# Patient Record
Sex: FEMALE | Race: BLACK OR AFRICAN AMERICAN | NOT HISPANIC OR LATINO | Employment: OTHER | URBAN - METROPOLITAN AREA
[De-identification: names, ages, dates, MRNs, and addresses within clinical notes are randomized per-mention and may not be internally consistent; named-entity substitution may affect disease eponyms.]

---

## 2021-01-03 ENCOUNTER — APPOINTMENT (EMERGENCY)
Dept: RADIOLOGY | Facility: HOSPITAL | Age: 71
End: 2021-01-03
Payer: COMMERCIAL

## 2021-01-03 ENCOUNTER — APPOINTMENT (INPATIENT)
Dept: NON INVASIVE DIAGNOSTICS | Facility: HOSPITAL | Age: 71
DRG: 270 | End: 2021-01-03
Payer: COMMERCIAL

## 2021-01-03 ENCOUNTER — APPOINTMENT (INPATIENT)
Dept: RADIOLOGY | Facility: HOSPITAL | Age: 71
DRG: 270 | End: 2021-01-03
Payer: COMMERCIAL

## 2021-01-03 ENCOUNTER — HOSPITAL ENCOUNTER (INPATIENT)
Facility: HOSPITAL | Age: 71
LOS: 24 days | Discharge: HOME | DRG: 270 | End: 2021-01-27
Attending: INTERNAL MEDICINE | Admitting: INTERNAL MEDICINE
Payer: COMMERCIAL

## 2021-01-03 ENCOUNTER — HOSPITAL ENCOUNTER (EMERGENCY)
Facility: HOSPITAL | Age: 71
End: 2021-01-03
Payer: COMMERCIAL

## 2021-01-03 VITALS
HEIGHT: 63 IN | TEMPERATURE: 97 F | DIASTOLIC BLOOD PRESSURE: 76 MMHG | HEART RATE: 100 BPM | RESPIRATION RATE: 18 BRPM | OXYGEN SATURATION: 96 % | WEIGHT: 198 LBS | BODY MASS INDEX: 35.08 KG/M2 | SYSTOLIC BLOOD PRESSURE: 148 MMHG

## 2021-01-03 DIAGNOSIS — M79.601 PAIN AND SWELLING OF RIGHT UPPER EXTREMITY: ICD-10-CM

## 2021-01-03 DIAGNOSIS — I51.7 CARDIOMEGALY: ICD-10-CM

## 2021-01-03 DIAGNOSIS — I10 ESSENTIAL HYPERTENSION: ICD-10-CM

## 2021-01-03 DIAGNOSIS — M79.89 PAIN AND SWELLING OF RIGHT UPPER EXTREMITY: ICD-10-CM

## 2021-01-03 DIAGNOSIS — I31.3 PERICARDIAL EFFUSION WITHOUT CARDIAC TAMPONADE: ICD-10-CM

## 2021-01-03 DIAGNOSIS — R06.02 SHORTNESS OF BREATH: ICD-10-CM

## 2021-01-03 DIAGNOSIS — I48.92 ATRIAL FLUTTER, PAROXYSMAL (HCC): Primary | ICD-10-CM

## 2021-01-03 DIAGNOSIS — I48.92 ATRIAL FLUTTER (HCC): ICD-10-CM

## 2021-01-03 DIAGNOSIS — J91.0 MALIGNANT PLEURAL EFFUSION: ICD-10-CM

## 2021-01-03 DIAGNOSIS — I31.3 PERICARDIAL EFFUSION: ICD-10-CM

## 2021-01-03 DIAGNOSIS — C49.A0 GASTROINTESTINAL STROMAL TUMOR (GIST) (HCC): Primary | ICD-10-CM

## 2021-01-03 DIAGNOSIS — J98.59 MEDIASTINAL MASS: ICD-10-CM

## 2021-01-03 DIAGNOSIS — R16.0 HYPODENSE MASS OF LIVER: ICD-10-CM

## 2021-01-03 LAB
ABO GROUP BLD: NORMAL
ABO GROUP BLD: NORMAL
ALBUMIN SERPL BCP-MCNC: 3.1 G/DL (ref 3.5–5)
ALBUMIN SERPL BCP-MCNC: 3.6 G/DL (ref 3.4–4.8)
ALP SERPL-CCNC: 106.9 U/L (ref 35–140)
ALP SERPL-CCNC: 119 U/L (ref 46–116)
ALT SERPL W P-5'-P-CCNC: 21 U/L (ref 5–54)
ALT SERPL W P-5'-P-CCNC: 26 U/L (ref 12–78)
ANION GAP SERPL CALCULATED.3IONS-SCNC: 7 MMOL/L (ref 4–13)
ANION GAP SERPL CALCULATED.3IONS-SCNC: 9 MMOL/L (ref 4–13)
ANISOCYTOSIS BLD QL SMEAR: PRESENT
APTT PPP: 28 SECONDS (ref 23–31)
APTT PPP: 36 SECONDS (ref 23–37)
AST SERPL W P-5'-P-CCNC: 17 U/L (ref 5–45)
AST SERPL W P-5'-P-CCNC: 19 U/L (ref 15–41)
ATRIAL RATE: 138 BPM
ATRIAL RATE: 139 BPM
ATRIAL RATE: 267 BPM
BASOPHILS # BLD MANUAL: 0.14 THOUSAND/UL (ref 0–0.1)
BASOPHILS NFR MAR MANUAL: 1 % (ref 0–1)
BILIRUB DIRECT SERPL-MCNC: 0.55 MG/DL (ref 0–0.2)
BILIRUB SERPL-MCNC: 0.93 MG/DL (ref 0.3–1.2)
BILIRUB SERPL-MCNC: 1.07 MG/DL (ref 0.2–1)
BLD GP AB SCN SERPL QL: NEGATIVE
BNP SERPL-MCNC: 113 PG/ML (ref 1–100)
BUN SERPL-MCNC: 19 MG/DL (ref 5–25)
BUN SERPL-MCNC: 22 MG/DL (ref 6–20)
BURR CELLS BLD QL SMEAR: PRESENT
CALCIUM ALBUM COR SERPL-MCNC: 9.4 MG/DL (ref 8.3–10.1)
CALCIUM SERPL-MCNC: 8.6 MG/DL (ref 8.4–10.2)
CALCIUM SERPL-MCNC: 8.7 MG/DL (ref 8.3–10.1)
CHLORIDE SERPL-SCNC: 106 MMOL/L (ref 96–108)
CHLORIDE SERPL-SCNC: 112 MMOL/L (ref 100–108)
CO2 SERPL-SCNC: 23 MMOL/L (ref 21–32)
CO2 SERPL-SCNC: 24 MMOL/L (ref 22–33)
CREAT SERPL-MCNC: 0.94 MG/DL (ref 0.6–1.3)
CREAT SERPL-MCNC: 1.07 MG/DL (ref 0.4–1.1)
D DIMER PPP FEU-MCNC: 8.37 MG/L FEU (ref 0.19–0.49)
EOSINOPHIL # BLD MANUAL: 0.7 THOUSAND/UL (ref 0–0.4)
EOSINOPHIL NFR BLD MANUAL: 5 % (ref 0–6)
ERYTHROCYTE [DISTWIDTH] IN BLOOD BY AUTOMATED COUNT: 18.9 % (ref 11.6–15.1)
ERYTHROCYTE [DISTWIDTH] IN BLOOD BY AUTOMATED COUNT: 19.7 % (ref 11.6–15.1)
EST. AVERAGE GLUCOSE BLD GHB EST-MCNC: 151 MG/DL
FLUAV RNA RESP QL NAA+PROBE: NEGATIVE
FLUBV RNA RESP QL NAA+PROBE: NEGATIVE
GFR SERPL CREATININE-BSD FRML MDRD: 60 ML/MIN/1.73SQ M
GFR SERPL CREATININE-BSD FRML MDRD: 70 ML/MIN/1.73SQ M
GIANT PLATELETS BLD QL SMEAR: PRESENT
GLUCOSE SERPL-MCNC: 119 MG/DL (ref 65–140)
GLUCOSE SERPL-MCNC: 136 MG/DL (ref 65–140)
HBA1C MFR BLD: 6.9 %
HCT VFR BLD AUTO: 34.1 % (ref 34.8–46.1)
HCT VFR BLD AUTO: 36.5 % (ref 34.8–46.1)
HELMET CELLS BLD QL SMEAR: PRESENT
HGB BLD-MCNC: 10.8 G/DL (ref 11.5–15.4)
HGB BLD-MCNC: 10.9 G/DL (ref 11.5–15.4)
HYPERCHROMIA BLD QL SMEAR: PRESENT
INR PPP: 1.42 (ref 0.9–1.1)
INR PPP: 1.64 (ref 0.84–1.19)
LYMPHOCYTES # BLD AUTO: 1.26 THOUSAND/UL (ref 0.6–4.47)
LYMPHOCYTES # BLD AUTO: 9 % (ref 14–44)
MAGNESIUM SERPL-MCNC: 2.3 MG/DL (ref 1.6–2.6)
MCH RBC QN AUTO: 20.6 PG (ref 26.8–34.3)
MCH RBC QN AUTO: 20.8 PG (ref 26.8–34.3)
MCHC RBC AUTO-ENTMCNC: 29.6 G/DL (ref 31.4–37.4)
MCHC RBC AUTO-ENTMCNC: 32 G/DL (ref 31.4–37.4)
MCV RBC AUTO: 65 FL (ref 82–98)
MCV RBC AUTO: 70 FL (ref 82–98)
MICROCYTES BLD QL AUTO: PRESENT
MONOCYTES # BLD AUTO: 1.26 THOUSAND/UL (ref 0–1.22)
MONOCYTES NFR BLD: 9 % (ref 4–12)
NEUTROPHILS # BLD MANUAL: 10.66 THOUSAND/UL (ref 1.85–7.62)
NEUTS SEG NFR BLD AUTO: 76 % (ref 43–75)
OVALOCYTES BLD QL SMEAR: PRESENT
P AXIS: 249 DEGREES
P AXIS: 250 DEGREES
P AXIS: 260 DEGREES
PLATELET # BLD AUTO: 384 THOUSANDS/UL (ref 149–390)
PLATELET # BLD AUTO: 391 THOUSANDS/UL (ref 149–390)
PLATELET BLD QL SMEAR: ADEQUATE
PMV BLD AUTO: 11.4 FL (ref 8.9–12.7)
PMV BLD AUTO: 11.4 FL (ref 8.9–12.7)
POIKILOCYTOSIS BLD QL SMEAR: PRESENT
POTASSIUM SERPL-SCNC: 4.2 MMOL/L (ref 3.5–5.3)
POTASSIUM SERPL-SCNC: 4.4 MMOL/L (ref 3.5–5)
PR INTERVAL: 143 MS
PR INTERVAL: 156 MS
PROT SERPL-MCNC: 6.7 G/DL (ref 6.4–8.3)
PROT SERPL-MCNC: 7 G/DL (ref 6.4–8.2)
PROTHROMBIN TIME: 15.8 SECONDS (ref 9.5–12.1)
PROTHROMBIN TIME: 19.3 SECONDS (ref 11.6–14.5)
QRS AXIS: -44 DEGREES
QRS AXIS: 19 DEGREES
QRS AXIS: 51 DEGREES
QRSD INTERVAL: 138 MS
QRSD INTERVAL: 66 MS
QRSD INTERVAL: 73 MS
QT INTERVAL: 288 MS
QT INTERVAL: 368 MS
QT INTERVAL: 382 MS
QTC INTERVAL: 415 MS
QTC INTERVAL: 438 MS
QTC INTERVAL: 558 MS
RBC # BLD AUTO: 5.24 MILLION/UL (ref 3.81–5.12)
RBC # BLD AUTO: 5.25 MILLION/UL (ref 3.81–5.12)
RBC MORPH BLD: PRESENT
RH BLD: POSITIVE
RH BLD: POSITIVE
RSV RNA RESP QL NAA+PROBE: NEGATIVE
SARS-COV-2 RNA RESP QL NAA+PROBE: NEGATIVE
SCHISTOCYTES BLD QL SMEAR: PRESENT
SMUDGE CELLS BLD QL SMEAR: PRESENT
SODIUM SERPL-SCNC: 139 MMOL/L (ref 133–145)
SODIUM SERPL-SCNC: 142 MMOL/L (ref 136–145)
SPECIMEN EXPIRATION DATE: NORMAL
T WAVE AXIS: 102 DEGREES
T WAVE AXIS: 257 DEGREES
T WAVE AXIS: 48 DEGREES
TARGETS BLD QL SMEAR: PRESENT
TOTAL CELLS COUNTED SPEC: 100
TROPONIN I SERPL-MCNC: <0.02 NG/ML
TROPONIN I SERPL-MCNC: <0.02 NG/ML
TROPONIN I SERPL-MCNC: <0.03 NG/ML (ref 0–0.07)
TSH SERPL DL<=0.05 MIU/L-ACNC: 1.21 UIU/ML (ref 0.36–3.74)
VENTRICULAR RATE: 138 BPM
VENTRICULAR RATE: 139 BPM
VENTRICULAR RATE: 71 BPM
WBC # BLD AUTO: 14.02 THOUSAND/UL (ref 4.31–10.16)
WBC # BLD AUTO: 15.59 THOUSAND/UL (ref 4.31–10.16)

## 2021-01-03 PROCEDURE — 80053 COMPREHEN METABOLIC PANEL: CPT

## 2021-01-03 PROCEDURE — 71275 CT ANGIOGRAPHY CHEST: CPT

## 2021-01-03 PROCEDURE — 93010 ELECTROCARDIOGRAM REPORT: CPT | Performed by: INTERNAL MEDICINE

## 2021-01-03 PROCEDURE — 96375 TX/PRO/DX INJ NEW DRUG ADDON: CPT

## 2021-01-03 PROCEDURE — 85027 COMPLETE CBC AUTOMATED: CPT | Performed by: SURGERY

## 2021-01-03 PROCEDURE — 84484 ASSAY OF TROPONIN QUANT: CPT | Performed by: INTERNAL MEDICINE

## 2021-01-03 PROCEDURE — 85027 COMPLETE CBC AUTOMATED: CPT

## 2021-01-03 PROCEDURE — 0241U HB NFCT DS VIR RESP RNA 4 TRGT: CPT

## 2021-01-03 PROCEDURE — 99285 EMERGENCY DEPT VISIT HI MDM: CPT

## 2021-01-03 PROCEDURE — 86850 RBC ANTIBODY SCREEN: CPT | Performed by: SURGERY

## 2021-01-03 PROCEDURE — 36415 COLL VENOUS BLD VENIPUNCTURE: CPT

## 2021-01-03 PROCEDURE — 82248 BILIRUBIN DIRECT: CPT | Performed by: INTERNAL MEDICINE

## 2021-01-03 PROCEDURE — 93306 TTE W/DOPPLER COMPLETE: CPT | Performed by: INTERNAL MEDICINE

## 2021-01-03 PROCEDURE — 84443 ASSAY THYROID STIM HORMONE: CPT | Performed by: INTERNAL MEDICINE

## 2021-01-03 PROCEDURE — 85007 BL SMEAR W/DIFF WBC COUNT: CPT

## 2021-01-03 PROCEDURE — 83735 ASSAY OF MAGNESIUM: CPT | Performed by: INTERNAL MEDICINE

## 2021-01-03 PROCEDURE — 85730 THROMBOPLASTIN TIME PARTIAL: CPT | Performed by: SURGERY

## 2021-01-03 PROCEDURE — 93005 ELECTROCARDIOGRAM TRACING: CPT

## 2021-01-03 PROCEDURE — G1004 CDSM NDSC: HCPCS

## 2021-01-03 PROCEDURE — 71045 X-RAY EXAM CHEST 1 VIEW: CPT

## 2021-01-03 PROCEDURE — 99252 IP/OBS CONSLTJ NEW/EST SF 35: CPT | Performed by: INTERNAL MEDICINE

## 2021-01-03 PROCEDURE — 84484 ASSAY OF TROPONIN QUANT: CPT

## 2021-01-03 PROCEDURE — 85379 FIBRIN DEGRADATION QUANT: CPT

## 2021-01-03 PROCEDURE — 86900 BLOOD TYPING SEROLOGIC ABO: CPT | Performed by: SURGERY

## 2021-01-03 PROCEDURE — 96376 TX/PRO/DX INJ SAME DRUG ADON: CPT

## 2021-01-03 PROCEDURE — 86920 COMPATIBILITY TEST SPIN: CPT

## 2021-01-03 PROCEDURE — 83880 ASSAY OF NATRIURETIC PEPTIDE: CPT

## 2021-01-03 PROCEDURE — 96365 THER/PROPH/DIAG IV INF INIT: CPT

## 2021-01-03 PROCEDURE — 85610 PROTHROMBIN TIME: CPT

## 2021-01-03 PROCEDURE — 83036 HEMOGLOBIN GLYCOSYLATED A1C: CPT | Performed by: INTERNAL MEDICINE

## 2021-01-03 PROCEDURE — 80053 COMPREHEN METABOLIC PANEL: CPT | Performed by: INTERNAL MEDICINE

## 2021-01-03 PROCEDURE — 96366 THER/PROPH/DIAG IV INF ADDON: CPT

## 2021-01-03 PROCEDURE — 85730 THROMBOPLASTIN TIME PARTIAL: CPT

## 2021-01-03 PROCEDURE — 85610 PROTHROMBIN TIME: CPT | Performed by: SURGERY

## 2021-01-03 PROCEDURE — NC001 PR NO CHARGE: Performed by: THORACIC SURGERY (CARDIOTHORACIC VASCULAR SURGERY)

## 2021-01-03 PROCEDURE — 93306 TTE W/DOPPLER COMPLETE: CPT

## 2021-01-03 PROCEDURE — 86901 BLOOD TYPING SEROLOGIC RH(D): CPT | Performed by: SURGERY

## 2021-01-03 PROCEDURE — 99223 1ST HOSP IP/OBS HIGH 75: CPT | Performed by: INTERNAL MEDICINE

## 2021-01-03 RX ORDER — DOCUSATE SODIUM 100 MG/1
100 CAPSULE, LIQUID FILLED ORAL 2 TIMES DAILY
Status: DISCONTINUED | OUTPATIENT
Start: 2021-01-03 | End: 2021-01-27 | Stop reason: HOSPADM

## 2021-01-03 RX ORDER — MAGNESIUM HYDROXIDE/ALUMINUM HYDROXICE/SIMETHICONE 120; 1200; 1200 MG/30ML; MG/30ML; MG/30ML
30 SUSPENSION ORAL EVERY 6 HOURS PRN
Status: DISCONTINUED | OUTPATIENT
Start: 2021-01-03 | End: 2021-01-27 | Stop reason: HOSPADM

## 2021-01-03 RX ORDER — HEPARIN SODIUM 10000 [USP'U]/100ML
3-30 INJECTION, SOLUTION INTRAVENOUS
Status: DISCONTINUED | OUTPATIENT
Start: 2021-01-03 | End: 2021-01-04

## 2021-01-03 RX ORDER — DILTIAZEM HYDROCHLORIDE 5 MG/ML
15 INJECTION INTRAVENOUS ONCE
Status: COMPLETED | OUTPATIENT
Start: 2021-01-03 | End: 2021-01-03

## 2021-01-03 RX ORDER — HEPARIN SODIUM 1000 [USP'U]/ML
6800 INJECTION, SOLUTION INTRAVENOUS; SUBCUTANEOUS ONCE
Status: COMPLETED | OUTPATIENT
Start: 2021-01-03 | End: 2021-01-03

## 2021-01-03 RX ORDER — SENNOSIDES 8.6 MG
1 TABLET ORAL DAILY
Status: DISCONTINUED | OUTPATIENT
Start: 2021-01-03 | End: 2021-01-27 | Stop reason: HOSPADM

## 2021-01-03 RX ORDER — CEFAZOLIN SODIUM 2 G/50ML
2000 SOLUTION INTRAVENOUS
Status: DISPENSED | OUTPATIENT
Start: 2021-01-04 | End: 2021-01-05

## 2021-01-03 RX ORDER — FUROSEMIDE 10 MG/ML
40 INJECTION INTRAMUSCULAR; INTRAVENOUS ONCE
Status: COMPLETED | OUTPATIENT
Start: 2021-01-03 | End: 2021-01-03

## 2021-01-03 RX ORDER — METOPROLOL TARTRATE 5 MG/5ML
5 INJECTION INTRAVENOUS ONCE
Status: COMPLETED | OUTPATIENT
Start: 2021-01-03 | End: 2021-01-03

## 2021-01-03 RX ORDER — LISINOPRIL 30 MG/1
30 TABLET ORAL DAILY
COMMUNITY
End: 2021-01-27 | Stop reason: HOSPADM

## 2021-01-03 RX ORDER — ONDANSETRON 2 MG/ML
4 INJECTION INTRAMUSCULAR; INTRAVENOUS EVERY 6 HOURS PRN
Status: DISCONTINUED | OUTPATIENT
Start: 2021-01-03 | End: 2021-01-27 | Stop reason: HOSPADM

## 2021-01-03 RX ADMIN — DOCUSATE SODIUM 100 MG: 100 CAPSULE, LIQUID FILLED ORAL at 09:35

## 2021-01-03 RX ADMIN — HEPARIN SODIUM 18 UNITS/KG/HR: 10000 INJECTION, SOLUTION INTRAVENOUS at 21:57

## 2021-01-03 RX ADMIN — DILTIAZEM HYDROCHLORIDE 15 MG/HR: 5 INJECTION INTRAVENOUS at 09:33

## 2021-01-03 RX ADMIN — FUROSEMIDE 40 MG: 10 INJECTION, SOLUTION INTRAMUSCULAR; INTRAVENOUS at 12:59

## 2021-01-03 RX ADMIN — METOPROLOL TARTRATE 25 MG: 25 TABLET, FILM COATED ORAL at 20:17

## 2021-01-03 RX ADMIN — STANDARDIZED SENNA CONCENTRATE 8.6 MG: 8.6 TABLET ORAL at 09:35

## 2021-01-03 RX ADMIN — DILTIAZEM HYDROCHLORIDE 5 MG/HR: 5 INJECTION INTRAVENOUS at 03:41

## 2021-01-03 RX ADMIN — DILTIAZEM HYDROCHLORIDE 15 MG: 5 INJECTION, SOLUTION INTRAVENOUS at 03:33

## 2021-01-03 RX ADMIN — METOPROLOL TARTRATE 25 MG: 25 TABLET, FILM COATED ORAL at 09:35

## 2021-01-03 RX ADMIN — HEPARIN SODIUM 6800 UNITS: 1000 INJECTION INTRAVENOUS; SUBCUTANEOUS at 20:17

## 2021-01-03 RX ADMIN — METOROPROLOL TARTRATE 5 MG: 5 INJECTION, SOLUTION INTRAVENOUS at 01:08

## 2021-01-03 RX ADMIN — IOHEXOL 85 ML: 350 INJECTION, SOLUTION INTRAVENOUS at 16:43

## 2021-01-03 RX ADMIN — ENOXAPARIN SODIUM 90 MG: 100 INJECTION SUBCUTANEOUS at 11:26

## 2021-01-03 NOTE — ED NOTES
Patient in [id number]  Repeat EKG completed and tigertexted to Beulah Jin and made aware of change in patient status  Awaiting Cardiology to see patient       Ashli Radnall RN  01/03/21 1547

## 2021-01-03 NOTE — EMTALA/ACUTE CARE TRANSFER
[facility] EMERGENCY DEPARTMENT  [address] [facility] [address]  Dept: [phone]      EMTALA TRANSFER CONSENT    NAME Geovanni DORANTES 1950                              MRN [id number]    I have been informed of my rights regarding examination, treatment, and transfer   by Dr Dorian Zhang MD    Benefits: Specialized equipment and/or services available at the receiving facility (Include comment)________________________    Risks: Potential for delay in receiving treatment, Potential deterioration of medical condition, Loss of IV, Increased discomfort during transfer, Possible worsening of condition or death during transfer      Consent for Transfer:  I acknowledge that my medical condition has been evaluated and explained to me by the emergency department physician or other qualified medical person and/or my attending physician, who has recommended that I be transferred to the service of  Accepting Physician: Janiya Watson at [address] Name, [address] : [address]  The above potential benefits of such transfer, the potential risks associated with such transfer, and the probable risks of not being transferred have been explained to me, and I fully understand them  The doctor has explained that, in my case, the benefits of transfer outweigh the risks  I agree to be transferred  I authorize the performance of emergency medical procedures and treatments upon me in both transit and upon arrival at the receiving facility  Additionally, I authorize the release of any and all medical records to the receiving facility and request they be transported with me, if possible  I understand that the safest mode of transportation during a medical emergency is an ambulance and that the Hospital advocates the use of this mode of transport   Risks of traveling to the receiving facility by car, including absence of medical control, life sustaining equipment, such as oxygen, and medical personnel has been explained to me and I fully understand them  (TERRY CORRECT BOX BELOW)  [  ]  I consent to the stated transfer and to be transported by ambulance/helicopter  [  ]  I consent to the stated transfer, but refuse transportation by ambulance and accept full responsibility for my transportation by car  I understand the risks of non-ambulance transfers and I exonerate the Hospital and its staff from any deterioration in my condition that results from this refusal     X___________________________________________    DATE  21  TIME________  Signature of patient or legally responsible individual signing on patient behalf           RELATIONSHIP TO PATIENT_________________________          Provider Certification    NAME Soha Hightower                                         1950                              MRN [id number]    A medical screening exam was performed on the above named patient  Based on the examination:    Condition Necessitating Transfer There were no encounter diagnoses      Patient Condition: The patient has been stabilized such that within reasonable medical probability, no material deterioration of the patient condition or the condition of the unborn child(cherry) is likely to result from the transfer    Reason for Transfer: Level of Care needed not available at this facility    Transfer Requirements: Maximino Martinez   · Space available and qualified personnel available for treatment as acknowledged by    · Agreed to accept transfer and to provide appropriate medical treatment as acknowledged by       [place]  · Appropriate medical records of the examination and treatment of the patient are provided at the time of transfer   [address] _______  · Transfer will be performed by qualified personnel from    and appropriate transfer equipment as required, including the use of necessary and appropriate life support measures  Provider Certification: I have examined the patient and explained the following risks and benefits of being transferred/refusing transfer to the patient/family:         Based on these reasonable risks and benefits to the patient and/or the unborn child(cherry), and based upon the information available at the time of the patients examination, I certify that the medical benefits reasonably to be expected from the provision of appropriate medical treatments at another medical facility outweigh the increasing risks, if any, to the individuals medical condition, and in the case of labor to the unborn child, from effecting the transfer      X____________________________________________ DATE 01/03/21        TIME_______      ORIGINAL - SEND TO MEDICAL RECORDS   COPY - SEND WITH PATIENT DURING TRANSFER

## 2021-01-03 NOTE — PLAN OF CARE
Problem: Potential for Falls  Goal: Patient will remain free of falls  Description: INTERVENTIONS:  - Assess patient frequently for physical needs  -  Identify cognitive and physical deficits and behaviors that affect risk of falls  -  [place] fall precautions as indicated by assessment   - Educate patient/family on patient safety including physical limitations  - Instruct patient to call for assistance with activity based on assessment  - Modify environment to reduce risk of injury  - Consider OT/PT consult to assist with strengthening/mobility  Outcome: Progressing     Problem: SAFETY ADULT  Goal: Patient will remain free of falls  Description: INTERVENTIONS:  - Assess patient frequently for physical needs  -  Identify cognitive and physical deficits and behaviors that affect risk of falls    -  [place] fall precautions as indicated by assessment   - Educate patient/family on patient safety including physical limitations  - Instruct patient to call for assistance with activity based on assessment  - Modify environment to reduce risk of injury  - Consider OT/PT consult to assist with strengthening/mobility  Outcome: Progressing     Problem: CARDIOVASCULAR - ADULT  Goal: Maintains optimal cardiac output and hemodynamic stability  Description: INTERVENTIONS:  - Monitor I/O, vital signs and rhythm  - Monitor for S/S and trends of decreased cardiac output  - Administer and titrate ordered vasoactive medications to optimize hemodynamic stability  - Assess quality of pulses, skin color and temperature  - Assess for signs of decreased coronary artery perfusion  - Instruct patient to report change in severity of symptoms  Outcome: Progressing

## 2021-01-03 NOTE — ASSESSMENT & PLAN NOTE
Patient has sob, edema of lower extremities  Does not look like heart failure, will continue to monitor, BNP is 118  Lasix for now 20 mg ivss once

## 2021-01-03 NOTE — ED NOTES
Called pharmacy for Cardizem drip  Will be sent to ED when made       Abhishek Mares, GARRET  01/03/21 4932

## 2021-01-03 NOTE — CONSULTS
Consultation - Cardiology   Myrna Soares 65 y o  female MRN: [id number]  Unit/Bed#: ED 23 Encounter: [id number]      Assessment and Plan:  Principal Problem:    Atrial flutter ([address] )  Active Problems:    Essential hypertension    Shortness of breath    # Atrial flutter  # cardiomegaly on CXR  # CHF  - c/w cardizem gtt for now  - obtain a TTE  - diuresis with lasix 40 mg QD, monitor urine output and lytes  - Eventually patient will require TATYANA/cardioversion vs ablation if she does not self convert  - Lovenox for AC  Depending upon her insurance she can be switched to oral [facility] tomorrow    # HTN      History of Present Illness   Physician Requesting Consult: Gali Wall MD  Reason for Consult / Principal Problem: Atrial flutter  HPI: Myrna Soares is a 65y o  year old female with past medical history of hypertension presenting with intermittent cough and shortness of breath  Patient had intermittent cough for the past few days which was mostly dry although she did have clear phlegm during one episode  She has been having shortness of breath intermittently while playing with her grandchild but it is not consistent with exercise  She has also been noticing lower extremity swelling which has worsened recently  She can lie down flat and denies any chest pain, PND or palpitations  She denies any prior cardiac history, substance use history or smoking        EK:1 flutter       Inpatient consult to Cardiology  Consult performed by: Marshall Guerrero MD  Consult ordered by: Gali Wall MD        Review of Systems:  Review of Systems  10 point ROS negative except as mentioned in the HPI    Historical Information   Past Medical History:   Diagnosis Date    Hypertension      Past Surgical History:   Procedure Laterality Date    MASTECTOMY      left      Social History     Substance and Sexual Activity   Alcohol Use Yes    Frequency: Monthly or less    Drinks per session: 1 or 2    Binge frequency: Never     Social History     Substance and Sexual Activity   Drug Use Never     Social History     Tobacco Use   Smoking Status Never Smoker   Smokeless Tobacco Never Used     Family History: non-contributory    Meds/Allergies   all current active meds have been reviewed  No Known Allergies    Objective   Vitals: Blood pressure 90/52, pulse 68, temperature (!) 97 4 °F (36 3 °C), temperature source Oral, resp  rate 18, height 5' 4" (1 626 m), weight 89 8 kg (198 lb), SpO2 96 %  , Body mass index is 33 99 kg/m² ,   Orthostatic Blood Pressures      Most Recent Value   Blood Pressure  90/52 [Dr Mendoza made aware] filed at 01/03/2021 1043   Patient Position - Orthostatic VS  Lying filed at 01/03/2021 1043          No intake or output data in the 24 hours ending 01/03/21 1055    Invasive Devices     Peripheral Intravenous Line            Peripheral IV 01/03/21 Right Antecubital less than 1 day                Physical Exam:  Physical Exam  General: alert, oriented X 3 , comfortable  Neck: No JVD  Cardiac: normal S1, S2, S3 gallop present  Respiratory: normal breath sounds, no wheezes or crackles  Abdomen: soft and non-tender  Extremities: warm, no cyanosis, 1+ lower extremity edema      Lab Results:     Lab Results   Component Value Date    TROPONINI <0 02 01/03/2021    TROPONINI <0 03 01/03/2021       Lab Results   Component Value Date    CALCIUM 8 7 01/03/2021    K 4 2 01/03/2021    CO2 23 01/03/2021     (H) 01/03/2021    BUN 19 01/03/2021    CREATININE 0 94 01/03/2021       Lab Results   Component Value Date    WBC 14 02 (H) 01/03/2021    HGB 10 9 (L) 01/03/2021    HCT 34 1 (L) 01/03/2021    MCV 65 (L) 01/03/2021     01/03/2021       No results found for: CHOL  No results found for: HDL  No results found for: LDLCALC  No results found for: TRIG    Lab Results   Component Value Date    ALT 26 01/03/2021    AST 17 01/03/2021       Results from last 7 days   Lab Units 01/03/21  0108   INR  1 42* Imaging: I have personally reviewed pertinent reports

## 2021-01-03 NOTE — ASSESSMENT & PLAN NOTE
Patient was coughing too much and her  brought her to ED  She was having sob, EKG showed a flutter, QTc prolongation  She her son passed away on 12/21/2020  She says did not feel anything  She did have swelling in her legs increased over last week   She has swelling in the past, treated with lasix with improvement in June, 2020    -admit to med/surg  -telemetry monitoring  -EKG serial  Troponins, so far negative  Started on cardizem drip, will continue for now  Cardiology consulted  ECHO ordered

## 2021-01-03 NOTE — ED PROVIDER NOTES
History  Chief Complaint   Patient presents with    Shortness of Breath     pt presents to ed via walk in with a cough x1 week also with some SOB and the past two days increase in leg swelling      47-year-old female history of hypertension presents secondary to a cough it has gone on for the past week noted some increasing shortness of breath and some swelling in her legs as well  Patient denies any particular chest pain she is awake alert no significant distress on evaluation  Patient denies any fever chills denies any sore throat patient denies any productive cough  Patient is awake alert in mild distress on my evaluation  Patient claims she has been compliant with her medications  Patient denies any nausea or vomiting and denies any significant cardiac history  Prior to Admission Medications   Prescriptions Last Dose Informant Patient Reported? Taking?   lisinopril (ZESTRIL) 30 mg tablet 1/3/2021 at Unknown time Self Yes Yes   Sig: Take 30 mg by mouth daily   metoprolol tartrate (LOPRESSOR) 25 mg tablet 1/3/2021 at Unknown time Self Yes Yes   Sig: Take 25 mg by mouth every 12 (twelve) hours      Facility-Administered Medications: None       Past Medical History:   Diagnosis Date    Hypertension        Past Surgical History:   Procedure Laterality Date    MASTECTOMY      left        History reviewed  No pertinent family history  I have reviewed and agree with the history as documented  E-Cigarette/Vaping    E-Cigarette Use Never User      E-Cigarette/Vaping Substances     Social History     Tobacco Use    Smoking status: Never Smoker    Smokeless tobacco: Never Used   Substance Use Topics    Alcohol use: Yes     Frequency: Monthly or less     Drinks per session: 1 or 2     Binge frequency: Never    Drug use: Never       Review of Systems   Constitutional: Negative for chills and fever  HENT: Negative for congestion  Eyes: Negative for visual disturbance     Respiratory: Positive for cough and shortness of breath  Cardiovascular: Positive for leg swelling  Negative for chest pain  Gastrointestinal: Negative for abdominal pain  Endocrine: Negative for cold intolerance  Genitourinary: Negative for frequency  Musculoskeletal: Negative for gait problem  Skin: Negative for rash  Neurological: Positive for weakness  Negative for dizziness  Psychiatric/Behavioral: Negative for behavioral problems and confusion  Physical Exam  Physical Exam  Vitals signs and nursing note reviewed  Constitutional:       General: She is in acute distress (mild respiratory distress)  Appearance: She is well-developed  HENT:      Head: Normocephalic and atraumatic  Eyes:      Conjunctiva/sclera: Conjunctivae normal       Pupils: Pupils are equal, round, and reactive to light  Neck:      Musculoskeletal: Normal range of motion and neck supple  Cardiovascular:      Rate and Rhythm: Regular rhythm  Tachycardia present  Heart sounds: Murmur: systolic murmer heard best over apex  Pulmonary:      Effort: Pulmonary effort is normal       Breath sounds: Examination of the right-lower field reveals rales  Examination of the left-lower field reveals rales  Rales present  Abdominal:      General: Bowel sounds are normal       Palpations: Abdomen is soft  Musculoskeletal: Normal range of motion  Skin:     General: Skin is warm and dry  Capillary Refill: Capillary refill takes less than 2 seconds  Neurological:      Mental Status: She is alert and oriented to person, place, and time     Psychiatric:         Behavior: Behavior normal          Vital Signs  ED Triage Vitals [01/03/21 0034]   Temperature Pulse Respirations Blood Pressure SpO2   (!) 97 °F (36 1 °C) (!) 137 21 162/77 94 %      Temp Source Heart Rate Source Patient Position - Orthostatic VS BP Location FiO2 (%)   Tympanic Monitor Sitting Right arm --      Pain Score       No Pain           Vitals:    01/03/21 0034 01/03/21 0121 01/03/21 0330 01/03/21 0335   BP: 162/77 136/80 132/96 112/75   Pulse: (!) 137 (!) 135 (!) 136 69   Patient Position - Orthostatic VS: Sitting Sitting Lying Lying         Visual Acuity      ED Medications  Medications   diltiazem (CARDIZEM) 125 mg in sodium chloride 0 9 % 125 mL infusion (5 mg/hr Intravenous New Bag 1/3/21 0341)   metoprolol (LOPRESSOR) injection 5 mg (5 mg Intravenous Given 1/3/21 0108)   diltiazem (CARDIZEM) injection 15 mg (15 mg Intravenous Given 1/3/21 0333)       Diagnostic Studies  Results Reviewed     Procedure Component Value Units Date/Time    COVID19, Influenza A/B, RSV PCR, [facility] [[id number]]  (Normal) Collected: 01/03/21 0108    Lab Status: Final result Specimen: Nares from Nasopharyngeal Swab Updated: 01/03/21 0222     SARS-CoV-2 Negative     INFLUENZA A PCR Negative     INFLUENZA B PCR Negative     RSV PCR Negative    Narrative: This test has been authorized by FDA under an EUA (Emergency Use Assay) for use by authorized laboratories  Clinical caution and judgement should be used with the interpretation of these results with consideration of the clinical impression and other laboratory testing  Testing reported as "Positive" or "Negative" has been proven to be accurate according to standard laboratory validation requirements  All testing is performed with control materials showing appropriate reactivity at standard intervals      CBC and differential [[id number]]  (Abnormal) Collected: 01/03/21 0108    Lab Status: Final result Specimen: Blood from Arm, Right Updated: 01/03/21 0158     WBC 14 02 Thousand/uL      RBC 5 24 Million/uL      Hemoglobin 10 9 g/dL      Hematocrit 34 1 %      MCV 65 fL      MCH 20 8 pg      MCHC 32 0 g/dL      RDW 18 9 %      MPV 11 4 fL      Platelets 167 Thousands/uL     Manual Differential(PHLEBS Do Not Order) [[id number]]  (Abnormal) Collected: 01/03/21 0108    Lab Status: Final result Specimen: Blood from Arm, Right Updated: 01/03/21 0158     Segmented % 76 %      Lymphocytes % 9 %      Monocytes % 9 %      Eosinophils, % 5 %      Basophils % 1 %      Absolute Neutrophils 10 66 Thousand/uL      Lymphocytes Absolute 1 26 Thousand/uL      Monocytes Absolute 1 26 Thousand/uL      Eosinophils Absolute 0 70 Thousand/uL      Basophils Absolute 0 14 Thousand/uL      Total Counted 100     Smudge Cells Present     RBC Morphology Present     Anisocytosis Present     Jade Cells Present     Helmet Cells Present     Hypochromia Present     Microcytes Present     Ovalocytes Present     Poikilocytes Present     Schistocytes Present     Target Cells Present     Platelet Estimate Adequate     Giant PLTs Present    APTT [[id number]]  (Normal) Collected: 01/03/21 0108    Lab Status: Final result Specimen: Blood from Arm, Right Updated: 01/03/21 0150     PTT 28 seconds     Protime-INR [[id number]]  (Abnormal) Collected: 01/03/21 0108    Lab Status: Final result Specimen: Blood from Arm, Right Updated: 01/03/21 0150     Protime 15 8 seconds      INR 1 42    Narrative:      INR Reference Ranges:  No Anticoagulant, Normal:           0 9-1 1  Standard Dose, Oral Anticoagulant:  2 0-3 0  High Dose, Oral Anticoagulant:      2 5-3 5    D-dimer, quantitative [[id number]]  (Abnormal) Collected: 01/03/21 0108    Lab Status: Final result Specimen: Blood from Arm, Right Updated: 01/03/21 0150     D-Dimer, Quant  8 37 mg/L FEU     B-Type Natriuretic Peptide [facility] ONLY) [[id number]]  (Abnormal) Collected: 01/03/21 0108    Lab Status: Final result Specimen: Blood from Arm, Right Updated: 01/03/21 0144      0 pg/mL     Troponin I [[id number]]  (Normal) Collected: 01/03/21 0108    Lab Status: Final result Specimen: Blood from Arm, Right Updated: 01/03/21 0142     Troponin I <0 03 ng/mL     Comprehensive metabolic panel [[id number]]  (Abnormal) Collected: 01/03/21 0108    Lab Status: Final result Specimen: Blood from Arm, Right Updated: 01/03/21 0138     Sodium 139 mmol/L      Potassium 4 4 mmol/L      Chloride 106 mmol/L      CO2 24 mmol/L      ANION GAP 9 mmol/L      BUN 22 mg/dL      Creatinine 1 07 mg/dL      Glucose 136 mg/dL      Calcium 8 6 mg/dL      AST 19 U/L      ALT 21 U/L      Alkaline Phosphatase 106 9 U/L      Total Protein 6 7 g/dL      Albumin 3 6 g/dL      Total Bilirubin 0 93 mg/dL      eGFR 60 ml/min/1 73sq m     Narrative:      [place] guidelines for Chronic Kidney Disease (CKD):     Stage 1 with normal or high GFR (GFR > 90 mL/min/1 73 square meters)    Stage 2 Mild CKD (GFR = 60-89 mL/min/1 73 square meters)    Stage 3A Moderate CKD (GFR = 45-59 mL/min/1 73 square meters)    Stage 3B Moderate CKD (GFR = 30-44 mL/min/1 73 square meters)    Stage 4 Severe CKD (GFR = 15-29 mL/min/1 73 square meters)    Stage 5 End Stage CKD (GFR <15 mL/min/1 73 square meters)  Note: GFR calculation is accurate only with a steady state creatinine                 XR chest 1 view portable    (Results Pending)              Procedures  Procedures         ED Course                             SBIRT 22yo+      Most Recent Value   SBIRT (24 yo +)   In order to provide better care to our patients, we are screening all of our patients for alcohol and drug use  Would it be okay to ask you these screening questions? Yes Filed at: 01/03/2021 0112   Initial Alcohol Screen: US AUDIT-C    1  How often do you have a drink containing alcohol?  0 Filed at: 01/03/2021 0112   2  How many drinks containing alcohol do you have on a typical day you are drinking? 0 Filed at: 01/03/2021 0112   3a  Male UNDER 65: How often do you have five or more drinks on one occasion? 0 Filed at: 01/03/2021 0112   3b  FEMALE Any Age, or MALE 65+: How often do you have 4 or more drinks on one occassion? 0 Filed at: 01/03/2021 0112   Audit-C Score  0 Filed at: 01/03/2021 4018   CARLITO: How many times in the past year have you       Used an illegal drug or used a prescription medication for non-medical reasons? Never Filed at: 01/03/2021 0112                    [facility]  Number of Diagnoses or Management Options  Diagnosis management comments: Patient was monitored in the emergency department Lopressor 5 mg IV was given there is no significant change in patient's heart rate  Patient had no significant issues with her blood pressure heart rate still remained at 1:38 a m  Blessing Ceballos Patient is given Lopressor IV without any improvement  Case discussed with cardiologist Dr Houston Shankar recommends patient be transferred for cardiac evaluation conversion of the atrial flutter after evaluation of the T etiology  Patient [facility] be [facility] the [facility] med surge telemetry the admitting physician will be Anderson Kumari  Disposition  Final diagnoses:   None     ED Disposition     ED Disposition Condition Date/Time Comment    Transfer to Another Facility-In [facility] Erm[place] 3, 2021  3:45 AM Erlin Claros should be transferred out to [facility]          MD Documentation      Most Recent Value   Patient Condition  The patient has been stabilized such that within reasonable medical probability, no material deterioration of the patient condition or the condition of the unborn child(cherry) is likely to result from the transfer   Reason for Transfer  Level of Care needed not available at this facility   Benefits of Transfer  Specialized equipment and/or services available at the receiving facility (Include comment)________________________   Risks of Transfer  Potential for delay in receiving treatment, Potential deterioration of medical condition, Loss of IV, Increased discomfort during transfer, Possible worsening of condition or death during transfer   Accepting Physician  Deepak Holt NameSavi   Sending MD ashley COMBS Documentation      Most [address] [facility] NameSavi      Follow-up Information    None         Patient's Medications   Discharge Prescriptions    No medications on file     No discharge procedures on file      PDMP Review     None          ED Provider  Electronically Signed by           Rabia Bacon MD  01/03/21 9752

## 2021-01-03 NOTE — ED NOTES
Cardizem 15mg bolus given at this time heart rate went from 135bpm to [address]  Ok Duque , RN  01/03/21 8434

## 2021-01-03 NOTE — H&P
H&P- Carolina Guardado 1950, 65 y o  female MRN: [id number]    Unit/Bed#: ED 23 Encounter: [id number]    Primary Care Provider: Augustina Rogers MD   Date and time admitted to hospital: 1/3/2021  8:26 AM        * Atrial flutter [facility])  Assessment & Plan  Patient was coughing too much and her  brought her to ED  She was having sob, EKG showed a flutter, QTc prolongation  She her son passed away on 12/21/2020  She says did not feel anything  She did have swelling in her legs increased over last week  She has swelling in the past, treated with lasix with improvement in June, 2020    -admit to med/surg  -telemetry monitoring  -EKG serial  Troponins, so far negative  Started on cardizem drip, will continue for now  Cardiology consulted  ECHO ordered      Shortness of breath  Assessment & Plan  Patient has sob, edema of lower extremities  Does not look like heart failure, will continue to monitor, BNP is 118  Lasix for now 20 mg ivss once    Essential hypertension  Assessment & Plan  Patient is being treated with lisinopril and metoprolol will continue for now  bp is under control  VTE Prophylaxis: Enoxaparin (Lovenox)  / sequential compression device   Code Status: full code   POLST: POLST is not applicable to this patient  Discussion with family: updated    Anticipated Length of Stay:  Patient will be admitted on an Inpatient basis with an anticipated length of stay of  More than  2 midnights  Justification for Hospital Stay: due to a flutter     Total Time for Visit, including Counseling / Coordination of Care: 45 minutes  Greater than 50% of this total time spent on direct patient counseling and coordination of care  Chief Complaint:   Cough     History of Present Illness:    Carolina Guardado is a 65 y o  female who presents with history of htn, lower ext edema presented with cough   She says her  noted it and brought her to ED, where they found her to be in a flutter, lower edema and transferred here  She was having no symptoms  She say she has been stressed since 2020 as her son passed away  She is making  arrangements  Review of Systems:    Review of Systems   Constitutional: Positive for activity change  Negative for appetite change, chills, diaphoresis, fatigue, fever and unexpected weight change  HENT: Negative for congestion, dental problem, drooling and ear discharge  Eyes: Negative for photophobia, pain, discharge, redness, itching and visual disturbance  Respiratory: Positive for cough and shortness of breath  Negative for apnea, choking, chest tightness, wheezing and stridor  Cardiovascular: Negative  Negative for chest pain, palpitations and leg swelling  Gastrointestinal: Negative  Negative for abdominal distention and abdominal pain  Endocrine: Negative  Negative for cold intolerance, heat intolerance, polydipsia, polyphagia and polyuria  Genitourinary: Negative  Musculoskeletal: Negative  Past Medical and Surgical History:     Past Medical History:   Diagnosis Date    Hypertension        Past Surgical History:   Procedure Laterality Date    MASTECTOMY      left        Meds/Allergies:    Prior to Admission medications    Medication Sig Start Date End Date Taking? Authorizing Provider   lisinopril (ZESTRIL) 30 mg tablet Take 30 mg by mouth daily    Historical Provider, MD   metoprolol tartrate (LOPRESSOR) 25 mg tablet Take 25 mg by mouth every 12 (twelve) hours    Historical Provider, MD     I have reviewed home medications with patient personally      Allergies: No Known Allergies    Social History:     Marital Status: /Civil Union   Occupation:    Patient Pre-hospital Living Situation: lives at home   Patient Pre-hospital Level of Mobility: able to ambulate  Patient Pre-hospital Diet Restrictions: cardiac   Substance Use History:   Social History     Substance and Sexual Activity   Alcohol Use Yes    Frequency: Monthly or less    Drinks per session: 1 or 2    Binge frequency: Never     Social History     Tobacco Use   Smoking Status Never Smoker   Smokeless Tobacco Never Used     Social History     Substance and Sexual Activity   Drug Use Never       Family History:    History reviewed  No pertinent family history  Physical Exam:     Vitals:   Blood Pressure: 168/92 (01/03/21 0826)  Pulse: (!) 139 (01/03/21 0826)  Respirations: 20 (01/03/21 0826)  Height: 5' 4" (162 6 cm) (01/03/21 0826)  Weight - Scale: 89 8 kg (198 lb) (01/03/21 0826)  SpO2: 96 % (01/03/21 0826)    Physical Exam  Vitals signs and nursing note reviewed  Constitutional:       Appearance: Normal appearance  HENT:      Head: Normocephalic and atraumatic  Eyes:      General:         Right eye: No discharge  Left eye: No discharge  Extraocular Movements: Extraocular movements intact  Pupils: Pupils are equal, round, and reactive to light  Neck:      Musculoskeletal: Normal range of motion and neck supple  No neck rigidity  Cardiovascular:      Rate and Rhythm: Tachycardia present  Heart sounds: Murmur present  No friction rub  No gallop  Abdominal:      General: Abdomen is flat  There is distension  Palpations: Abdomen is soft  Musculoskeletal:         General: No swelling or tenderness  Right lower leg: Edema present  Left lower leg: Edema present  Lymphadenopathy:      Cervical: No cervical adenopathy  Skin:     General: Skin is warm and dry  Neurological:      Mental Status: She is alert and oriented to person, place, and time  Psychiatric:         Mood and Affect: Mood normal          Thought Content: Thought content normal               Additional Data:     Lab Results: I have personally reviewed pertinent reports        Results from last 7 days   Lab Units 01/03/21  0108   WBC Thousand/uL 14 02*   HEMOGLOBIN g/dL 10 9*   HEMATOCRIT % 34 1*   PLATELETS Thousands/uL 384   LYMPHO PCT % 9* MONO PCT % 9   EOS PCT % 5     Results from last 7 days   Lab Units 01/03/21  0108   SODIUM mmol/L 139   POTASSIUM mmol/L 4 4   CHLORIDE mmol/L 106   CO2 mmol/L 24   BUN mg/dL 22*   CREATININE mg/dL 1 07   ANION GAP mmol/L 9   CALCIUM mg/dL 8 6   ALBUMIN g/dL 3 6   TOTAL BILIRUBIN mg/dL 0 93   ALK PHOS U/L 106 9   ALT U/L 21   AST U/L 19   GLUCOSE RANDOM mg/dL 136     Results from last 7 days   Lab Units 01/03/21  0108   INR  1 42*                   Imaging: I have personally reviewed pertinent reports  No orders to display       EKG, Pathology, and Other Studies Reviewed on Admission:   · EKG: A flutter     [facility] Yes     ** Please Note: This note has been constructed using a voice recognition system   **

## 2021-01-03 NOTE — CONSULTS
Consultation - Thoracic Surgery   Myrna Soares 65 y o  female MRN: [id number]  Unit/Bed#: [facility] 426-01 Encounter: [id number]      Assessment/Plan      Assessment:  Pt is a 64 y/o F w h/o Afib, Br cancer s/p mastectomy who presents w cough and CT imaging demonstrating large pericardial effusion and 6 6cm mediastinal mass  1/3 Echo: EF 65%  Large pericardial effusion  No evidence of tamponade physiology  VSS  Afebrile  spo2 97% on 2L NC  Plan:  Convert patient to IV heparin   Keep NPO after midnight  Hold heparin gtt midday tomorrow  Planning of pericardial window for tomorrow  Will also discuss biopsy of mediastinal mass in future  History of Present Illness   Physician Requesting Consult: Gali Wall MD  Reason for Consult / Principal Problem: pericardial effusion needing pericardial window  History, ROS and PFSH unobtainable from any source due to none  HPI: Myrna Soares is a 65y o  year old female past medical history of AFib, breast cancer status post left mastectomy in 1999, who presents with shortness of breath, cough for the past week, nothing seems to make this cough better worse  Also noted lower extremity pedal edema  CT imaging of the chest demonstrating pericardial effusion and mediastinal mass adjacent to the heart  Echocardiogram with normal ejection fraction but large pericardial effusions without evidence of tamponade physiology  Denied any history of heart attack or stroke  Surgical history significant for left mastectomy and prior tubal ligation  Denied any recent sick contacts  Denied any fevers, chills  Patient did endorse some shortness of breath and chest discomfort progressively worsening over the last couple of days            Inpatient consult to Thoracic Surgery     Date/Time 1/3/2021 6:54 PM     Performed by  Stephani Aschoff, MD     Authorized by Gali Wall MD              Review of Systems   Constitutional: Negative for chills and fever    HENT: Negative  Eyes: Negative  Respiratory: Positive for chest tightness and shortness of breath  Cardiovascular: Positive for leg swelling  Gastrointestinal: Negative  Endocrine: Negative  Genitourinary: Negative  Musculoskeletal: Negative  Skin: Negative  Allergic/Immunologic: Negative  Neurological: Negative  Hematological: Negative  Psychiatric/Behavioral: Negative  Historical Information   Past Medical History:   Diagnosis Date    Arthritis     Hypertension      Past Surgical History:   Procedure Laterality Date    MASTECTOMY      left      Social History   Social History     Substance and Sexual Activity   Alcohol Use Yes    Frequency: Monthly or less    Drinks per session: 1 or 2    Binge frequency: Never     Social History     Substance and Sexual Activity   Drug Use Never     E-Cigarette/Vaping    E-Cigarette Use Never User      E-Cigarette/Vaping Substances     Social History     Tobacco Use   Smoking Status Never Smoker   Smokeless Tobacco Never Used     Family History: non-contributory}    Meds/Allergies   all current active meds have been reviewed  No Known Allergies    Objective   Vitals: Blood pressure 138/77, pulse (!) 128, temperature 98 1 °F (36 7 °C), resp  rate 18, height 5' 4" (1 626 m), weight 89 6 kg (197 lb 8 5 oz), SpO2 97 %  ,Body mass index is 33 91 kg/m²  Intake/Output Summary (Last 24 hours) at 1/3/2021 1842  Last data filed at 1/3/2021 1740  Gross per 24 hour   Intake 81 58 ml   Output 400 ml   Net -318 42 ml     Invasive Devices     Peripheral Intravenous Line            Peripheral IV 01/03/21 Right Antecubital less than 1 day                Physical Exam  Vitals signs reviewed  HENT:      Head: Normocephalic and atraumatic  Nose: Nose normal       Mouth/Throat:      Pharynx: No oropharyngeal exudate  Eyes:      General: No scleral icterus       Conjunctiva/sclera: Conjunctivae normal       Pupils: Pupils are equal, round, and reactive to light  Neck:      Musculoskeletal: Normal range of motion  Cardiovascular:      Rate and Rhythm: Normal rate  Pulses: Normal pulses  Pulmonary:      Effort: Pulmonary effort is normal    Abdominal:      General: There is no distension  Palpations: Abdomen is soft  Tenderness: There is no abdominal tenderness  Genitourinary:     Comments: deferred  Musculoskeletal: Normal range of motion  Skin:     General: Skin is warm  Capillary Refill: Capillary refill takes 2 to 3 seconds  Neurological:      General: No focal deficit present  Mental Status: She is alert and oriented to person, place, and time  Psychiatric:         Mood and Affect: Mood normal          Lab Results:   I have personally reviewed pertinent reports  , Coags:   Lab Results   Component Value Date    PTT 28 01/03/2021    INR 1 42 (H) 01/03/2021   , Creatinine:   Lab Results   Component Value Date    CREATININE 0 94 01/03/2021   , Lipid Panel: No results found for: CHOL, CBC with diff:   Lab Results   Component Value Date    WBC 14 02 (H) 01/03/2021    HGB 10 9 (L) 01/03/2021    HCT 34 1 (L) 01/03/2021    MCV 65 (L) 01/03/2021     01/03/2021    MCH 20 8 (L) 01/03/2021    MCHC 32 0 01/03/2021    RDW 18 9 (H) 01/03/2021    MPV 11 4 01/03/2021   , BMP/CMP:   Lab Results   Component Value Date    SODIUM 142 01/03/2021    K 4 2 01/03/2021     (H) 01/03/2021    CO2 23 01/03/2021    BUN 19 01/03/2021    CREATININE 0 94 01/03/2021    CALCIUM 8 7 01/03/2021    AST 17 01/03/2021    ALT 26 01/03/2021    ALKPHOS 119 (H) 01/03/2021    EGFR 70 01/03/2021   , Coags:   Lab Results   Component Value Date    PTT 28 01/03/2021    INR 1 42 (H) 01/03/2021     Imaging Studies: I have personally reviewed pertinent reports  EKG, Pathology, and Other Studies: I have personally reviewed pertinent reports      VTE Prophylaxis: Sequential compression device (Venodyne)      Code Status: Level 1 - Full Code  Advance Directive and Living Will:      Power of :    POLST:      Counseling / Coordination of Care  Counseling/Coordination of Care: Total floor / unit time spent today 30 minutes  Greater than 50% of total time was spent with the patient and / or family counseling and / or coordination of care   A description of the counseling / coordination of care: 30

## 2021-01-03 NOTE — QUICK NOTE
Called by radiology patient has pericardial effusion and pleural effusions with mediastinal mass  Discussed with cardiology  Patient is hemodynamically stable  She has ECHO done  CT surgery consult placed for pericardial effusion  IR mediastinal mass biopsy   Called  and explained about the radiology findings and pericardial fluid, mediastinal masses and pleural effusion

## 2021-01-03 NOTE — ED NOTES
Cardizem drip continued at this time at 15mg/hr   Bag running empty due to repriming of tubing, SLIM admit Naomi tigertexted and made aware of patient arrival        Uyen Li RN  01/03/21 6565

## 2021-01-04 ENCOUNTER — ANESTHESIA EVENT (INPATIENT)
Dept: PERIOP | Facility: HOSPITAL | Age: 71
DRG: 270 | End: 2021-01-04
Payer: COMMERCIAL

## 2021-01-04 ENCOUNTER — ANESTHESIA (INPATIENT)
Dept: PERIOP | Facility: HOSPITAL | Age: 71
DRG: 270 | End: 2021-01-04
Payer: COMMERCIAL

## 2021-01-04 VITALS — HEART RATE: 113 BPM

## 2021-01-04 PROBLEM — I31.3 PERICARDIAL EFFUSION WITHOUT CARDIAC TAMPONADE: Status: ACTIVE | Noted: 2021-01-04

## 2021-01-04 PROBLEM — J98.59 MEDIASTINAL MASS: Status: ACTIVE | Noted: 2021-01-04

## 2021-01-04 LAB
ALBUMIN SERPL BCP-MCNC: 3.2 G/DL (ref 3.5–5)
ALP SERPL-CCNC: 116 U/L (ref 46–116)
ALT SERPL W P-5'-P-CCNC: 28 U/L (ref 12–78)
ANION GAP SERPL CALCULATED.3IONS-SCNC: 8 MMOL/L (ref 4–13)
APTT PPP: 134 SECONDS (ref 23–37)
AST SERPL W P-5'-P-CCNC: 21 U/L (ref 5–45)
BASOPHILS # BLD AUTO: 0.08 THOUSANDS/ΜL (ref 0–0.1)
BASOPHILS NFR BLD AUTO: 1 % (ref 0–1)
BILIRUB SERPL-MCNC: 1.03 MG/DL (ref 0.2–1)
BUN SERPL-MCNC: 21 MG/DL (ref 5–25)
CALCIUM ALBUM COR SERPL-MCNC: 9.6 MG/DL (ref 8.3–10.1)
CALCIUM SERPL-MCNC: 9 MG/DL (ref 8.3–10.1)
CHLORIDE SERPL-SCNC: 107 MMOL/L (ref 100–108)
CO2 SERPL-SCNC: 24 MMOL/L (ref 21–32)
CREAT SERPL-MCNC: 1.14 MG/DL (ref 0.6–1.3)
EOSINOPHIL # BLD AUTO: 0.29 THOUSAND/ΜL (ref 0–0.61)
EOSINOPHIL NFR BLD AUTO: 2 % (ref 0–6)
ERYTHROCYTE [DISTWIDTH] IN BLOOD BY AUTOMATED COUNT: 19.8 % (ref 11.6–15.1)
GFR SERPL CREATININE-BSD FRML MDRD: 56 ML/MIN/1.73SQ M
GLUCOSE SERPL-MCNC: 129 MG/DL (ref 65–140)
GLUCOSE SERPL-MCNC: 132 MG/DL (ref 65–140)
GLUCOSE SERPL-MCNC: 133 MG/DL (ref 65–140)
GLUCOSE SERPL-MCNC: 97 MG/DL (ref 65–140)
HCT VFR BLD AUTO: 36.3 % (ref 34.8–46.1)
HGB BLD-MCNC: 10.6 G/DL (ref 11.5–15.4)
IMM GRANULOCYTES # BLD AUTO: 0.26 THOUSAND/UL (ref 0–0.2)
IMM GRANULOCYTES NFR BLD AUTO: 2 % (ref 0–2)
LYMPHOCYTES # BLD AUTO: 1.21 THOUSANDS/ΜL (ref 0.6–4.47)
LYMPHOCYTES NFR BLD AUTO: 8 % (ref 14–44)
MCH RBC QN AUTO: 20.2 PG (ref 26.8–34.3)
MCHC RBC AUTO-ENTMCNC: 29.2 G/DL (ref 31.4–37.4)
MCV RBC AUTO: 69 FL (ref 82–98)
MONOCYTES # BLD AUTO: 1.82 THOUSAND/ΜL (ref 0.17–1.22)
MONOCYTES NFR BLD AUTO: 12 % (ref 4–12)
NEUTROPHILS # BLD AUTO: 11.16 THOUSANDS/ΜL (ref 1.85–7.62)
NEUTS SEG NFR BLD AUTO: 75 % (ref 43–75)
NRBC BLD AUTO-RTO: 0 /100 WBCS
PHOSPHATE SERPL-MCNC: 3.8 MG/DL (ref 2.3–4.1)
PLATELET # BLD AUTO: 368 THOUSANDS/UL (ref 149–390)
PMV BLD AUTO: 11.6 FL (ref 8.9–12.7)
POTASSIUM SERPL-SCNC: 4.5 MMOL/L (ref 3.5–5.3)
PROT SERPL-MCNC: 7 G/DL (ref 6.4–8.2)
RBC # BLD AUTO: 5.24 MILLION/UL (ref 3.81–5.12)
SODIUM SERPL-SCNC: 139 MMOL/L (ref 136–145)
WBC # BLD AUTO: 14.82 THOUSAND/UL (ref 4.31–10.16)

## 2021-01-04 PROCEDURE — 88342 IMHCHEM/IMCYTCHM 1ST ANTB: CPT | Performed by: PATHOLOGY

## 2021-01-04 PROCEDURE — 82948 REAGENT STRIP/BLOOD GLUCOSE: CPT

## 2021-01-04 PROCEDURE — 87206 SMEAR FLUORESCENT/ACID STAI: CPT | Performed by: THORACIC SURGERY (CARDIOTHORACIC VASCULAR SURGERY)

## 2021-01-04 PROCEDURE — 85025 COMPLETE CBC W/AUTO DIFF WBC: CPT | Performed by: INTERNAL MEDICINE

## 2021-01-04 PROCEDURE — 87116 MYCOBACTERIA CULTURE: CPT | Performed by: THORACIC SURGERY (CARDIOTHORACIC VASCULAR SURGERY)

## 2021-01-04 PROCEDURE — 87075 CULTR BACTERIA EXCEPT BLOOD: CPT | Performed by: THORACIC SURGERY (CARDIOTHORACIC VASCULAR SURGERY)

## 2021-01-04 PROCEDURE — 88112 CYTOPATH CELL ENHANCE TECH: CPT | Performed by: PATHOLOGY

## 2021-01-04 PROCEDURE — 0W9D00Z DRAINAGE OF PERICARDIAL CAVITY WITH DRAINAGE DEVICE, OPEN APPROACH: ICD-10-PCS | Performed by: THORACIC SURGERY (CARDIOTHORACIC VASCULAR SURGERY)

## 2021-01-04 PROCEDURE — 88341 IMHCHEM/IMCYTCHM EA ADD ANTB: CPT | Performed by: PATHOLOGY

## 2021-01-04 PROCEDURE — 88305 TISSUE EXAM BY PATHOLOGIST: CPT | Performed by: PATHOLOGY

## 2021-01-04 PROCEDURE — 87205 SMEAR GRAM STAIN: CPT | Performed by: THORACIC SURGERY (CARDIOTHORACIC VASCULAR SURGERY)

## 2021-01-04 PROCEDURE — 84100 ASSAY OF PHOSPHORUS: CPT | Performed by: INTERNAL MEDICINE

## 2021-01-04 PROCEDURE — 80053 COMPREHEN METABOLIC PANEL: CPT | Performed by: INTERNAL MEDICINE

## 2021-01-04 PROCEDURE — 87252 VIRUS INOCULATION TISSUE: CPT | Performed by: THORACIC SURGERY (CARDIOTHORACIC VASCULAR SURGERY)

## 2021-01-04 PROCEDURE — 33025 INCISION OF HEART SAC: CPT | Performed by: THORACIC SURGERY (CARDIOTHORACIC VASCULAR SURGERY)

## 2021-01-04 PROCEDURE — 99233 SBSQ HOSP IP/OBS HIGH 50: CPT | Performed by: INTERNAL MEDICINE

## 2021-01-04 PROCEDURE — 99223 1ST HOSP IP/OBS HIGH 75: CPT | Performed by: THORACIC SURGERY (CARDIOTHORACIC VASCULAR SURGERY)

## 2021-01-04 PROCEDURE — 87070 CULTURE OTHR SPECIMN AEROBIC: CPT | Performed by: THORACIC SURGERY (CARDIOTHORACIC VASCULAR SURGERY)

## 2021-01-04 PROCEDURE — 99232 SBSQ HOSP IP/OBS MODERATE 35: CPT | Performed by: INTERNAL MEDICINE

## 2021-01-04 PROCEDURE — 85730 THROMBOPLASTIN TIME PARTIAL: CPT | Performed by: INTERNAL MEDICINE

## 2021-01-04 RX ORDER — ONDANSETRON 2 MG/ML
INJECTION INTRAMUSCULAR; INTRAVENOUS AS NEEDED
Status: DISCONTINUED | OUTPATIENT
Start: 2021-01-04 | End: 2021-01-04

## 2021-01-04 RX ORDER — PROMETHAZINE HYDROCHLORIDE 25 MG/ML
25 INJECTION, SOLUTION INTRAMUSCULAR; INTRAVENOUS ONCE AS NEEDED
Status: DISCONTINUED | OUTPATIENT
Start: 2021-01-04 | End: 2021-01-04 | Stop reason: HOSPADM

## 2021-01-04 RX ORDER — ALBUTEROL SULFATE 2.5 MG/3ML
2.5 SOLUTION RESPIRATORY (INHALATION) ONCE AS NEEDED
Status: DISCONTINUED | OUTPATIENT
Start: 2021-01-04 | End: 2021-01-04 | Stop reason: HOSPADM

## 2021-01-04 RX ORDER — HEPARIN SODIUM 10000 [USP'U]/100ML
3-30 INJECTION, SOLUTION INTRAVENOUS
Status: DISCONTINUED | OUTPATIENT
Start: 2021-01-05 | End: 2021-01-11

## 2021-01-04 RX ORDER — SODIUM CHLORIDE, SODIUM LACTATE, POTASSIUM CHLORIDE, CALCIUM CHLORIDE 600; 310; 30; 20 MG/100ML; MG/100ML; MG/100ML; MG/100ML
INJECTION, SOLUTION INTRAVENOUS CONTINUOUS PRN
Status: DISCONTINUED | OUTPATIENT
Start: 2021-01-04 | End: 2021-01-04

## 2021-01-04 RX ORDER — ONDANSETRON 2 MG/ML
4 INJECTION INTRAMUSCULAR; INTRAVENOUS ONCE AS NEEDED
Status: DISCONTINUED | OUTPATIENT
Start: 2021-01-04 | End: 2021-01-04 | Stop reason: HOSPADM

## 2021-01-04 RX ORDER — PROPOFOL 10 MG/ML
INJECTION, EMULSION INTRAVENOUS AS NEEDED
Status: DISCONTINUED | OUTPATIENT
Start: 2021-01-04 | End: 2021-01-04

## 2021-01-04 RX ORDER — KETAMINE HCL IN NACL, ISO-OSM 100MG/10ML
SYRINGE (ML) INJECTION AS NEEDED
Status: DISCONTINUED | OUTPATIENT
Start: 2021-01-04 | End: 2021-01-04

## 2021-01-04 RX ORDER — EPHEDRINE SULFATE 50 MG/ML
INJECTION INTRAVENOUS AS NEEDED
Status: DISCONTINUED | OUTPATIENT
Start: 2021-01-04 | End: 2021-01-04

## 2021-01-04 RX ORDER — DEXAMETHASONE SODIUM PHOSPHATE 10 MG/ML
INJECTION, SOLUTION INTRAMUSCULAR; INTRAVENOUS AS NEEDED
Status: DISCONTINUED | OUTPATIENT
Start: 2021-01-04 | End: 2021-01-04

## 2021-01-04 RX ORDER — FENTANYL CITRATE 50 UG/ML
INJECTION, SOLUTION INTRAMUSCULAR; INTRAVENOUS AS NEEDED
Status: DISCONTINUED | OUTPATIENT
Start: 2021-01-04 | End: 2021-01-04

## 2021-01-04 RX ORDER — MIDAZOLAM HYDROCHLORIDE 2 MG/2ML
INJECTION, SOLUTION INTRAMUSCULAR; INTRAVENOUS AS NEEDED
Status: DISCONTINUED | OUTPATIENT
Start: 2021-01-04 | End: 2021-01-04

## 2021-01-04 RX ORDER — ROCURONIUM BROMIDE 10 MG/ML
INJECTION, SOLUTION INTRAVENOUS AS NEEDED
Status: DISCONTINUED | OUTPATIENT
Start: 2021-01-04 | End: 2021-01-04

## 2021-01-04 RX ORDER — FENTANYL CITRATE/PF 50 MCG/ML
25 SYRINGE (ML) INJECTION
Status: DISCONTINUED | OUTPATIENT
Start: 2021-01-04 | End: 2021-01-04 | Stop reason: HOSPADM

## 2021-01-04 RX ORDER — MIDAZOLAM HYDROCHLORIDE 2 MG/2ML
INJECTION, SOLUTION INTRAMUSCULAR; INTRAVENOUS
Status: DISPENSED
Start: 2021-01-04 | End: 2021-01-05

## 2021-01-04 RX ORDER — CEFAZOLIN SODIUM 1 G/3ML
INJECTION, POWDER, FOR SOLUTION INTRAMUSCULAR; INTRAVENOUS AS NEEDED
Status: DISCONTINUED | OUTPATIENT
Start: 2021-01-04 | End: 2021-01-04

## 2021-01-04 RX ORDER — LIDOCAINE HYDROCHLORIDE AND EPINEPHRINE BITARTRATE 20; .01 MG/ML; MG/ML
INJECTION, SOLUTION SUBCUTANEOUS AS NEEDED
Status: DISCONTINUED | OUTPATIENT
Start: 2021-01-04 | End: 2021-01-04 | Stop reason: HOSPADM

## 2021-01-04 RX ADMIN — DEXAMETHASONE SODIUM PHOSPHATE 5 MG: 10 INJECTION, SOLUTION INTRAMUSCULAR; INTRAVENOUS at 17:42

## 2021-01-04 RX ADMIN — MIDAZOLAM 2 MG: 1 INJECTION INTRAMUSCULAR; INTRAVENOUS at 17:01

## 2021-01-04 RX ADMIN — FENTANYL CITRATE 25 MCG: 50 INJECTION INTRAMUSCULAR; INTRAVENOUS at 17:43

## 2021-01-04 RX ADMIN — SODIUM CHLORIDE, SODIUM LACTATE, POTASSIUM CHLORIDE, AND CALCIUM CHLORIDE: .6; .31; .03; .02 INJECTION, SOLUTION INTRAVENOUS at 16:49

## 2021-01-04 RX ADMIN — METOPROLOL TARTRATE 25 MG: 25 TABLET, FILM COATED ORAL at 08:25

## 2021-01-04 RX ADMIN — LISINOPRIL 30 MG: 10 TABLET ORAL at 08:25

## 2021-01-04 RX ADMIN — PHENYLEPHRINE HYDROCHLORIDE 200 MCG: 10 INJECTION INTRAVENOUS at 17:21

## 2021-01-04 RX ADMIN — EPHEDRINE SULFATE 5 MG: 50 INJECTION, SOLUTION INTRAVENOUS at 17:48

## 2021-01-04 RX ADMIN — PHENYLEPHRINE HYDROCHLORIDE 200 MCG: 10 INJECTION INTRAVENOUS at 17:41

## 2021-01-04 RX ADMIN — METOPROLOL TARTRATE 25 MG: 25 TABLET, FILM COATED ORAL at 20:18

## 2021-01-04 RX ADMIN — FENTANYL CITRATE 25 MCG: 50 INJECTION INTRAMUSCULAR; INTRAVENOUS at 17:51

## 2021-01-04 RX ADMIN — ONDANSETRON 4 MG: 2 INJECTION INTRAMUSCULAR; INTRAVENOUS at 17:42

## 2021-01-04 RX ADMIN — FENTANYL CITRATE 50 MCG: 50 INJECTION INTRAMUSCULAR; INTRAVENOUS at 17:31

## 2021-01-04 RX ADMIN — PROPOFOL 50 MG: 10 INJECTION, EMULSION INTRAVENOUS at 17:21

## 2021-01-04 RX ADMIN — PHENYLEPHRINE HYDROCHLORIDE 100 MCG: 10 INJECTION INTRAVENOUS at 17:38

## 2021-01-04 RX ADMIN — DILTIAZEM HYDROCHLORIDE 12.5 MG/HR: 5 INJECTION INTRAVENOUS at 01:00

## 2021-01-04 RX ADMIN — SUGAMMADEX 150 MG: 100 INJECTION, SOLUTION INTRAVENOUS at 17:55

## 2021-01-04 RX ADMIN — CEFAZOLIN 2000 MG: 1 INJECTION, POWDER, FOR SOLUTION INTRAVENOUS at 16:55

## 2021-01-04 RX ADMIN — Medication 40 MG: at 17:21

## 2021-01-04 RX ADMIN — PHENYLEPHRINE HYDROCHLORIDE 200 MCG: 10 INJECTION INTRAVENOUS at 17:30

## 2021-01-04 RX ADMIN — ROCURONIUM BROMIDE 10 MG: 50 INJECTION, SOLUTION INTRAVENOUS at 17:28

## 2021-01-04 RX ADMIN — DILTIAZEM HYDROCHLORIDE 15 MG/HR: 5 INJECTION INTRAVENOUS at 08:31

## 2021-01-04 NOTE — OP NOTE
OPERATIVE REPORT  PATIENT NAME: Karen Eric    :  1950  MRN: [id number]  Pt Location: BE OR ROOM 08    SURGERY DATE: 2021    Surgeon(s) and Role:     * Jennifer Hoang MD - Primary     * Brenda Matias MD - Assisting    Preop Diagnosis:  Pericardial effusion [I31 3]    Post-Op Diagnosis Codes:     * Pericardial effusion [I31 3]    Procedure(s) (LRB):  WINDOW PERICARDIAL, subxiphoid  (N/A)    Specimen(s):  ID Type Source Tests Collected by Time Destination   1 :  Body Fluid Pericardial Fluid NON-GYNECOLOGIC CYTOLOGY Jennifer Hoang MD 2021 1733    A :  Body Fluid Pericardial Fluid ANAEROBIC CULTURE AND GRAM STAIN, VIRUS CULTURE, BODY FLUID CULTURE AND GRAM STAIN, AFB CULTURE WITH STAIN Jennifer Hoang MD 2021 1720        Estimated Blood Loss:   Minimal    Drains:  Closed/Suction Drain Right;Medial Chest Bulb 24 Fr  (Active)   Number of days: 0       Anesthesia Type:   General    Operative Indications:  Pericardial effusion [I323]  77-year-old female with a large pericardial effusion atrial fibrillation shortness of breath but no clear evidence of tamponade echo    Operative Findings:  Drained 720 mL of slightly bloody pericardial fluid  Patient's AFib improved immediately afterwards  Complications:   None    Procedure and Technique:  After obtaining informed consent from the patient, they were transported to the operating room and placed supine on the OR table  Anesthesia at this point placed an arterial line and all monitoring lines  Patient was prepped with ChloraPrep and draped in standard sterile fashion  A formal time-out was performed at this time including patient, date of birth, intended procedure, antibiotic usage as appropriate, beta-blocker usage as appropriate and plans for specimen handling  Once fully ready to get started with surgery, general anesthesia was induced and a single lumen endotracheal tube was placed without incident    The remained hemodynamically stable during induction and with intubation  Once intubated we used 5 cc of 2% lidocaine for local anesthesia  A 3 cm subxiphoid incision was made  We dissected down into the midline opening up the anterior rectus sheath  We dissected superiorly underneath the sternum until we reached the pericardium  The pericardium was grasped with 2 Allice clamps and was sharply entered the pericardium with scissors  Immediately upon entry into the pericardium we obtained slightly bloody serous fluid  This was suctioned out fully  A specimen was sent for cytology as well as a 2nd specimen was sent for Gram stain and culture  In total 720 mL of pericardial fluid was removed  The patient stabilized following removal of this fluid  A 24 Hungarian channel drain was placed into the pericardial space and passed through a left lateral incision  This was sutured down with a drain stitch  Once we were satisfied with hemostasis, we began to close  The fascia was closed with a running 0 Vicryl suture  Soft tissue was closed with a running 3 0 Vicryl suture  Skin was closed with a running 4 Monocryl  Surgical glue was placed over the incision  A drain sponge was placed at drain site  The drain was placed to bulb suction  All needle, instrument and sponge counts were correct at conclusion of the procedure  Wand assessment of drain sponges was also performed and showed no retained sponges  Patient awoke and was extubated without issue  They were transferred to the PACU in stable condition       I was present for the entire procedure    Patient Disposition:  PACU     SIGNATURE: Angela Antunez MD  DATE: January 4, 2021  TIME: 5:54 PM

## 2021-01-04 NOTE — PROGRESS NOTES
Progress Note - Shyla Gray 1950, 65 y o  female MRN: [id number]    Unit/Bed#: [facility] 426-01 Encounter: [id number]    Primary Care Provider: Marisol Barrientos MD   Date and time admitted to hospital: 1/3/2021  8:26 AM        * Atrial flutter [facility])  Assessment & Plan  Patient was coughing too much and her  brought her to ED  She was having sob, EKG showed atrial flutter, QTc prolongation  Her son passed away on 12/21/2020  She did have swelling in her legs increased over last week  She has swelling in the past, treated with lasix with improvement in June, 2020  Patient was evaluated by Cardiology  Cardiac echo with large pericardial effusion without tamponade, EF 65%  Currently on metoprolol, heparin drip and Cardizem drip  Continue to monitor  Cardiology is following      Pericardial effusion without cardiac tamponade  Assessment & Plan  Evaluated by thoracic surgery with plan for elective pericardiocentesis versus pericardial window to drain pericardial effusion  Currently patient NPO    Mediastinal mass  Assessment & Plan  Patient with remote history of left breast cancer status post mastectomy over 20 years ago  No chemotherapy or radiation  Anterior mediastinal mass seen on CT scan with diffuse mediastinal adenopathy, Innumerable vague hypodensities throughout the liver are suspicious for metastases  Unclear etiology  Thoracic surgery is following with plan for IR biopsy after pericardial effusion draining and reimaging    Shortness of breath  Assessment & Plan  Shortness of Increasing bilateral lower extremity edema, Likely secondary to above  Continue to monitor    Essential hypertension  Assessment & Plan  Continue metoprolol  Hold lisinopril for now  Continue to monitor         VTE Pharmacologic Prophylaxis:   Pharmacologic: Heparin Drip  Mechanical VTE Prophylaxis in Place: Yes    Patient Centered Rounds: I have performed bedside rounds with nursing staff today      Discussions with Specialists or Other Care Team Provider:     Education and Discussions with Family / Patient:  Discussed with patient's     Time Spent for Care: 45 minutes  More than 50% of total time spent on counseling and coordination of care as described above  Current Length of Stay: 1 day(s)    Current Patient Status: Inpatient   Certification Statement: The patient will continue to require additional inpatient hospital stay due to Above    Discharge Plan / Estimated Discharge Date: TBD    Code Status: Level 1 - Full Code      Subjective:   Patient seen and examined  Comfortable sitting in bed  Dyspnea on exertion  No Chest pain    Objective:     Vitals:   Temp (24hrs), Av 8 °F (36 6 °C), Min:97 4 °F (36 3 °C), Max:98 1 °F (36 7 °C)    Temp:  [97 4 °F (36 3 °C)-98 1 °F (36 7 °C)] 98 1 °F (36 7 °C)  HR:  [] 68  Resp:  [18-20] 18  BP: ()/(49-81) 106/58  SpO2:  [90 %-99 %] 94 %  Body mass index is 33 79 kg/m²  Input and Output Summary (last 24 hours):        Intake/Output Summary (Last 24 hours) at 2021 1220  Last data filed at 2021 0954  Gross per 24 hour   Intake 375 7 ml   Output 1450 ml   Net -1074 3 ml       Physical Exam:     Physical Exam  Patient is awake alert in no acute distress  Lung with decreased breath sounds bilateral  Heart tachycardia  Abdomen soft nontender  Bilateral lower extremity edema    Additional Data:     Labs:    Results from last 7 days   Lab Units 21  0557   WBC Thousand/uL 14 82*   HEMOGLOBIN g/dL 10 6*   HEMATOCRIT % 36 3   PLATELETS Thousands/uL 368   NEUTROS PCT % 75   LYMPHS PCT % 8*   MONOS PCT % 12   EOS PCT % 2     Results from last 7 days   Lab Units 21  0533   POTASSIUM mmol/L 4 5   CHLORIDE mmol/L 107   CO2 mmol/L 24   BUN mg/dL 21   CREATININE mg/dL 1 14   CALCIUM mg/dL 9 0   ALK PHOS U/L 116   ALT U/L 28   AST U/L 21     Results from last 7 days   Lab Units 21  1958   INR  1 64*       * I Have Reviewed All Lab Data Listed Above   * Additional Pertinent Lab Tests Reviewed: Janell 64 Admission Reviewed    Imaging:    Imaging Reports Reviewed Today Include:   Imaging Personally Reviewed by Myself Includes:     Recent Cultures (last 7 days):           Last 24 Hours Medication List:   Current Facility-Administered Medications   Medication Dose Route Frequency Provider Last Rate    aluminum-magnesium hydroxide-simethicone  30 mL Oral Q6H PRN Aurora Sampson MD      cefazolin  2,000 mg Intravenous On Call To Rufina Camarena MD      diltiazem  1-15 mg/hr Intravenous Titrated Aurora Sampson MD 5 mg/hr (01/04/21 1657)    docusate sodium  100 mg Oral BID Aurora Sampson MD      heparin (porcine)  3-30 Units/kg/hr (Order-Specific) Intravenous Titrated Prema Levy MD Stopped (01/04/21 7007)    metoprolol tartrate  25 mg Oral Q12H [address] Aurora Sampson MD      ondansetron  4 mg Intravenous Q6H PRN Aurora Sampson MD      senna  1 tablet Oral Daily Aurora Sampson MD          Today, Patient Was Seen By: Alexandria Akbar DO    ** Please Note: This note has been constructed using a voice recognition system   **

## 2021-01-04 NOTE — UTILIZATION REVIEW
Initial Clinical Review    Admission: Date/Time/Statement:   Admission Orders (From admission, onward)     Ordered        01/03/21 0847  Inpatient Admission  Once                   Orders Placed This Encounter   Procedures    Inpatient Admission     Standing Status:   Standing     Number of Occurrences:   1     Order Specific Question:   Admitting Physician     Answer:   Stan Morgan [[id number]]     Order Specific Question:   Level of Care     Answer:   Level 2 Stepdown / HOT [14]     Order Specific Question:   Estimated length of stay     Answer:   More than 2 Midnights     Order Specific Question:   Certification     Answer:   I certify that inpatient services are medically necessary for this patient for a duration of greater than two midnights  See H&P and MD Progress Notes for additional information about the patient's course of treatment  ED Arrival Information     Expected Arrival Acuity Means of Arrival Escorted By Service Admission Type    1/3/2021  1/3/2021 08:26 Urgent Ambulance [facility] Hospitalist Urgent    Arrival Complaint    Atrial Flutter        Chief Complaint   Patient presents with    Shortness of Breath     Patient had SOB and lower extremity swelling, found to have atrial flutter at [facility]  Transfer here private for [facility]  Assessment/Plan: 65year old female, presented to the ED @ [address],  Transferred to [facility], higher level of care, via EMS  Admitted as Inpatient due to Atrial Flutter  PTA,  noted coughing too much and brought her to the ED, found to be in A  Flutter, with lower leg edema, and transferred to [facility]   + for SOB  Monitor on telemetry  Serial ECGs  Initial trop WNL, trend trops  IV cardizem gtt  Consult Cardio  ECHO: pending  Monitor BNP  IV lasix  VTE Prophylaxis: Enoxaparin (Lovenox)  / sequential compression device     01/03/2021  Consult Cardio:  Atrial Flutter  Cardiomegaly on chest X   CHF  Continue IV cardizem gtt  Obtain TATYANA  Diuresis with lasix, Monitor UOP and Elytes  May require TATYANA/CV va ablation if she does not self convert  Lovenox for [facility]       01/03/2021  Consult Thoracic:  CT imaging demonstrating large pericardial effusion and 6 6cm mediastinal mass  1/3 Echo: EF 65%  Large pericardial effusion  No evidence of tamponade physiology  P O  97% with O2 @ 2L via NC  Convert to IV heparin  NPO after MN  Planning for Pericardial window tomorrow       Triage Vitals   Temperature Pulse Respirations Blood Pressure SpO2   01/03/21 0933 01/03/21 0826 01/03/21 0826 01/03/21 0826 01/03/21 0826   (!) 97 4 °F (36 3 °C) (!) 139 20 168/92 96 %      Temp Source Heart Rate Source Patient Position - Orthostatic VS BP Location FiO2 (%)   01/03/21 0933 01/03/21 0826 01/03/21 1043 01/03/21 0826 --   Oral Monitor Lying Right arm       Pain Score       01/03/21 0826       No Pain          Wt Readings from Last 1 Encounters:   01/04/21 89 3 kg (196 lb 13 9 oz)     Additional Vital Signs:   Date/Time  Temp  Pulse  Resp  BP  MAP (mmHg)  SpO2  Calculated FIO2 (%) - Nasal Cannula  Nasal Cannula O2 Flow Rate (L/min)  O2 Device  Patient Position - Orthostatic VS   01/04/21 0953    67    95/49Abnormal   70          Sitting   01/04/21 0929    68        94 %           01/04/21 0900    77                   01/04/21 0800            95 %      None (Room air)     01/04/21 07:32:20  97 9 °F (36 6 °C)  137Abnormal     120/77  96  90 %      None (Room air)  Lying   01/04/21 0417    134Abnormal         92 %           01/04/21 0304  97 4 °F (36 3 °C)Abnormal   103  18  141/58  85  93 %      None (Room air)  Lying   01/03/21 2358  97 4 °F (36 3 °C)Abnormal   98  18  112/66  84  93 %      None (Room air)  Sitting   01/03/21 2157    79        96 %           01/03/21 2100    90        94 %           01/03/21 2001    138Abnormal         93 %           01/03/21 Frank Yepez  None (Room air)     01/03/21 19:59:08  97 9 °F (36 6 °C)  139Abnormal   20  127/73    94 %           01/03/21 17:09:36  98 1 °F (36 7 °C)  128Abnormal         97 %           01/03/21 1709  98 1 °F (36 7 °C)  117Abnormal     138/77  101  97 %  28  2 L/min  Nasal cannula  Sitting   01/03/21 1500    98  18  132/72  90  94 %           01/03/21 1430    98  20  133/62  89  94 %  28  2 L/min       01/03/21 1345    102  20  117/67  86  97 %           01/03/21 1230    132Abnormal   20  129/81  100  99 %  28  2 L/min  Nasal cannula  Sitting   01/03/21 1100    99  18  102/56  72  95 %           01/03/21 1043    68  18  90/52     96 %  28  2 L/min  Nasal cannula  Lying   BP: Dr Mendoza made aware at 01/03/21 1043   01/03/21 1000    138Abnormal   20  113/57  80  96 %  28  2 L/min         Date and Time Eye Opening Best Verbal Response Best Motor Response Daiana Coma Scale Score   01/04/21 0800 4 5 6 15   01/04/21 0400 4 5 6 15   01/03/21 2000 4 5 6 15   01/03/21 1709 4 5 6 15   01/03/21 0834 4 5 6 15   01/03/21 0045 4 5 6 15     01/03/2021 @ 1704  CTa chest:  Large pericardial effusion measuring approximately 3 3 cm area of bulging of the right pericardium or a potential adjacent right mediastinal mass measuring approximately 6 6 cm #2/100  The pericardial effusion may be on a malignant basis  Prominent diffuse mediastinal adenopathy  Interlobular septal thickening and small pleural effusions in keeping with pulmonary edema  Posterior right upper lobe consolidation #3/55 in a wedge-shaped appearance with a apex extending towards the right hilum may represent pneumonia or a postobstructive pneumonitis from a central mass  A discrete mass is not identified but could be   obscured  Innumerable vague hypodensities throughout the liver are suspicious for metastases       01/03/2021 @ 1622  Chest X:  Marked enlargement of the cardiac silhouette which could be due to cardiomegaly or a pericardial effusion  Right middle lobe pneumonia  2021 @ 0048  EC, A  Flutter, QTc prolonged 558     2021 @ 0923  EC, A  Flutter, QTc 438, Non-specific intra-ventricular conduction block    2021 @ 1038  EC, Atrial flutter with variable A-V block    Pertinent Labs/Diagnostic Test Results:   Results from last 7 days   Lab Units 21  010   SARS-COV-2  Negative     Results from last 7 days   Lab Units 21  0557 21  1958 21  0108   WBC Thousand/uL 14 82* 15 59* 14 02*   HEMOGLOBIN g/dL 10 6* 10 8* 10 9*   HEMATOCRIT % 36 3 36 5 34 1*   PLATELETS Thousands/uL 368 391* 384   NEUTROS ABS Thousands/µL 11 16*  --   --      Results from last 7 days   Lab Units 21  0533 21  0911 21  0108   SODIUM mmol/L 139 142 139   POTASSIUM mmol/L 4 5 4 2 4 4   CHLORIDE mmol/L 107 112* 106   CO2 mmol/L 24 23 24   ANION GAP mmol/L 8 7 9   BUN mg/dL 21 19 22*   CREATININE mg/dL 1 14 0 94 1 07   EGFR ml/min/1 73sq m 56 70 60   CALCIUM mg/dL 9 0 8 7 8 6   MAGNESIUM mg/dL  --  2 3  --    PHOSPHORUS mg/dL 3 8  --   --      Results from last 7 days   Lab Units 21  0533 21  0911 21  0108   AST U/L 21 17 19   ALT U/L 28 26 21   ALK PHOS U/L 116 119* 106 9   TOTAL PROTEIN g/dL 7 0 7 0 6 7   ALBUMIN g/dL 3 2* 3 1* 3 6   TOTAL BILIRUBIN mg/dL 1 03* 1 07* 0 93   BILIRUBIN DIRECT mg/dL  --  0 55*  --      Results from last 7 days   Lab Units 21  0602   POC GLUCOSE mg/dl 129     Results from last 7 days   Lab Units 21  0533 21  0911 21  0108   GLUCOSE RANDOM mg/dL 97 119 136     Results from last 7 days   Lab Units 21  0911   HEMOGLOBIN A1C % 6 9*   EAG mg/dl 151     Results from last 7 days   Lab Units 21  1306 21  0911 21  0108   TROPONIN I ng/mL <0 02 <0 02 <0 03     Results from last 7 days   Lab Units 21  0108   D-DIMER QUANTITATIVE mg/L FEU 8 37*     Results from last 7 days   Lab Units 01/04/21  0415 01/03/21 1958 01/03/21  0108   PROTIME seconds  --  19 3* 15 8*   INR   --  1 64* 1 42*   PTT seconds 134* 36 28     Results from last 7 days   Lab Units 01/03/21  0911   TSH 3RD GENERATON uIU/mL 1 210     Results from last 7 days   Lab Units 01/03/21  0108   BNP pg/mL 113 0*     Results from last 7 days   Lab Units 01/03/21  0108   INFLUENZA A PCR  Negative   INFLUENZA B PCR  Negative   RSV PCR  Negative     Results from last 7 days   Lab Units 01/03/21  0108   TOTAL COUNTED  100     ED Treatment:   Medication Administration from 01/03/2021 0824 to 01/03/2021 1703       Date/Time Order Dose Route Action     01/03/2021 1340 diltiazem (CARDIZEM) 125 mg in sodium chloride 0 9 % 125 mL infusion 7 5 mg/hr Intravenous Rate/Dose Change     01/03/2021 0933 diltiazem (CARDIZEM) 125 mg in sodium chloride 0 9 % 125 mL infusion 15 mg/hr Intravenous New Bag     01/03/2021 0935 metoprolol tartrate (LOPRESSOR) tablet 25 mg 25 mg Oral Given     01/03/2021 0935 docusate sodium (COLACE) capsule 100 mg 100 mg Oral Given     01/03/2021 0935 senna (SENOKOT) tablet 8 6 mg 8 6 mg Oral Given     01/03/2021 1126 enoxaparin (LOVENOX) subcutaneous injection 90 mg 90 mg Subcutaneous Given     01/03/2021 1259 furosemide (LASIX) injection 40 mg 40 mg Intravenous Given     01/03/2021 1643 iohexol (OMNIPAQUE) 350 MG/ML injection (MULTI-DOSE) 85 mL 85 mL Intravenous Given        Past Medical History:   Diagnosis Date    Arthritis     Hypertension      Present on Admission:   Atrial flutter (HCC)   Shortness of breath   Essential hypertension      Admitting Diagnosis: Cardiomegaly [I51 7]  Shortness of breath [R06 02]  Essential hypertension [I10]  SOB (shortness of breath) [R06 02]  Age/Sex: 65 y o  female  Admission Orders:  Scheduled Medications:  cefazolin, 2,000 mg, Intravenous, On Call To OR  docusate sodium, 100 mg, Oral, BID  lisinopril, 30 mg, Oral, Daily  metoprolol tartrate, 25 mg, Oral, Q12H [facility]  senna, 1 tablet, Oral, Daily      Continuous IV Infusions:  diltiazem, 1-15 mg/hr, Intravenous, Titrated  heparin (porcine), 3-30 Units/kg/hr (Order-Specific), Intravenous, Titrated      PRN Meds:  aluminum-magnesium hydroxide-simethicone, 30 mL, Oral, Q6H PRN  ondansetron, 4 mg, Intravenous, Q6H PRN      NPO  Daily Weight, I&O  Telemetry  Natanael SCDs  IP CONSULT TO CARDIOLOGY  IP CONSULT TO THORACIC SURGERY    Network Utilization Review Department  ATTENTION: Please call with any questions or concerns to [phone] and carefully listen to the prompts so that you are directed to the right person  All voicemails are confidential   Geetha Rosas all requests for admission clinical reviews, approved or denied determinations and any other requests to dedicated fax number below belonging to the campus where the patient is receiving treatment   List of dedicated fax numbers for the Facilities:  [address] DENIALS (Administrative/Medical Necessity) [phone]   [address] (Maternity/NICU/Pediatrics) [phone]   [address] [place] [address] Dr Huang Ramos 2178 (Isabella Mauro "Marah" 103) [address] [address] Ayo Maxi Arshad [address]31 [address] [place]) [facility] [phone]

## 2021-01-04 NOTE — ASSESSMENT & PLAN NOTE
Patient with remote history of left breast cancer status post mastectomy over 20 years ago  No chemotherapy or radiation  Anterior mediastinal mass seen on CT scan with diffuse mediastinal adenopathy, Innumerable vague hypodensities throughout the liver are suspicious for metastases    Unclear etiology  Thoracic surgery is following with plan for IR biopsy after pericardial effusion draining and reimaging

## 2021-01-04 NOTE — PROGRESS NOTES
Progress Note -    Dolly Lloyd 65 y o  female MRN: [id number]  Unit/Bed#: [facility] 426-01 Encounter: [id number]    Assessment  73-yr old female with large pericardial effusion (background hx of breast ca; s/p mastectomy & new onset afib)  echocardio done yesterday was negative for tamponade physiology  Occasionally tachycardic to the 130s; on cardizem drip and heparin gtt  Other vital signs have been stable in room air  PLAN:  - hold heparin drip  - keep NPO for surgery  - close vital signs monitoring  Subjective:   - c/o: denies any shortness of breath  Does report spells/bouts of anxiety  Objective:     Vitals: Temp:  [97 4 °F (36 3 °C)-98 1 °F (36 7 °C)] 97 4 °F (36 3 °C)  HR:  [] 134  Resp:  [18-20] 18  BP: ()/(52-92) 141/58  Body mass index is 33 79 kg/m²  I/O       01/02 0701 - 01/03 0700 01/03 0701 - 01/04 0700 01/04 0701 - 01/05 0700    I V  (mL/kg)  300 3 (3 4)     Total Intake(mL/kg)  300 3 (3 4)     Urine (mL/kg/hr)  1300     Total Output  1300     Net  -999 7                  Physical Exam:  GEN: NAD  HEENT: atraumatic, normocephalic  CV: RRR  Lung: Normal effort  Ab: Soft, NT/ND  Extrem: No CCE  Neuro: A+Ox3    Lab, Imaging and other studies: I have personally reviewed pertinent reports      VTE Pharmacologic Prophylaxis: Heparin  VTE Mechanical Prophylaxis: sequential compression device

## 2021-01-04 NOTE — ANESTHESIA POSTPROCEDURE EVALUATION
Post-Op Assessment Note    CV Status:  Stable  Pain Score: 0    Pain management: adequate     Mental Status:  Alert and awake   Hydration Status:  Euvolemic   PONV Controlled:  Controlled   Airway Patency:  Patent      Post Op Vitals Reviewed: Yes      Staff: CRNA   Comments: Pt awake, alert, able to maintain own airway, VSS, report to recovery RN        No complications documented      BP (P) 134/95 (01/04/21 1805)    Temp (!) (P) 97 2 °F (36 2 °C) (01/04/21 1805)    Pulse (!) (P) 114 (01/04/21 1805)   Resp (P) 18 (01/04/21 1805)    SpO2 (P) 96 % (01/04/21 1805)

## 2021-01-04 NOTE — ASSESSMENT & PLAN NOTE
Shortness of Increasing bilateral lower extremity edema, Likely secondary to above  Continue to monitor

## 2021-01-04 NOTE — ASSESSMENT & PLAN NOTE
Evaluated by thoracic surgery with plan for elective pericardiocentesis versus pericardial window to drain pericardial effusion  Currently patient NPO

## 2021-01-04 NOTE — ASSESSMENT & PLAN NOTE
Patient was coughing too much and her  brought her to ED  She was having sob, EKG showed atrial flutter, QTc prolongation  Her son passed away on 12/21/2020  She did have swelling in her legs increased over last week  She has swelling in the past, treated with lasix with improvement in June, 2020     Patient was evaluated by Cardiology  Cardiac echo with large pericardial effusion without tamponade, EF 65%  Currently on metoprolol, heparin drip and Cardizem drip  Continue to monitor  Cardiology is following

## 2021-01-04 NOTE — PROGRESS NOTES
Cardiology PROGRESS NOTE     Name: Abhay Carnes   Age & Sex: 65 y o  female   MRN: [id number]  Unit/Bed#: [facility] 426-01   Encounter: [id number]      PATIENT INFORMATION     Name: Abhay Carnes   Age & Sex: 65 y o  female   MRN: [id number]  Hospital Stay Days: 1    ASSESSMENT/PLAN     Principal Problem:    Atrial flutter ([address] )  Active Problems:    Essential hypertension    Shortness of breath      Assessment:    Atrial flutter  Large pericardial effusion- CT surgery consulted  Right mediastinal mass 6 6 cm  Mediastinal lymphadenopathy  Small pleural effusions  History of breast cancer  Diabetes A1c 6 9    Plan:    1  Atrial flutter  Likely in setting of history of hypertension, new pericardial effusion and new mediastinal mass  Started on Cardizem and heparin drip initially  CHADS2 Vasc score of 4  EKG:  Atrial flutter, rate 138,   Echo:  EF 65%, large pericardial effusion, no signs of tamponade       Plan: Will continue heparin drip for anticoagulation  On Cardizem drip and Lopressor 25 mg b i d  Rate has been around 110-120  Plan to up titrate Lopressor for rate control as tolerated and slowly wean off Cardizem drip  Continue tele  2  Large pericardial effusion without tamponade:  CT surgery consulted for possible drainage  3  Hypertension:  Continue Lopressor and up titrate for AFib rate control  Continue lisinopril 30 mg daily  4  R mediastinal mass: plan for IR biopsy  SUBJECTIVE     Patient seen and examined  No acute events overnight  Patient denies any chest pain or shortness of breath  Currently asymptomatic  No signs of volume overload      OBJECTIVE     Vitals:    01/04/21 0417 01/04/21 0521 01/04/21 0732 01/04/21 0800   BP:   120/77    BP Location:   Right arm    Pulse: (!) 134  (!) 137    Resp:       Temp:   97 9 °F (36 6 °C)    TempSrc:       SpO2: 92%  90% 95%   Weight:  89 3 kg (196 lb 13 9 oz)     Height:          Temperature:   Temp (24hrs), Av 7 °F (36 5 °C), Min:97 4 °F (36 3 °C), Max:98 1 °F (36 7 °C)    Temperature: 97 9 °F (36 6 °C)  Intake & Output:  I/O        07 -  0700  07 -  0700  07 -  0700    I V  (mL/kg)  300 3 (3 4)     Total Intake(mL/kg)  300 3 (3 4)     Urine (mL/kg/hr)  1300     Total Output  1300     Net  -999 7                Weights:   IBW: 54 7 kg    Body mass index is 33 79 kg/m²  Weight (last 2 days)     Date/Time   Weight    21 0521   89 3 (196 87)    21 1709   89 6 (197 53)    21 0826   89 8 (198)            Physical Exam  Vitals signs reviewed  Constitutional:       General: She is not in acute distress  Appearance: She is well-developed  She is not diaphoretic  HENT:      Head: Normocephalic and atraumatic  Nose: Nose normal       Mouth/Throat:      Pharynx: No oropharyngeal exudate  Eyes:      General: No scleral icterus  Conjunctiva/sclera: Conjunctivae normal    Neck:      Musculoskeletal: Neck supple  Thyroid: No thyromegaly  Vascular: No JVD  Trachea: No tracheal deviation  Cardiovascular:      Rate and Rhythm: Regular rhythm  Tachycardia present  Heart sounds: Normal heart sounds  No murmur  No friction rub  No gallop  Pulmonary:      Effort: Pulmonary effort is normal  No respiratory distress  Breath sounds: Normal breath sounds  No stridor  No wheezing or rales  Chest:      Chest wall: No tenderness  Abdominal:      General: Bowel sounds are normal  There is no distension  Palpations: Abdomen is soft  There is no mass  Tenderness: There is no abdominal tenderness  There is no guarding or rebound  Musculoskeletal: Normal range of motion  General: No tenderness  Skin:     General: Skin is warm  Findings: No erythema or rash  Neurological:      Mental Status: She is alert and oriented to person, place, and time  Sensory: No sensory deficit     Psychiatric:         Behavior: Behavior normal          Thought Content: Thought content normal          Judgment: Judgment normal        LABORATORY DATA     Labs: I have personally reviewed pertinent reports  Results from last 7 days   Lab Units 01/04/21  0557 01/03/21  1958 01/03/21  0108   WBC Thousand/uL 14 82* 15 59* 14 02*   HEMOGLOBIN g/dL 10 6* 10 8* 10 9*   HEMATOCRIT % 36 3 36 5 34 1*   PLATELETS Thousands/uL 368 391* 384   NEUTROS PCT % 75  --   --    MONOS PCT % 12  --   --    MONO PCT %  --   --  9      Results from last 7 days   Lab Units 01/04/21  0533 01/03/21  0911 01/03/21  0108   POTASSIUM mmol/L 4 5 4 2 4 4   CHLORIDE mmol/L 107 112* 106   CO2 mmol/L 24 23 24   BUN mg/dL 21 19 22*   CREATININE mg/dL 1 14 0 94 1 07   CALCIUM mg/dL 9 0 8 7 8 6   ALK PHOS U/L 116 119* 106 9   ALT U/L 28 26 21   AST U/L 21 17 19     Results from last 7 days   Lab Units 01/03/21  0911   MAGNESIUM mg/dL 2 3     Results from last 7 days   Lab Units 01/04/21  0533   PHOSPHORUS mg/dL 3 8      Results from last 7 days   Lab Units 01/04/21  0415 01/03/21  1958 01/03/21  0108   INR   --  1 64* 1 42*   PTT seconds 134* 36 28         Results from last 7 days   Lab Units 01/03/21  1306 01/03/21  0911 01/03/21  0108   TROPONIN I ng/mL <0 02 <0 02 <0 03       IMAGING & DIAGNOSTIC TESTING     Radiology Results: I have personally reviewed pertinent reports  Xr Chest 1 View Portable    Result Date: 1/3/2021  Impression: Marked enlargement of the cardiac silhouette which could be due to cardiomegaly or a pericardial effusion  Right middle lobe pneumonia  Workstation performed: [id number]     Cta Chest Pe Study    Result Date: 1/3/2021  Impression: Large pericardial effusion measuring approximately 3 3 cm area of bulging of the right pericardium or a potential adjacent right mediastinal mass measuring approximately 6 6 cm #2/100  The pericardial effusion may be on a malignant basis   Prominent diffuse mediastinal adenopathy Interlobular septal thickening and small pleural effusions in keeping with pulmonary edema  Posterior right upper lobe consolidation #3/55 in a wedge-shaped appearance with a apex extending towards the right hilum may represent pneumonia or a postobstructive pneumonitis from a central mass  A discrete mass is not identified but could be obscured  Innumerable vague hypodensities throughout the liver are suspicious for metastases  I personally discussed this study with KERON Shannon on 1/3/2021 at 5:18 PM  Workstation performed: [id number]     Other Diagnostic Testing: I have personally reviewed pertinent reports  ACTIVE MEDICATIONS     Current Facility-Administered Medications   Medication Dose Route Frequency    aluminum-magnesium hydroxide-simethicone (MYLANTA) oral suspension 30 mL  30 mL Oral Q6H PRN    ceFAZolin (ANCEF) IVPB (premix in dextrose) 2,000 mg 50 mL  2,000 mg Intravenous On Call To OR    diltiazem (CARDIZEM) 125 mg in sodium chloride 0 9 % 125 mL infusion  1-15 mg/hr Intravenous Titrated    docusate sodium (COLACE) capsule 100 mg  100 mg Oral BID    heparin (porcine) 25,000 units in 0 45% NaCl 250 mL infusion (premix)  3-30 Units/kg/hr (Order-Specific) Intravenous Titrated    lisinopril (ZESTRIL) tablet 30 mg  30 mg Oral Daily    metoprolol tartrate (LOPRESSOR) tablet 25 mg  25 mg Oral Q12H [address]    ondansetron (ZOFRAN) injection 4 mg  4 mg Intravenous Q6H PRN    senna (SENOKOT) tablet 8 6 mg  1 tablet Oral Daily       VTE Pharmacologic Prophylaxis: Heparin  VTE Mechanical Prophylaxis: sequential compression device    Portions of the record may have been created with voice recognition software  Occasional wrong word or "sound a like" substitutions may have occurred due to the inherent limitations of voice recognition software    Read the chart carefully and recognize, using context, where substitutions have occurred   ==  Morral Grand, [address] [facility]  Internal Medicine Residency PGY-2

## 2021-01-04 NOTE — ANESTHESIA PREPROCEDURE EVALUATION
Procedure:  WINDOW PERICARDIAL, subxiphoid  (N/A Chest)    Relevant Problems   CARDIO   (+) Atrial flutter (HCC)   (+) Essential hypertension      PULMONARY   (+) Shortness of breath        Physical Exam    Airway    Mallampati score: I  TM Distance: >3 FB  Neck ROM: full     Dental   No notable dental hx     Cardiovascular  Rhythm: regular, Rate: normal, Cardiovascular exam normal    Pulmonary  Pulmonary exam normal Breath sounds clear to auscultation,     Other Findings        Anesthesia Plan  ASA Score- 4     Anesthesia Type- general with ASA Monitors  Additional Monitors: arterial line  Airway Plan: ETT  Comment: Risks/benefits and alternatives discussed with patient including likely possibility of PONV and sore throat, as well as the rare possibilities of aspiration, dental/oropharyngeal/ocular injuries, or grave/life threatening anesthetic and surgical emergencies          Plan Factors-Exercise tolerance (METS): >4 METS  Chart reviewed  EKG reviewed  Existing labs reviewed  Patient summary reviewed  Patient is not a current smoker  Patient instructed to abstain from smoking on day of procedure  Patient did not smoke on day of surgery  Obstructive sleep apnea risk education given perioperatively  Induction- intravenous  Postoperative Plan- Plan for postoperative opioid use  Planned trial extubation    Informed Consent- Anesthetic plan and risks discussed with patient  I personally reviewed this patient with the CRNA  Discussed and agreed on the Anesthesia Plan with the CRNA  Sintia Pérez

## 2021-01-05 ENCOUNTER — APPOINTMENT (INPATIENT)
Dept: RADIOLOGY | Facility: HOSPITAL | Age: 71
DRG: 270 | End: 2021-01-05
Attending: THORACIC SURGERY (CARDIOTHORACIC VASCULAR SURGERY)
Payer: COMMERCIAL

## 2021-01-05 LAB
ANION GAP SERPL CALCULATED.3IONS-SCNC: 4 MMOL/L (ref 4–13)
APTT PPP: 121 SECONDS (ref 23–37)
APTT PPP: 80 SECONDS (ref 23–37)
BUN SERPL-MCNC: 28 MG/DL (ref 5–25)
CALCIUM SERPL-MCNC: 8.3 MG/DL (ref 8.3–10.1)
CHLORIDE SERPL-SCNC: 114 MMOL/L (ref 100–108)
CO2 SERPL-SCNC: 25 MMOL/L (ref 21–32)
CREAT SERPL-MCNC: 1.49 MG/DL (ref 0.6–1.3)
ERYTHROCYTE [DISTWIDTH] IN BLOOD BY AUTOMATED COUNT: 19.5 % (ref 11.6–15.1)
GFR SERPL CREATININE-BSD FRML MDRD: 40 ML/MIN/1.73SQ M
GLUCOSE SERPL-MCNC: 162 MG/DL (ref 65–140)
GLUCOSE SERPL-MCNC: 178 MG/DL (ref 65–140)
GLUCOSE SERPL-MCNC: 235 MG/DL (ref 65–140)
HCT VFR BLD AUTO: 32.2 % (ref 34.8–46.1)
HGB BLD-MCNC: 9.8 G/DL (ref 11.5–15.4)
MCH RBC QN AUTO: 20.6 PG (ref 26.8–34.3)
MCHC RBC AUTO-ENTMCNC: 30.4 G/DL (ref 31.4–37.4)
MCV RBC AUTO: 68 FL (ref 82–98)
NRBC BLD AUTO-RTO: 0 /100 WBCS
PLATELET # BLD AUTO: 377 THOUSANDS/UL (ref 149–390)
PMV BLD AUTO: 11.5 FL (ref 8.9–12.7)
POTASSIUM SERPL-SCNC: 4.3 MMOL/L (ref 3.5–5.3)
RBC # BLD AUTO: 4.76 MILLION/UL (ref 3.81–5.12)
SODIUM SERPL-SCNC: 143 MMOL/L (ref 136–145)
WBC # BLD AUTO: 13.77 THOUSAND/UL (ref 4.31–10.16)

## 2021-01-05 PROCEDURE — 02HV33Z INSERTION OF INFUSION DEVICE INTO SUPERIOR VENA CAVA, PERCUTANEOUS APPROACH: ICD-10-PCS | Performed by: FAMILY MEDICINE

## 2021-01-05 PROCEDURE — 80048 BASIC METABOLIC PNL TOTAL CA: CPT | Performed by: SURGERY

## 2021-01-05 PROCEDURE — 99232 SBSQ HOSP IP/OBS MODERATE 35: CPT | Performed by: INTERNAL MEDICINE

## 2021-01-05 PROCEDURE — 82948 REAGENT STRIP/BLOOD GLUCOSE: CPT

## 2021-01-05 PROCEDURE — 99233 SBSQ HOSP IP/OBS HIGH 50: CPT | Performed by: FAMILY MEDICINE

## 2021-01-05 PROCEDURE — 85730 THROMBOPLASTIN TIME PARTIAL: CPT | Performed by: FAMILY MEDICINE

## 2021-01-05 PROCEDURE — 85027 COMPLETE CBC AUTOMATED: CPT | Performed by: SURGERY

## 2021-01-05 PROCEDURE — 71045 X-RAY EXAM CHEST 1 VIEW: CPT

## 2021-01-05 PROCEDURE — 99024 POSTOP FOLLOW-UP VISIT: CPT | Performed by: THORACIC SURGERY (CARDIOTHORACIC VASCULAR SURGERY)

## 2021-01-05 PROCEDURE — 36569 INSJ PICC 5 YR+ W/O IMAGING: CPT

## 2021-01-05 PROCEDURE — C1751 CATH, INF, PER/CENT/MIDLINE: HCPCS

## 2021-01-05 RX ORDER — METOPROLOL TARTRATE 50 MG/1
50 TABLET, FILM COATED ORAL EVERY 12 HOURS SCHEDULED
Status: DISCONTINUED | OUTPATIENT
Start: 2021-01-05 | End: 2021-01-11

## 2021-01-05 RX ADMIN — DILTIAZEM HYDROCHLORIDE 15 MG/HR: 5 INJECTION INTRAVENOUS at 21:48

## 2021-01-05 RX ADMIN — DOCUSATE SODIUM 100 MG: 100 CAPSULE, LIQUID FILLED ORAL at 17:14

## 2021-01-05 RX ADMIN — HEPARIN SODIUM 15 UNITS/KG/HR: 10000 INJECTION, SOLUTION INTRAVENOUS at 21:44

## 2021-01-05 RX ADMIN — METOPROLOL TARTRATE 25 MG: 25 TABLET, FILM COATED ORAL at 08:55

## 2021-01-05 RX ADMIN — HEPARIN SODIUM 18 UNITS/KG/HR: 10000 INJECTION, SOLUTION INTRAVENOUS at 01:12

## 2021-01-05 RX ADMIN — METOPROLOL TARTRATE 50 MG: 50 TABLET, FILM COATED ORAL at 21:43

## 2021-01-05 RX ADMIN — DILTIAZEM HYDROCHLORIDE 10 MG/HR: 5 INJECTION INTRAVENOUS at 09:47

## 2021-01-05 NOTE — ASSESSMENT & PLAN NOTE
Patient with remote history of left breast cancer status post mastectomy over 20 years ago  No chemotherapy or radiation  Anterior mediastinal mass seen on CT scan with diffuse mediastinal adenopathy, Innumerable vague hypodensities throughout the liver are suspicious for metastases    Unclear etiology  Thoracic surgery is following   s/p IR biopsy on 1/4/21

## 2021-01-05 NOTE — PROGRESS NOTES
Cardiology PROGRESS NOTE     Name: Meghana Jones   Age & Sex: 65 y o  female   MRN: [id number]  Unit/Bed#: [facility] 426-01   Encounter: [id number]      PATIENT INFORMATION     Name: Meghana Jones   Age & Sex: 65 y o  female   MRN: [id number]  Hospital Stay Days: 2    ASSESSMENT/PLAN     Principal Problem:    Atrial flutter ([address] )  Active Problems:    Essential hypertension    Shortness of breath    Pericardial effusion without cardiac tamponade    Mediastinal mass      Assessment:    Atrial flutter  Large pericardial effusion- s/p pericardial window  Right mediastinal mass 6 6 cm- s/p biopsy   Mediastinal lymphadenopathy  Small pleural effusions  History of breast cancer  Diabetes A1c 6 9    Plan:    1  Atrial flutter  Likely in setting of history of hypertension, new pericardial effusion and new mediastinal mass  Started on Cardizem and heparin drip initially  CHADS2 Vasc score of 4  EKG:  Atrial flutter, rate 138,   Echo:  EF 65%, large pericardial effusion, no signs of tamponade       Plan: Will continue heparin drip for anticoagulation for now with plan to switch to oral AC  Lopressor 25 mg b i d  Will increase dose for goal rate <120  Dc Cardizem drip  Continue tele  2  Large pericardial effusion without tamponade: s/p drainage of 720 mL slight bloody fluid  3  Hypertension:  Continue Lopressor and up titrate for AFib rate control  Continue lisinopril 30 mg daily  4  R mediastinal mass: s/p IR biopsy on 1/4/21  SUBJECTIVE     Patient seen and examined  No acute events overnight  Patient denies any chest pain or shortness of breath  Currently asymptomatic  No signs of volume overload      OBJECTIVE     Vitals:    01/05/21 0415 01/05/21 0445 01/05/21 0600 01/05/21 0613   BP: 106/65 109/66     BP Location:       Pulse: 82 85     Resp:       Temp:    (!) 97 4 °F (36 3 °C)   TempSrc:    Oral   SpO2:       Weight:   88 8 kg (195 lb 12 3 oz)    Height: Temperature:   Temp (24hrs), Av 6 °F (36 4 °C), Min:97 °F (36 1 °C), Max:98 1 °F (36 7 °C)    Temperature: (!) 97 4 °F (36 3 °C)  Intake & Output:  I/O        07 -  0700  07 -  0700 / 07 -  0700    I V  (mL/kg)  300 3 (3 4)     Total Intake(mL/kg)  300 3 (3 4)     Urine (mL/kg/hr)  1300     Total Output  1300     Net  -999 7                Weights:   IBW: 54 7 kg    Body mass index is 33 6 kg/m²  Weight (last 2 days)     Date/Time   Weight    21 0600   88 8 (195 77)    21 0521   89 3 (196 87)    21 1709   89 6 (197 53)    21 0826   89 8 (198)            Physical Exam  Vitals signs reviewed  Constitutional:       General: She is not in acute distress  Appearance: She is well-developed  She is not diaphoretic  HENT:      Head: Normocephalic and atraumatic  Nose: Nose normal       Mouth/Throat:      Pharynx: No oropharyngeal exudate  Eyes:      General: No scleral icterus  Conjunctiva/sclera: Conjunctivae normal    Neck:      Musculoskeletal: Neck supple  Thyroid: No thyromegaly  Vascular: No JVD  Trachea: No tracheal deviation  Cardiovascular:      Rate and Rhythm: Regular rhythm  Tachycardia present  Heart sounds: Normal heart sounds  No murmur  No friction rub  No gallop  Pulmonary:      Effort: Pulmonary effort is normal  No respiratory distress  Breath sounds: Normal breath sounds  No stridor  No wheezing or rales  Chest:      Chest wall: No tenderness  Abdominal:      General: Bowel sounds are normal  There is no distension  Palpations: Abdomen is soft  There is no mass  Tenderness: There is no abdominal tenderness  There is no guarding or rebound  Musculoskeletal: Normal range of motion  General: No tenderness  Skin:     General: Skin is warm  Findings: No erythema or rash  Neurological:      Mental Status: She is alert and oriented to person, place, and time  Sensory: No sensory deficit  Psychiatric:         Behavior: Behavior normal          Thought Content: Thought content normal          Judgment: Judgment normal        LABORATORY DATA     Labs: I have personally reviewed pertinent reports  Results from last 7 days   Lab Units 01/05/21  0448 01/04/21  0557 01/03/21 1958 01/03/21  0108   WBC Thousand/uL 13 77* 14 82* 15 59* 14 02*   HEMOGLOBIN g/dL 9 8* 10 6* 10 8* 10 9*   HEMATOCRIT % 32 2* 36 3 36 5 34 1*   PLATELETS Thousands/uL 377 368 391* 384   NEUTROS PCT %  --  75  --   --    MONOS PCT %  --  12  --   --    MONO PCT %  --   --   --  9      Results from last 7 days   Lab Units 01/05/21  0448 01/04/21  0533 01/03/21  0911 01/03/21  0108   POTASSIUM mmol/L 4 3 4 5 4 2 4 4   CHLORIDE mmol/L 114* 107 112* 106   CO2 mmol/L 25 24 23 24   BUN mg/dL 28* 21 19 22*   CREATININE mg/dL 1 49* 1 14 0 94 1 07   CALCIUM mg/dL 8 3 9 0 8 7 8 6   ALK PHOS U/L  --  116 119* 106 9   ALT U/L  --  28 26 21   AST U/L  --  21 17 19     Results from last 7 days   Lab Units 01/03/21  0911   MAGNESIUM mg/dL 2 3     Results from last 7 days   Lab Units 01/04/21  0533   PHOSPHORUS mg/dL 3 8      Results from last 7 days   Lab Units 01/04/21  0415 01/03/21  1958 01/03/21  0108   INR   --  1 64* 1 42*   PTT seconds 134* 36 28         Results from last 7 days   Lab Units 01/03/21  1306 01/03/21  0911 01/03/21  0108   TROPONIN I ng/mL <0 02 <0 02 <0 03       IMAGING & DIAGNOSTIC TESTING     Radiology Results: I have personally reviewed pertinent reports  Xr Chest 1 View Portable    Result Date: 1/3/2021  Impression: Marked enlargement of the cardiac silhouette which could be due to cardiomegaly or a pericardial effusion  Right middle lobe pneumonia   Workstation performed: [id number]     Cta Chest Pe Study    Result Date: 1/3/2021  Impression: Large pericardial effusion measuring approximately 3 3 cm area of bulging of the right pericardium or a potential adjacent right mediastinal mass measuring approximately 6 6 cm #2/100  The pericardial effusion may be on a malignant basis  Prominent diffuse mediastinal adenopathy Interlobular septal thickening and small pleural effusions in keeping with pulmonary edema  Posterior right upper lobe consolidation #3/55 in a wedge-shaped appearance with a apex extending towards the right hilum may represent pneumonia or a postobstructive pneumonitis from a central mass  A discrete mass is not identified but could be obscured  Innumerable vague hypodensities throughout the liver are suspicious for metastases  I personally discussed this study with KERON Pitts on 1/3/2021 at 5:18 PM  Workstation performed: [id number]     Other Diagnostic Testing: I have personally reviewed pertinent reports  ACTIVE MEDICATIONS     Current Facility-Administered Medications   Medication Dose Route Frequency    aluminum-magnesium hydroxide-simethicone (MYLANTA) oral suspension 30 mL  30 mL Oral Q6H PRN    diltiazem (CARDIZEM) 125 mg in sodium chloride 0 9 % 125 mL infusion  1-15 mg/hr Intravenous Titrated    docusate sodium (COLACE) capsule 100 mg  100 mg Oral BID    heparin (porcine) 25,000 units in 0 45% NaCl 250 mL infusion (premix)  3-30 Units/kg/hr (Order-Specific) Intravenous Titrated    metoprolol tartrate (LOPRESSOR) tablet 25 mg  25 mg Oral Q12H [facility]    ondansetron (ZOFRAN) injection 4 mg  4 mg Intravenous Q6H PRN    senna (SENOKOT) tablet 8 6 mg  1 tablet Oral Daily       VTE Pharmacologic Prophylaxis: Heparin  VTE Mechanical Prophylaxis: sequential compression device    Portions of the record may have been created with voice recognition software  Occasional wrong word or "sound a like" substitutions may have occurred due to the inherent limitations of voice recognition software    Read the chart carefully and recognize, using context, where substitutions have occurred   ==  Be Claire, [address] [facility]  Internal Medicine Residency PGY-2

## 2021-01-05 NOTE — ASSESSMENT & PLAN NOTE
Evaluated by thoracic surgery  Underwent pericardial drain placement 01/04/2021, will stay at least for 72 hours

## 2021-01-05 NOTE — RESTORATIVE TECHNICIAN NOTE
Restorative Specialist Mobility Note       Activity: (would like to wait a little longer until she ambulates)

## 2021-01-05 NOTE — PROGRESS NOTES
Progress Note -    Julia Dominguez 65 y o  female MRN: [id number]  Unit/Bed#: [facility] 426-01 Encounter: [id number]    Assessment:  73-yr old female with large pericardial effusion (background hx of breast ca; s/p mastectomy & new onset afib)  Now POD # 1; s/p subxiphoid pericardial window  Two episodes of hypotension noted between 2 am and 3 am: systolics of 87 and 78   cardizem drip held  Heart rate mostly in the 70s post-operatively; 120 at the bedside this morning  On hep gtt  Drain output: 125 cc; serosanguinous  White count: 13; Hb: 9 8  Gram stain; no orgs  Pericardial fluid cytology pending  PLAN:  - keep pericardial drain to bulb suction  - resume cardizem drip  - continue hep gtt and transition to oral agent per primary team   - other care per primary team     Subjective:   - c/o; no new complaints  Denies any shortness of breath or palpitations  Objective:     Vitals: Temp:  [97 °F (36 1 °C)-98 1 °F (36 7 °C)] 97 4 °F (36 3 °C)  HR:  [] 85  Resp:  [15-33] 15  BP: ()/(47-95) 109/66  Arterial Line BP: (114-180)/(52-76) 114/52  Body mass index is 33 6 kg/m²  I/O       01/03 0701 - 01/04 0700 01/04 0701 - 01/05 0700 01/05 0701 - 01/06 0700    I V  (mL/kg) 300 3 (3 4) 841 8 (9 5)     NG/GT  0     Total Intake(mL/kg) 300 3 (3 4) 841 8 (9 5)     Urine (mL/kg/hr) 1300 400 (0 2)     Drains  125     Other  670     Total Output 1300 1195     Net -999 7 -353 2                  Physical Exam:  GEN: NAD  HEENT: atraumatic, normocephalic  Chest: incision clean/dry/intact; drain (CT) in situ; light serosanguinous effluent  CV: RRR  Lung: Normal effort  Ab: Soft, NT/ND  Extrem: No CCE  Neuro: A+Ox3    Lab, Imaging and other studies: I have personally reviewed pertinent reports      VTE Pharmacologic Prophylaxis: Heparin  VTE Mechanical Prophylaxis: sequential compression device

## 2021-01-05 NOTE — ASSESSMENT & PLAN NOTE
Patient was coughing too much and her  brought her to ED  She was having sob, EKG showed atrial flutter, QTc prolongation  Her son passed away on 12/21/2020  She did have swelling in her legs increased over last week  She has swelling in the past, treated with lasix with improvement in June, 2020     Patient was evaluated by Cardiology  Cardiac echo with large pericardial effusion without tamponade, EF 65%  Currently on metoprolol, heparin drip and Cardizem drip  Very difficult IV access, will need PICC line

## 2021-01-05 NOTE — PROGRESS NOTES
Progress Note - Jennifer Mon 1950, 65 y o  female MRN: [id number]    Unit/Bed#: [facility] 426-01 Encounter: [id number]    Primary Care Provider: Fito Sage MD   Date and time admitted to hospital: 1/3/2021  8:26 AM        * Atrial flutter [facility])  Assessment & Plan  Patient was coughing too much and her  brought her to ED  She was having sob, EKG showed atrial flutter, QTc prolongation  Her son passed away on 12/21/2020  She did have swelling in her legs increased over last week  She has swelling in the past, treated with lasix with improvement in June, 2020  Patient was evaluated by Cardiology  Cardiac echo with large pericardial effusion without tamponade, EF 65%  Currently on metoprolol, heparin drip and Cardizem drip  Very difficult IV access, will need PICC line      Pericardial effusion without cardiac tamponade  Assessment & Plan  Evaluated by thoracic surgery  Underwent pericardial drain placement 01/04/2021, will stay at least for 72 hours    Mediastinal mass  Assessment & Plan  Patient with remote history of left breast cancer status post mastectomy over 20 years ago  No chemotherapy or radiation  Anterior mediastinal mass seen on CT scan with diffuse mediastinal adenopathy, Innumerable vague hypodensities throughout the liver are suspicious for metastases  Unclear etiology  Thoracic surgery is following   s/p IR biopsy on 1/4/21    Shortness of breath  Assessment & Plan  Shortness of Increasing bilateral lower extremity edema, Likely secondary to above  Continue to monitor    Essential hypertension  Assessment & Plan  Continue metoprolol  Hold lisinopril for now  Continue to monitor       VTE Pharmacologic Prophylaxis:   Pharmacologic: Heparin Drip  Mechanical VTE Prophylaxis in Place: Yes    Patient Centered Rounds: I have performed bedside rounds with nursing staff today       Education and Discussions with Family / Patient: discussed with  cristal over the phone Current Length of Stay: 2 day(s)    Current Patient Status: Inpatient   Certification Statement: The patient will continue to require additional inpatient hospital stay due to Pericardial effusion, short of breath    Discharge Plan:  Pending     Code Status: Level 1 - Full Code      Subjective:   Examined at bedside  Not in distress  No Short of breath or chest pain    Objective:     Vitals:   Temp (24hrs), Av 4 °F (36 3 °C), Min:97 °F (36 1 °C), Max:97 7 °F (36 5 °C)    Temp:  [97 °F (36 1 °C)-97 7 °F (36 5 °C)] 97 6 °F (36 4 °C)  HR:  [] 121  Resp:  [14-33] 14  BP: ()/(47-95) 108/62  SpO2:  [93 %-98 %] 95 %  Body mass index is 33 6 kg/m²  Input and Output Summary (last 24 hours): Intake/Output Summary (Last 24 hours) at 2021 1233  Last data filed at 2021 1140  Gross per 24 hour   Intake 766 41 ml   Output 1105 ml   Net -338  59 ml       Physical Exam:     Physical Exam  Constitutional:       Appearance: She is well-developed  HENT:      Head: Normocephalic and atraumatic  Neck:      Musculoskeletal: Normal range of motion  Pulmonary:      Effort: Pulmonary effort is normal  No respiratory distress  Breath sounds: Normal breath sounds  Skin:     General: Skin is warm and dry              Labs:    Results from last 7 days   Lab Units 21  0448 21  0557   WBC Thousand/uL 13 77* 14 82*   HEMOGLOBIN g/dL 9 8* 10 6*   HEMATOCRIT % 32 2* 36 3   PLATELETS Thousands/uL 377 368   NEUTROS PCT %  --  75   LYMPHS PCT %  --  8*   MONOS PCT %  --  12   EOS PCT %  --  2     Results from last 7 days   Lab Units 21  0448 21  0533   SODIUM mmol/L 143 139   POTASSIUM mmol/L 4 3 4 5   CHLORIDE mmol/L 114* 107   CO2 mmol/L 25 24   BUN mg/dL 28* 21   CREATININE mg/dL 1 49* 1 14   ANION GAP mmol/L 4 8   CALCIUM mg/dL 8 3 9 0   ALBUMIN g/dL  --  3 2*   TOTAL BILIRUBIN mg/dL  --  1 03*   ALK PHOS U/L  --  116   ALT U/L  --  28   AST U/L  --  21   GLUCOSE RANDOM mg/dL 178* 97     Results from last 7 days   Lab Units 01/03/21  1958   INR  1 64*     Results from last 7 days   Lab Units 01/05/21  1031 01/05/21  0534 01/04/21  2047 01/04/21  1136 01/04/21  0602   POC GLUCOSE mg/dl 235* 162* 133 132 129     Results from last 7 days   Lab Units 01/03/21  0911   HEMOGLOBIN A1C % 6 9*                Recent Cultures (last 7 days):     Results from last 7 days   Lab Units 01/04/21  1720   GRAM STAIN RESULT  1+ Polys  2+ Mononuclear Cells  No organisms seen   BODY FLUID CULTURE, STERILE  No growth       Last 24 Hours Medication List:   Current Facility-Administered Medications   Medication Dose Route Frequency Provider Last Rate    aluminum-magnesium hydroxide-simethicone  30 mL Oral Q6H PRN Carlos Shea MD      diltiazem  1-15 mg/hr Intravenous Titrated Carlos Shea MD Stopped (01/05/21 1208)    docusate sodium  100 mg Oral BID Carlos Shea MD      heparin (porcine)  3-30 Units/kg/hr (Order-Specific) Intravenous Titrated Carlos Shea MD 15 Units/kg/hr (01/05/21 1209)    metoprolol tartrate  25 mg Oral Q12H [facility] Carlos Shea MD      ondansetron  4 mg Intravenous Q6H PRN Carlos Shea MD      senna  1 tablet Oral Daily Carlos Shea MD          Today, Patient Was Seen By: Kirstin Bass MD    ** Please Note: Dictation voice to text software may have been used in the creation of this document   **

## 2021-01-05 NOTE — PROCEDURES
Insert PICC line    Date/Time: 1/5/2021 1:58 PM  Performed by: Joslyn Torres RN  Authorized by: Topher Bishop MD     Patient location:  Bedside  Other Assisting Provider: Yes (comment)    Consent:     Consent obtained:  Written (physician obtained consent)    Consent given by:  Patient    Procedural risks discussed: MD instructed  Universal protocol:     Procedure explained and questions answered to patient or proxy's satisfaction: yes      Relevant documents present and verified: yes      Test results available and properly labeled: yes      Radiology Images displayed and confirmed  If images not available, report reviewed: yes      Required blood products, implants, devices, and special equipment available: yes      Site/side marked: yes      Immediately prior to procedure, a time out was called: yes      Patient identity confirmed:  Verbally with patient and arm band  Pre-procedure details:     Hand hygiene: Hand hygiene performed prior to insertion      Sterile barrier technique: All elements of maximal sterile technique followed      Skin preparation:  ChloraPrep    Skin preparation agent: Skin preparation agent completely dried prior to procedure    Indications:     PICC line indications: medications requiring central line    Anesthesia (see MAR for exact dosages):      Anesthesia method:  Local infiltration    Local anesthetic:  Lidocaine 1% w/o epi (2 ml)  Procedure details:     Location:  Brachial    Vessel type: vein      Laterality:  Right    Site selection rationale:  Left arm limb alert    Approach: percutaneous technique used      Patient position:  Flat    Procedural supplies:  Double lumen    Catheter size:  5 Fr    Landmarks identified: yes      Ultrasound guidance: yes      Sterile ultrasound techniques: Sterile gel and sterile probe covers were used      Number of attempts:  2    Successful placement: yes      Vessel of catheter tip end:  Sherlock 3CG confirmed (Ok to use, reported to RN) Total catheter length (cm):  42    Catheter out on skin (cm):  1    Max flow rate:  999 ml / hr    Arm circumference:  40  Post-procedure details:     Post-procedure:  Dressing applied and securement device placed    Assessment:  Blood return through all ports and free fluid flow    Post-procedure complications: none      Patient tolerance of procedure: Tolerated well, no immediate complications    Observer: Yes      Observer name:   Guerita Chirinos, Infusion tech

## 2021-01-06 LAB
ABO GROUP BLD BPU: NORMAL
ABO GROUP BLD BPU: NORMAL
ANION GAP SERPL CALCULATED.3IONS-SCNC: 7 MMOL/L (ref 4–13)
APTT PPP: 109 SECONDS (ref 23–37)
APTT PPP: 61 SECONDS (ref 23–37)
APTT PPP: 65 SECONDS (ref 23–37)
BPU ID: NORMAL
BPU ID: NORMAL
BUN SERPL-MCNC: 29 MG/DL (ref 5–25)
CALCIUM SERPL-MCNC: 8.6 MG/DL (ref 8.3–10.1)
CHLORIDE SERPL-SCNC: 111 MMOL/L (ref 100–108)
CO2 SERPL-SCNC: 23 MMOL/L (ref 21–32)
CREAT SERPL-MCNC: 1.2 MG/DL (ref 0.6–1.3)
CROSSMATCH: NORMAL
CROSSMATCH: NORMAL
ERYTHROCYTE [DISTWIDTH] IN BLOOD BY AUTOMATED COUNT: 19.6 % (ref 11.6–15.1)
GFR SERPL CREATININE-BSD FRML MDRD: 52 ML/MIN/1.73SQ M
GLUCOSE SERPL-MCNC: 140 MG/DL (ref 65–140)
HCT VFR BLD AUTO: 34.3 % (ref 34.8–46.1)
HGB BLD-MCNC: 10.2 G/DL (ref 11.5–15.4)
MCH RBC QN AUTO: 20.2 PG (ref 26.8–34.3)
MCHC RBC AUTO-ENTMCNC: 29.7 G/DL (ref 31.4–37.4)
MCV RBC AUTO: 68 FL (ref 82–98)
PLATELET # BLD AUTO: 346 THOUSANDS/UL (ref 149–390)
PMV BLD AUTO: 11.1 FL (ref 8.9–12.7)
POTASSIUM SERPL-SCNC: 4.1 MMOL/L (ref 3.5–5.3)
RBC # BLD AUTO: 5.04 MILLION/UL (ref 3.81–5.12)
SODIUM SERPL-SCNC: 141 MMOL/L (ref 136–145)
UNIT DISPENSE STATUS: NORMAL
UNIT DISPENSE STATUS: NORMAL
UNIT PRODUCT CODE: NORMAL
UNIT PRODUCT CODE: NORMAL
UNIT RH: NORMAL
UNIT RH: NORMAL
WBC # BLD AUTO: 21.22 THOUSAND/UL (ref 4.31–10.16)

## 2021-01-06 PROCEDURE — 99232 SBSQ HOSP IP/OBS MODERATE 35: CPT | Performed by: FAMILY MEDICINE

## 2021-01-06 PROCEDURE — 99232 SBSQ HOSP IP/OBS MODERATE 35: CPT | Performed by: INTERNAL MEDICINE

## 2021-01-06 PROCEDURE — 80048 BASIC METABOLIC PNL TOTAL CA: CPT | Performed by: FAMILY MEDICINE

## 2021-01-06 PROCEDURE — 85730 THROMBOPLASTIN TIME PARTIAL: CPT | Performed by: FAMILY MEDICINE

## 2021-01-06 PROCEDURE — 85027 COMPLETE CBC AUTOMATED: CPT | Performed by: FAMILY MEDICINE

## 2021-01-06 RX ADMIN — STANDARDIZED SENNA CONCENTRATE 8.6 MG: 8.6 TABLET ORAL at 08:49

## 2021-01-06 RX ADMIN — METOPROLOL TARTRATE 50 MG: 50 TABLET, FILM COATED ORAL at 08:49

## 2021-01-06 RX ADMIN — DOCUSATE SODIUM 100 MG: 100 CAPSULE, LIQUID FILLED ORAL at 08:49

## 2021-01-06 RX ADMIN — METOPROLOL TARTRATE 50 MG: 50 TABLET, FILM COATED ORAL at 21:01

## 2021-01-06 RX ADMIN — HEPARIN SODIUM 13 UNITS/KG/HR: 10000 INJECTION, SOLUTION INTRAVENOUS at 21:07

## 2021-01-06 NOTE — RESTORATIVE TECHNICIAN NOTE
Restorative Specialist Mobility Note       Activity: Ambulate in turpin, Chair     Assistive Device: None

## 2021-01-06 NOTE — PROGRESS NOTES
Progress Note - Zi Georges 1950, 65 y o  female MRN: [id number]    Unit/Bed#: [facility] 426-01 Encounter: [id number]    Primary Care Provider: Dez Smith MD   Date and time admitted to hospital: 1/3/2021  8:26 AM        * Atrial flutter [facility])  Assessment & Plan  Patient was coughing too much and her  brought her to ED  She was having sob, EKG showed atrial flutter, QTc prolongation  Her son passed away on 12/21/2020  She did have swelling in her legs increased over last week  She has swelling in the past, treated with lasix with improvement in June, 2020  Patient was evaluated by Cardiology  Cardiac echo with large pericardial effusion without tamponade, EF 65%  Currently off of Cardizem drip, on p o  Metoprolol  Heart rate is well controlled  Patient continues to be on heparin drip  Will switch over to oral anticoagulation when cleared by Cardiology  Has a PICC line due to difficult IV access      Pericardial effusion without cardiac tamponade  Assessment & Plan  Evaluated by thoracic surgery  Underwent pericardial drain placement 01/04/2021, will stay at least for 72 hours    Mediastinal mass  Assessment & Plan  Patient with remote history of left breast cancer status post mastectomy over 20 years ago  No chemotherapy or radiation  Anterior mediastinal mass seen on CT scan with diffuse mediastinal adenopathy, Innumerable vague hypodensities throughout the liver are suspicious for metastases    Unclear etiology  Thoracic surgery is following   s/p IR biopsy on 1/4/21    Shortness of breath  Assessment & Plan  Shortness of Increasing bilateral lower extremity edema, Likely secondary to above  Continue to monitor    Essential hypertension  Assessment & Plan  Continue metoprolol  Hold lisinopril for now  Continue to monitor        VTE Pharmacologic Prophylaxis:   Pharmacologic: Heparin Drip  Mechanical VTE Prophylaxis in Place: Yes    Patient Centered Rounds: I have performed bedside rounds with nursing staff today  Education and Discussions with Family / Patient:  Patient at bedside    Current Length of Stay: 3 day(s)    Current Patient Status: Inpatient   Certification Statement: The patient will continue to require additional inpatient hospital stay due to Heparin drip, pericardial drain    Discharge Plan:  Home when medically clear    Code Status: Level 1 - Full Code      Subjective:   Examined at bedside  Patient is sitting up on the chair  Denies any complaint or concern  States feels well  Objective:     Vitals:   Temp (24hrs), Av °F (36 7 °C), Min:97 5 °F (36 4 °C), Max:98 3 °F (36 8 °C)    Temp:  [97 5 °F (36 4 °C)-98 3 °F (36 8 °C)] 97 5 °F (36 4 °C)  HR:  [] 62  Resp:  [12-16] 12  BP: (101-170)/(54-78) 101/54  SpO2:  [95 %-98 %] 97 %  Body mass index is 34 1 kg/m²  Input and Output Summary (last 24 hours): Intake/Output Summary (Last 24 hours) at 2021 1415  Last data filed at 2021 1134  Gross per 24 hour   Intake 771 63 ml   Output 1660 ml   Net -888  37 ml       Physical Exam:     Physical Exam  Constitutional:       Appearance: She is well-developed  HENT:      Head: Normocephalic and atraumatic  Neck:      Musculoskeletal: Normal range of motion  Pulmonary:      Effort: Pulmonary effort is normal  No respiratory distress  Breath sounds: Normal breath sounds  Skin:     General: Skin is warm and dry  Additional Data:     Labs:    Results from last 7 days   Lab Units 21  0458  21  0557   WBC Thousand/uL 21 22*   < > 14 82*   HEMOGLOBIN g/dL 10 2*   < > 10 6*   HEMATOCRIT % 34 3*   < > 36 3   PLATELETS Thousands/uL 346   < > 368   NEUTROS PCT %  --   --  75   LYMPHS PCT %  --   --  8*   MONOS PCT %  --   --  12   EOS PCT %  --   --  2    < > = values in this interval not displayed       Results from last 7 days   Lab Units 21  0458  21  0533   SODIUM mmol/L 141   < > 139   POTASSIUM mmol/L 4 1   < > 4 5 CHLORIDE mmol/L 111*   < > 107   CO2 mmol/L 23   < > 24   BUN mg/dL 29*   < > 21   CREATININE mg/dL 1 20   < > 1 14   ANION GAP mmol/L 7   < > 8   CALCIUM mg/dL 8 6   < > 9 0   ALBUMIN g/dL  --   --  3 2*   TOTAL BILIRUBIN mg/dL  --   --  1 03*   ALK PHOS U/L  --   --  116   ALT U/L  --   --  28   AST U/L  --   --  21   GLUCOSE RANDOM mg/dL 140   < > 97    < > = values in this interval not displayed  Results from last 7 days   Lab Units 01/03/21  1958   INR  1 64*     Results from last 7 days   Lab Units 01/05/21  1031 01/05/21  0534 01/04/21  2047 01/04/21  1136 01/04/21  0602   POC GLUCOSE mg/dl 235* 162* 133 132 129     Results from last 7 days   Lab Units 01/03/21  0911   HEMOGLOBIN A1C % 6 9*               * I Have Reviewed All Lab Data Listed Above  * Additional Pertinent Lab Tests Reviewed: Janell 64 Admission Reviewed    Imaging:    Recent Cultures (last 7 days):     Results from last 7 days   Lab Units 01/04/21  1720   GRAM STAIN RESULT  1+ Polys  2+ Mononuclear Cells  No organisms seen   BODY FLUID CULTURE, STERILE  No growth       Last 24 Hours Medication List:   Current Facility-Administered Medications   Medication Dose Route Frequency Provider Last Rate    aluminum-magnesium hydroxide-simethicone  30 mL Oral Q6H PRN Mercedes Islas MD      diltiazem  1-15 mg/hr Intravenous Titrated Mercedes Islas MD Stopped (01/06/21 1037)    docusate sodium  100 mg Oral BID Mercedes Islas MD      heparin (porcine)  3-30 Units/kg/hr (Order-Specific) Intravenous Titrated Mercedes Islas MD 13 Units/kg/hr (01/06/21 0219)    metoprolol tartrate  50 mg Oral Q12H [address] DO      ondansetron  4 mg Intravenous Q6H PRN Mercedes Islas MD      senna  1 tablet Oral Daily Mercedes Islas MD          Today, Patient Was Seen By: Fina Drummond MD    ** Please Note: Dictation voice to text software may have been used in the creation of this document   **

## 2021-01-06 NOTE — PROGRESS NOTES
Cardiology PROGRESS NOTE     Name: Lazaro Argueta   Age & Sex: 65 y o  female   MRN: [id number]  Unit/Bed#: [facility] 426-01   Encounter: [id number]      PATIENT INFORMATION     Name: Lazaro Argueta   Age & Sex: 65 y o  female   MRN: [id number]  Hospital Stay Days: 3    ASSESSMENT/PLAN     Principal Problem:    Atrial flutter ([address] )  Active Problems:    Essential hypertension    Shortness of breath    Pericardial effusion without cardiac tamponade    Mediastinal mass      Assessment:    Atrial flutter  Large pericardial effusion- s/p pericardial window  Right mediastinal mass 6 6 cm- s/p biopsy   Leukocytosis  Mediastinal lymphadenopathy  Small pleural effusions  History of breast cancer  Diabetes A1c 6 9    Plan:    1  Atrial flutter  Likely in setting of history of hypertension, new pericardial effusion and new mediastinal mass  Started on Cardizem and heparin drip initially  CHADS2 Vasc score of 4  EKG:  Atrial flutter, rate 138,   Echo:  EF 65%, large pericardial effusion, no signs of tamponade       Plan: Will continue heparin drip for anticoagulation for now with plan to switch to oral AC  Lopressor 50 mg b i d  Wean Cardizem drip as tolerated  Continue tele  Rate seems to be going up with activity  Continue to watch for now  2  Large pericardial effusion without tamponade: s/p drainage of 720 mL slight bloody fluid  Plan to get limited echo this week  Fluid studies: cultures pending, no organism growth, no acid fast bacilli seen  3  Hypertension:  Continue Lopressor and up titrate for AFib rate control  Holding lisinopril 30 mg daily given recent hypotension  4  R mediastinal mass: s/p IR biopsy on 1/4/21  Bx pending  5  Leukocytosis      SUBJECTIVE     Patient seen and examined  No acute events overnight  Patient denies any chest pain or shortness of breath  Currently asymptomatic  No signs of volume overload   Pt admits to feeling well from breathing stand point  OBJECTIVE     Vitals:    21 2300 21 0200 21 0300 21 0600   BP: 105/66  106/60    BP Location: Right arm  Right arm    Pulse: 62 80 76    Resp: 16  16    Temp: 98 1 °F (36 7 °C)  98 °F (36 7 °C)    TempSrc: Oral  Oral    SpO2: 96%  95%    Weight:    90 1 kg (198 lb 10 2 oz)   Height:          Temperature:   Temp (24hrs), Av 1 °F (36 7 °C), Min:98 °F (36 7 °C), Max:98 3 °F (36 8 °C)    Temperature: 98 °F (36 7 °C)  Intake & Output:  I/O       701 -  07 -  07 -  07    I V  (mL/kg)  300 3 (3 4)     Total Intake(mL/kg)  300 3 (3 4)     Urine (mL/kg/hr)  1300     Total Output  1300     Net  -999 7                Weights:   IBW: 54 7 kg    Body mass index is 34 1 kg/m²  Weight (last 2 days)     Date/Time   Weight    21 06   90 1 (198 63)    21 06   88 8 (195 77)    21 05   89 3 (196 87)            Physical Exam  Vitals signs reviewed  Constitutional:       General: She is not in acute distress  Appearance: She is well-developed  She is not diaphoretic  HENT:      Head: Normocephalic and atraumatic  Nose: Nose normal       Mouth/Throat:      Pharynx: No oropharyngeal exudate  Eyes:      General: No scleral icterus  Conjunctiva/sclera: Conjunctivae normal    Neck:      Musculoskeletal: Neck supple  Thyroid: No thyromegaly  Vascular: No JVD  Trachea: No tracheal deviation  Cardiovascular:      Rate and Rhythm: Normal rate  Rhythm irregular  Heart sounds: Normal heart sounds  No murmur  No friction rub  No gallop  Pulmonary:      Effort: Pulmonary effort is normal  No respiratory distress  Breath sounds: Normal breath sounds  No stridor  No wheezing or rales  Chest:      Chest wall: No tenderness  Abdominal:      General: Bowel sounds are normal  There is no distension  Palpations: Abdomen is soft  There is no mass  Tenderness:  There is no abdominal tenderness  There is no guarding or rebound  Musculoskeletal: Normal range of motion  General: No tenderness  Skin:     General: Skin is warm  Findings: No erythema or rash  Neurological:      Mental Status: She is alert and oriented to person, place, and time  Sensory: No sensory deficit  Psychiatric:         Behavior: Behavior normal          Thought Content: Thought content normal          Judgment: Judgment normal        LABORATORY DATA     Labs: I have personally reviewed pertinent reports  Results from last 7 days   Lab Units 01/06/21  0458 01/05/21  0448 01/04/21  0557  01/03/21  0108   WBC Thousand/uL 21 22* 13 77* 14 82*   < > 14 02*   HEMOGLOBIN g/dL 10 2* 9 8* 10 6*   < > 10 9*   HEMATOCRIT % 34 3* 32 2* 36 3   < > 34 1*   PLATELETS Thousands/uL 346 377 368   < > 384   NEUTROS PCT %  --   --  75  --   --    MONOS PCT %  --   --  12  --   --    MONO PCT %  --   --   --   --  9    < > = values in this interval not displayed  Results from last 7 days   Lab Units 01/06/21 0458 01/05/21 0448 01/04/21  0533 01/03/21  0911 01/03/21  0108   POTASSIUM mmol/L 4 1 4 3 4 5 4 2 4 4   CHLORIDE mmol/L 111* 114* 107 112* 106   CO2 mmol/L 23 25 24 23 24   BUN mg/dL 29* 28* 21 19 22*   CREATININE mg/dL 1 20 1 49* 1 14 0 94 1 07   CALCIUM mg/dL 8 6 8 3 9 0 8 7 8 6   ALK PHOS U/L  --   --  116 119* 106 9   ALT U/L  --   --  28 26 21   AST U/L  --   --  21 17 19     Results from last 7 days   Lab Units 01/03/21  0911   MAGNESIUM mg/dL 2 3     Results from last 7 days   Lab Units 01/04/21  0533   PHOSPHORUS mg/dL 3 8      Results from last 7 days   Lab Units 01/06/21  0106 01/05/21  1714 01/05/21  0902  01/03/21  1958 01/03/21  0108   INR   --   --   --   --  1 64* 1 42*   PTT seconds 109* 80* 121*   < > 36 28    < > = values in this interval not displayed           Results from last 7 days   Lab Units 01/03/21  1306 01/03/21  0911 01/03/21  0108   TROPONIN I ng/mL <0 02 <0 02 <0 03 IMAGING & DIAGNOSTIC TESTING     Radiology Results: I have personally reviewed pertinent reports  Xr Chest 1 View Portable    Result Date: 1/3/2021  Impression: Marked enlargement of the cardiac silhouette which could be due to cardiomegaly or a pericardial effusion  Right middle lobe pneumonia  Workstation performed: [id number]     Cta Chest Pe Study    Result Date: 1/3/2021  Impression: Large pericardial effusion measuring approximately 3 3 cm area of bulging of the right pericardium or a potential adjacent right mediastinal mass measuring approximately 6 6 cm #2/100  The pericardial effusion may be on a malignant basis  Prominent diffuse mediastinal adenopathy Interlobular septal thickening and small pleural effusions in keeping with pulmonary edema  Posterior right upper lobe consolidation #3/55 in a wedge-shaped appearance with a apex extending towards the right hilum may represent pneumonia or a postobstructive pneumonitis from a central mass  A discrete mass is not identified but could be obscured  Innumerable vague hypodensities throughout the liver are suspicious for metastases  I personally discussed this study with KERON Mendez on 1/3/2021 at 5:18 PM  Workstation performed: [id number]     Other Diagnostic Testing: I have personally reviewed pertinent reports      ACTIVE MEDICATIONS     Current Facility-Administered Medications   Medication Dose Route Frequency    aluminum-magnesium hydroxide-simethicone (MYLANTA) oral suspension 30 mL  30 mL Oral Q6H PRN    diltiazem (CARDIZEM) 125 mg in sodium chloride 0 9 % 125 mL infusion  1-15 mg/hr Intravenous Titrated    docusate sodium (COLACE) capsule 100 mg  100 mg Oral BID    heparin (porcine) 25,000 units in 0 45% NaCl 250 mL infusion (premix)  3-30 Units/kg/hr (Order-Specific) Intravenous Titrated    metoprolol tartrate (LOPRESSOR) tablet 50 mg  50 mg Oral Q12H [facility] & assisted    ondansetron (ZOFRAN) injection 4 mg  4 mg Intravenous Q6H PRN    senna (SENOKOT) tablet 8 6 mg  1 tablet Oral Daily       VTE Pharmacologic Prophylaxis: Heparin  VTE Mechanical Prophylaxis: sequential compression device    Portions of the record may have been created with voice recognition software  Occasional wrong word or "sound a like" substitutions may have occurred due to the inherent limitations of voice recognition software    Read the chart carefully and recognize, using context, where substitutions have occurred   ==  Sofiya Galdamez, [address] [facility]  Internal Medicine Residency PGY-2

## 2021-01-06 NOTE — CASE MANAGEMENT
Pt is transferred to [facility] from [facility] ED where pt was initially brought on 1/3/21  CM met w/ pt and obtained all the following info  HOME: Pt resides in a 1st floor apt w/ no steps inside apt, no steps to enter apt, no steps to enter bldg from outside, and 2 steps up to path from parking lot  LIVES W/:   : Pt's  [place] (c: [phone])  INDEPENDENCE: Pt is independent at baseline w/ ambulating and performing her ADLS  DME: None reported  HHC: No hx of services reported  I/P REHAB: No hx of placements reported  MENTAL HEALTH ISSUES: No hx reported  D&A ISSUES: No hx reported  PCP: Dr Claudia Sotelo through [facility]  PHARMACY: [facility] pharmacy located on corner of [address] in [facility]  INCOME SOURCE: Pension and Social Security benefits  INSURANCE: [employer] Medicare-replacement plan  MEDICAL POA: No one is officially pre-designated  Pt's  is her POA by legal default, only if needed  TRANSPORTATION AT D/C: Pt's family will provide transport  CM reviewed d/c planning process including the following: identifying help at home, patient preference for d/c planning needs, Discharge Lounge, [facility] Meds to Bed program, availability of treatment team to discuss questions or concerns patient and/or family may have regarding understanding medications and recognizing signs and symptoms once discharged  CM also encouraged patient to follow up with all recommended appointments after discharge  Patient advised of importance for patient and family to participate in managing patients medical well being  Patient/caregiver received discharge checklist  Content reviewed  Patient/caregiver encouraged to participate in discharge plan of care prior to discharge home

## 2021-01-07 ENCOUNTER — APPOINTMENT (INPATIENT)
Dept: NON INVASIVE DIAGNOSTICS | Facility: HOSPITAL | Age: 71
DRG: 270 | End: 2021-01-07
Payer: COMMERCIAL

## 2021-01-07 LAB
ANION GAP SERPL CALCULATED.3IONS-SCNC: 4 MMOL/L (ref 4–13)
APTT PPP: 40 SECONDS (ref 23–37)
APTT PPP: 87 SECONDS (ref 23–37)
ATRIAL RATE: 256 BPM
BACTERIA SPEC ANAEROBE CULT: NO GROWTH
BACTERIA SPEC BFLD CULT: NO GROWTH
BUN SERPL-MCNC: 25 MG/DL (ref 5–25)
CALCIUM SERPL-MCNC: 8.1 MG/DL (ref 8.3–10.1)
CHLORIDE SERPL-SCNC: 111 MMOL/L (ref 100–108)
CO2 SERPL-SCNC: 27 MMOL/L (ref 21–32)
CREAT SERPL-MCNC: 1.01 MG/DL (ref 0.6–1.3)
ERYTHROCYTE [DISTWIDTH] IN BLOOD BY AUTOMATED COUNT: 19.8 % (ref 11.6–15.1)
GFR SERPL CREATININE-BSD FRML MDRD: 64 ML/MIN/1.73SQ M
GLUCOSE SERPL-MCNC: 101 MG/DL (ref 65–140)
GRAM STN SPEC: NORMAL
HCT VFR BLD AUTO: 33.4 % (ref 34.8–46.1)
HGB BLD-MCNC: 10 G/DL (ref 11.5–15.4)
MCH RBC QN AUTO: 20.7 PG (ref 26.8–34.3)
MCHC RBC AUTO-ENTMCNC: 29.9 G/DL (ref 31.4–37.4)
MCV RBC AUTO: 69 FL (ref 82–98)
P AXIS: 255 DEGREES
PLATELET # BLD AUTO: 320 THOUSANDS/UL (ref 149–390)
PMV BLD AUTO: 11.1 FL (ref 8.9–12.7)
POTASSIUM SERPL-SCNC: 4.1 MMOL/L (ref 3.5–5.3)
QRS AXIS: 17 DEGREES
QRSD INTERVAL: 84 MS
QT INTERVAL: 438 MS
QTC INTERVAL: 462 MS
RBC # BLD AUTO: 4.82 MILLION/UL (ref 3.81–5.12)
SODIUM SERPL-SCNC: 142 MMOL/L (ref 136–145)
T WAVE AXIS: -37 DEGREES
VENTRICULAR RATE: 67 BPM
WBC # BLD AUTO: 15.03 THOUSAND/UL (ref 4.31–10.16)

## 2021-01-07 PROCEDURE — 99232 SBSQ HOSP IP/OBS MODERATE 35: CPT | Performed by: INTERNAL MEDICINE

## 2021-01-07 PROCEDURE — 85027 COMPLETE CBC AUTOMATED: CPT | Performed by: FAMILY MEDICINE

## 2021-01-07 PROCEDURE — 93308 TTE F-UP OR LMTD: CPT | Performed by: INTERNAL MEDICINE

## 2021-01-07 PROCEDURE — 93308 TTE F-UP OR LMTD: CPT

## 2021-01-07 PROCEDURE — 85730 THROMBOPLASTIN TIME PARTIAL: CPT | Performed by: FAMILY MEDICINE

## 2021-01-07 PROCEDURE — 80048 BASIC METABOLIC PNL TOTAL CA: CPT | Performed by: FAMILY MEDICINE

## 2021-01-07 PROCEDURE — 93325 DOPPLER ECHO COLOR FLOW MAPG: CPT | Performed by: INTERNAL MEDICINE

## 2021-01-07 PROCEDURE — 99233 SBSQ HOSP IP/OBS HIGH 50: CPT | Performed by: FAMILY MEDICINE

## 2021-01-07 PROCEDURE — 93321 DOPPLER ECHO F-UP/LMTD STD: CPT | Performed by: INTERNAL MEDICINE

## 2021-01-07 PROCEDURE — 93010 ELECTROCARDIOGRAM REPORT: CPT | Performed by: INTERNAL MEDICINE

## 2021-01-07 PROCEDURE — 93005 ELECTROCARDIOGRAM TRACING: CPT

## 2021-01-07 RX ORDER — OXYCODONE HYDROCHLORIDE 5 MG/1
2.5 TABLET ORAL EVERY 4 HOURS PRN
Status: DISCONTINUED | OUTPATIENT
Start: 2021-01-07 | End: 2021-01-08

## 2021-01-07 RX ORDER — ACETAMINOPHEN 325 MG/1
650 TABLET ORAL EVERY 6 HOURS PRN
Status: DISCONTINUED | OUTPATIENT
Start: 2021-01-07 | End: 2021-01-27 | Stop reason: HOSPADM

## 2021-01-07 RX ADMIN — METOPROLOL TARTRATE 50 MG: 50 TABLET, FILM COATED ORAL at 20:13

## 2021-01-07 RX ADMIN — HEPARIN SODIUM 17 UNITS/KG/HR: 10000 INJECTION, SOLUTION INTRAVENOUS at 20:14

## 2021-01-07 RX ADMIN — STANDARDIZED SENNA CONCENTRATE 8.6 MG: 8.6 TABLET ORAL at 08:35

## 2021-01-07 RX ADMIN — DOCUSATE SODIUM 100 MG: 100 CAPSULE, LIQUID FILLED ORAL at 08:35

## 2021-01-07 RX ADMIN — METOPROLOL TARTRATE 50 MG: 50 TABLET, FILM COATED ORAL at 08:35

## 2021-01-07 RX ADMIN — ACETAMINOPHEN 650 MG: 325 TABLET, FILM COATED ORAL at 20:24

## 2021-01-07 NOTE — PROGRESS NOTES
Cardiology PROGRESS NOTE     Name: Brooke Nichole   Age & Sex: 65 y o  female   MRN: [id number]  Unit/Bed#: [facility] 426-01   Encounter: [id number]      PATIENT INFORMATION     Name: Brooke Nichole   Age & Sex: 65 y o  female   MRN: [id number]  Hospital Stay Days: 4    ASSESSMENT/PLAN     Principal Problem:    Atrial flutter ([address] )  Active Problems:    Essential hypertension    Shortness of breath    Pericardial effusion without cardiac tamponade    Mediastinal mass      Assessment:    Atrial flutter  Large pericardial effusion- s/p pericardial window  Right mediastinal mass 6 6 cm- s/p biopsy   Leukocytosis  Mediastinal lymphadenopathy  Small pleural effusions  History of breast cancer  Diabetes A1c 6 9    Plan:    1  Atrial flutter  Likely in setting of history of hypertension, new pericardial effusion and new mediastinal mass  Started on Cardizem and heparin drip initially  CHADS2 Vasc score of 4  EKG:  Atrial flutter, rate 138,   Echo:  EF 65%, large pericardial effusion, no signs of tamponade       Plan: Will continue heparin drip for anticoagulation for now with plan to switch to oral AC  May consider it once follow up echo is stable from pericardial effusion stand point  Lopressor 50 mg b i d  Wean Cardizem drip as tolerated  Continue tele  Rate seems to be going up with activity  Continue to watch for now  2  Large pericardial effusion without tamponade: s/p drainage of 720 mL slight bloody fluid  Plan to get limited echo this week  Fluid studies: cultures pending, no organism growth, no acid fast bacilli seen  Plan:  Follow up on limited echo  3  Hypertension:  Continue Lopressor and up titrate for AFib rate control  Holding lisinopril 30 mg daily given recent hypotension  4  R mediastinal mass: s/p IR biopsy on 1/4/21  Bx pending  5  Leukocytosis- likely reactive, improving  SUBJECTIVE     Patient seen and examined  No acute events overnight  Patient denies any chest pain or shortness of breath  Currently asymptomatic  No signs of volume overload  Pt admits to feeling well from breathing stand point  OBJECTIVE     Vitals:    21 2101 21 2300 21 0300 21 0600   BP: 125/70 106/69 103/51    BP Location:  Right arm Right arm    Pulse: 88 69 64    Resp:  16 16    Temp:  97 9 °F (36 6 °C) 97 9 °F (36 6 °C)    TempSrc:  Oral Oral    SpO2:  97% 97%    Weight:    90 7 kg (199 lb 15 3 oz)   Height:          Temperature:   Temp (24hrs), Av 8 °F (36 6 °C), Min:97 5 °F (36 4 °C), Max:97 9 °F (36 6 °C)    Temperature: 97 9 °F (36 6 °C)  Intake & Output:  I/O        07 - / 0700  07 -  07 07 -  0700    I V  (mL/kg)  300 3 (3 4)     Total Intake(mL/kg)  300 3 (3 4)     Urine (mL/kg/hr)  1300     Total Output  1300     Net  -999 7                Weights:   IBW: 54 7 kg    Body mass index is 34 32 kg/m²  Weight (last 2 days)     Date/Time   Weight    21 0600   90 7 (199 96)    21 0600   90 1 (198 63)    21 0600   88 8 (195 77)            Physical Exam  Vitals signs reviewed  Constitutional:       General: She is not in acute distress  Appearance: She is well-developed  She is not diaphoretic  HENT:      Head: Normocephalic and atraumatic  Nose: Nose normal       Mouth/Throat:      Pharynx: No oropharyngeal exudate  Eyes:      General: No scleral icterus  Conjunctiva/sclera: Conjunctivae normal    Neck:      Musculoskeletal: Neck supple  Thyroid: No thyromegaly  Vascular: No JVD  Trachea: No tracheal deviation  Cardiovascular:      Rate and Rhythm: Normal rate  Rhythm irregular  Heart sounds: Normal heart sounds  No murmur  No friction rub  No gallop  Pulmonary:      Effort: Pulmonary effort is normal  No respiratory distress  Breath sounds: Normal breath sounds  No stridor  No wheezing or rales  Chest:      Chest wall: No tenderness  Abdominal:      General: Bowel sounds are normal  There is no distension  Palpations: Abdomen is soft  There is no mass  Tenderness: There is no abdominal tenderness  There is no guarding or rebound  Musculoskeletal: Normal range of motion  General: No tenderness  Skin:     General: Skin is warm  Findings: No erythema or rash  Neurological:      Mental Status: She is alert and oriented to person, place, and time  Sensory: No sensory deficit  Psychiatric:         Behavior: Behavior normal          Thought Content: Thought content normal          Judgment: Judgment normal        LABORATORY DATA     Labs: I have personally reviewed pertinent reports  Results from last 7 days   Lab Units 01/07/21 0447 01/06/21 0458 01/05/21 0448 01/04/21  0557  01/03/21  0108   WBC Thousand/uL 15 03* 21 22* 13 77* 14 82*   < > 14 02*   HEMOGLOBIN g/dL 10 0* 10 2* 9 8* 10 6*   < > 10 9*   HEMATOCRIT % 33 4* 34 3* 32 2* 36 3   < > 34 1*   PLATELETS Thousands/uL 320 346 377 368   < > 384   NEUTROS PCT %  --   --   --  75  --   --    MONOS PCT %  --   --   --  12  --   --    MONO PCT %  --   --   --   --   --  9    < > = values in this interval not displayed        Results from last 7 days   Lab Units 01/07/21 0447 01/06/21 0458 01/05/21 0448 01/04/21  0533 01/03/21  0911 01/03/21  0108   POTASSIUM mmol/L 4 1 4 1 4 3 4 5 4 2 4 4   CHLORIDE mmol/L 111* 111* 114* 107 112* 106   CO2 mmol/L 27 23 25 24 23 24   BUN mg/dL 25 29* 28* 21 19 22*   CREATININE mg/dL 1 01 1 20 1 49* 1 14 0 94 1 07   CALCIUM mg/dL 8 1* 8 6 8 3 9 0 8 7 8 6   ALK PHOS U/L  --   --   --  116 119* 106 9   ALT U/L  --   --   --  28 26 21   AST U/L  --   --   --  21 17 19     Results from last 7 days   Lab Units 01/03/21  0911   MAGNESIUM mg/dL 2 3     Results from last 7 days   Lab Units 01/04/21  0533   PHOSPHORUS mg/dL 3 8      Results from last 7 days   Lab Units 01/07/21 0447 01/06/21  1625 01/06/21  0859  01/03/21  1958 01/03/21  0108   INR   --   --   --   --  1 64* 1 42*   PTT seconds 40* 61* 65*   < > 36 28    < > = values in this interval not displayed  Results from last 7 days   Lab Units 01/03/21  1306 01/03/21  0911 01/03/21  0108   TROPONIN I ng/mL <0 02 <0 02 <0 03       IMAGING & DIAGNOSTIC TESTING     Radiology Results: I have personally reviewed pertinent reports  Xr Chest 1 View Portable    Result Date: 1/3/2021  Impression: Marked enlargement of the cardiac silhouette which could be due to cardiomegaly or a pericardial effusion  Right middle lobe pneumonia  Workstation performed: [id number]     Cta Chest Pe Study    Result Date: 1/3/2021  Impression: Large pericardial effusion measuring approximately 3 3 cm area of bulging of the right pericardium or a potential adjacent right mediastinal mass measuring approximately 6 6 cm #2/100  The pericardial effusion may be on a malignant basis  Prominent diffuse mediastinal adenopathy Interlobular septal thickening and small pleural effusions in keeping with pulmonary edema  Posterior right upper lobe consolidation #3/55 in a wedge-shaped appearance with a apex extending towards the right hilum may represent pneumonia or a postobstructive pneumonitis from a central mass  A discrete mass is not identified but could be obscured  Innumerable vague hypodensities throughout the liver are suspicious for metastases  I personally discussed this study with KERON Hernandez on 1/3/2021 at 5:18 PM  Workstation performed: [id number]     Other Diagnostic Testing: I have personally reviewed pertinent reports      ACTIVE MEDICATIONS     Current Facility-Administered Medications   Medication Dose Route Frequency    aluminum-magnesium hydroxide-simethicone (MYLANTA) oral suspension 30 mL  30 mL Oral Q6H PRN    diltiazem (CARDIZEM) 125 mg in sodium chloride 0 9 % 125 mL infusion  1-15 mg/hr Intravenous Titrated    docusate sodium (COLACE) capsule 100 mg  100 mg Oral BID    heparin (porcine) 25,000 units in 0 45% NaCl 250 mL infusion (premix)  3-30 Units/kg/hr (Order-Specific) Intravenous Titrated    metoprolol tartrate (LOPRESSOR) tablet 50 mg  50 mg Oral Q12H STEPHANIE    ondansetron (ZOFRAN) injection 4 mg  4 mg Intravenous Q6H PRN    senna (SENOKOT) tablet 8 6 mg  1 tablet Oral Daily       VTE Pharmacologic Prophylaxis: Heparin  VTE Mechanical Prophylaxis: sequential compression device    Portions of the record may have been created with voice recognition software  Occasional wrong word or "sound a like" substitutions may have occurred due to the inherent limitations of voice recognition software    Read the chart carefully and recognize, using context, where substitutions have occurred   ==  [place], [address] [facility]  Internal Medicine Residency PGY-2

## 2021-01-07 NOTE — QUICK NOTE
Thoracic surgery attending note    Patient seen examined this morning  No pain at her incision site  Breathing improved  Heart rate improved on antiarrhythmics  Pericardial drain still in place to bulb suction  140 mL out yesterday    Pericardial fluid cytology still pending    Assessment - 73-year-old female with a large pericardial effusion status post 01/04/2021 subxiphoid pericardial window doing better    Plan  - keep pericardial drain in place until output less than 50 mL in 24 hours  - follow-up pericardial fluid cytology  - patient also has a 6 cm anterior mediastinal mass around the SVC  If the cytology is nondiagnostic then may need to workup for a biopsy of that mass  - thoracic surgery will continue to follow    Please call with questions    Camilla Simmons MD

## 2021-01-07 NOTE — ASSESSMENT & PLAN NOTE
Patient with remote history of left breast cancer status post mastectomy over 20 years ago  No chemotherapy or radiation  Anterior mediastinal mass seen on CT scan with diffuse mediastinal adenopathy, Innumerable vague hypodensities throughout the liver are suspicious for metastases    Unclear etiology  Thoracic surgery is following   s/p IR biopsy on 1/4/21, pending pathology

## 2021-01-07 NOTE — ASSESSMENT & PLAN NOTE
Patient was coughing too much and her  brought her to ED  She was having sob, EKG showed atrial flutter, QTc prolongation  Her son passed away on 12/21/2020  She did have swelling in her legs increased over last week  She has swelling in the past, treated with lasix with improvement in June, 2020  Patient was evaluated by Cardiology  Cardiac echo with large pericardial effusion without tamponade, EF 65%  Currently off of Cardizem drip, on p o  Metoprolol  Heart rate is well controlled  Patient continues to be on heparin drip    Will switch over to oral anticoagulation when cleared by Cardiology  Has a PICC line due to difficult IV access

## 2021-01-07 NOTE — ASSESSMENT & PLAN NOTE
Evaluated by thoracic surgery  Underwent pericardial drain placement 01/04/2021  Still draining, will need to keep drain as per CT surgery     Pending repeat echo

## 2021-01-07 NOTE — PROGRESS NOTES
Progress Note - Rosemarie Ya 1950, 65 y o  female MRN: [id number]    Unit/Bed#: [facility] 426-01 Encounter: [id number]    Primary Care Provider: Farzana Garrido MD   Date and time admitted to hospital: 1/3/2021  8:26 AM        * Atrial flutter [facility])  Assessment & Plan  Patient was coughing too much and her  brought her to ED  She was having sob, EKG showed atrial flutter, QTc prolongation  Her son passed away on 12/21/2020  She did have swelling in her legs increased over last week  She has swelling in the past, treated with lasix with improvement in June, 2020  Patient was evaluated by Cardiology  Cardiac echo with large pericardial effusion without tamponade, EF 65%  Currently off of Cardizem drip, on p o  Metoprolol  Heart rate is well controlled  Patient continues to be on heparin drip  Will switch over to oral anticoagulation when cleared by Cardiology  Has a PICC line due to difficult IV access      Pericardial effusion without cardiac tamponade  Assessment & Plan  Evaluated by thoracic surgery  Underwent pericardial drain placement 01/04/2021  Still draining, will need to keep drain as per CT surgery  Pending repeat echo    Mediastinal mass  Assessment & Plan  Patient with remote history of left breast cancer status post mastectomy over 20 years ago  No chemotherapy or radiation  Anterior mediastinal mass seen on CT scan with diffuse mediastinal adenopathy, Innumerable vague hypodensities throughout the liver are suspicious for metastases    Unclear etiology  Thoracic surgery is following   s/p IR biopsy on 1/4/21, pending pathology    Shortness of breath  Assessment & Plan  Shortness of Increasing bilateral lower extremity edema, Likely secondary to above  Continue to monitor    Essential hypertension  Assessment & Plan  Continue metoprolol  Hold lisinopril for now  Continue to monitor           VTE Pharmacologic Prophylaxis:   Pharmacologic: Heparin Drip  Mechanical VTE Prophylaxis in Place: Yes    Patient Centered Rounds: I have performed bedside rounds with nursing staff today  Education and Discussions with Family / Patient: called Bill Deras on cell, no answer       Current Length of Stay: 4 day(s)    Current Patient Status: Inpatient   Certification Statement: The patient will continue to require additional inpatient hospital stay due to pericardial drain in place    Discharge Plan: pending progress    Code Status: Level 1 - Full Code      Subjective:   Patient denies any dyspnea, chest pain or any other complains  Objective:     Vitals:   Temp (24hrs), Av 8 °F (36 6 °C), Min:97 7 °F (36 5 °C), Max:97 9 °F (36 6 °C)    Temp:  [97 7 °F (36 5 °C)-97 9 °F (36 6 °C)] 97 7 °F (36 5 °C)  HR:  [62-88] 76  Resp:  [15-18] 18  BP: (103-125)/(51-71) 113/56  SpO2:  [97 %-98 %] 98 %  Body mass index is 34 32 kg/m²  Input and Output Summary (last 24 hours): Intake/Output Summary (Last 24 hours) at 2021 1152  Last data filed at 2021 0839  Gross per 24 hour   Intake 1536 96 ml   Output 2350 ml   Net -813 04 ml       Physical Exam:     Physical Exam  Constitutional:       Appearance: She is well-developed  HENT:      Head: Normocephalic and atraumatic  Neck:      Musculoskeletal: Normal range of motion  Pulmonary:      Effort: Pulmonary effort is normal  No respiratory distress  Breath sounds: Normal breath sounds  Skin:     General: Skin is warm and dry  Additional Data:     Labs:    Results from last 7 days   Lab Units 21  04421  0557   WBC Thousand/uL 15 03*   < > 14 82*   HEMOGLOBIN g/dL 10 0*   < > 10 6*   HEMATOCRIT % 33 4*   < > 36 3   PLATELETS Thousands/uL 320   < > 368   NEUTROS PCT %  --   --  75   LYMPHS PCT %  --   --  8*   MONOS PCT %  --   --  12   EOS PCT %  --   --  2    < > = values in this interval not displayed       Results from last 7 days   Lab Units 21  0533   SODIUM mmol/L 142   < > 139   POTASSIUM mmol/L 4 1   < > 4 5   CHLORIDE mmol/L 111*   < > 107   CO2 mmol/L 27   < > 24   BUN mg/dL 25   < > 21   CREATININE mg/dL 1 01   < > 1 14   ANION GAP mmol/L 4   < > 8   CALCIUM mg/dL 8 1*   < > 9 0   ALBUMIN g/dL  --   --  3 2*   TOTAL BILIRUBIN mg/dL  --   --  1 03*   ALK PHOS U/L  --   --  116   ALT U/L  --   --  28   AST U/L  --   --  21   GLUCOSE RANDOM mg/dL 101   < > 97    < > = values in this interval not displayed  Results from last 7 days   Lab Units 01/03/21  1958   INR  1 64*     Results from last 7 days   Lab Units 01/05/21  1031 01/05/21  0534 01/04/21  2047 01/04/21  1136 01/04/21  0602   POC GLUCOSE mg/dl 235* 162* 133 132 129     Results from last 7 days   Lab Units 01/03/21  0911   HEMOGLOBIN A1C % 6 9*               * I Have Reviewed All Lab Data Listed Above  * Additional Pertinent Lab Tests Reviewed: [address] Admission Reviewed    Imaging:     Recent Cultures (last 7 days):     Results from last 7 days   Lab Units 01/04/21  1720   GRAM STAIN RESULT  1+ Polys  2+ Mononuclear Cells  No organisms seen   BODY FLUID CULTURE, STERILE  No growth       Last 24 Hours Medication List:   Current Facility-Administered Medications   Medication Dose Route Frequency Provider Last Rate    aluminum-magnesium hydroxide-simethicone  30 mL Oral Q6H PRN Stefania Cox MD      diltiazem  1-15 mg/hr Intravenous Titrated Stefania Cox MD Stopped (01/06/21 1037)    docusate sodium  100 mg Oral BID Stefania Cox MD      heparin (porcine)  3-30 Units/kg/hr (Order-Specific) Intravenous Titrated Stefania Cox MD 17 Units/kg/hr (01/07/21 0703)    metoprolol tartrate  50 mg Oral Q12H [address] Walker Slight, DO      ondansetron  4 mg Intravenous Q6H PRN Stefania Cox MD      senna  1 tablet Oral Daily Stefania Cox MD          Today, Patient Was Seen By: Katerine Tucker MD    ** Please Note: Dictation voice to text software may have been used in the creation of this document  **

## 2021-01-08 ENCOUNTER — APPOINTMENT (INPATIENT)
Dept: RADIOLOGY | Facility: HOSPITAL | Age: 71
DRG: 270 | End: 2021-01-08
Payer: COMMERCIAL

## 2021-01-08 PROBLEM — M79.601 PAIN AND SWELLING OF RIGHT UPPER EXTREMITY: Status: ACTIVE | Noted: 2021-01-08

## 2021-01-08 PROBLEM — R06.02 SHORTNESS OF BREATH: Status: RESOLVED | Noted: 2021-01-03 | Resolved: 2021-01-08

## 2021-01-08 PROBLEM — M79.89 PAIN AND SWELLING OF RIGHT UPPER EXTREMITY: Status: ACTIVE | Noted: 2021-01-08

## 2021-01-08 LAB
APTT PPP: 79 SECONDS (ref 23–37)
APTT PPP: 86 SECONDS (ref 23–37)
APTT PPP: 95 SECONDS (ref 23–37)
ERYTHROCYTE [DISTWIDTH] IN BLOOD BY AUTOMATED COUNT: 19.8 % (ref 11.6–15.1)
HCT VFR BLD AUTO: 33.9 % (ref 34.8–46.1)
HGB BLD-MCNC: 9.9 G/DL (ref 11.5–15.4)
INR PPP: 1.64 (ref 0.84–1.19)
MCH RBC QN AUTO: 20.4 PG (ref 26.8–34.3)
MCHC RBC AUTO-ENTMCNC: 29.2 G/DL (ref 31.4–37.4)
MCV RBC AUTO: 70 FL (ref 82–98)
PLATELET # BLD AUTO: 334 THOUSANDS/UL (ref 149–390)
PMV BLD AUTO: 10.9 FL (ref 8.9–12.7)
PROTHROMBIN TIME: 19.4 SECONDS (ref 11.6–14.5)
RBC # BLD AUTO: 4.85 MILLION/UL (ref 3.81–5.12)
WBC # BLD AUTO: 15.87 THOUSAND/UL (ref 4.31–10.16)

## 2021-01-08 PROCEDURE — 99222 1ST HOSP IP/OBS MODERATE 55: CPT | Performed by: SURGERY

## 2021-01-08 PROCEDURE — 85730 THROMBOPLASTIN TIME PARTIAL: CPT | Performed by: PHYSICIAN ASSISTANT

## 2021-01-08 PROCEDURE — 85027 COMPLETE CBC AUTOMATED: CPT | Performed by: PHYSICIAN ASSISTANT

## 2021-01-08 PROCEDURE — 76882 US LMTD JT/FCL EVL NVASC XTR: CPT

## 2021-01-08 PROCEDURE — 99233 SBSQ HOSP IP/OBS HIGH 50: CPT | Performed by: FAMILY MEDICINE

## 2021-01-08 PROCEDURE — 85610 PROTHROMBIN TIME: CPT | Performed by: PHYSICIAN ASSISTANT

## 2021-01-08 PROCEDURE — 85730 THROMBOPLASTIN TIME PARTIAL: CPT | Performed by: FAMILY MEDICINE

## 2021-01-08 PROCEDURE — 99232 SBSQ HOSP IP/OBS MODERATE 35: CPT | Performed by: INTERNAL MEDICINE

## 2021-01-08 RX ORDER — OXYCODONE HYDROCHLORIDE 5 MG/1
2.5 TABLET ORAL EVERY 4 HOURS PRN
Status: DISCONTINUED | OUTPATIENT
Start: 2021-01-08 | End: 2021-01-27 | Stop reason: HOSPADM

## 2021-01-08 RX ORDER — HYDROMORPHONE HCL/PF 1 MG/ML
0.2 SYRINGE (ML) INJECTION EVERY 4 HOURS PRN
Status: DISCONTINUED | OUTPATIENT
Start: 2021-01-08 | End: 2021-01-27 | Stop reason: HOSPADM

## 2021-01-08 RX ORDER — METOPROLOL TARTRATE 5 MG/5ML
2.5 INJECTION INTRAVENOUS ONCE
Status: COMPLETED | OUTPATIENT
Start: 2021-01-08 | End: 2021-01-08

## 2021-01-08 RX ORDER — OXYCODONE HYDROCHLORIDE 5 MG/1
5 TABLET ORAL EVERY 4 HOURS PRN
Status: DISCONTINUED | OUTPATIENT
Start: 2021-01-08 | End: 2021-01-27 | Stop reason: HOSPADM

## 2021-01-08 RX ORDER — HYDROMORPHONE HCL/PF 1 MG/ML
0.2 SYRINGE (ML) INJECTION ONCE
Status: DISCONTINUED | OUTPATIENT
Start: 2021-01-08 | End: 2021-01-27 | Stop reason: HOSPADM

## 2021-01-08 RX ORDER — DILTIAZEM HYDROCHLORIDE 5 MG/ML
15 INJECTION INTRAVENOUS ONCE
Status: COMPLETED | OUTPATIENT
Start: 2021-01-08 | End: 2021-01-08

## 2021-01-08 RX ADMIN — HYDROMORPHONE HYDROCHLORIDE 0.2 MG: 1 INJECTION, SOLUTION INTRAMUSCULAR; INTRAVENOUS; SUBCUTANEOUS at 05:15

## 2021-01-08 RX ADMIN — OXYCODONE HYDROCHLORIDE 5 MG: 5 TABLET ORAL at 09:30

## 2021-01-08 RX ADMIN — ACETAMINOPHEN 650 MG: 325 TABLET, FILM COATED ORAL at 04:41

## 2021-01-08 RX ADMIN — OXYCODONE HYDROCHLORIDE 2.5 MG: 5 TABLET ORAL at 04:41

## 2021-01-08 RX ADMIN — DILTIAZEM HYDROCHLORIDE 15 MG: 5 INJECTION INTRAVENOUS at 06:35

## 2021-01-08 RX ADMIN — ACETAMINOPHEN 650 MG: 325 TABLET, FILM COATED ORAL at 09:30

## 2021-01-08 RX ADMIN — METOROPROLOL TARTRATE 2.5 MG: 5 INJECTION, SOLUTION INTRAVENOUS at 05:25

## 2021-01-08 RX ADMIN — STANDARDIZED SENNA CONCENTRATE 8.6 MG: 8.6 TABLET ORAL at 09:20

## 2021-01-08 RX ADMIN — METOPROLOL TARTRATE 50 MG: 50 TABLET, FILM COATED ORAL at 22:00

## 2021-01-08 RX ADMIN — DOCUSATE SODIUM 100 MG: 100 CAPSULE, LIQUID FILLED ORAL at 19:34

## 2021-01-08 RX ADMIN — METOPROLOL TARTRATE 50 MG: 50 TABLET, FILM COATED ORAL at 09:21

## 2021-01-08 RX ADMIN — DOCUSATE SODIUM 100 MG: 100 CAPSULE, LIQUID FILLED ORAL at 09:20

## 2021-01-08 NOTE — NURSING NOTE
Aflutter on monitor with rate of 130-150's  Rizwan HOLBROOK with slim at bedside, 2 5mg IV lopressor given  Will continue to monitor

## 2021-01-08 NOTE — PROGRESS NOTES
Progress Note - Arturo Bustamante 1950, 65 y o  female MRN: [id number]    Unit/Bed#: [facility] 426-01 Encounter: [id number]    Primary Care Provider: Antoni Acosta MD   Date and time admitted to hospital: 1/3/2021  8:26 AM        * Atrial flutter [facility])  Assessment & Plan  Patient was coughing too much and her  brought her to ED  She was having sob, EKG showed atrial flutter, QTc prolongation  Her son passed away on 12/21/2020  She did have swelling in her legs increased over last week  She has swelling in the past, treated with lasix with improvement in June, 2020  Patient was evaluated by Cardiology  Cardiac echo with large pericardial effusion without tamponade, EF 65%  Currently off of Cardizem drip, on p o  Metoprolol  Heart rate is well controlled  Has a PICC line due to difficult IV access  Due to possible right upper extremity hematoma, currently heparin drip is on hold  Pericardial effusion without cardiac tamponade  Assessment & Plan  Evaluated by thoracic surgery  Underwent pericardial drain placement 01/04/2021  Still draining, will need to keep drain as per CT surgery  Repeat echo shows no residual pericardial effusion    Pain and swelling of right upper extremity  Assessment & Plan  Right upper extremity swelling noted overnight  Likely hematoma  Unable to obtain CT scan due to not having IV access  Will obtain STAT ultrasound  Will hold heparin drip until US result  Mediastinal mass  Assessment & Plan  Patient with remote history of left breast cancer status post mastectomy over 20 years ago  No chemotherapy or radiation  Anterior mediastinal mass seen on CT scan with diffuse mediastinal adenopathy, Innumerable vague hypodensities throughout the liver are suspicious for metastases    Unclear etiology  Thoracic surgery is following   s/p IR biopsy on 1/4/21, pending pathology    Essential hypertension  Assessment & Plan  Continue metoprolol  Hold lisinopril for now  Continue to monitor    Shortness of breathresolved as of 2021  Assessment & Plan  resolved Likely secondary to above  Continue to monitor        VTE Pharmacologic Prophylaxis:   Pharmacologic: Pharmacologic VTE Prophylaxis contraindicated due to on hold for possible right arm hematoma  Mechanical VTE Prophylaxis in Place: Yes    Patient Centered Rounds: I have performed bedside rounds with nursing staff today  Discussions with Specialists or Other Care Team Provider: cardio   Current Length of Stay: 5 day(s)    Current Patient Status: Inpatient   Certification Statement: The patient will continue to require additional inpatient hospital stay due to pending cardiac clearance, pending US    Discharge Plan: home when medically clear    Code Status: Level 1 - Full Code      Subjective:   Patient complaints of worsening pain and swelling of the right medial upper arm that started last night  Patient noted sudden swelling with worsening pain  Overnight provider ordered CT scan however unable to complete due to not having IV access  Objective:     Vitals:   Temp (24hrs), Av 7 °F (36 5 °C), Min:97 3 °F (36 3 °C), Max:98 2 °F (36 8 °C)    Temp:  [97 3 °F (36 3 °C)-98 2 °F (36 8 °C)] 97 3 °F (36 3 °C)  HR:  [] 103  Resp:  [17-19] 17  BP: (117-149)/() 142/74  SpO2:  [94 %-98 %] 98 %  Body mass index is 33 91 kg/m²  Input and Output Summary (last 24 hours): Intake/Output Summary (Last 24 hours) at 2021 1004  Last data filed at 2021 0747  Gross per 24 hour   Intake 771 78 ml   Output 1620 ml   Net -848 22 ml       Physical Exam:     Physical Exam  Constitutional:       Appearance: She is well-developed  HENT:      Head: Normocephalic and atraumatic  Neck:      Musculoskeletal: Normal range of motion  Pulmonary:      Effort: Pulmonary effort is normal  No respiratory distress  Breath sounds: Normal breath sounds     Musculoskeletal:      Comments: Right upper arm on the medial side swelling, and tenderness noted  No color changes   Skin:     General: Skin is warm and dry  Additional Data:     Labs:    Results from last 7 days   Lab Units 01/08/21  0531  01/04/21  0557   WBC Thousand/uL 15 87*   < > 14 82*   HEMOGLOBIN g/dL 9 9*   < > 10 6*   HEMATOCRIT % 33 9*   < > 36 3   PLATELETS Thousands/uL 334   < > 368   NEUTROS PCT %  --   --  75   LYMPHS PCT %  --   --  8*   MONOS PCT %  --   --  12   EOS PCT %  --   --  2    < > = values in this interval not displayed  Results from last 7 days   Lab Units 01/07/21  0447  01/04/21  0533   SODIUM mmol/L 142   < > 139   POTASSIUM mmol/L 4 1   < > 4 5   CHLORIDE mmol/L 111*   < > 107   CO2 mmol/L 27   < > 24   BUN mg/dL 25   < > 21   CREATININE mg/dL 1 01   < > 1 14   ANION GAP mmol/L 4   < > 8   CALCIUM mg/dL 8 1*   < > 9 0   ALBUMIN g/dL  --   --  3 2*   TOTAL BILIRUBIN mg/dL  --   --  1 03*   ALK PHOS U/L  --   --  116   ALT U/L  --   --  28   AST U/L  --   --  21   GLUCOSE RANDOM mg/dL 101   < > 97    < > = values in this interval not displayed       Results from last 7 days   Lab Units 01/08/21  0530   INR  1 64*     Results from last 7 days   Lab Units 01/05/21  1031 01/05/21  0534 01/04/21  2047 01/04/21  1136 01/04/21  0602   POC GLUCOSE mg/dl 235* 162* 133 132 129     Results from last 7 days   Lab Units 01/03/21  0911   HEMOGLOBIN A1C % 6 9*                Recent Cultures (last 7 days):     Results from last 7 days   Lab Units 01/04/21  1720   GRAM STAIN RESULT  1+ Polys  2+ Mononuclear Cells  No organisms seen   BODY FLUID CULTURE, STERILE  No growth       Last 24 Hours Medication List:   Current Facility-Administered Medications   Medication Dose Route Frequency Provider Last Rate    acetaminophen  650 mg Oral Q6H PRN Jim Elmore PA-C      aluminum-magnesium hydroxide-simethicone  30 mL Oral Q6H PRN Narayan Rajan MD      diltiazem  1-15 mg/hr Intravenous Titrated Narayan Rajan MD Stopped (01/06/21 1037)    docusate sodium  100 mg Oral BID Rosi Alarcon MD      heparin (porcine)  3-30 Units/kg/hr (Order-Specific) Intravenous Titrated Rosi Alarcon MD Stopped (01/08/21 4705)    HYDROmorphone  0 2 mg Intravenous Q4H PRN Cleotha Boom, PA-C      HYDROmorphone  0 2 mg Intravenous Once Cleotha Boom, PA-C      metoprolol tartrate  50 mg Oral Q12H [address] Lois Mcnair DO      ondansetron  4 mg Intravenous Q6H PRN Rosi Alarcon MD      oxyCODONE  2 5 mg Oral Q4H PRN Cleotha Boom, PA-C      oxyCODONE  5 mg Oral Q4H PRN Cleotha Boom, PA-C      senna  1 tablet Oral Daily Rosi Alarcon MD          Today, Patient Was Seen By: Ramón Diamond MD    ** Please Note: Dictation voice to text software may have been used in the creation of this document   **

## 2021-01-08 NOTE — CONSULTS
Consultation - General Surgery   Ana Luisa Zendejas 65 y o  female MRN: [id number]  Unit/Bed#: [facility] 426-01 Encounter: [id number]    Assessment/Plan     Assessment:  65 F w/ remote Hx of breast cancer p/w new onset atrial flutter and shortness of breath this admission, secondary to pericardial effusion, now s/p pericardial window on 1/4/21 recovering well who developed spontaneous pain and swelling of the RUE overnight on 1/7/21 concerning for hematoma  Patient was on newly commenced anticoagulation with heparin gtt this admission, currently held  Afebrile, Tachycardic with episodic atrial fibrillation and flutter, normotensive, maintaining O2 saturation on room air    Hb 9 9 this AM, previously 10 0 from 10 2 from 9 8 on days prior this admission    US of RUE showing echogenic fluid density in biceps distribution of upper arm measuring  8 2 x 5 6 x 7 5 cm  8 2 x 5 6 x 7 5 cm     Patient has an indwelling dual lumen PICC line placed on 1/5/2021 in proximity to the current hematoma, no malfunction of this line has been noted  RUE neurovascularly intact, palpable radial and ulnar pulses, no motor or sensory deficits  Mild pain with arm flexion noted, no formal weakness  Plan:  -No acute surgical intervention  -monitor serum hemoglobin  -agree with current holding of heparin, may resume if Hb stable and no symptoms of expansion or neurovascular compromise develop  -pericardial drain care, additional work-up of effusion and mediastinal mass as per thoracic surgery  -additional care as per primary team       History of Present Illness   History, ROS and PFSH unobtainable from any source due to none  HPI:  Ana Luisa Zendejas is a 65 y o  female who presents with new onset swelling and pain of the RUE near her biceps along the mid humeral aspect of the extremity  This began overnight without and notable inciting or precipitating event  She categorically denies trauma to the area   She was admitted to the hospital and has since been treated for a pericardial effusion and cardiac arrhythmias and did have a PICC line inserted in the arm near this site on 1/5/21 without any immediate complication  She has been receiving heparin anticoagulation this admission for new onset AFIB which is currently held  Ultrasound obtained today shows fluid collection near the muscle body concerning for possible hematoma  She denies increased pain, swelling, numbness or weakness of the extremity, and notes that apart from he initial pain and some superficial bruising of the arm, nothing has appeared or felt irregular to her  Consults    Review of Systems   Constitutional: Negative for activity change and appetite change  Cardiovascular: Positive for palpitations  Musculoskeletal: Positive for joint swelling and myalgias  Skin: Negative for color change and wound  Neurological: Negative for weakness and numbness  All other systems reviewed and are negative        Historical Information   Past Medical History:   Diagnosis Date    Arthritis     Hypertension      Past Surgical History:   Procedure Laterality Date    MASTECTOMY      left     PERICARDIAL WINDOW N/A 1/4/2021    Procedure: WINDOW PERICARDIAL, subxiphoid ;  Surgeon: Mitzi Valenzuela MD;  Location: [facility];  Service: Thoracic     Social History   Social History     Substance and Sexual Activity   Alcohol Use Yes    Frequency: Monthly or less    Drinks per session: 1 or 2    Binge frequency: Never     Social History     Substance and Sexual Activity   Drug Use Never     E-Cigarette/Vaping    E-Cigarette Use Never User      E-Cigarette/Vaping Substances     Social History     Tobacco Use   Smoking Status Never Smoker   Smokeless Tobacco Never Used     Family History: non-contributory    Meds/Allergies   all current active meds have been reviewed and current meds:   Current Facility-Administered Medications   Medication Dose Route Frequency    acetaminophen (TYLENOL) tablet 650 mg  650 mg Oral Q6H PRN    aluminum-magnesium hydroxide-simethicone (MYLANTA) oral suspension 30 mL  30 mL Oral Q6H PRN    diltiazem (CARDIZEM) 125 mg in sodium chloride 0 9 % 125 mL infusion  1-15 mg/hr Intravenous Titrated    docusate sodium (COLACE) capsule 100 mg  100 mg Oral BID    heparin (porcine) 25,000 units in 0 45% NaCl 250 mL infusion (premix)  3-30 Units/kg/hr (Order-Specific) Intravenous Titrated    HYDROmorphone (DILAUDID) injection 0 2 mg  0 2 mg Intravenous Q4H PRN    HYDROmorphone (DILAUDID) injection 0 2 mg  0 2 mg Intravenous Once    metoprolol tartrate (LOPRESSOR) tablet 50 mg  50 mg Oral Q12H [address]    ondansetron (ZOFRAN) injection 4 mg  4 mg Intravenous Q6H PRN    oxyCODONE (ROXICODONE) IR tablet 2 5 mg  2 5 mg Oral Q4H PRN    oxyCODONE (ROXICODONE) IR tablet 5 mg  5 mg Oral Q4H PRN    senna (SENOKOT) tablet 8 6 mg  1 tablet Oral Daily     No Known Allergies    Objective   First Vitals:   Blood Pressure: 168/92 (01/03/21 0826)  Pulse: (!) 139 (01/03/21 0826)  Temperature: (!) 97 4 °F (36 3 °C) (01/03/21 0933)  Temp Source: Oral (01/03/21 0933)  Respirations: 20 (01/03/21 0826)  Height: 5' 4" (162 6 cm) (01/03/21 0826)  Weight - Scale: 89 8 kg (198 lb) (01/03/21 0826)  SpO2: 96 % (01/03/21 0826)    Current Vitals:   Blood Pressure: 135/61 (01/08/21 1534)  Pulse: (!) 124 (01/08/21 1534)  Temperature: 97 8 °F (36 6 °C) (01/08/21 1534)  Temp Source: Oral (01/08/21 1534)  Respirations: 16 (01/08/21 1534)  Height: 5' 4" (162 6 cm) (01/03/21 1709)  Weight - Scale: 89 6 kg (197 lb 8 5 oz) (01/08/21 0600)  SpO2: 97 % (01/08/21 1534)      Intake/Output Summary (Last 24 hours) at 1/8/2021 1603  Last data filed at 1/8/2021 1300  Gross per 24 hour   Intake 951 78 ml   Output 1270 ml   Net -318 22 ml       Invasive Devices     Peripherally Inserted Central Catheter Line            PICC Line 01/05/21 Right Brachial 3 days          Drain            Closed/Suction Drain Right;Medial Chest Bulb 24 Fr  3 days                Physical Exam  Constitutional:       General: She is not in acute distress  Appearance: She is obese  HENT:      Head: Normocephalic and atraumatic  Mouth/Throat:      Mouth: Mucous membranes are moist    Eyes:      Pupils: Pupils are equal, round, and reactive to light  Neck:      Musculoskeletal: Neck supple  No muscular tenderness  Cardiovascular:      Rate and Rhythm: Normal rate  Rhythm irregular  Pulses:           Radial pulses are 2+ on the right side and 2+ on the left side  Comments: Pericardial drain present, bulb compressed, trace serosanguineous effleunt in drain     2+ ulnar pulses bilaterally   Pulmonary:      Effort: Pulmonary effort is normal  No respiratory distress  Abdominal:      General: There is no distension  Palpations: Abdomen is soft  Tenderness: There is no abdominal tenderness  Musculoskeletal:         General: Swelling present  No deformity  Right elbow: She exhibits decreased range of motion and swelling  She exhibits no deformity  Tenderness found  Arms:    Skin:     General: Skin is warm and dry  Capillary Refill: Capillary refill takes 2 to 3 seconds  Coloration: Skin is not cyanotic, mottled or pale  Findings: Ecchymosis present  Neurological:      General: No focal deficit present  Mental Status: She is alert and oriented to person, place, and time  Mental status is at baseline  Motor: Motor function is intact  No weakness or atrophy  Coordination: Coordination is intact  Comments: RUE with intact hand motor and sensory dunction, fine motor finger coordination intact       Psychiatric:         Mood and Affect: Mood normal          Behavior: Behavior normal          Lab Results:   I have personally reviewed pertinent lab results    , CBC:   Lab Results   Component Value Date    WBC 15 87 (H) 01/08/2021    HGB 9 9 (L) 01/08/2021    HCT 33 9 (L) 01/08/2021    MCV 70 (L) 01/08/2021     01/08/2021    MCH 20 4 (L) 01/08/2021    MCHC 29 2 (L) 01/08/2021    RDW 19 8 (H) 01/08/2021    MPV 10 9 01/08/2021   , CMP: No results found for: SODIUM, K, CL, CO2, ANIONGAP, BUN, CREATININE, GLUCOSE, CALCIUM, AST, ALT, ALKPHOS, PROT, BILITOT, EGFR, Coagulation:   Lab Results   Component Value Date    INR 1 64 (H) 01/08/2021     Imaging: I have personally reviewed pertinent reports  Xr Chest Portable    Result Date: 1/5/2021  Impression: Pericardial drain placement with slight decrease in size of the cardiac silhouette  Small effusions and bibasilar atelectasis  Workstation performed: [id number]     Xr Chest 1 View Portable    Result Date: 1/3/2021  Impression: Marked enlargement of the cardiac silhouette which could be due to cardiomegaly or a pericardial effusion  Right middle lobe pneumonia  Workstation performed: [id number]     Cta Chest Pe Study    Result Date: 1/3/2021  Impression: Large pericardial effusion measuring approximately 3 3 cm area of bulging of the right pericardium or a potential adjacent right mediastinal mass measuring approximately 6 6 cm #2/100  The pericardial effusion may be on a malignant basis  Prominent diffuse mediastinal adenopathy Interlobular septal thickening and small pleural effusions in keeping with pulmonary edema  Posterior right upper lobe consolidation #3/55 in a wedge-shaped appearance with a apex extending towards the right hilum may represent pneumonia or a postobstructive pneumonitis from a central mass  A discrete mass is not identified but could be obscured  Innumerable vague hypodensities throughout the liver are suspicious for metastases    I personally discussed this study with Nicki Coleman on 1/3/2021 at 5:18 PM  Workstation performed: [id number]     Us Extremity Soft Tissue    Result Date: 1/8/2021  Impression: Complex fluid collection in the medial right upper arm with hematocrit level in keeping with hematoma  Given the location, the possibility of associated underlying biceps tendon rupture is not excluded  This can be evaluated with MSK shoulder ultrasound if clinically indicated  Recommend follow-up ultrasound in 1 month to confirm resolution  The study was marked in [facility] for immediate notification  Workstation performed: [id number]     EKG, Pathology, and Other Studies: I have personally reviewed pertinent reports     and I have personally reviewed pertinent films in PACS

## 2021-01-08 NOTE — QUICK NOTE
Texted by RN sergio DUONG pain and swelling  Pt reports pain started at 7pm last night and has been getting progressively worse, now 10/10 unrelieved by oxy 2 5 mg  No numbness/tingling  Significant swelling/+ttp and small area of ecchymosis/palpable mass noted to medial R upper arm  Motor/sensory/distal pulses intact  On heparin gtt since 1/5, last PTT therapeutic  A/P: concern for hematoma  Check stat coags/CBC, CT RUE w contrast, hold heparin gtt pending above  Q4 neurovascular checks, pain control with oxy/dilaudid

## 2021-01-08 NOTE — ASSESSMENT & PLAN NOTE
Patient was coughing too much and her  brought her to ED  She was having sob, EKG showed atrial flutter, QTc prolongation  Her son passed away on 12/21/2020  She did have swelling in her legs increased over last week  She has swelling in the past, treated with lasix with improvement in June, 2020  Patient was evaluated by Cardiology  Cardiac echo with large pericardial effusion without tamponade, EF 65%  Currently off of Cardizem drip, on p o  Metoprolol  Heart rate is well controlled  Has a PICC line due to difficult IV access  Due to possible right upper extremity hematoma, currently heparin drip is on hold

## 2021-01-08 NOTE — UTILIZATION REVIEW
Continued Stay Review    Date: 01/08/2021                          Current Patient Class: Inpatient  Current Level of Care: Med/Surg    HPI:66 y o  female initially admitted on 01/03/2021  Atrial Flutter  Assessment/Plan: due to right upper extremity hematoma - heparin gtt on hold  Check CBC, coags, CT of RUE  Neurovascular checks q4h  Analgesia PRN  Monitor on telemetry  Continue BB  Underwent pericardial drain placement 01/04/2021, Still draining, will need to keep drain as per CT surgery     Pertinent Labs/Diagnostic Results:   Results from last 7 days   Lab Units 01/03/21  0108   SARS-COV-2  Negative     Results from last 7 days   Lab Units 01/08/21  0531 01/07/21  0447 01/06/21  0458 01/05/21  0448 01/04/21  0557   WBC Thousand/uL 15 87* 15 03* 21 22* 13 77* 14 82*   HEMOGLOBIN g/dL 9 9* 10 0* 10 2* 9 8* 10 6*   HEMATOCRIT % 33 9* 33 4* 34 3* 32 2* 36 3   PLATELETS Thousands/uL 334 320 346 377 368   NEUTROS ABS Thousands/µL  --   --   --   --  11 16*     Results from last 7 days   Lab Units 01/07/21  0447 01/06/21  0458 01/05/21  0448 01/04/21  0533 01/03/21  0911   SODIUM mmol/L 142 141 143 139 142   POTASSIUM mmol/L 4 1 4 1 4 3 4 5 4 2   CHLORIDE mmol/L 111* 111* 114* 107 112*   CO2 mmol/L 27 23 25 24 23   ANION GAP mmol/L 4 7 4 8 7   BUN mg/dL 25 29* 28* 21 19   CREATININE mg/dL 1 01 1 20 1 49* 1 14 0 94   EGFR ml/min/1 73sq m 64 52 40 56 70   CALCIUM mg/dL 8 1* 8 6 8 3 9 0 8 7   MAGNESIUM mg/dL  --   --   --   --  2 3   PHOSPHORUS mg/dL  --   --   --  3 8  --      Results from last 7 days   Lab Units 01/04/21  0533 01/03/21  0911 01/03/21  0108   AST U/L 21 17 19   ALT U/L 28 26 21   ALK PHOS U/L 116 119* 106 9   TOTAL PROTEIN g/dL 7 0 7 0 6 7   ALBUMIN g/dL 3 2* 3 1* 3 6   TOTAL BILIRUBIN mg/dL 1 03* 1 07* 0 93   BILIRUBIN DIRECT mg/dL  --  0 55*  --      Results from last 7 days   Lab Units 01/05/21  1031 01/05/21  0534 01/04/21  2047 01/04/21  1136 01/04/21  0602   POC GLUCOSE mg/dl 235* 162* 133 132 129     Results from last 7 days   Lab Units 01/07/21  0447 01/06/21  0458 01/05/21  0448 01/04/21  0533 01/03/21  0911 01/03/21  0108   GLUCOSE RANDOM mg/dL 101 140 178* 97 119 136     Results from last 7 days   Lab Units 01/03/21  0911   HEMOGLOBIN A1C % 6 9*   EAG mg/dl 151     Results from last 7 days   Lab Units 01/03/21  1306 01/03/21  0911 01/03/21  0108   TROPONIN I ng/mL <0 02 <0 02 <0 03     Results from last 7 days   Lab Units 01/03/21  0108   D-DIMER QUANTITATIVE mg/L FEU 8 37*     Results from last 7 days   Lab Units 01/08/21  0530 01/08/21  0452 01/07/21  2359  01/03/21  1958 01/03/21  0108   PROTIME seconds 19 4*  --   --   --  19 3* 15 8*   INR  1 64*  --   --   --  1 64* 1 42*   PTT seconds 79* 95* 86*   < > 36 28    < > = values in this interval not displayed       Results from last 7 days   Lab Units 01/03/21  0911   TSH 3RD GENERATON uIU/mL 1 210     Results from last 7 days   Lab Units 01/03/21  0108   BNP pg/mL 113 0*     Results from last 7 days   Lab Units 01/03/21  0108   INFLUENZA A PCR  Negative   INFLUENZA B PCR  Negative   RSV PCR  Negative     Results from last 7 days   Lab Units 01/04/21  1720   GRAM STAIN RESULT  1+ Polys  2+ Mononuclear Cells  No organisms seen   BODY FLUID CULTURE, STERILE  No growth     Results from last 7 days   Lab Units 01/03/21  0108   TOTAL COUNTED  100     Vital Signs: /74   Pulse 103   Temp (!) 97 3 °F (36 3 °C) (Oral)   Resp 17   Ht 5' 4" (1 626 m)   Wt 89 6 kg (197 lb 8 5 oz)   SpO2 98%   BMI 33 91 kg/m²       Medications:   Scheduled Medications:  docusate sodium, 100 mg, Oral, BID  HYDROmorphone, 0 2 mg, Intravenous, Once  metoprolol tartrate, 50 mg, Oral, Q12H STEPHANIE  senna, 1 tablet, Oral, Daily      Continuous IV Infusions:  diltiazem, 1-15 mg/hr, Intravenous, Titrated  heparin (porcine), 3-30 Units/kg/hr (Order-Specific), Intravenous, Titrated      PRN Meds:  acetaminophen, 650 mg, Oral, Q6H PRN  aluminum-magnesium hydroxide-simethicone, 30 mL, Oral, Q6H PRN  HYDROmorphone, 0 2 mg, Intravenous, Q4H PRN  ondansetron, 4 mg, Intravenous, Q6H PRN  oxyCODONE, 2 5 mg, Oral, Q4H PRN  oxyCODONE, 5 mg, Oral, Q4H PRN        Discharge Plan: D    Network Utilization Review Department  ATTENTION: Please call with any questions or concerns to [phone] and carefully listen to the prompts so that you are directed to the right person  All voicemails are confidential   Chinedu Dove all requests for admission clinical reviews, approved or denied determinations and any other requests to dedicated fax number below belonging to the campus where the patient is receiving treatment   List of dedicated fax numbers for the Facilities:  [address] DENIALS (Administrative/Medical Necessity) [phone]   [address] (Maternity/NICU/Pediatrics) [phone]   [address] [place] [address] [address] [place] [address] (  Bimal Colbert [facility]) [address] [address] [place] [address] Ayo Arshad [address] 951 [phone]

## 2021-01-08 NOTE — PLAN OF CARE
Problem: Potential for Falls  Goal: Patient will remain free of falls  Description: INTERVENTIONS:  - Assess patient frequently for physical needs  -  Identify cognitive and physical deficits and behaviors that affect risk of falls    -  Vale fall precautions as indicated by assessment   - Educate patient/family on patient safety including physical limitations  - Instruct patient to call for assistance with activity based on assessment  - Modify environment to reduce risk of injury  - Consider OT/PT consult to assist with strengthening/mobility  Outcome: Progressing

## 2021-01-08 NOTE — ASSESSMENT & PLAN NOTE
Evaluated by thoracic surgery  Underwent pericardial drain placement 01/04/2021  Still draining, will need to keep drain as per CT surgery     Repeat echo shows no residual pericardial effusion

## 2021-01-08 NOTE — PROGRESS NOTES
During rounding patient sitting in her chair moaning and crying with ice pack on right arm with 10/10 pain  Good pulse, limited ROM and swelling noted  Oyxcodone and tylenol given with no relief  Yuly HOLBROOK with slim notified, assess patient at bedside, order to hold heparin gtt until CT scan is resulted

## 2021-01-08 NOTE — PROGRESS NOTES
Cardiology PROGRESS NOTE     Name: Arturo Bustamante   Age & Sex: 65 y o  female   MRN: [id number]  Unit/Bed#: [facility] 426-01   Encounter: [id number]      PATIENT INFORMATION     Name: Arturo Bustamante   Age & Sex: 65 y o  female   MRN: [id number]  Hospital Stay Days: 5    ASSESSMENT/PLAN     Principal Problem:    Atrial flutter ([address] )  Active Problems:    Essential hypertension    Shortness of breath    Pericardial effusion without cardiac tamponade    Mediastinal mass      Assessment:    Atrial flutter  Large pericardial effusion- s/p pericardial window  Right arm pain and swelling- concern for hematoma  Right mediastinal mass 6 6 cm- s/p biopsy   Leukocytosis  Mediastinal lymphadenopathy  Small pleural effusions  History of breast cancer  Diabetes A1c 6 9        Plan:    1  Atrial flutter  Likely in setting of history of hypertension, new pericardial effusion and new mediastinal mass  Started on Cardizem and heparin drip initially  CHADS2 Vasc score of 4  EKG on admission:  Atrial flutter, rate 138,   Echo:  EF 65%, large pericardial effusion, no signs of tamponade  Repeat echo:  EF 70%, moderate pericardial thickening, no residual pericardial effusion  Plan:  Lopressor 50 mg b i d  Continue tele  Rate seems to be going up with activity  Continue to watch for now  1/8/21: pt found to have R arm swelling  Concern for possible hematoma  Communicated with [facility]  Heparin on hold until work up for right arm swelling is done  Pt notes to have Aflutter with RVR likely in setting of pain and stress  Patient received IV Lopressor 2 5 mg once and IV Cardizem 15 mg once for rate control  Will continue to monitor for now  2  Large pericardial effusion without tamponade: s/p drainage of 720 mL slight bloody fluid  Plan to get limited echo this week  Fluid studies: cultures pending, no organism growth, no acid fast bacilli seen    Follow-up echoes showed EF 70%, mild-to-moderate MR, moderate pericardial thickening, no pericardial effusion  Plan:  Follow-up echo showed no evidence of pericardial effusion  Pericardial drain is still draining fluid  Will consider switching to oral anticoagulation for AFib once pericardial drain is removed  3  Hypertension:  Continue Lopressor and up titrate for AFib rate control  Holding lisinopril 30 mg daily given recent hypotension  4  R mediastinal mass: s/p IR biopsy on 21  Bx pending  5  Leukocytosis- likely reactive, improving  6  Right arm pain and swelling:  Patient being followed up for new onset right arm pain and swelling  Concern for possible hematoma  SUBJECTIVE     Patient seen and examined  No acute events overnight  Patient denies any chest pain or shortness of breath  Currently asymptomatic  No signs of volume overload  Pt admits to feeling well from breathing stand point  Patient admits to having some right arm pain and swelling  OBJECTIVE     Vitals:    21 0600 21 0636 21 0638 21 0747   BP:  (!) 145/103  142/73   BP Location:    Right arm   Pulse:  (!) 148 70 (!) 121   Resp:    17   Temp:    (!) 97 3 °F (36 3 °C)   TempSrc:    Oral   SpO2:    98%   Weight: 89 6 kg (197 lb 8 5 oz)      Height:          Temperature:   Temp (24hrs), Av 7 °F (36 5 °C), Min:97 3 °F (36 3 °C), Max:98 2 °F (36 8 °C)    Temperature: (!) 97 3 °F (36 3 °C)  Intake & Output:  I/O        07 -  07 -  07 -  0700    I V  (mL/kg)  300 3 (3 4)     Total Intake(mL/kg)  300 3 (3 4)     Urine (mL/kg/hr)  1300     Total Output  1300     Net  -999 7                Weights:   IBW: 54 7 kg    Body mass index is 33 91 kg/m²  Weight (last 2 days)     Date/Time   Weight    21 0600   89 6 (197 53)    21 06   90 7 (199 96)    21 06   90 1 (198 63)            Physical Exam  Vitals signs reviewed     Constitutional:       General: She is not in acute distress  Appearance: She is well-developed  She is not diaphoretic  HENT:      Head: Normocephalic and atraumatic  Nose: Nose normal       Mouth/Throat:      Pharynx: No oropharyngeal exudate  Eyes:      General: No scleral icterus  Conjunctiva/sclera: Conjunctivae normal    Neck:      Musculoskeletal: Neck supple  Thyroid: No thyromegaly  Vascular: No JVD  Trachea: No tracheal deviation  Cardiovascular:      Rate and Rhythm: Normal rate  Rhythm irregular  Heart sounds: Normal heart sounds  No murmur  No friction rub  No gallop  Pulmonary:      Effort: Pulmonary effort is normal  No respiratory distress  Breath sounds: Normal breath sounds  No stridor  No wheezing or rales  Chest:      Chest wall: No tenderness  Abdominal:      General: Bowel sounds are normal  There is no distension  Palpations: Abdomen is soft  There is no mass  Tenderness: There is no abdominal tenderness  There is no guarding or rebound  Musculoskeletal: Normal range of motion  General: No tenderness  Comments: Right arm swelling and pain  Skin:     General: Skin is warm  Findings: No erythema or rash  Neurological:      Mental Status: She is alert and oriented to person, place, and time  Sensory: No sensory deficit  Psychiatric:         Behavior: Behavior normal          Thought Content: Thought content normal          Judgment: Judgment normal        LABORATORY DATA     Labs: I have personally reviewed pertinent reports    Results from last 7 days   Lab Units 01/08/21  0531 01/07/21  0447 01/06/21  0458  01/04/21  0557  01/03/21  0108   WBC Thousand/uL 15 87* 15 03* 21 22*   < > 14 82*   < > 14 02*   HEMOGLOBIN g/dL 9 9* 10 0* 10 2*   < > 10 6*   < > 10 9*   HEMATOCRIT % 33 9* 33 4* 34 3*   < > 36 3   < > 34 1*   PLATELETS Thousands/uL 334 320 346   < > 368   < > 384   NEUTROS PCT %  --   --   --   --  75  --   --    MONOS PCT %  --   --   --   --  12 --   --    MONO PCT %  --   --   --   --   --   --  9    < > = values in this interval not displayed  Results from last 7 days   Lab Units 01/07/21  0447 01/06/21  0458 01/05/21  0448 01/04/21  0533 01/03/21  0911 01/03/21  0108   POTASSIUM mmol/L 4 1 4 1 4 3 4 5 4 2 4 4   CHLORIDE mmol/L 111* 111* 114* 107 112* 106   CO2 mmol/L 27 23 25 24 23 24   BUN mg/dL 25 29* 28* 21 19 22*   CREATININE mg/dL 1 01 1 20 1 49* 1 14 0 94 1 07   CALCIUM mg/dL 8 1* 8 6 8 3 9 0 8 7 8 6   ALK PHOS U/L  --   --   --  116 119* 106 9   ALT U/L  --   --   --  28 26 21   AST U/L  --   --   --  21 17 19     Results from last 7 days   Lab Units 01/03/21  0911   MAGNESIUM mg/dL 2 3     Results from last 7 days   Lab Units 01/04/21  0533   PHOSPHORUS mg/dL 3 8      Results from last 7 days   Lab Units 01/08/21  0530 01/08/21  0452 01/07/21  2359  01/03/21  1958 01/03/21  0108   INR  1 64*  --   --   --  1 64* 1 42*   PTT seconds 79* 95* 86*   < > 36 28    < > = values in this interval not displayed  Results from last 7 days   Lab Units 01/03/21  1306 01/03/21  0911 01/03/21  0108   TROPONIN I ng/mL <0 02 <0 02 <0 03       IMAGING & DIAGNOSTIC TESTING     Radiology Results: I have personally reviewed pertinent reports  Xr Chest 1 View Portable    Result Date: 1/3/2021  Impression: Marked enlargement of the cardiac silhouette which could be due to cardiomegaly or a pericardial effusion  Right middle lobe pneumonia  Workstation performed: [id number]     Cta Chest Pe Study    Result Date: 1/3/2021  Impression: Large pericardial effusion measuring approximately 3 3 cm area of bulging of the right pericardium or a potential adjacent right mediastinal mass measuring approximately 6 6 cm #2/100  The pericardial effusion may be on a malignant basis  Prominent diffuse mediastinal adenopathy Interlobular septal thickening and small pleural effusions in keeping with pulmonary edema   Posterior right upper lobe consolidation #3/55 in a wedge-shaped appearance with a apex extending towards the right hilum may represent pneumonia or a postobstructive pneumonitis from a central mass  A discrete mass is not identified but could be obscured  Innumerable vague hypodensities throughout the liver are suspicious for metastases  I personally discussed this study with KERON Babcock on 1/3/2021 at 5:18 PM  Workstation performed: [id number]     Other Diagnostic Testing: I have personally reviewed pertinent reports  ACTIVE MEDICATIONS     Current Facility-Administered Medications   Medication Dose Route Frequency    acetaminophen (TYLENOL) tablet 650 mg  650 mg Oral Q6H PRN    aluminum-magnesium hydroxide-simethicone (MYLANTA) oral suspension 30 mL  30 mL Oral Q6H PRN    diltiazem (CARDIZEM) 125 mg in sodium chloride 0 9 % 125 mL infusion  1-15 mg/hr Intravenous Titrated    docusate sodium (COLACE) capsule 100 mg  100 mg Oral BID    heparin (porcine) 25,000 units in 0 45% NaCl 250 mL infusion (premix)  3-30 Units/kg/hr (Order-Specific) Intravenous Titrated    HYDROmorphone (DILAUDID) injection 0 2 mg  0 2 mg Intravenous Q4H PRN    HYDROmorphone (DILAUDID) injection 0 2 mg  0 2 mg Intravenous Once    metoprolol tartrate (LOPRESSOR) tablet 50 mg  50 mg Oral Q12H [address]    ondansetron (ZOFRAN) injection 4 mg  4 mg Intravenous Q6H PRN    oxyCODONE (ROXICODONE) IR tablet 2 5 mg  2 5 mg Oral Q4H PRN    oxyCODONE (ROXICODONE) IR tablet 5 mg  5 mg Oral Q4H PRN    senna (SENOKOT) tablet 8 6 mg  1 tablet Oral Daily       VTE Pharmacologic Prophylaxis: Heparin  VTE Mechanical Prophylaxis: sequential compression device    Portions of the record may have been created with voice recognition software  Occasional wrong word or "sound a like" substitutions may have occurred due to the inherent limitations of voice recognition software    Read the chart carefully and recognize, using context, where substitutions have occurred   ==  Julia Gee,   [facility]  Internal Medicine Residency PGY-2

## 2021-01-08 NOTE — ASSESSMENT & PLAN NOTE
Right upper extremity swelling noted overnight  Likely hematoma  Unable to obtain CT scan due to not having IV access  Will obtain STAT ultrasound  Will hold heparin drip until US result

## 2021-01-08 NOTE — H&P (VIEW-ONLY)
Consultation - General Surgery   Mulugeta Braun 65 y o  female MRN: [id number]  Unit/Bed#: [facility] 426-01 Encounter: [id number]    Assessment/Plan     Assessment:  65 F w/ remote Hx of breast cancer p/w new onset atrial flutter and shortness of breath this admission, secondary to pericardial effusion, now s/p pericardial window on 1/4/21 recovering well who developed spontaneous pain and swelling of the RUE overnight on 1/7/21 concerning for hematoma  Patient was on newly commenced anticoagulation with heparin gtt this admission, currently held  Afebrile, Tachycardic with episodic atrial fibrillation and flutter, normotensive, maintaining O2 saturation on room air    Hb 9 9 this AM, previously 10 0 from 10 2 from 9 8 on days prior this admission    US of RUE showing echogenic fluid density in biceps distribution of upper arm measuring  8 2 x 5 6 x 7 5 cm  8 2 x 5 6 x 7 5 cm     Patient has an indwelling dual lumen PICC line placed on 1/5/2021 in proximity to the current hematoma, no malfunction of this line has been noted  RUE neurovascularly intact, palpable radial and ulnar pulses, no motor or sensory deficits  Mild pain with arm flexion noted, no formal weakness  Plan:  -No acute surgical intervention  -monitor serum hemoglobin  -agree with current holding of heparin, may resume if Hb stable and no symptoms of expansion or neurovascular compromise develop  -pericardial drain care, additional work-up of effusion and mediastinal mass as per thoracic surgery  -additional care as per primary team       History of Present Illness   History, ROS and PFSH unobtainable from any source due to none  HPI:  Mulugeta Braun is a 65 y o  female who presents with new onset swelling and pain of the RUE near her biceps along the mid humeral aspect of the extremity  This began overnight without and notable inciting or precipitating event  She categorically denies trauma to the area   She was admitted to the hospital and has since been treated for a pericardial effusion and cardiac arrhythmias and did have a PICC line inserted in the arm near this site on 1/5/21 without any immediate complication  She has been receiving heparin anticoagulation this admission for new onset AFIB which is currently held  Ultrasound obtained today shows fluid collection near the muscle body concerning for possible hematoma  She denies increased pain, swelling, numbness or weakness of the extremity, and notes that apart from he initial pain and some superficial bruising of the arm, nothing has appeared or felt irregular to her  Consults    Review of Systems   Constitutional: Negative for activity change and appetite change  Cardiovascular: Positive for palpitations  Musculoskeletal: Positive for joint swelling and myalgias  Skin: Negative for color change and wound  Neurological: Negative for weakness and numbness  All other systems reviewed and are negative        Historical Information   Past Medical History:   Diagnosis Date    Arthritis     Hypertension      Past Surgical History:   Procedure Laterality Date    MASTECTOMY      left     PERICARDIAL WINDOW N/A 1/4/2021    Procedure: WINDOW PERICARDIAL, subxiphoid ;  Surgeon: Sabina Stanley MD;  Location: [facility];  Service: Thoracic     Social History   Social History     Substance and Sexual Activity   Alcohol Use Yes    Frequency: Monthly or less    Drinks per session: 1 or 2    Binge frequency: Never     Social History     Substance and Sexual Activity   Drug Use Never     E-Cigarette/Vaping    E-Cigarette Use Never User      E-Cigarette/Vaping Substances     Social History     Tobacco Use   Smoking Status Never Smoker   Smokeless Tobacco Never Used     Family History: non-contributory    Meds/Allergies   all current active meds have been reviewed and current meds:   Current Facility-Administered Medications   Medication Dose Route Frequency    acetaminophen (TYLENOL) tablet 650 mg  650 mg Oral Q6H PRN    aluminum-magnesium hydroxide-simethicone (MYLANTA) oral suspension 30 mL  30 mL Oral Q6H PRN    diltiazem (CARDIZEM) 125 mg in sodium chloride 0 9 % 125 mL infusion  1-15 mg/hr Intravenous Titrated    docusate sodium (COLACE) capsule 100 mg  100 mg Oral BID    heparin (porcine) 25,000 units in 0 45% NaCl 250 mL infusion (premix)  3-30 Units/kg/hr (Order-Specific) Intravenous Titrated    HYDROmorphone (DILAUDID) injection 0 2 mg  0 2 mg Intravenous Q4H PRN    HYDROmorphone (DILAUDID) injection 0 2 mg  0 2 mg Intravenous Once    metoprolol tartrate (LOPRESSOR) tablet 50 mg  50 mg Oral Q12H [facility]    ondansetron (ZOFRAN) injection 4 mg  4 mg Intravenous Q6H PRN    oxyCODONE (ROXICODONE) IR tablet 2 5 mg  2 5 mg Oral Q4H PRN    oxyCODONE (ROXICODONE) IR tablet 5 mg  5 mg Oral Q4H PRN    senna (SENOKOT) tablet 8 6 mg  1 tablet Oral Daily     No Known Allergies    Objective   First Vitals:   Blood Pressure: 168/92 (01/03/21 0826)  Pulse: (!) 139 (01/03/21 0826)  Temperature: (!) 97 4 °F (36 3 °C) (01/03/21 0933)  Temp Source: Oral (01/03/21 0933)  Respirations: 20 (01/03/21 0826)  Height: 5' 4" (162 6 cm) (01/03/21 0826)  Weight - Scale: 89 8 kg (198 lb) (01/03/21 0826)  SpO2: 96 % (01/03/21 0826)    Current Vitals:   Blood Pressure: 135/61 (01/08/21 1534)  Pulse: (!) 124 (01/08/21 1534)  Temperature: 97 8 °F (36 6 °C) (01/08/21 1534)  Temp Source: Oral (01/08/21 1534)  Respirations: 16 (01/08/21 1534)  Height: 5' 4" (162 6 cm) (01/03/21 1709)  Weight - Scale: 89 6 kg (197 lb 8 5 oz) (01/08/21 0600)  SpO2: 97 % (01/08/21 1534)      Intake/Output Summary (Last 24 hours) at 1/8/2021 1603  Last data filed at 1/8/2021 1300  Gross per 24 hour   Intake 951 78 ml   Output 1270 ml   Net -318 22 ml       Invasive Devices     Peripherally Inserted Central Catheter Line            PICC Line 01/05/21 Right Brachial 3 days          Drain            Closed/Suction Drain Right;Medial Chest Bulb 24 Fr  3 days                Physical Exam  Constitutional:       General: She is not in acute distress  Appearance: She is obese  HENT:      Head: Normocephalic and atraumatic  Mouth/Throat:      Mouth: Mucous membranes are moist    Eyes:      Pupils: Pupils are equal, round, and reactive to light  Neck:      Musculoskeletal: Neck supple  No muscular tenderness  Cardiovascular:      Rate and Rhythm: Normal rate  Rhythm irregular  Pulses:           Radial pulses are 2+ on the right side and 2+ on the left side  Comments: Pericardial drain present, bulb compressed, trace serosanguineous effleunt in drain     2+ ulnar pulses bilaterally   Pulmonary:      Effort: Pulmonary effort is normal  No respiratory distress  Abdominal:      General: There is no distension  Palpations: Abdomen is soft  Tenderness: There is no abdominal tenderness  Musculoskeletal:         General: Swelling present  No deformity  Right elbow: She exhibits decreased range of motion and swelling  She exhibits no deformity  Tenderness found  Arms:    Skin:     General: Skin is warm and dry  Capillary Refill: Capillary refill takes 2 to 3 seconds  Coloration: Skin is not cyanotic, mottled or pale  Findings: Ecchymosis present  Neurological:      General: No focal deficit present  Mental Status: She is alert and oriented to person, place, and time  Mental status is at baseline  Motor: Motor function is intact  No weakness or atrophy  Coordination: Coordination is intact  Comments: RUE with intact hand motor and sensory dunction, fine motor finger coordination intact       Psychiatric:         Mood and Affect: Mood normal          Behavior: Behavior normal          Lab Results:   I have personally reviewed pertinent lab results    , CBC:   Lab Results   Component Value Date    WBC 15 87 (H) 01/08/2021    HGB 9 9 (L) 01/08/2021    HCT 33 9 (L) 01/08/2021    MCV 70 (L) 01/08/2021     01/08/2021    MCH 20 4 (L) 01/08/2021    MCHC 29 2 (L) 01/08/2021    RDW 19 8 (H) 01/08/2021    MPV 10 9 01/08/2021   , CMP: No results found for: SODIUM, K, CL, CO2, ANIONGAP, BUN, CREATININE, GLUCOSE, CALCIUM, AST, ALT, ALKPHOS, PROT, BILITOT, EGFR, Coagulation:   Lab Results   Component Value Date    INR 1 64 (H) 01/08/2021     Imaging: I have personally reviewed pertinent reports  Xr Chest Portable    Result Date: 1/5/2021  Impression: Pericardial drain placement with slight decrease in size of the cardiac silhouette  Small effusions and bibasilar atelectasis  Workstation performed: [id number]     Xr Chest 1 View Portable    Result Date: 1/3/2021  Impression: Marked enlargement of the cardiac silhouette which could be due to cardiomegaly or a pericardial effusion  Right middle lobe pneumonia  Workstation performed: [id number]     Cta Chest Pe Study    Result Date: 1/3/2021  Impression: Large pericardial effusion measuring approximately 3 3 cm area of bulging of the right pericardium or a potential adjacent right mediastinal mass measuring approximately 6 6 cm #2/100  The pericardial effusion may be on a malignant basis  Prominent diffuse mediastinal adenopathy Interlobular septal thickening and small pleural effusions in keeping with pulmonary edema  Posterior right upper lobe consolidation #3/55 in a wedge-shaped appearance with a apex extending towards the right hilum may represent pneumonia or a postobstructive pneumonitis from a central mass  A discrete mass is not identified but could be obscured  Innumerable vague hypodensities throughout the liver are suspicious for metastases    I personally discussed this study with Kindra Price on 1/3/2021 at 5:18 PM  Workstation performed: [id number]     Us Extremity Soft Tissue    Result Date: 1/8/2021  Impression: Complex fluid collection in the medial right upper arm with hematocrit level in keeping with hematoma  Given the location, the possibility of associated underlying biceps tendon rupture is not excluded  This can be evaluated with MSK shoulder ultrasound if clinically indicated  Recommend follow-up ultrasound in 1 month to confirm resolution  The study was marked in [facility] for immediate notification  Workstation performed: [id number]     EKG, Pathology, and Other Studies: I have personally reviewed pertinent reports     and I have personally reviewed pertinent films in PACS

## 2021-01-09 LAB
ANION GAP SERPL CALCULATED.3IONS-SCNC: 5 MMOL/L (ref 4–13)
BUN SERPL-MCNC: 16 MG/DL (ref 5–25)
CALCIUM SERPL-MCNC: 8.6 MG/DL (ref 8.3–10.1)
CHLORIDE SERPL-SCNC: 109 MMOL/L (ref 100–108)
CO2 SERPL-SCNC: 27 MMOL/L (ref 21–32)
CREAT SERPL-MCNC: 0.79 MG/DL (ref 0.6–1.3)
ERYTHROCYTE [DISTWIDTH] IN BLOOD BY AUTOMATED COUNT: 20 % (ref 11.6–15.1)
GFR SERPL CREATININE-BSD FRML MDRD: 86 ML/MIN/1.73SQ M
GLUCOSE SERPL-MCNC: 100 MG/DL (ref 65–140)
HCT VFR BLD AUTO: 33.3 % (ref 34.8–46.1)
HGB BLD-MCNC: 9.8 G/DL (ref 11.5–15.4)
MCH RBC QN AUTO: 20.3 PG (ref 26.8–34.3)
MCHC RBC AUTO-ENTMCNC: 29.4 G/DL (ref 31.4–37.4)
MCV RBC AUTO: 69 FL (ref 82–98)
PLATELET # BLD AUTO: 244 THOUSANDS/UL (ref 149–390)
POTASSIUM SERPL-SCNC: 4.5 MMOL/L (ref 3.5–5.3)
RBC # BLD AUTO: 4.82 MILLION/UL (ref 3.81–5.12)
SODIUM SERPL-SCNC: 141 MMOL/L (ref 136–145)
WBC # BLD AUTO: 14.8 THOUSAND/UL (ref 4.31–10.16)

## 2021-01-09 PROCEDURE — 85027 COMPLETE CBC AUTOMATED: CPT | Performed by: FAMILY MEDICINE

## 2021-01-09 PROCEDURE — 99232 SBSQ HOSP IP/OBS MODERATE 35: CPT | Performed by: INTERNAL MEDICINE

## 2021-01-09 PROCEDURE — 99233 SBSQ HOSP IP/OBS HIGH 50: CPT | Performed by: FAMILY MEDICINE

## 2021-01-09 PROCEDURE — 80048 BASIC METABOLIC PNL TOTAL CA: CPT | Performed by: FAMILY MEDICINE

## 2021-01-09 RX ADMIN — METOPROLOL TARTRATE 50 MG: 50 TABLET, FILM COATED ORAL at 08:00

## 2021-01-09 RX ADMIN — DOCUSATE SODIUM 100 MG: 100 CAPSULE, LIQUID FILLED ORAL at 08:02

## 2021-01-09 RX ADMIN — HYDROMORPHONE HYDROCHLORIDE 0.2 MG: 1 INJECTION, SOLUTION INTRAMUSCULAR; INTRAVENOUS; SUBCUTANEOUS at 06:45

## 2021-01-09 RX ADMIN — STANDARDIZED SENNA CONCENTRATE 8.6 MG: 8.6 TABLET ORAL at 08:02

## 2021-01-09 RX ADMIN — METOPROLOL TARTRATE 50 MG: 50 TABLET, FILM COATED ORAL at 21:46

## 2021-01-09 NOTE — ASSESSMENT & PLAN NOTE
Evaluated by thoracic surgery  Underwent pericardial drain placement 01/04/2021  Still draining, will need to keep drain as per CT surgery  Repeat echo shows no residual pericardial effusion  However continues to have large volume drainage therefore will need to maintain the drain

## 2021-01-09 NOTE — PROGRESS NOTES
Progress Note - Selvin Correa 1950, 65 y o  female MRN: [id number]    Unit/Bed#: [facility] 426-01 Encounter: [id number]    Primary Care Provider: Mary Parrish MD   Date and time admitted to hospital: 1/3/2021  8:26 AM        * Atrial flutter [facility])  Assessment & Plan  Patient was coughing too much and her  brought her to ED  She was having sob, EKG showed atrial flutter, QTc prolongation  Her son passed away on 12/21/2020  She did have swelling in her legs increased over last week  She has swelling in the past, treated with lasix with improvement in June, 2020  Patient was evaluated by Cardiology  Cardiac echo with large pericardial effusion without tamponade, EF 65%  Currently off of Cardizem drip, on p o  Metoprolol  Heart rate is well controlled  Has a PICC line due to difficult IV access  Due to possible right upper extremity hematoma or proximal biceps tendon tear, currently heparin drip is on hold  Pericardial effusion without cardiac tamponade  Assessment & Plan  Evaluated by thoracic surgery  Underwent pericardial drain placement 01/04/2021  Still draining, will need to keep drain as per CT surgery  Repeat echo shows no residual pericardial effusion  However continues to have large volume drainage therefore will need to maintain the drain  Pain and swelling of right upper extremity  Assessment & Plan  Right upper extremity swelling noted overnight  Likely hematoma  Unable to obtain CT scan due to not having IV access  US obtained that shows possible hematoma vs  Proximal biceps tendon repair  General surgery consult appreciated- recommends conservative managment and holding AC  Patient does not have motor strength loss  neurovascular intact  Pain is significantly improved since last night  Patient is able to extend her shoulder, slightly limited due to pain  At this point, patient will follow up outpatient   If persistent in 3 months, may obtain MRI    Mediastinal mass  Assessment & Plan  Patient with remote history of left breast cancer status post mastectomy over 20 years ago  No chemotherapy or radiation  Anterior mediastinal mass seen on CT scan with diffuse mediastinal adenopathy, Innumerable vague hypodensities throughout the liver are suspicious for metastases  Unclear etiology  Thoracic surgery is following   s/p IR biopsy on 21, pending pathology    Essential hypertension  Assessment & Plan  Continue metoprolol  Hold lisinopril for now  Continue to monitor        VTE Pharmacologic Prophylaxis:   Pharmacologic: Pharmacologic VTE Prophylaxis contraindicated due to right upper arm hematoma  Mechanical VTE Prophylaxis in Place: Yes    Patient Centered Rounds: I have performed bedside rounds with nursing staff today  Current Length of Stay: 6 day(s)    Current Patient Status: Inpatient   Certification Statement: The patient will continue to require additional inpatient hospital stay due to continues to have pericardial drain, right upper arm swelling    Discharge Plan: pending progress    Code Status: Level 1 - Full Code      Subjective:   Patient examined at bedside  States right upper arm pain is significantly improved compared to night before  She is able to lift her arm, slightly limited with pain however no loss of strength, no worsening swelling  Objective:     Vitals:   Temp (24hrs), Av 5 °F (36 9 °C), Min:97 6 °F (36 4 °C), Max:99 7 °F (37 6 °C)    Temp:  [97 6 °F (36 4 °C)-99 7 °F (37 6 °C)] 99 3 °F (37 4 °C)  HR:  [] 107  Resp:  [14-16] 14  BP: (107-135)/(56-77) 113/56  SpO2:  [94 %-98 %] 96 %  Body mass index is 34 1 kg/m²  Input and Output Summary (last 24 hours): Intake/Output Summary (Last 24 hours) at 2021 1506  Last data filed at 2021 0800  Gross per 24 hour   Intake 120 ml   Output 840 ml   Net -720 ml       Physical Exam:     Physical Exam  Constitutional:       Appearance: She is well-developed  HENT:      Head: Normocephalic and atraumatic  Neck:      Musculoskeletal: Normal range of motion  Pulmonary:      Effort: Pulmonary effort is normal  No respiratory distress  Breath sounds: Normal breath sounds  Musculoskeletal:      Comments: Right medial upper arm swelling noted  Patient is able to extend her shoulder, slightly limited due to some pain  Skin:     General: Skin is warm and dry  Additional Data:     Labs:    Results from last 7 days   Lab Units 01/09/21  0504  01/04/21  0557   WBC Thousand/uL 14 80*   < > 14 82*   HEMOGLOBIN g/dL 9 8*   < > 10 6*   HEMATOCRIT % 33 3*   < > 36 3   PLATELETS Thousands/uL 244   < > 368   NEUTROS PCT %  --   --  75   LYMPHS PCT %  --   --  8*   MONOS PCT %  --   --  12   EOS PCT %  --   --  2    < > = values in this interval not displayed  Results from last 7 days   Lab Units 01/09/21  0504  01/04/21  0533   SODIUM mmol/L 141   < > 139   POTASSIUM mmol/L 4 5   < > 4 5   CHLORIDE mmol/L 109*   < > 107   CO2 mmol/L 27   < > 24   BUN mg/dL 16   < > 21   CREATININE mg/dL 0 79   < > 1 14   ANION GAP mmol/L 5   < > 8   CALCIUM mg/dL 8 6   < > 9 0   ALBUMIN g/dL  --   --  3 2*   TOTAL BILIRUBIN mg/dL  --   --  1 03*   ALK PHOS U/L  --   --  116   ALT U/L  --   --  28   AST U/L  --   --  21   GLUCOSE RANDOM mg/dL 100   < > 97    < > = values in this interval not displayed       Results from last 7 days   Lab Units 01/08/21  0530   INR  1 64*     Results from last 7 days   Lab Units 01/05/21  1031 01/05/21  0534 01/04/21  2047 01/04/21  1136 01/04/21  0602   POC GLUCOSE mg/dl 235* 162* 133 132 129     Results from last 7 days   Lab Units 01/03/21  0911   HEMOGLOBIN A1C % 6 9*            Recent Cultures (last 7 days):     Results from last 7 days   Lab Units 01/04/21  1720   GRAM STAIN RESULT  1+ Polys  2+ Mononuclear Cells  No organisms seen   BODY FLUID CULTURE, STERILE  No growth       Last 24 Hours Medication List:   Current Facility-Administered Medications   Medication Dose Route Frequency Provider Last Rate    acetaminophen  650 mg Oral Q6H PRN Melissa Dennison PA-C      aluminum-magnesium hydroxide-simethicone  30 mL Oral Q6H PRN Molly Salinas MD      diltiazem  1-15 mg/hr Intravenous Titrated Molly Salinas MD Stopped (01/06/21 1037)    docusate sodium  100 mg Oral BID Molly Salinas MD      heparin (porcine)  3-30 Units/kg/hr (Order-Specific) Intravenous Titrated Molly Salinas MD Stopped (01/08/21 0505)    HYDROmorphone  0 2 mg Intravenous Q4H PRN Melissa Dennison PA-C      HYDROmorphone  0 2 mg Intravenous Once Melissa Dennison PA-C      metoprolol tartrate  50 mg Oral Q12H [facility] Kelsy Nagel DO      ondansetron  4 mg Intravenous Q6H PRN Molly Salinas MD      oxyCODONE  2 5 mg Oral Q4H PRN Melissa Dennison PA-C      oxyCODONE  5 mg Oral Q4H PRN Melissa Dennison PA-C      senna  1 tablet Oral Daily Molly Salinas MD          Today, Patient Was Seen By: Davis Barros MD    ** Please Note: Dictation voice to text software may have been used in the creation of this document   **

## 2021-01-09 NOTE — PROGRESS NOTES
Cardiology Progress Note - Carolina Guardado 65 y o  female MRN: [id number]    Unit/Bed#: [facility] 426-01 Encounter: [id number]    Assessment and plan  1  Pericardial effusion suspect malignant  2  Thoracic mass near SVC  3  Atrial flutter  4  Right arm hematoma    Recommendations:  Patient's output still remains high from pericardial drain  120 cc yesterday  Echocardiogram was personally reviewed there were no loculated pockets this chest remains high output  Will discuss the case with thoracic surgery I suspect will need identification of with the masses in her chest and possible initiation treatment to help slow down outputs  There is no evidence of pericarditis or other correctable cause of pericardial fluid production that I can discern     Atrial flutter has been better rate controlled the pain from right arm hematoma has been driving upper rates  Continue with AV wyatt blocking agents resume anticoagulation when able  Subjective:    No significant events overnight  Resting comfortably right arm pain has improved  Denies any chest pain, shortness of breath, lightheadedness    ROS    Objective:   Vitals: Blood pressure 111/68, pulse (!) 135, temperature 98 9 °F (37 2 °C), resp  rate 14, height 5' 4" (1 626 m), weight 90 1 kg (198 lb 10 2 oz), SpO2 94 %  , Body mass index is 34 1 kg/m² ,   Orthostatic Blood Pressures      Most Recent Value   Blood Pressure  111/68 filed at 2021 0728   Patient Position - Orthostatic VS  Sitting filed at 2021 6355         Systolic (36NJR), XTR:845 , Min:107 , BH     Diastolic (08PBD), UPC:29, Min:60, Max:77      Intake/Output Summary (Last 24 hours) at 2021 0941  Last data filed at 2021 0800  Gross per 24 hour   Intake 600 ml   Output 1395 ml   Net -795 ml     Weight (last 2 days)     Date/Time   Weight    21 0600   90 1 (198 63)    21 0600   89 6 (197 53)    21 0600   90 7 (199 96)                Telemetry Review: No significant arrhythmias seen on telemetry review  EKG personally reviewed by Leda Baig DO  Physical Exam  Vitals signs and nursing note reviewed  Constitutional:       General: She is not in acute distress  Appearance: She is well-developed  HENT:      Head: Normocephalic and atraumatic  Eyes:      Conjunctiva/sclera: Conjunctivae normal       Pupils: Pupils are equal, round, and reactive to light  Neck:      Musculoskeletal: Neck supple  Cardiovascular:      Rate and Rhythm: Normal rate and regular rhythm  Heart sounds: Normal heart sounds  No murmur  No friction rub  Pulmonary:      Effort: Pulmonary effort is normal  No respiratory distress  Breath sounds: Normal breath sounds  No wheezing or rales  Abdominal:      General: Bowel sounds are normal  There is no distension  Palpations: Abdomen is soft  Tenderness: There is no abdominal tenderness  There is no rebound  Musculoskeletal: Normal range of motion  General: No tenderness or deformity  Skin:     General: Skin is warm and dry  Findings: No erythema  Neurological:      Mental Status: She is alert and oriented to person, place, and time  Cranial Nerves: No cranial nerve deficit             Laboratory Results:  Results from last 7 days   Lab Units 01/03/21  1306 01/03/21  0911 01/03/21  0108   TROPONIN I ng/mL <0 02 <0 02 <0 03       CBC with diff:   Results from last 7 days   Lab Units 01/09/21  0504 01/08/21  0531 01/07/21  0447 01/06/21  0458 01/05/21  0448 01/04/21  0557 01/03/21  1958 01/03/21  0108   WBC Thousand/uL 14 80* 15 87* 15 03* 21 22* 13 77* 14 82* 15 59* 14 02*   HEMOGLOBIN g/dL 9 8* 9 9* 10 0* 10 2* 9 8* 10 6* 10 8* 10 9*   HEMATOCRIT % 33 3* 33 9* 33 4* 34 3* 32 2* 36 3 36 5 34 1*   MCV fL 69* 70* 69* 68* 68* 69* 70* 65*   PLATELETS Thousands/uL 244 334 320 346 377 368 391* 384   MCH pg 20 3* 20 4* 20 7* 20 2* 20 6* 20 2* 20 6* 20 8*   MCHC g/dL 29 4* 29 2* 29 9* 29 7* 30 4* 29 2* 29 6* 32 0   RDW % 20 0* 19 8* 19 8* 19 6* 19 5* 19 8* 19 7* 18 9*   MPV fL  --  10 9 11 1 11 1 11 5 11 6 11 4 11 4   NRBC AUTO /100 WBCs  --   --   --   --  0 0  --   --          CMP:  Results from last 7 days   Lab Units 01/09/21  0504 01/07/21  0447 01/06/21  0458 01/05/21  0448 01/04/21  0533 01/03/21  0911 01/03/21  0108   POTASSIUM mmol/L 4 5 4 1 4 1 4 3 4 5 4 2 4 4   CHLORIDE mmol/L 109* 111* 111* 114* 107 112* 106   CO2 mmol/L 27 27 23 25 24 23 24   BUN mg/dL 16 25 29* 28* 21 19 22*   CREATININE mg/dL 0 79 1 01 1 20 1 49* 1 14 0 94 1 07   CALCIUM mg/dL 8 6 8 1* 8 6 8 3 9 0 8 7 8 6   AST U/L  --   --   --   --  21 17 19   ALT U/L  --   --   --   --  28 26 21   ALK PHOS U/L  --   --   --   --  116 119* 106 9   EGFR ml/min/1 73sq m 86 64 52 40 56 70 60         BMP:  Results from last 7 days   Lab Units 01/09/21  0504 01/07/21  0447 01/06/21  0458 01/05/21 0448 01/04/21  0533 01/03/21  0911 01/03/21  0108   POTASSIUM mmol/L 4 5 4 1 4 1 4 3 4 5 4 2 4 4   CHLORIDE mmol/L 109* 111* 111* 114* 107 112* 106   CO2 mmol/L 27 27 23 25 24 23 24   BUN mg/dL 16 25 29* 28* 21 19 22*   CREATININE mg/dL 0 79 1 01 1 20 1 49* 1 14 0 94 1 07   CALCIUM mg/dL 8 6 8 1* 8 6 8 3 9 0 8 7 8 6       BNP: No results for input(s): BNP in the last 72 hours  Magnesium:   Results from last 7 days   Lab Units 01/03/21  0911   MAGNESIUM mg/dL 2 3       Coags:   Results from last 7 days   Lab Units 01/08/21  0530 01/08/21  0452 01/07/21  2359 01/07/21  1329 01/07/21  0447 01/06/21  1625 01/06/21  0859  01/03/21  1958 01/03/21  0108   PTT seconds 79* 95* 86* 87* 40* 61* 65*   < > 36 28   INR  1 64*  --   --   --   --   --   --   --  1 64* 1 42*    < > = values in this interval not displayed         TSH:        Hemoglobin A1C   Results from last 7 days   Lab Units 01/03/21  0911   HEMOGLOBIN A1C % 6 9*       Lipid Profile:       Cardiac testing:   Results for orders placed during the hospital encounter of 01/03/21   Echo complete with contrast if indicated    Narrative [address]  [facility], [address] [facility]  [phone]    Transthoracic Echocardiogram  2D, M-mode, Doppler, and Color Doppler    Study date:  2021    Patient: Nolan Langley  MR number: [id number]  Account number: [de-identified]  : 1950  Age: 65 years  Gender: Female  Status: Inpatient  Location: Emergency room  Height: 64 in  Weight: 197 6 lb  BP: 117/ 67 mmHg    Indications: Atrial flutter  Diagnoses: I48 1 - Atrial flutter    Sonographer:  Clinton Damico RDCS  Primary Physician:  Vinicius Simpson MD  Referring Physician:  Luz Beltran MD  Group:  [facility]  Interpreting Physician:  Amando Ariza MD    SUMMARY    LEFT VENTRICLE:  The cavity was small  Systolic function was normal  Ejection fraction was estimated to be 65 %  There were no regional wall motion abnormalities  Wall thickness was at the upper limits of normal     RIGHT VENTRICLE:  The size was normal   Systolic function was normal     LEFT ATRIUM:  The atrium was mildly dilated  MITRAL VALVE:  There was mild to moderate regurgitation  AORTIC VALVE:  There was trace regurgitation  TRICUSPID VALVE:  There was mild regurgitation  PERICARDIUM:  A large pericardial effusion was identified circumferential to the heart  There was no evidence of hemodynamic compromise  HISTORY: PRIOR HISTORY: Hypertension  PROCEDURE: The procedure was performed in the emergency room  This was a routine study  The transthoracic approach was used  The study included complete 2D imaging, M-mode, complete spectral Doppler, and color Doppler  The heart rate was  100 bpm, at the start of the study  Echocardiographic views were limited due to poor acoustic window availability  This was a technically difficult study  LEFT VENTRICLE: The cavity was small  Systolic function was normal  Ejection fraction was estimated to be 65 %  There were no regional wall motion abnormalities  Wall thickness was at the upper limits of normal     RIGHT VENTRICLE: The size was normal  Systolic function was normal  Wall thickness was normal     LEFT ATRIUM: The atrium was mildly dilated  RIGHT ATRIUM: Size was normal     MITRAL VALVE: Valve structure was normal  There was normal leaflet separation  DOPPLER: The transmitral velocity was within the normal range  There was no evidence for stenosis  There was mild to moderate regurgitation  The regurgitant jet  was eccentric and directed posteriorly  AORTIC VALVE: The valve was trileaflet  Leaflets exhibited normal thickness and normal cuspal separation  DOPPLER: Transaortic velocity was within the normal range  There was no evidence for stenosis  There was trace regurgitation  TRICUSPID VALVE: The valve structure was normal  There was normal leaflet separation  DOPPLER: The transtricuspid velocity was within the normal range  There was no evidence for stenosis  There was mild regurgitation  PULMONIC VALVE: Leaflets exhibited normal thickness, no calcification, and normal cuspal separation  DOPPLER: The transpulmonic velocity was within the normal range  There was no regurgitation  PERICARDIUM: A large pericardial effusion was identified circumferential to the heart  There was no evidence of hemodynamic compromise  The pericardium was normal in appearance  AORTA: The root exhibited normal size  SYSTEMIC VEINS: IVC: The inferior vena cava was normal in size and course   Respirophasic changes were normal     SYSTEM MEASUREMENT TABLES    2D  %FS: 26 02 %  Ao Diam: 3 2 cm  EDV(Teich): 66 92 ml  EF(Teich): 51 72 %  ESV(Teich): 32 31 ml  IVSd: 1 01 cm  LA Area: 21 05 cm2  LA Diam: 3 39 cm  LVEDV MOD A4C: 28 28 ml  LVEF MOD A4C: 86 2 %  LVESV MOD A4C: 3 9 ml  LVIDd: 3 92 cm  LVIDs: 2 9 cm  LVLd A4C: 6 cm  LVLs A4C: 5 29 cm  LVPWd: 1 17 cm  RA Area: 12 36 cm2  RVIDd: 2 64 cm  SV MOD A4C: 24 38 ml  SV(Teich): 34 61 ml    CW  TR Vmax: 2 17 m/s  TR maxP 81 mmHg    [facility] [place] [facility] Accredited Echocardiography Laboratory    Prepared and electronically signed by    Atul Chavez MD  Signed 2021 16:28:40       No results found for this or any previous visit  No results found for this or any previous visit  No results found for this or any previous visit  Meds/Allergies   all current active meds have been reviewed  Medications Prior to Admission   Medication    lisinopril (ZESTRIL) 30 mg tablet    metoprolol tartrate (LOPRESSOR) 25 mg tablet       diltiazem, 1-15 mg/hr, Last Rate: Stopped (21 1037)  heparin (porcine), 3-30 Units/kg/hr (Order-Specific), Last Rate: Stopped (21 0505)      Assessment:  Principal Problem:    Atrial flutter (HCC)  Active Problems:    Essential hypertension    Pericardial effusion without cardiac tamponade    Mediastinal mass    Pain and swelling of right upper extremity            Counseling / Coordination of Care  Total floor / unit time spent today 25 minutes  Greater than 50% of total time was spent with the patient and / or family counseling and / or coordination of care  A description of the counseling / coordination of care: Sarah Edmonds

## 2021-01-09 NOTE — ASSESSMENT & PLAN NOTE
Patient was coughing too much and her  brought her to ED  She was having sob, EKG showed atrial flutter, QTc prolongation  Her son passed away on 12/21/2020  She did have swelling in her legs increased over last week  She has swelling in the past, treated with lasix with improvement in June, 2020  Patient was evaluated by Cardiology  Cardiac echo with large pericardial effusion without tamponade, EF 65%  Currently off of Cardizem drip, on p o  Metoprolol  Heart rate is well controlled  Has a PICC line due to difficult IV access  Due to possible right upper extremity hematoma or proximal biceps tendon tear, currently heparin drip is on hold

## 2021-01-09 NOTE — QUICK NOTE
Evaluated patient  Her RUE is mostly soft  There is a small half dollar sized area superior to her PICC insertion that is moderately taut  This has been outlined  We will monitor this daily  She is neurovascularly intact in her RUE and denies any complaints

## 2021-01-09 NOTE — ASSESSMENT & PLAN NOTE
Right upper extremity swelling noted overnight  Likely hematoma  Unable to obtain CT scan due to not having IV access  US obtained that shows possible hematoma vs  Proximal biceps tendon repair  General surgery consult appreciated- recommends conservative managment and holding AC  Patient does not have motor strength loss  neurovascular intact  Pain is significantly improved since last night  Patient is able to extend her shoulder, slightly limited due to pain  At this point, patient will follow up outpatient   If persistent in 3 months, may obtain MRI

## 2021-01-10 PROBLEM — R16.0 HYPODENSE MASS OF LIVER: Status: ACTIVE | Noted: 2021-01-10

## 2021-01-10 LAB
ANION GAP SERPL CALCULATED.3IONS-SCNC: 3 MMOL/L (ref 4–13)
BUN SERPL-MCNC: 12 MG/DL (ref 5–25)
CALCIUM SERPL-MCNC: 8.6 MG/DL (ref 8.3–10.1)
CHLORIDE SERPL-SCNC: 111 MMOL/L (ref 100–108)
CO2 SERPL-SCNC: 29 MMOL/L (ref 21–32)
CREAT SERPL-MCNC: 0.84 MG/DL (ref 0.6–1.3)
ERYTHROCYTE [DISTWIDTH] IN BLOOD BY AUTOMATED COUNT: 20.5 % (ref 11.6–15.1)
GFR SERPL CREATININE-BSD FRML MDRD: 80 ML/MIN/1.73SQ M
GLUCOSE SERPL-MCNC: 106 MG/DL (ref 65–140)
HCT VFR BLD AUTO: 28.7 % (ref 34.8–46.1)
HGB BLD-MCNC: 8.8 G/DL (ref 11.5–15.4)
MCH RBC QN AUTO: 21.2 PG (ref 26.8–34.3)
MCHC RBC AUTO-ENTMCNC: 30.7 G/DL (ref 31.4–37.4)
MCV RBC AUTO: 69 FL (ref 82–98)
PLATELET # BLD AUTO: 193 THOUSANDS/UL (ref 149–390)
POTASSIUM SERPL-SCNC: 4.4 MMOL/L (ref 3.5–5.3)
RBC # BLD AUTO: 4.15 MILLION/UL (ref 3.81–5.12)
SODIUM SERPL-SCNC: 143 MMOL/L (ref 136–145)
WBC # BLD AUTO: 10.37 THOUSAND/UL (ref 4.31–10.16)

## 2021-01-10 PROCEDURE — 80048 BASIC METABOLIC PNL TOTAL CA: CPT | Performed by: PHYSICIAN ASSISTANT

## 2021-01-10 PROCEDURE — 99233 SBSQ HOSP IP/OBS HIGH 50: CPT | Performed by: FAMILY MEDICINE

## 2021-01-10 PROCEDURE — 85027 COMPLETE CBC AUTOMATED: CPT | Performed by: PHYSICIAN ASSISTANT

## 2021-01-10 PROCEDURE — 99232 SBSQ HOSP IP/OBS MODERATE 35: CPT | Performed by: INTERNAL MEDICINE

## 2021-01-10 RX ADMIN — DOCUSATE SODIUM 100 MG: 100 CAPSULE, LIQUID FILLED ORAL at 17:33

## 2021-01-10 RX ADMIN — METOPROLOL TARTRATE 50 MG: 50 TABLET, FILM COATED ORAL at 21:09

## 2021-01-10 RX ADMIN — DOCUSATE SODIUM 100 MG: 100 CAPSULE, LIQUID FILLED ORAL at 09:24

## 2021-01-10 RX ADMIN — STANDARDIZED SENNA CONCENTRATE 8.6 MG: 8.6 TABLET ORAL at 09:24

## 2021-01-10 RX ADMIN — METOPROLOL TARTRATE 50 MG: 50 TABLET, FILM COATED ORAL at 09:24

## 2021-01-10 NOTE — ASSESSMENT & PLAN NOTE
Patient was coughing too much and her  brought her to ED  She was having sob, EKG showed atrial flutter, QTc prolongation  Her son passed away on 12/21/2020  She did have swelling in her legs increased over last week  She has swelling in the past, treated with lasix with improvement in June, 2020  Patient was evaluated by Cardiology  Cardiac echo with large pericardial effusion without tamponade, EF 65%  Currently off of Cardizem drip, on p o  Metoprolol  Heart rate is well controlled  Has a PICC line due to difficult IV access  Due to possible right upper extremity hematoma or proximal biceps tendon tear, currently heparin drip is on hold  Will need AC once pericardial drain is removed and cleared by thoracic surgery

## 2021-01-10 NOTE — PROGRESS NOTES
General Cardiology   Progress Note -  Team One   Soha Campbell 65 y o  female MRN: [id number]    Unit/Bed#: [facility] 426-01 Encounter: [id number]    Assessment/ Plan    1  Pericardial effusion status post pericardial drain by thoracic surgery 01/04/2021  Drain output 170 ml in 24 hours   Per thoracic surgery keep pericardial drain in place until output less than 50 mL in 24 hours  Echocardiogram 01/07/2021 showed no evidence for residual pericardial effusion and no pericardial constriction  Pericardial fluid cytology: Atypical cellular changes  Rare atypical epithelioid cells present  Background reactive mesothelial cells, histiocytes, lymphocytes and neutrophils  2  Thoracic mass near SVC  Status post IR biopsy 01/04/2021 pending pathology    3  Atrial flutter/fibrillation  Reviewed telemetry, patient currently normal sinus rhythm heart rate 70s to 80s  On metoprolol 50 mg p o  B i d  Anticoagulation on hold due to #4      4  Right arm hematoma  Holding anticoagulation due to hematoma   General surgery following and note she is neurovascularly intact and to continue conservative management  No complaint of pain in right upper extremity    5  Hypertension  Blood pressure stable:  111/55  Hold home medication lisinopril  Continue metoprolol    Subjective  Patient resting comfortable out of bed in chair  She is reporting constipation  No complaint of chest pain, shortness of breath or palpitations  She reports poor appetite due to constipation  No fevers or chills  Review of Systems   Constitution: Positive for decreased appetite  Negative for chills, fever and malaise/fatigue  HENT: Negative for congestion  Cardiovascular: Negative for chest pain, dyspnea on exertion, leg swelling, orthopnea and palpitations  Respiratory: Negative for cough and shortness of breath  Musculoskeletal: Negative for falls  Gastrointestinal: Positive for constipation  Negative for bloating, nausea and vomiting  Neurological: Negative for dizziness and light-headedness  Psychiatric/Behavioral: Negative for altered mental status  All other systems reviewed and are negative  Objective:   Vitals: Blood pressure 103/51, pulse 70, temperature 98 °F (36 7 °C), resp  rate 17, height 5' 4" (1 626 m), weight 87 4 kg (192 lb 10 9 oz), SpO2 97 %  ,       Body mass index is 33 07 kg/m²  ,     Systolic (19ZVQ), YZU:816 , Min:95 , QRO:773     Diastolic (74GIZ), JKC:92, Min:48, Max:66          Intake/Output Summary (Last 24 hours) at 1/10/2021 1337  Last data filed at 1/10/2021 0928  Gross per 24 hour   Intake 0 ml   Output 2810 ml   Net -2810 ml     Weight (last 2 days)     Date/Time   Weight    01/10/21 0600   87 4 (192 68)    01/09/21 0600   90 1 (198 63)    01/08/21 0600   89 6 (197 53)            Telemetry Review: Normal sinus rhythm heart rate 70s 80s  Patient had atrial fibrillation with RVR yesterday    Physical Exam  Constitutional:       General: She is not in acute distress  Appearance: She is obese  HENT:      Head: Normocephalic  Mouth/Throat:      Mouth: Mucous membranes are moist    Neck:      Musculoskeletal: Neck supple  Cardiovascular:      Rate and Rhythm: Normal rate and regular rhythm  Pulses: Normal pulses  Heart sounds: No murmur  Comments: Pericardial LORENZO drain  Pulmonary:      Effort: Pulmonary effort is normal  No respiratory distress  Breath sounds: Normal breath sounds  Comments: Room air  Abdominal:      General: Bowel sounds are normal       Palpations: Abdomen is soft  Musculoskeletal: Normal range of motion  General: No swelling  Skin:     General: Skin is warm and dry  Capillary Refill: Capillary refill takes less than 2 seconds  Neurological:      General: No focal deficit present  Mental Status: She is alert and oriented to person, place, and time     Psychiatric:         Mood and Affect: Mood normal          LABORATORY RESULTS CBC with diff:   Results from last 7 days   Lab Units 01/10/21  0455 01/09/21  0504 01/08/21  0531 01/07/21  0447 01/06/21  0458 01/05/21  0448 01/04/21  0557 01/03/21  1958   WBC Thousand/uL 10 37* 14 80* 15 87* 15 03* 21 22* 13 77* 14 82* 15 59*   HEMOGLOBIN g/dL 8 8* 9 8* 9 9* 10 0* 10 2* 9 8* 10 6* 10 8*   HEMATOCRIT % 28 7* 33 3* 33 9* 33 4* 34 3* 32 2* 36 3 36 5   MCV fL 69* 69* 70* 69* 68* 68* 69* 70*   PLATELETS Thousands/uL 193 244 334 320 346 377 368 391*   MCH pg 21 2* 20 3* 20 4* 20 7* 20 2* 20 6* 20 2* 20 6*   MCHC g/dL 30 7* 29 4* 29 2* 29 9* 29 7* 30 4* 29 2* 29 6*   RDW % 20 5* 20 0* 19 8* 19 8* 19 6* 19 5* 19 8* 19 7*   MPV fL  --   --  10 9 11 1 11 1 11 5 11 6 11 4   NRBC AUTO /100 WBCs  --   --   --   --   --  0 0  --        CMP:  Results from last 7 days   Lab Units 01/10/21  0736 01/09/21  0504 01/07/21 0447 01/06/21  0458 01/05/21  0448 01/04/21  0533   POTASSIUM mmol/L 4 4 4 5 4 1 4 1 4 3 4 5   CHLORIDE mmol/L 111* 109* 111* 111* 114* 107   CO2 mmol/L 29 27 27 23 25 24   BUN mg/dL 12 16 25 29* 28* 21   CREATININE mg/dL 0 84 0 79 1 01 1 20 1 49* 1 14   CALCIUM mg/dL 8 6 8 6 8 1* 8 6 8 3 9 0   AST U/L  --   --   --   --   --  21   ALT U/L  --   --   --   --   --  28   ALK PHOS U/L  --   --   --   --   --  116   EGFR ml/min/1 73sq m 80 86 64 52 40 56       BMP:  Results from last 7 days   Lab Units 01/10/21  0736 01/09/21  0504 01/07/21  0447 01/06/21  0458 01/05/21  0448 01/04/21  0533   POTASSIUM mmol/L 4 4 4 5 4 1 4 1 4 3 4 5   CHLORIDE mmol/L 111* 109* 111* 111* 114* 107   CO2 mmol/L 29 27 27 23 25 24   BUN mg/dL 12 16 25 29* 28* 21   CREATININE mg/dL 0 84 0 79 1 01 1 20 1 49* 1 14   CALCIUM mg/dL 8 6 8 6 8 1* 8 6 8 3 9 0       No results found for: NTBNP                            Results from last 7 days   Lab Units 01/08/21  0530 01/03/21  1958   INR  1 64* 1 64*       Lipid Profile:   No results found for: CHOL  No results found for: HDL  No results found for: LDLCALC  No results found for: TRIG    Cardiac testing:   Results for orders placed during the hospital encounter of 21   Echo complete with contrast if indicated    Narrative [address]  [facility] [place], [address] [facility]  [phone]    Transthoracic Echocardiogram  2D, M-mode, Doppler, and Color Doppler    Study date:  2021    Patient: Vitaliy Alvarado  MR number: [id number]  Account number: [de-[id number]  : 1950  Age: 65 years  Gender: Female  Status: Inpatient  Location: Emergency room  Height: 64 in  Weight: 197 6 lb  BP: 117/ 67 mmHg    Indications: Atrial flutter  Diagnoses: I48 1 - Atrial flutter    Sonographer:  Gilda Hmamond RDCS  Primary Physician:  Brent Ram MD  Referring Physician:  Nia Wilson MD  Group:  [facility]  Interpreting Physician:  Lamont Lawrence MD    SUMMARY    LEFT VENTRICLE:  The cavity was small  Systolic function was normal  Ejection fraction was estimated to be 65 %  There were no regional wall motion abnormalities  Wall thickness was at the upper limits of normal     RIGHT VENTRICLE:  The size was normal   Systolic function was normal     LEFT ATRIUM:  The atrium was mildly dilated  MITRAL VALVE:  There was mild to moderate regurgitation  AORTIC VALVE:  There was trace regurgitation  TRICUSPID VALVE:  There was mild regurgitation  PERICARDIUM:  A large pericardial effusion was identified circumferential to the heart  There was no evidence of hemodynamic compromise  HISTORY: PRIOR HISTORY: Hypertension  PROCEDURE: The procedure was performed in the emergency room  This was a routine study  The transthoracic approach was used  The study included complete 2D imaging, M-mode, complete spectral Doppler, and color Doppler  The heart rate was  100 bpm, at the start of the study  Echocardiographic views were limited due to poor acoustic window availability   This was a technically difficult study     LEFT VENTRICLE: The cavity was small  Systolic function was normal  Ejection fraction was estimated to be 65 %  There were no regional wall motion abnormalities  Wall thickness was at the upper limits of normal     RIGHT VENTRICLE: The size was normal  Systolic function was normal  Wall thickness was normal     LEFT ATRIUM: The atrium was mildly dilated  RIGHT ATRIUM: Size was normal     MITRAL VALVE: Valve structure was normal  There was normal leaflet separation  DOPPLER: The transmitral velocity was within the normal range  There was no evidence for stenosis  There was mild to moderate regurgitation  The regurgitant jet  was eccentric and directed posteriorly  AORTIC VALVE: The valve was trileaflet  Leaflets exhibited normal thickness and normal cuspal separation  DOPPLER: Transaortic velocity was within the normal range  There was no evidence for stenosis  There was trace regurgitation  TRICUSPID VALVE: The valve structure was normal  There was normal leaflet separation  DOPPLER: The transtricuspid velocity was within the normal range  There was no evidence for stenosis  There was mild regurgitation  PULMONIC VALVE: Leaflets exhibited normal thickness, no calcification, and normal cuspal separation  DOPPLER: The transpulmonic velocity was within the normal range  There was no regurgitation  PERICARDIUM: A large pericardial effusion was identified circumferential to the heart  There was no evidence of hemodynamic compromise  The pericardium was normal in appearance  AORTA: The root exhibited normal size  SYSTEMIC VEINS: IVC: The inferior vena cava was normal in size and course   Respirophasic changes were normal     SYSTEM MEASUREMENT TABLES    2D  %FS: 26 02 %  Ao Diam: 3 2 cm  EDV(Teich): 66 92 ml  EF(Teich): 51 72 %  ESV(Teich): 32 31 ml  IVSd: 1 01 cm  LA Area: 21 05 cm2  LA Diam: 3 39 cm  LVEDV MOD A4C: 28 28 ml  LVEF MOD A4C: 86 2 %  LVESV MOD A4C: 3 9 ml  LVIDd: 3 92 cm  LVIDs: 2 9 cm  LVLd A4C: 6 cm  LVLs A4C: 5 29 cm  LVPWd: 1 17 cm  RA Area: 12 36 cm2  RVIDd: 2 64 cm  SV MOD A4C: 24 38 ml  SV(Teich): 34 61 ml    CW  TR Vmax: 2 17 m/s  TR maxP 81 mmHg    [facility]    Prepared and electronically signed by    Arnav Lanza MD  Signed 2021 16:28:40       No results found for this or any previous visit  No results found for this or any previous visit  No procedure found  No results found for this or any previous visit        Meds/Allergies   all current active meds have been reviewed and current meds:   Current Facility-Administered Medications   Medication Dose Route Frequency    acetaminophen (TYLENOL) tablet 650 mg  650 mg Oral Q6H PRN    aluminum-magnesium hydroxide-simethicone (MYLANTA) oral suspension 30 mL  30 mL Oral Q6H PRN    diltiazem (CARDIZEM) 125 mg in sodium chloride 0 9 % 125 mL infusion  1-15 mg/hr Intravenous Titrated    docusate sodium (COLACE) capsule 100 mg  100 mg Oral BID    heparin (porcine) 25,000 units in 0 45% NaCl 250 mL infusion (premix)  3-30 Units/kg/hr (Order-Specific) Intravenous Titrated    HYDROmorphone (DILAUDID) injection 0 2 mg  0 2 mg Intravenous Q4H PRN    HYDROmorphone (DILAUDID) injection 0 2 mg  0 2 mg Intravenous Once    metoprolol tartrate (LOPRESSOR) tablet 50 mg  50 mg Oral Q12H [address]    ondansetron (ZOFRAN) injection 4 mg  4 mg Intravenous Q6H PRN    oxyCODONE (ROXICODONE) IR tablet 2 5 mg  2 5 mg Oral Q4H PRN    oxyCODONE (ROXICODONE) IR tablet 5 mg  5 mg Oral Q4H PRN    senna (SENOKOT) tablet 8 6 mg  1 tablet Oral Daily     Medications Prior to Admission   Medication    lisinopril (ZESTRIL) 30 mg tablet    metoprolol tartrate (LOPRESSOR) 25 mg tablet       diltiazem, 1-15 mg/hr, Last Rate: Stopped (21 1037)  heparin (porcine), 3-30 Units/kg/hr (Order-Specific), Last Rate: Stopped (21 8584)      Counseling / Coordination of Care  Total floor / unit time spent today 20 minutes  Greater than 50% of total time was spent with the patient and / or family counseling and / or coordination of care  ** Please Note: Dragon 360 Dictation voice to text software may have been used in the creation of this document   **

## 2021-01-10 NOTE — PROGRESS NOTES
Progress Note - Rosa Bowman 1950, 65 y o  female MRN: [id number]    Unit/Bed#: [facility] 426-01 Encounter: [id number]    Primary Care Provider: Jorge Ahmadi MD   Date and time admitted to hospital: 1/3/2021  8:26 AM        * Atrial flutter [facility])  Assessment & Plan  Patient was coughing too much and her  brought her to ED  She was having sob, EKG showed atrial flutter, QTc prolongation  Her son passed away on 12/21/2020  She did have swelling in her legs increased over last week  She has swelling in the past, treated with lasix with improvement in June, 2020  Patient was evaluated by Cardiology  Cardiac echo with large pericardial effusion without tamponade, EF 65%  Currently off of Cardizem drip, on p o  Metoprolol  Heart rate is well controlled  Has a PICC line due to difficult IV access  Due to possible right upper extremity hematoma or proximal biceps tendon tear, currently heparin drip is on hold  Will need AC once pericardial drain is removed and cleared by thoracic surgery  Pericardial effusion without cardiac tamponade  Assessment & Plan  Evaluated by thoracic surgery  Underwent pericardial drain placement 01/04/2021  Still draining, will need to keep drain as per CT surgery  Repeat echo shows no residual pericardial effusion  However continues to have large volume drainage therefore will need to maintain the drain  Hypodense mass of liver  Assessment & Plan  CT on admission shows: Innumerable vague hypodensities throughout the liver suspicious for metastases  Will need to discuss with thoracic to see if biopsy of the mediastinal mass is needed  Will consider IR for liver biopsy? Pain and swelling of right upper extremity  Assessment & Plan  Right upper extremity swelling noted overnight  Likely hematoma  Unable to obtain CT scan due to not having IV access  US obtained that shows possible hematoma vs  Proximal biceps tendon repair    General surgery consult appreciated- recommends conservative managment and holding AC  Patient does not have motor strength loss  neurovascular intact  Pain is significantly improved since last night  Patient is able to extend her shoulder, slightly limited due to pain  At this point, patient will follow up outpatient  If persistent in 3 months, may obtain MRI    Mediastinal mass  Assessment & Plan  Patient with remote history of left breast cancer status post mastectomy over 20 years ago  No chemotherapy or radiation  Anterior mediastinal mass seen on CT scan with diffuse mediastinal adenopathy, Innumerable vague hypodensities throughout the liver are suspicious for metastases  Unclear etiology  Thoracic surgery is following   s/p IR biopsy for pericardial pathology on 21, shows atypical reactive cells  May need biopsy of the mediastinal mass as malignancy is a concern  Thoracic surgery is deciding whether will be done with IR or thoracic surgery  Patient also has Innumerable vague hypodensities throughout the liver suspicious for metastases  Essential hypertension  Assessment & Plan  Continue metoprolol  Hold lisinopril for now  Continue to monitor         VTE Pharmacologic Prophylaxis:   Pharmacologic: Pharmacologic VTE Prophylaxis contraindicated due to right arm hematoma  Mechanical VTE Prophylaxis in Place: Yes    Patient Centered Rounds: I have performed bedside rounds with nursing staff today  Current Length of Stay: 7 day(s)    Current Patient Status: Inpatient   Certification Statement: The patient will continue to require additional inpatient hospital stay due to pericardial drainage    Discharge Plan: pending progress    Code Status: Level 1 - Full Code      Subjective:   Patient examined at bedside  The upper extremity swelling is improving, pain is improving  Able to move her arms without issues       Objective:     Vitals:   Temp (24hrs), Av 4 °F (37 4 °C), Min:98 °F (36 7 °C), Max:100 3 °F (37 9 °C)    Temp:  [98 °F (36 7 °C)-100 3 °F (37 9 °C)] 98 °F (36 7 °C)  HR:  [] 70  Resp:  [14-17] 17  BP: ()/(48-66) 103/51  SpO2:  [91 %-98 %] 97 %  Body mass index is 33 07 kg/m²  Input and Output Summary (last 24 hours): Intake/Output Summary (Last 24 hours) at 1/10/2021 1113  Last data filed at 1/10/2021 0784  Gross per 24 hour   Intake 0 ml   Output 2810 ml   Net -2810 ml       Physical Exam:     Physical Exam  Constitutional:       Appearance: She is well-developed  HENT:      Head: Normocephalic and atraumatic  Neck:      Musculoskeletal: Normal range of motion  Pulmonary:      Effort: Pulmonary effort is normal  No respiratory distress  Breath sounds: Normal breath sounds  Musculoskeletal:         General: Swelling present  Comments: Right upper medial extremity swelling  Patient has full ROM of right shoulder   Skin:     General: Skin is warm and dry  Additional Data:     Labs:    Results from last 7 days   Lab Units 01/10/21  0455  01/04/21  0557   WBC Thousand/uL 10 37*   < > 14 82*   HEMOGLOBIN g/dL 8 8*   < > 10 6*   HEMATOCRIT % 28 7*   < > 36 3   PLATELETS Thousands/uL 193   < > 368   NEUTROS PCT %  --   --  75   LYMPHS PCT %  --   --  8*   MONOS PCT %  --   --  12   EOS PCT %  --   --  2    < > = values in this interval not displayed  Results from last 7 days   Lab Units 01/10/21  0736  01/04/21  0533   SODIUM mmol/L 143   < > 139   POTASSIUM mmol/L 4 4   < > 4 5   CHLORIDE mmol/L 111*   < > 107   CO2 mmol/L 29   < > 24   BUN mg/dL 12   < > 21   CREATININE mg/dL 0 84   < > 1 14   ANION GAP mmol/L 3*   < > 8   CALCIUM mg/dL 8 6   < > 9 0   ALBUMIN g/dL  --   --  3 2*   TOTAL BILIRUBIN mg/dL  --   --  1 03*   ALK PHOS U/L  --   --  116   ALT U/L  --   --  28   AST U/L  --   --  21   GLUCOSE RANDOM mg/dL 106   < > 97    < > = values in this interval not displayed       Results from last 7 days   Lab Units 01/08/21  0530   INR  1 64*     Results from last 7 days   Lab Units 01/05/21  1031 01/05/21  0534 01/04/21  2047 01/04/21  1136 01/04/21  0602   POC GLUCOSE mg/dl 235* 162* 133 132 129                  Recent Cultures (last 7 days):     Results from last 7 days   Lab Units 01/04/21  1720   GRAM STAIN RESULT  1+ Polys  2+ Mononuclear Cells  No organisms seen   BODY FLUID CULTURE, STERILE  No growth       Last 24 Hours Medication List:   Current Facility-Administered Medications   Medication Dose Route Frequency Provider Last Rate    acetaminophen  650 mg Oral Q6H PRN Sarah Torres PA-C      aluminum-magnesium hydroxide-simethicone  30 mL Oral Q6H PRN Kristen Pacheco MD      diltiazem  1-15 mg/hr Intravenous Titrated Kristen Pacheco MD Stopped (01/06/21 1037)    docusate sodium  100 mg Oral BID Kristen Pacheco MD      heparin (porcine)  3-30 Units/kg/hr (Order-Specific) Intravenous Titrated Kristen Pacheco MD Stopped (01/08/21 0505)    HYDROmorphone  0 2 mg Intravenous Q4H PRN Sarah Torres PA-C      HYDROmorphone  0 2 mg Intravenous Once Stacie H DAVID Munoz      metoprolol tartrate  50 mg Oral Q12H [address] [facility],       ondansetron  4 mg Intravenous Q6H PRN Kristen Pacheco MD      oxyCODONE  2 5 mg Oral Q4H PRN Sarah Torres PA-C      oxyCODONE  5 mg Oral Q4H PRN Sarah Torres PA-C      senna  1 tablet Oral Daily Kristen Pacheco MD          Today, Patient Was Seen By: Morgan Vásquez MD    ** Please Note: Dictation voice to text software may have been used in the creation of this document   **

## 2021-01-10 NOTE — ASSESSMENT & PLAN NOTE
CT on admission shows: Innumerable vague hypodensities throughout the liver suspicious for metastases  Will need to discuss with thoracic to see if biopsy of the mediastinal mass is needed  Will consider IR for liver biopsy?

## 2021-01-10 NOTE — ASSESSMENT & PLAN NOTE
Patient with remote history of left breast cancer status post mastectomy over 20 years ago  No chemotherapy or radiation  Anterior mediastinal mass seen on CT scan with diffuse mediastinal adenopathy, Innumerable vague hypodensities throughout the liver are suspicious for metastases  Unclear etiology  Thoracic surgery is following   s/p IR biopsy for pericardial pathology on 1/4/21, shows atypical reactive cells  May need biopsy of the mediastinal mass as malignancy is a concern  Thoracic surgery is deciding whether will be done with IR or thoracic surgery  Patient also has Innumerable vague hypodensities throughout the liver suspicious for metastases

## 2021-01-11 ENCOUNTER — TELEPHONE (OUTPATIENT)
Dept: RADIOLOGY | Facility: HOSPITAL | Age: 71
End: 2021-01-11

## 2021-01-11 ENCOUNTER — APPOINTMENT (INPATIENT)
Dept: RADIOLOGY | Facility: HOSPITAL | Age: 71
DRG: 270 | End: 2021-01-11
Payer: COMMERCIAL

## 2021-01-11 PROCEDURE — 99233 SBSQ HOSP IP/OBS HIGH 50: CPT | Performed by: FAMILY MEDICINE

## 2021-01-11 PROCEDURE — 88305 TISSUE EXAM BY PATHOLOGIST: CPT | Performed by: PATHOLOGY

## 2021-01-11 PROCEDURE — 99232 SBSQ HOSP IP/OBS MODERATE 35: CPT | Performed by: INTERNAL MEDICINE

## 2021-01-11 PROCEDURE — 88342 IMHCHEM/IMCYTCHM 1ST ANTB: CPT | Performed by: PATHOLOGY

## 2021-01-11 PROCEDURE — G1004 CDSM NDSC: HCPCS

## 2021-01-11 PROCEDURE — 71260 CT THORAX DX C+: CPT

## 2021-01-11 PROCEDURE — 88112 CYTOPATH CELL ENHANCE TECH: CPT | Performed by: PATHOLOGY

## 2021-01-11 PROCEDURE — 88341 IMHCHEM/IMCYTCHM EA ADD ANTB: CPT | Performed by: PATHOLOGY

## 2021-01-11 RX ORDER — METOPROLOL SUCCINATE 50 MG/1
50 TABLET, EXTENDED RELEASE ORAL EVERY 12 HOURS SCHEDULED
Status: DISCONTINUED | OUTPATIENT
Start: 2021-01-11 | End: 2021-01-11

## 2021-01-11 RX ADMIN — DOCUSATE SODIUM 100 MG: 100 CAPSULE, LIQUID FILLED ORAL at 08:35

## 2021-01-11 RX ADMIN — METOPROLOL TARTRATE 50 MG: 50 TABLET, FILM COATED ORAL at 08:35

## 2021-01-11 RX ADMIN — METOPROLOL SUCCINATE 75 MG: 25 TABLET, FILM COATED, EXTENDED RELEASE ORAL at 18:04

## 2021-01-11 RX ADMIN — IOHEXOL 85 ML: 350 INJECTION, SOLUTION INTRAVENOUS at 14:34

## 2021-01-11 RX ADMIN — STANDARDIZED SENNA CONCENTRATE 8.6 MG: 8.6 TABLET ORAL at 08:35

## 2021-01-11 NOTE — ASSESSMENT & PLAN NOTE
Evaluated by thoracic surgery  Underwent pericardial drain placement 01/04/2021  Still draining, will need to keep drain as per CT surgery  Repeat echo shows no residual pericardial effusion  However continues to have large volume drainage therefore will need to maintain the drain    Thoracic surgery is now considering biopsying mediastinal mass

## 2021-01-11 NOTE — ASSESSMENT & PLAN NOTE
· Patient with remote history of left breast cancer status post mastectomy over 20 years ago  · No chemotherapy or radiation  · Anterior mediastinal mass seen on CT scan with diffuse mediastinal adenopathy, Innumerable vague hypodensities throughout the liver are suspicious for metastases  · Unclear etiology  · Thoracic surgery is following   · s/p IR biopsy for pericardial fluid pathology on 1/4/21, shows atypical reactive cells  · May need biopsy of the mediastinal mass as malignancy is a concern  Thoracic surgery is deciding whether will be done with IR or thoracic surgery     · Pending repeat pericardial fluid pathology and repeat CT chest with contrast

## 2021-01-11 NOTE — PROGRESS NOTES
Cardiology Team 2 Progress Note - Denise Marsh 65 y o  female MRN: [id number]    Unit/Bed#: [facility] 426-01 Encounter: [id number]          Subjective:   Patient seen and examined  No significant symptoms overnight  Drain site intact with persistent serosanguineous fluid in bulb  On review of system, patient endorses improvement in her shortness of breath, lower extremity swelling as well as chest discomfort  She has no fever, chills lightheadedness, dizziness, syncope, nausea, vomiting, perceivable palpitations, chest pain, diaphoresis, orthopnea, or PND  Objective:     Vitals: Blood pressure 127/77, pulse 76, temperature 98 7 °F (37 1 °C), resp  rate 17, height 5' 4" (1 626 m), weight 86 4 kg (190 lb 6 4 oz), SpO2 96 %  , Body mass index is 32 68 kg/m² ,   Orthostatic Blood Pressures      Most Recent Value   Blood Pressure  127/77 filed at 01/11/2021 0748   Patient Position - Orthostatic VS  Sitting filed at 01/09/2021 1457            Intake/Output Summary (Last 24 hours) at 1/11/2021 1011  Last data filed at 1/11/2021 0748  Gross per 24 hour   Intake 100 ml   Output 605 ml   Net -505 ml         Physical Exam:    GEN: Denise Marsh appears well, alert and oriented x 3, pleasant and cooperative   HEENT:  Normocephalic, atraumatic, anicteric, moist mucous membranes  NECK: No JVD or carotid bruits   HEART: Irregular rhythm, normal rate, normal S1 and S2, no murmurs, clicks, gallops or rubs; intact drain site with serosanguineous fluid in bulb  LUNGS: Clear to auscultation bilaterally; no wheezes, rales, or rhonchi; respiration nonlabored on 1 L NC  ABDOMEN:  Normoactive bowel sounds, soft, no tenderness, no distention  EXTREMITIES: peripheral pulses palpable; trace to 1+ pedal/ankle edema  NEURO: no gross focal findings; cranial nerves grossly intact   SKIN:  Dry, intact, warm to touch      Current Facility-Administered Medications:     acetaminophen (TYLENOL) tablet 650 mg, 650 mg, Oral, Q6H PRN, Kori Madera PA-C, 650 mg at 01/08/21 0930    aluminum-magnesium hydroxide-simethicone (MYLANTA) oral suspension 30 mL, 30 mL, Oral, Q6H PRN, Jj Chang MD    docusate sodium (COLACE) capsule 100 mg, 100 mg, Oral, BID, Jj Chang MD, 100 mg at 01/11/21 0835    HYDROmorphone (DILAUDID) injection 0 2 mg, 0 2 mg, Intravenous, Q4H PRN, Kori Madera PA-C, 0 2 mg at 01/09/21 0645    HYDROmorphone (DILAUDID) injection 0 2 mg, 0 2 mg, Intravenous, Once, Kori Madera PA-C    metoprolol tartrate (LOPRESSOR) tablet 50 mg, 50 mg, Oral, Q12H [address], Sherlean Brigidoles, DO, 50 mg at 01/11/21 0835    ondansetron (ZOFRAN) injection 4 mg, 4 mg, Intravenous, Q6H PRN, Jj Chang MD    oxyCODONE (ROXICODONE) IR tablet 2 5 mg, 2 5 mg, Oral, Q4H PRN, Kori Madera PA-C    oxyCODONE (ROXICODONE) IR tablet 5 mg, 5 mg, Oral, Q4H PRN, Kori Madera PA-C, 5 mg at 01/08/21 0930    senna (SENOKOT) tablet 8 6 mg, 1 tablet, Oral, Daily, Jj Chang MD, 8 6 mg at 01/11/21 0835    Labs & Results:    Lab Results   Component Value Date    TROPONINI <0 02 01/03/2021    TROPONINI <0 02 01/03/2021    TROPONINI <0 03 01/03/2021       Lab Results   Component Value Date    CALCIUM 8 6 01/10/2021    K 4 4 01/10/2021    CO2 29 01/10/2021     (H) 01/10/2021    BUN 12 01/10/2021    CREATININE 0 84 01/10/2021       Lab Results   Component Value Date    WBC 10 37 (H) 01/10/2021    HGB 8 8 (L) 01/10/2021    HCT 28 7 (L) 01/10/2021    MCV 69 (L) 01/10/2021     01/10/2021     Results from last 7 days   Lab Units 01/08/21  0530   INR  1 64*       No results found for: CHOL  No results found for: HDL  No results found for: LDLCALC  No results found for: TRIG    Lab Results   Component Value Date    ALT 28 01/04/2021    AST 21 01/04/2021       Telemetry:   Personally reviewed by Jo Hodges MD:  Rate control atrial fibrillation with heart rate 70-90    Previously in sinus mechanism with sudden spike in the middle of the night corresponding with coarse fib with peak heart rate of 120s    Imaging:  No new cardiac images to be reviewed      VTE Prophylaxis: Reason for no pharmacologic prophylaxis due to right arm hematoma + persistent pericardial drainage      Assessment:  Principal Problem:    Atrial flutter (HCC)  Active Problems:    Essential hypertension    Pericardial effusion without cardiac tamponade    Mediastinal mass    Pain and swelling of right upper extremity    Hypodense mass of liver    65 y o  female who presented with cough and shortness of breath  Cardiology was initially consulted for atrial flutter/fibrillation and diuresis  Course was complicated by newly diagnosed large pericardial effusion and mediastinal mass  Patient has been rate-controlled with transient resumption of sinus rhythm  No anticoagulation as yet per thoracic surgery team, pending further procedure i e  biopsy  1  Pericardial effusion  -1/3 TTE:  Large with no tamponade  Resolved s/p pericardial window with drain placement on 01/04 per echo on the 7th  -per primary team and thoracic surgery there was concern for malignant etiology stemming from thoracic mass near SVC  -195 cc serosanguineous drainage in the last 24 hours    2  Atrial fibrillation/flutter  -recurrent rapid AFib overnight with heart rate down to 70-90s currently  -[id number] 3, HAS-BLED 2  -metoprolol tartrate 50 mg b i d   -anticoagulation currently held due to persistent pericardial drainage and right arm hematoma    3  Essential hypertension  -controlled while inpatient on metoprolol, current /77  -home lisinopril currently held due to previous hypotension       Plan:  1  Currently stable hemodynamically  Persistent high output from pericardial drain with 195 cc in last 24 hours  There is plan for mediastinal mass biopsy by thoracic surgery or IR    In that sentiment, continue holding anticoagulation and resume once deemed safe from a surgical standpoint  2  Currently in rate control rhythm with metoprolol tartrate  If frequent spike, can consider switch to succinate 50 mg b i d  for more even release  3  Continue holding lisinopril unless blood pressure uptrends  Case discussed and reviewed with Dr Stanislaw Sanchez who agrees with my assessment and plan  Thank you for involving us in the care of your patient  [employer]/ [employer]Care Everywhere records reviewed:  Yes    ** Please Note: Fluency DirectDictation voice to text software may have been used in the creation of this document   **

## 2021-01-11 NOTE — PROGRESS NOTES
Progress Note - Janine Held 1950, 65 y o  female MRN: [id number]    Unit/Bed#: [facility] 426-01 Encounter: [id number]    Primary Care Provider: Paresh Kumari MD   Date and time admitted to hospital: 1/3/2021  8:26 AM        * Atrial flutter ([address] )  Assessment & Plan  · Patient was brought to ED by has been due to coughing, lower extremity edema and dyspnea  · EKG showed atrial flutter, QTc prolongation  Her son passed away on 12/21/2020  · Patient was evaluated by Cardiology  · Cardiac echo with large pericardial effusion without tamponade, EF 65%  · Initially was treated with Cardizem drip, Currently off Cardizem drip,   · on p o  Metoprolol  · Has a PICC line due to difficult IV access  · Due to possible right upper extremity hematoma or proximal biceps tendon tear, currently heparin drip is on hold  · Will need AC once pericardial drain is removed and decision on mediastinal biopsy and cleared by thoracic surgery  Pericardial effusion without cardiac tamponade  Assessment & Plan  Evaluated by thoracic surgery  Underwent pericardial drain placement 01/04/2021  Still draining, will need to keep drain as per CT surgery  Repeat echo shows no residual pericardial effusion  However continues to have large volume drainage therefore will need to maintain the drain  Thoracic surgery is now considering biopsying mediastinal mass    Hypodense mass of liver  Assessment & Plan  CT on admission shows: Innumerable vague hypodensities throughout the liver suspicious for metastases  Pending decision on biopsy of mediastinal mass    Pain and swelling of right upper extremity  Assessment & Plan  Right upper extremity swelling noted   Likely hematoma  Unable to obtain CT scan due to not having IV access  US obtained that shows possible hematoma vs  Proximal biceps tendon repair  General surgery consult appreciated- recommends conservative managment and holding AC     Patient does not have motor strength loss  neurovascular intact  Pain is significantly improved since last night  Patient is able to extend her shoulder, slightly limited due to pain  At this point, patient will follow up outpatient  If persistent in 3 months, may obtain MRI    Mediastinal mass  Assessment & Plan  · Patient with remote history of left breast cancer status post mastectomy over 20 years ago  · No chemotherapy or radiation  · Anterior mediastinal mass seen on CT scan with diffuse mediastinal adenopathy, Innumerable vague hypodensities throughout the liver are suspicious for metastases  · Unclear etiology  · Thoracic surgery is following   · s/p IR biopsy for pericardial fluid pathology on 1/4/21, shows atypical reactive cells  · May need biopsy of the mediastinal mass as malignancy is a concern  Thoracic surgery is deciding whether will be done with IR or thoracic surgery  · Pending repeat pericardial fluid pathology and repeat CT chest with contrast      Essential hypertension  Assessment & Plan  Continue metoprolol  Hold lisinopril for now  Continue to monitor        VTE Pharmacologic Prophylaxis:   Pharmacologic: Pharmacologic VTE Prophylaxis contraindicated due to holding AC due to high pericardial drain output and upper extremity hematoma  Mechanical VTE Prophylaxis in Place: Yes    Patient Centered Rounds: I have performed bedside rounds with nursing staff today  Current Length of Stay: 8 day(s)    Current Patient Status: Inpatient   Certification Statement: The patient will continue to require additional inpatient hospital stay due to pending mediastinal mass biopsy    Discharge Plan:  Pending progress    Code Status: Level 1 - Full Code      Subjective:   Patient examined at bedside  Patient states the right upper extremity pain is significantly improved  Patient is able to move it without significant difficulty  Swelling is improving as well  Denies any short of breath or chest pain    Continues to have copious pericardial drainage in the very    Objective:     Vitals:   Temp (24hrs), Av 6 °F (37 °C), Min:98 °F (36 7 °C), Max:99 1 °F (37 3 °C)    Temp:  [98 °F (36 7 °C)-99 1 °F (37 3 °C)] 98 7 °F (37 1 °C)  HR:  [67-89] 76  Resp:  [16-18] 17  BP: ()/(47-77) 127/77  SpO2:  [93 %-97 %] 96 %  Body mass index is 32 68 kg/m²  Input and Output Summary (last 24 hours): Intake/Output Summary (Last 24 hours) at 2021 0939  Last data filed at 2021 0748  Gross per 24 hour   Intake 100 ml   Output 605 ml   Net -505 ml       Physical Exam:     Physical Exam  Constitutional:       Appearance: She is well-developed  HENT:      Head: Normocephalic and atraumatic  Neck:      Musculoskeletal: Normal range of motion  Pulmonary:      Effort: Pulmonary effort is normal  No respiratory distress  Breath sounds: Normal breath sounds  Musculoskeletal:      Comments: Right upper arm swelling noted   Skin:     General: Skin is warm and dry           Labs:    Results from last 7 days   Lab Units 01/10/21  0455   WBC Thousand/uL 10 37*   HEMOGLOBIN g/dL 8 8*   HEMATOCRIT % 28 7*   PLATELETS Thousands/uL 193     Results from last 7 days   Lab Units 01/10/21  0736   SODIUM mmol/L 143   POTASSIUM mmol/L 4 4   CHLORIDE mmol/L 111*   CO2 mmol/L 29   BUN mg/dL 12   CREATININE mg/dL 0 84   ANION GAP mmol/L 3*   CALCIUM mg/dL 8 6   GLUCOSE RANDOM mg/dL 106     Results from last 7 days   Lab Units 21  0530   INR  1 64*     Results from last 7 days   Lab Units 21  1031 21  0534 21  2047 21  1136   POC GLUCOSE mg/dl 235* 162* 133 132                     Recent Cultures (last 7 days):     Results from last 7 days   Lab Units 21  1720   GRAM STAIN RESULT  1+ Polys  2+ Mononuclear Cells  No organisms seen   BODY FLUID CULTURE, STERILE  No growth       Last 24 Hours Medication List:   Current Facility-Administered Medications   Medication Dose Route Frequency Provider Last Rate  acetaminophen  650 mg Oral Q6H PRN Garnell Brazil, DAVID      aluminum-magnesium hydroxide-simethicone  30 mL Oral Q6H PRN Kristina Germain MD      docusate sodium  100 mg Oral BID Kristina Germain, MD      HYDROmorphone  0 2 mg Intravenous Q4H PRN Garnell Brazil, DAVID      HYDROmorphone  0 2 mg Intravenous Once Garnell Brazil, DAVID      metoprolol tartrate  50 mg Oral Q12H [facility] Mckenna Finney DO      ondansetron  4 mg Intravenous Q6H PRN Kristina Germain MD      oxyCODONE  2 5 mg Oral Q4H PRN Garnell Brazil, DAVID      oxyCODONE  5 mg Oral Q4H PRN Garnell Brazil, DAVID      senna  1 tablet Oral Daily Kristina Germain MD          Today, Patient Was Seen By: Schuyler Blunt MD    ** Please Note: Dictation voice to text software may have been used in the creation of this document   **

## 2021-01-11 NOTE — ASSESSMENT & PLAN NOTE
· Patient was brought to ED by has been due to coughing, lower extremity edema and dyspnea  · EKG showed atrial flutter, QTc prolongation  Her son passed away on 12/21/2020  · Patient was evaluated by Cardiology  · Cardiac echo with large pericardial effusion without tamponade, EF 65%  · Initially was treated with Cardizem drip, Currently off Cardizem drip,   · on p o  Metoprolol  · Has a PICC line due to difficult IV access  · Due to possible right upper extremity hematoma or proximal biceps tendon tear, currently heparin drip is on hold  · Will need AC once pericardial drain is removed and decision on mediastinal biopsy and cleared by thoracic surgery

## 2021-01-11 NOTE — ASSESSMENT & PLAN NOTE
Right upper extremity swelling noted   Likely hematoma  Unable to obtain CT scan due to not having IV access  US obtained that shows possible hematoma vs  Proximal biceps tendon repair  General surgery consult appreciated- recommends conservative managment and holding AC  Patient does not have motor strength loss  neurovascular intact  Pain is significantly improved since last night  Patient is able to extend her shoulder, slightly limited due to pain  At this point, patient will follow up outpatient   If persistent in 3 months, may obtain MRI

## 2021-01-11 NOTE — QUICK NOTE
Thoracic surgery attending note    Patient seen examined this morning  No major events over the weekend  Denies any shortness of breath or chest pain    Pericardial drain with 195 mL of serous output yesterday    Previous pericardial fluid cytology was negative for malignancy    Assessment - 68-year-old female with a 6 cm anterior mediastinal mass and large pericardial effusion status post 01/04/2021 subxiphoid pericardial window with continued high output from her drain      Plan  - send repeat cytology of her pericardial fluid  - check repeat CT of the chest with IV contrast to evaluate that anterior mediastinal mass  - continue to hold anticoagulation for possible biopsy of the anterior mediastinal mass  - will discuss with Chauncey Chambers MD

## 2021-01-11 NOTE — NURSING NOTE
Called nurse caring for inpatient  to follow up with extravasation of contrast that was given for CT on  1/8/21  Per RN Dimitry Myers No issues or complaints from patient regarding left AC extravasation of Omnipaque

## 2021-01-11 NOTE — ASSESSMENT & PLAN NOTE
CT on admission shows: Innumerable vague hypodensities throughout the liver suspicious for metastases    Pending decision on biopsy of mediastinal mass

## 2021-01-12 LAB
ANION GAP SERPL CALCULATED.3IONS-SCNC: 4 MMOL/L (ref 4–13)
BUN SERPL-MCNC: 9 MG/DL (ref 5–25)
CALCIUM SERPL-MCNC: 8.2 MG/DL (ref 8.3–10.1)
CHLORIDE SERPL-SCNC: 107 MMOL/L (ref 100–108)
CO2 SERPL-SCNC: 29 MMOL/L (ref 21–32)
CREAT SERPL-MCNC: 0.71 MG/DL (ref 0.6–1.3)
ERYTHROCYTE [DISTWIDTH] IN BLOOD BY AUTOMATED COUNT: 19.6 % (ref 11.6–15.1)
GFR SERPL CREATININE-BSD FRML MDRD: 98 ML/MIN/1.73SQ M
GLUCOSE SERPL-MCNC: 97 MG/DL (ref 65–140)
HCT VFR BLD AUTO: 28.3 % (ref 34.8–46.1)
HGB BLD-MCNC: 8.5 G/DL (ref 11.5–15.4)
MCH RBC QN AUTO: 20.7 PG (ref 26.8–34.3)
MCHC RBC AUTO-ENTMCNC: 30 G/DL (ref 31.4–37.4)
MCV RBC AUTO: 69 FL (ref 82–98)
PLATELET # BLD AUTO: 237 THOUSANDS/UL (ref 149–390)
PMV BLD AUTO: 11.3 FL (ref 8.9–12.7)
POTASSIUM SERPL-SCNC: 3.9 MMOL/L (ref 3.5–5.3)
RBC # BLD AUTO: 4.11 MILLION/UL (ref 3.81–5.12)
SODIUM SERPL-SCNC: 140 MMOL/L (ref 136–145)
WBC # BLD AUTO: 12.48 THOUSAND/UL (ref 4.31–10.16)

## 2021-01-12 PROCEDURE — 80048 BASIC METABOLIC PNL TOTAL CA: CPT | Performed by: FAMILY MEDICINE

## 2021-01-12 PROCEDURE — 99024 POSTOP FOLLOW-UP VISIT: CPT | Performed by: THORACIC SURGERY (CARDIOTHORACIC VASCULAR SURGERY)

## 2021-01-12 PROCEDURE — 99232 SBSQ HOSP IP/OBS MODERATE 35: CPT | Performed by: INTERNAL MEDICINE

## 2021-01-12 PROCEDURE — 85027 COMPLETE CBC AUTOMATED: CPT | Performed by: FAMILY MEDICINE

## 2021-01-12 PROCEDURE — NC001 PR NO CHARGE: Performed by: PHYSICIAN ASSISTANT

## 2021-01-12 RX ORDER — DILTIAZEM HYDROCHLORIDE 60 MG/1
60 CAPSULE, EXTENDED RELEASE ORAL EVERY 6 HOURS SCHEDULED
Status: DISCONTINUED | OUTPATIENT
Start: 2021-01-12 | End: 2021-01-12

## 2021-01-12 RX ORDER — METOPROLOL SUCCINATE 100 MG/1
100 TABLET, EXTENDED RELEASE ORAL EVERY 12 HOURS SCHEDULED
Status: DISCONTINUED | OUTPATIENT
Start: 2021-01-12 | End: 2021-01-27 | Stop reason: HOSPADM

## 2021-01-12 RX ADMIN — STANDARDIZED SENNA CONCENTRATE 8.6 MG: 8.6 TABLET ORAL at 09:06

## 2021-01-12 RX ADMIN — DILTIAZEM HYDROCHLORIDE 30 MG: 30 TABLET, FILM COATED ORAL at 17:14

## 2021-01-12 RX ADMIN — SODIUM CHLORIDE 500 ML: 0.9 INJECTION, SOLUTION INTRAVENOUS at 17:25

## 2021-01-12 RX ADMIN — DOCUSATE SODIUM 100 MG: 100 CAPSULE, LIQUID FILLED ORAL at 17:14

## 2021-01-12 RX ADMIN — METOPROLOL SUCCINATE 100 MG: 100 TABLET, EXTENDED RELEASE ORAL at 21:03

## 2021-01-12 RX ADMIN — DILTIAZEM HYDROCHLORIDE 30 MG: 30 TABLET, FILM COATED ORAL at 23:03

## 2021-01-12 RX ADMIN — DOCUSATE SODIUM 100 MG: 100 CAPSULE, LIQUID FILLED ORAL at 09:06

## 2021-01-12 RX ADMIN — METOPROLOL SUCCINATE 75 MG: 25 TABLET, FILM COATED, EXTENDED RELEASE ORAL at 09:05

## 2021-01-12 NOTE — PROGRESS NOTES
CARDIOLOGY PROGRESS NOTE     PATIENT INFORMATION     Name: Karen Eric   Age & Sex: 65 y o  female   MRN: [id number]  Hospital Stay Days: 9  [facility]   Encounter: [id number]    ASSESSMENT/PLAN     Principal Problem:    Atrial flutter ([address] )  Active Problems:    Essential hypertension    Pericardial effusion without cardiac tamponade    Mediastinal mass    Pain and swelling of right upper extremity    Hypodense mass of liver    Impression:  Ms Ramirez Willis is a 65yo female with PMH of hypertension and breast cancer in 1999 who presented on 1/3 for concern of shortness of breath and was found to be in afib/flutter  Metoprolol dose increased for treatment of arrhythmia resulting in rate control and conversion back to NSR, however, overnight 1/11 patient did go back into afib with rates to the 120's  Currently 90's-110's  CTA done on presentation for evaluation of PE was significant for pericardial effusion without tamponade as well as anterior mediastinal mass with pleural effusions  Had pericardial window done on 1/4 with placement of drain which remains in place with significant output  Repeat echo on 1/7 with no residual effusion  Pericardial fluid being retested for evaluation of malignancy and plan to have anterior mediastinal mass vs  Hepatic lesion biopsied by IR today vs  tomorrow    Plan:  Pericardial Effusion  CTA and TTE done on 1/3 significant for large pericardial effusion without tamponade  Had pericardial window and drain placement on 1/4  Repeat echo 1/7 with resolution of effusion   CT chest done 1/11 continues to report that mediastinal mass anterior to R atrium is likely cause of effusion  200cc serosanguinous fluid drained over past 24 hours  Plan for IR biopsy of anterior mediastinal mass vs  Hepatic lesion today vs  Tomorrow  Thoracic surgery managing drain  Will continue drain placement due to high output  Repeated fluid cytology to evaluate again for malignancy as none diagnosed previously    Atrial Fibrillation/Flutter  Patient continues to have episodes of paroxysmal atrial fibrillation  Yesterday had a few episodes of rapid afib with rates 120-130's  Currently in afib with rates 90's-110's  Increased dosage of metoprolol succinate to 75mg BID on   Increase Metoprolol to 100mg BID  Holding anticoagulation due to pericardial drainage, R arm hematoma, and potential procedure with IR  Continue to monitor on telemetry     Hypertension  Patient on lisinopril 30mg at home  Held due to episodes of hypotension while admitted  BP remains accepatble at 124/63  Will consider adding back Lisinopril, likely at lower dose, if blood pressure elevates  SUBJECTIVE     Patient seen and examined  No acute events overnight  Patient reentered atrial fibrillation overnight  Reports that she continues to be asymptomatic  Denies any chest pain, dyspnea, palpitations, diaphoresis, N/V  Patient reports that diagnosis of L breast cancer was in 1999  Negative lymph nodes at that time  No chemotherapy or radiation done  No further issues or recurrence  Follows with Q6 month mammos as she reports she recently had genetic testing done that indicated she is at high risk and her family should also be tested  OBJECTIVE     Vitals:    21 2303 21 0257 21 0600 21 0735   BP: 105/61 103/57  116/57   Pulse: 103 96  (!) 108   Resp: 18 18  18   Temp: 98 2 °F (36 8 °C) 98 7 °F (37 1 °C)  98 8 °F (37 1 °C)   TempSrc:       SpO2: 97% 97%  96%   Weight:   85 6 kg (188 lb 11 4 oz)    Height:          Temperature:   Temp (24hrs), Av 7 °F (37 1 °C), Min:98 2 °F (36 8 °C), Max:99 °F (37 2 °C)    Temperature: 98 8 °F (37 1 °C)  Intake & Output:  I/O       01/10 0701 -  07 -  07 -  07    P  O   100     NG/GT 0 0     Total Intake(mL/kg) 0 (0) 100 (1 2)     Urine (mL/kg/hr) 750 (0 4) 1925 (0 9)     Drains 195 200     Total Output 945 2125     Net -945 -2025                Weights:   IBW: 54 7 kg    Body mass index is 32 39 kg/m²  Weight (last 2 days)     Date/Time   Weight    01/12/21 0600   85 6 (188 71)    01/11/21 0600   86 4 (190 4)    01/10/21 0600   87 4 (192 68)            Physical Exam  Vitals signs reviewed  Constitutional:       General: She is awake  Appearance: Normal appearance  HENT:      Head: Normocephalic and atraumatic  Right Ear: External ear normal       Left Ear: External ear normal       Nose: Nose normal       Mouth/Throat:      Mouth: Mucous membranes are moist       Pharynx: Oropharynx is clear  Eyes:      General: No scleral icterus  Right eye: No discharge  Left eye: No discharge  Conjunctiva/sclera: Conjunctivae normal    Cardiovascular:      Rate and Rhythm: Tachycardia present  Rhythm irregular  Heart sounds: Normal heart sounds  No murmur  Pulmonary:      Effort: Pulmonary effort is normal  No respiratory distress  Breath sounds: Normal breath sounds  No wheezing  Abdominal:      General: Bowel sounds are normal  There is no distension  Palpations: Abdomen is soft  Tenderness: There is no abdominal tenderness  Musculoskeletal: Normal range of motion  General: Swelling present  No tenderness  Right lower leg: Edema present  Left lower leg: Edema present  Skin:     General: Skin is warm and dry  Coloration: Skin is not jaundiced  Neurological:      General: No focal deficit present  Mental Status: She is alert and oriented to person, place, and time  Cranial Nerves: No cranial nerve deficit  Motor: No weakness  Psychiatric:         Mood and Affect: Mood normal          Behavior: Behavior normal  Behavior is cooperative  Thought Content: Thought content normal        LABORATORY DATA     Labs: I have personally reviewed pertinent reports      Results from last 7 days   Lab Units 01/12/21  0546 01/10/21  0455 01/09/21  0504   WBC Thousand/uL 12 48* 10 37* 14 80*   HEMOGLOBIN g/dL 8 5* 8 8* 9 8*   HEMATOCRIT % 28 3* 28 7* 33 3*   PLATELETS Thousands/uL 237 193 244      Results from last 7 days   Lab Units 01/12/21  0536 01/10/21  0736 01/09/21  0504   POTASSIUM mmol/L 3 9 4 4 4 5   CHLORIDE mmol/L 107 111* 109*   CO2 mmol/L 29 29 27   BUN mg/dL 9 12 16   CREATININE mg/dL 0 71 0 84 0 79   CALCIUM mg/dL 8 2* 8 6 8 6              Results from last 7 days   Lab Units 01/08/21  0530 01/08/21  0452 01/07/21  2359   INR  1 64*  --   --    PTT seconds 79* 95* 86*               IMAGING & DIAGNOSTIC TESTING     Radiology Results: I have personally reviewed pertinent reports  Xr Chest Portable    Result Date: 1/5/2021  Impression: Pericardial drain placement with slight decrease in size of the cardiac silhouette  Small effusions and bibasilar atelectasis  Workstation performed: [id number]     Xr Chest 1 View Portable    Result Date: 1/3/2021  Impression: Marked enlargement of the cardiac silhouette which could be due to cardiomegaly or a pericardial effusion  Right middle lobe pneumonia  Workstation performed: [id number]      Msk Limited    Result Date: 1/8/2021  Impression: Previously noted collection is contiguous with the bicipital groove, and the long head biceps tendon is not visualized within the bicipital groove  Findings most likely represent proximal tendon tear with distal retraction  If collection does not resolve spontaneously (within 1-3 months), recommend right humeral MRI with contrast as hemorrhagic neoplasm is within the differential  The study was marked in EPIC for significant notification  Workstation performed: [id number]     Cta Chest Pe Study    Result Date: 1/3/2021  Impression: Large pericardial effusion measuring approximately 3 3 cm area of bulging of the right pericardium or a potential adjacent right mediastinal mass measuring approximately 6 6 cm #2/100   The pericardial effusion may be on a malignant basis  Prominent diffuse mediastinal adenopathy Interlobular septal thickening and small pleural effusions in keeping with pulmonary edema  Posterior right upper lobe consolidation #3/55 in a wedge-shaped appearance with a apex extending towards the right hilum may represent pneumonia or a postobstructive pneumonitis from a central mass  A discrete mass is not identified but could be obscured  Innumerable vague hypodensities throughout the liver are suspicious for metastases  I personally discussed this study with Jim Burden on 1/3/2021 at 5:18 PM  Workstation performed: [id number]     Us Extremity Soft Tissue    Result Date: 1/8/2021  Impression: Complex fluid collection in the medial right upper arm with hematocrit level in keeping with hematoma  Given the location, the possibility of associated underlying biceps tendon rupture is not excluded  This can be evaluated with [facility] shoulder ultrasound if clinically indicated  Recommend follow-up ultrasound in 1 month to confirm resolution  The study was marked in [facility] for immediate notification  Workstation performed: [id number]     Other Diagnostic Testing: I have personally reviewed pertinent reports        ACTIVE MEDICATIONS     Current Facility-Administered Medications   Medication Dose Route Frequency    acetaminophen (TYLENOL) tablet 650 mg  650 mg Oral Q6H PRN    aluminum-magnesium hydroxide-simethicone (MYLANTA) oral suspension 30 mL  30 mL Oral Q6H PRN    docusate sodium (COLACE) capsule 100 mg  100 mg Oral BID    HYDROmorphone (DILAUDID) injection 0 2 mg  0 2 mg Intravenous Q4H PRN    HYDROmorphone (DILAUDID) injection 0 2 mg  0 2 mg Intravenous Once    metoprolol succinate (TOPROL-XL) 24 hr tablet 75 mg  75 mg Oral Q12H STEPHANIE    ondansetron (ZOFRAN) injection 4 mg  4 mg Intravenous Q6H PRN    oxyCODONE (ROXICODONE) IR tablet 2 5 mg  2 5 mg Oral Q4H PRN    oxyCODONE (ROXICODONE) IR tablet 5 mg  5 mg Oral Q4H PRN    senna (SENOKOT) tablet 8 6 mg  1 tablet Oral Daily       ==  Momo Ch DO  Internal Medicine Resident, PGY-1  [facility]

## 2021-01-12 NOTE — PROGRESS NOTES
Progress Note - Thoracic Surgery   Selvin Correa 65 y o  female MRN: [id number]  Unit/Bed#: [facility] 426-01 Encounter: [id number]    Assessment:  65 y o  F with a 6 cm anterior mediastinal mass and a large pericardial effusion s/p subxiphoid pericardial window on 1/4/2021, with continued high output from the drain  1/11 CT chest: lymphadenopathy in prevascular space measuring 1 5 cm  Pericardial drain in place  Mass anterior to R atrium 4 5 cm x 5 5 cm s/f mets dz  Innumerable hepatic mets  RUL consolidation -> pna w pleural effusion  Plan:  F/u repeat cytology on pleural fluid   Continue to hold [facility] for possible bx of anterior mediastinal mass  IR consult for biopsy   NPO for possible biopsy today if IR able to fit her in    Subjective/Objective     Subjective: No acute events overnight  Patient denies any chest pain, SOB  Tolerating diet without nausea/vomiting  No fevers, chills  Objective:     Blood pressure 103/57, pulse 96, temperature 98 7 °F (37 1 °C), resp  rate 18, height 5' 4" (1 626 m), weight 86 4 kg (190 lb 6 4 oz), SpO2 97 %  ,Body mass index is 32 68 kg/m²        Intake/Output Summary (Last 24 hours) at 1/12/2021 9173  Last data filed at 1/12/2021 0600  Gross per 24 hour   Intake 100 ml   Output 2125 ml   Net -2025 ml       Invasive Devices     Peripherally Inserted Central Catheter Line            PICC Line 01/05/21 Right Brachial 6 days          Drain            Closed/Suction Drain Right;Medial Chest Bulb 24 Fr  7 days                Physical Exam:   NAD, alert and oriented x3  Normocephalic, atraumatic  MMM, EOMI, PERRLA  Norm resp effort on RA  RRR  Abd soft, NT/ND  No calf tenderness or peripheral edema  Motor/sensation intact in distal extremities  CN grossly intact  -rash/lesions      Lab, Imaging and other studies:  CBC:   Lab Results   Component Value Date    WBC 12 48 (H) 01/12/2021    HGB 8 5 (L) 01/12/2021    HCT 28 3 (L) 01/12/2021    MCV 69 (L) 01/12/2021     01/12/2021    MCH 20 7 (L) 01/12/2021    MCHC 30 0 (L) 01/12/2021    RDW 19 6 (H) 01/12/2021    MPV 11 3 01/12/2021   , CMP:   Lab Results   Component Value Date    SODIUM 140 01/12/2021    K 3 9 01/12/2021     01/12/2021    CO2 29 01/12/2021    BUN 9 01/12/2021    CREATININE 0 71 01/12/2021    CALCIUM 8 2 (L) 01/12/2021    EGFR 98 01/12/2021   , Coagulation: No results found for: PT, INR, APTT  VTE Pharmacologic Prophylaxis: Sequential compression device (Venodyne)   VTE Mechanical Prophylaxis: sequential compression device

## 2021-01-12 NOTE — ASSESSMENT & PLAN NOTE
· Patient with remote history of left breast cancer status post mastectomy over 20 years ago  · No chemotherapy or radiation  · Anterior mediastinal mass seen on CT scan with diffuse mediastinal adenopathy, Innumerable vague hypodensities throughout the liver are suspicious for metastases  · Unclear etiology  · Thoracic surgery is following   · s/p IR biopsy for pericardial fluid pathology on 1/4/21, shows atypical reactive cells     · Thoracic surgery consulted Interventional Radiology or anterior mediastinal biopsy

## 2021-01-12 NOTE — PLAN OF CARE
Problem: Potential for Falls  Goal: Patient will remain free of falls  Description: INTERVENTIONS:  - Assess patient frequently for physical needs  -  Identify cognitive and physical deficits and behaviors that affect risk of falls  -  Mead fall precautions as indicated by assessment   - Educate patient/family on patient safety including physical limitations  - Instruct patient to call for assistance with activity based on assessment  - Modify environment to reduce risk of injury  - Consider OT/PT consult to assist with strengthening/mobility  Outcome: Progressing     Problem: SAFETY ADULT  Goal: Patient will remain free of falls  Description: INTERVENTIONS:  - Assess patient frequently for physical needs  -  Identify cognitive and physical deficits and behaviors that affect risk of falls  -  Mead fall precautions as indicated by assessment   - Educate patient/family on patient safety including physical limitations  - Instruct patient to call for assistance with activity based on assessment  - Modify environment to reduce risk of injury  - Consider OT/PT consult to assist with strengthening/mobility  Outcome: Progressing     Problem: Knowledge Deficit  Goal: Patient/family/caregiver demonstrates understanding of disease process, treatment plan, medications, and discharge instructions  Description: Complete learning assessment and assess knowledge base    Interventions:  - Provide teaching at level of understanding  - Provide teaching via preferred learning methods  Outcome: Progressing     Problem: CARDIOVASCULAR - ADULT  Goal: Maintains optimal cardiac output and hemodynamic stability  Description: INTERVENTIONS:  - Monitor I/O, vital signs and rhythm  - Monitor for S/S and trends of decreased cardiac output  - Administer and titrate ordered vasoactive medications to optimize hemodynamic stability  - Assess quality of pulses, skin color and temperature  - Assess for signs of decreased coronary artery perfusion  - Instruct patient to report change in severity of symptoms  Outcome: Progressing     Problem: Prexisting or High Potential for Compromised Skin Integrity  Goal: Skin integrity is maintained or improved  Description: INTERVENTIONS:  - Identify patients at risk for skin breakdown  - Assess and monitor skin integrity  - Assess and monitor nutrition and hydration status  - Monitor labs   - Assess for incontinence   - Turn and reposition patient  - Assist with mobility/ambulation  - Relieve pressure over bony prominences  - Avoid friction and shearing  - Provide appropriate hygiene as needed including keeping skin clean and dry  - Evaluate need for skin moisturizer/barrier cream  - Collaborate with interdisciplinary team   - Patient/family teaching  - Consider wound care consult   Outcome: Progressing

## 2021-01-12 NOTE — UTILIZATION REVIEW
Continued Stay Review    Date: 01/12/2021                         Current Patient Class: Inpatient  Current Level of Care: Med/Surg    HPI:66 y o  female initially admitted on 01/03/2021   s/p subxiphoid pericardial window on 1/4/2021  Assessment/Plan: Pericardial drain and placed to bulb suction  200 mL of serous output  Keep drain in place due to high output  Consult IR for anterior mediastinal mass for biopsy  Redraw INR if < 1 5 will plan for targeted liver mass biopsy tomorrow 1/13/21  Repeat pericardial fld cytology - will wait on pathology before consulting Med Onc  Pertinent Labs/Diagnostic Results:      1/11 CT chest: lymphadenopathy in prevascular space measuring 1 5 cm  Pericardial drain in place  Mass anterior to R atrium 4 5 cm x 5 5 cm s/f mets dz  Innumerable hepatic mets  RUL consolidation -> pna w pleural effusion       Results from last 7 days   Lab Units 01/12/21  0536 01/10/21  0455 01/09/21  0504 01/08/21  0531 01/07/21  0447   WBC Thousand/uL 12 48* 10 37* 14 80* 15 87* 15 03*   HEMOGLOBIN g/dL 8 5* 8 8* 9 8* 9 9* 10 0*   HEMATOCRIT % 28 3* 28 7* 33 3* 33 9* 33 4*   PLATELETS Thousands/uL 237 193 244 334 320     Results from last 7 days   Lab Units 01/12/21  0536 01/10/21  0736 01/09/21  0504 01/07/21  0447 01/06/21  0458   SODIUM mmol/L 140 143 141 142 141   POTASSIUM mmol/L 3 9 4 4 4 5 4 1 4 1   CHLORIDE mmol/L 107 111* 109* 111* 111*   CO2 mmol/L 29 29 27 27 23   ANION GAP mmol/L 4 3* 5 4 7   BUN mg/dL 9 12 16 25 29*   CREATININE mg/dL 0 71 0 84 0 79 1 01 1 20   EGFR ml/min/1 73sq m 98 80 86 64 52   CALCIUM mg/dL 8 2* 8 6 8 6 8 1* 8 6     Results from last 7 days   Lab Units 01/12/21  0536 01/10/21  0736 01/09/21  0504 01/07/21  0447 01/06/21  0458   GLUCOSE RANDOM mg/dL 97 106 100 101 140     Results from last 7 days   Lab Units 01/08/21  0530 01/08/21  0452 01/07/21  2359   PROTIME seconds 19 4*  --   --    INR  1 64*  --   --    PTT seconds 79* 95* 86*     Vital Signs: /58 Pulse 56   Temp 98 3 °F (36 8 °C)   Resp 18   Ht 5' 4" (1 626 m)   Wt 85 6 kg (188 lb 11 4 oz)   SpO2 95%   BMI 32 39 kg/m²       Medications:   Scheduled Medications:  diltiazem, 30 mg, Oral, Q6H STEPHANIE  docusate sodium, 100 mg, Oral, BID  HYDROmorphone, 0 2 mg, Intravenous, Once  metoprolol succinate, 100 mg, Oral, Q12H STEPHANIE  senna, 1 tablet, Oral, Daily      Continuous IV Infusions:     PRN Meds:  acetaminophen, 650 mg, Oral, Q6H PRN  aluminum-magnesium hydroxide-simethicone, 30 mL, Oral, Q6H PRN  HYDROmorphone, 0 2 mg, Intravenous, Q4H PRN  ondansetron, 4 mg, Intravenous, Q6H PRN  oxyCODONE, 2 5 mg, Oral, Q4H PRN  oxyCODONE, 5 mg, Oral, Q4H PRN        Discharge Plan: D    Network Utilization Review Department  ATTENTION: Please call with any questions or concerns to [phone] and carefully listen to the prompts so that you are directed to the right person  All voicemails are confidential   Clarisse Diallo all requests for admission clinical reviews, approved or denied determinations and any other requests to dedicated fax number below belonging to the campus where the patient is receiving treatment   List of dedicated fax numbers for the Facilities:  [address] DENIALS (Administrative/Medical Necessity) [phone]   [address] (Maternity/NICU/Pediatrics) [phone]   [address] [address] [facility] Janaida Alec [address] [facility] [address] [address] Ayo Maxi [address] [place]) [address] [place] [address] [phone]

## 2021-01-12 NOTE — TELEMEDICINE
INTERPROFESSIONAL (PHONE) CONSULTATION - Interventional Radiology  Mason Barry 65 y o  female MRN: [id number]  Unit/Bed#: [facility] 426-01 Encounter: [id number]    IR has been consulted to evaluate the patient, determine the appropriate procedure, and whether or not a procedure can and should be performed regarding the care of Mason Barry  We were consulted by internal medicine concerning mediastinal mass/ possible liver mets, and to possibly perform a targeted liver mass biopsy if medically appropriate for the patient  IP Consult to IR  Consult performed by: Elver Varghese PA-C  Consult ordered by: Elizabeth Acosta MD        01/12/21      Assessment/Recommendation:     65year old female presenting with hx of HTN, breast ca presenting with afib/flutter, pericardial effusion, mediastinal mass with hepatic lesions    - last INR 1 64 from 1/8/21    - will redraw INR tomorrow morning  Guidelines INR less than 1 5  If INR less than 1 5, will plan for targeted liver mass biopsy tomorrow 1/13/21  - please hold any AM anticoagulation  - please keep npo after midnight  Total time spent in review of data, discussion with requesting provider and rendering advice was 25 minutes       Patient or appropriate family member was verbally informed by internal medicine of this consultative service on their behalf to provide more timely access to specialty care in lieu of an in person consultation  They were informed it is a billable service unless the it was determined an in person follow up was medically necessary by me within the next 14 days at which time only the in person consult would be billed  Verbal consent was obtained  Thank you for allowing Interventional Radiology to participate in the care of Mason Barry  Please don't hesitate to call or TigerText us with any questions       Elver Varghese PA-C

## 2021-01-12 NOTE — PROGRESS NOTES
Progress Note - Soha Campbell 1950, 65 y o  female MRN: [id number]    Unit/Bed#: [facility] 426-01 Encounter: [id number]    Primary Care Provider: Vinicius Simpson MD   Date and time admitted to hospital: 1/3/2021  8:26 AM        * Atrial flutter ([address] )  Assessment & Plan  · Patient was brought to ED by has been due to coughing, lower extremity edema and dyspnea  · EKG showed atrial flutter, QTc prolongation  Her son passed away on 12/21/2020  · Patient was evaluated by Cardiology  · Cardiac echo with large pericardial effusion without tamponade, EF 65%  · Initially was treated with Cardizem drip, Currently off Cardizem drip,   · on p o  Metoprolol  · Has a PICC line due to difficult IV access  · Due to possible right upper extremity hematoma or proximal biceps tendon tear, currently heparin drip is on hold  · Will need AC once pericardial drain is removed and decision on mediastinal biopsy and cleared by thoracic surgery  Note plans by thoracic surgery to consult Interventional Radiology for anterior mediastinal mass        Hypodense mass of liver  Assessment & Plan  CT on admission shows: Innumerable vague hypodensities throughout the liver suspicious for metastases  Pending decision on biopsy of mediastinal mass    Pain and swelling of right upper extremity  Assessment & Plan  Right upper extremity swelling noted   Likely hematoma  Unable to obtain CT scan due to not having IV access  US obtained that shows possible hematoma vs  Proximal biceps tendon repair  General surgery consult appreciated- recommends conservative managment and holding AC  Patient does not have motor strength loss  neurovascular intact  Pain is significantly improved since last night  Patient is able to extend her shoulder, slightly limited due to pain  At this point, patient will follow up outpatient   If persistent in 3 months, may obtain MRI    Mediastinal mass  Assessment & Plan  · Patient with remote history of left breast cancer status post mastectomy over 20 years ago  · No chemotherapy or radiation  · Anterior mediastinal mass seen on CT scan with diffuse mediastinal adenopathy, Innumerable vague hypodensities throughout the liver are suspicious for metastases  · Unclear etiology  · Thoracic surgery is following   · s/p IR biopsy for pericardial fluid pathology on 21, shows atypical reactive cells  · Thoracic surgery consulted Interventional Radiology or anterior mediastinal biopsy        Pericardial effusion without cardiac tamponade  Assessment & Plan  Evaluated by thoracic surgery  Underwent pericardial drain placement 2021  Still draining, will need to keep drain as per CT surgery  Repeat echo shows no residual pericardial effusion  However continues to have large volume drainage therefore will need to maintain the drain  Thoracic surgery is now considering biopsying mediastinal mass    Essential hypertension  Assessment & Plan  Continue metoprolol  Hold lisinopril for now  Continue to monitor        Time Spent for Care: 15 minutes  More than 50% of total time spent on counseling and coordination of care as described above  Current Length of Stay: 9 day(s)    Current Patient Status: Inpatient   Certification Statement: The patient will continue to require additional inpatient hospital stay due to Need to monitor symptoms        Code Status: Level 1 - Full Code      Subjective:   No acute distress    Objective:     Vitals:   Temp (24hrs), Av 7 °F (37 1 °C), Min:98 2 °F (36 8 °C), Max:99 °F (37 2 °C)    Temp:  [98 2 °F (36 8 °C)-99 °F (37 2 °C)] 98 8 °F (37 1 °C)  HR:  [] 95  Resp:  [15-18] 18  BP: (103-125)/(57-67) 125/62  SpO2:  [95 %-97 %] 96 %  Body mass index is 32 39 kg/m²  Input and Output Summary (last 24 hours):        Intake/Output Summary (Last 24 hours) at 2021 0987  Last data filed at 2021 0735  Gross per 24 hour   Intake 300 ml   Output 2125 ml   Net -1825 ml       Physical Exam:     Physical Exam  Constitutional:       Appearance: Normal appearance  HENT:      Head: Normocephalic and atraumatic  Mouth/Throat:      Mouth: Mucous membranes are moist    Cardiovascular:      Rate and Rhythm: Normal rate and regular rhythm  Pulmonary:      Effort: Pulmonary effort is normal       Breath sounds: Normal breath sounds  Abdominal:      General: Abdomen is flat  Palpations: Abdomen is soft  Musculoskeletal: Normal range of motion  General: No swelling  Skin:     General: Skin is warm and dry  Neurological:      General: No focal deficit present  Mental Status: She is alert and oriented to person, place, and time  Cranial Nerves: No cranial nerve deficit  Psychiatric:         Mood and Affect: Mood normal          Behavior: Behavior normal          Additional Data:     Labs:    Results from last 7 days   Lab Units 01/12/21  0536   WBC Thousand/uL 12 48*   HEMOGLOBIN g/dL 8 5*   HEMATOCRIT % 28 3*   PLATELETS Thousands/uL 237     Results from last 7 days   Lab Units 01/12/21  0536   POTASSIUM mmol/L 3 9   CHLORIDE mmol/L 107   CO2 mmol/L 29   BUN mg/dL 9   CREATININE mg/dL 0 71   CALCIUM mg/dL 8 2*     Results from last 7 days   Lab Units 01/08/21  0530   INR  1 64*       * I Have Reviewed All Lab Data Listed Above  * Additional Pertinent Lab Tests Reviewed:  All Labs Within Last 24 Hours Reviewed      Recent Cultures (last 7 days):           Last 24 Hours Medication List:   Current Facility-Administered Medications   Medication Dose Route Frequency Provider Last Rate    acetaminophen  650 mg Oral Q6H PRN Gabbie Flaherty PA-C      aluminum-magnesium hydroxide-simethicone  30 mL Oral Q6H PRN Kartik Reddy MD      docusate sodium  100 mg Oral BID Kartik Reddy MD      HYDROmorphone  0 2 mg Intravenous Q4H PRN Gabbie Flaherty PA-C      HYDROmorphone  0 2 mg Intravenous Once Gabbie Flaherty PA-C      metoprolol succinate  75 mg Oral Q12H [address] [facility], MD      ondansetron  4 mg Intravenous Q6H PRN Fran Alva MD      oxyCODONE  2 5 mg Oral Q4H PRN Hamilton Orosco PA-C      oxyCODONE  5 mg Oral Q4H PRN Hamilton Orosco PA-C      senna  1 tablet Oral Daily Fran Alva MD          Today, Patient Was Seen By: King Rodrick DO    ** Please Note: Dictation voice to text software may have been used in the creation of this document   **

## 2021-01-12 NOTE — PLAN OF CARE
Problem: Potential for Falls  Goal: Patient will remain free of falls  Description: INTERVENTIONS:  - Assess patient frequently for physical needs  -  Identify cognitive and physical deficits and behaviors that affect risk of falls  -  [place] fall precautions as indicated by assessment   - Educate patient/family on patient safety including physical limitations  - Instruct patient to call for assistance with activity based on assessment  - Modify environment to reduce risk of injury  - Consider OT/PT consult to assist with strengthening/mobility  Outcome: Progressing     Problem: SAFETY ADULT  Goal: Patient will remain free of falls  Description: INTERVENTIONS:  - Assess patient frequently for physical needs  -  Identify cognitive and physical deficits and behaviors that affect risk of falls  -  [place] fall precautions as indicated by assessment   - Educate patient/family on patient safety including physical limitations  - Instruct patient to call for assistance with activity based on assessment  - Modify environment to reduce risk of injury  - Consider OT/PT consult to assist with strengthening/mobility  Outcome: Progressing     Problem: CARDIOVASCULAR - ADULT  Goal: Maintains optimal cardiac output and hemodynamic stability  Description: INTERVENTIONS:  - Monitor I/O, vital signs and rhythm  - Monitor for S/S and trends of decreased cardiac output  - Administer and titrate ordered vasoactive medications to optimize hemodynamic stability  - Assess quality of pulses, skin color and temperature  - Assess for signs of decreased coronary artery perfusion  - Instruct patient to report change in severity of symptoms  Outcome: Progressing     Problem: Knowledge Deficit  Goal: Patient/family/caregiver demonstrates understanding of disease process, treatment plan, medications, and discharge instructions  Description: Complete learning assessment and assess knowledge base    Interventions:  - Provide teaching at level of understanding  - Provide teaching via preferred learning methods  Outcome: Progressing     Problem: Prexisting or High Potential for Compromised Skin Integrity  Goal: Skin integrity is maintained or improved  Description: INTERVENTIONS:  - Identify patients at risk for skin breakdown  - Assess and monitor skin integrity  - Assess and monitor nutrition and hydration status  - Monitor labs   - Assess for incontinence   - Turn and reposition patient  - Assist with mobility/ambulation  - Relieve pressure over bony prominences  - Avoid friction and shearing  - Provide appropriate hygiene as needed including keeping skin clean and dry  - Evaluate need for skin moisturizer/barrier cream  - Collaborate with interdisciplinary team   - Patient/family teaching  - Consider wound care consult   Outcome: Progressing

## 2021-01-12 NOTE — ASSESSMENT & PLAN NOTE
· Patient was brought to ED by has been due to coughing, lower extremity edema and dyspnea  · EKG showed atrial flutter, QTc prolongation  Her son passed away on 12/21/2020  · Patient was evaluated by Cardiology  · Cardiac echo with large pericardial effusion without tamponade, EF 65%  · Initially was treated with Cardizem drip, Currently off Cardizem drip,   · on p o  Metoprolol  · Has a PICC line due to difficult IV access  · Due to possible right upper extremity hematoma or proximal biceps tendon tear, currently heparin drip is on hold  · Will need AC once pericardial drain is removed and decision on mediastinal biopsy and cleared by thoracic surgery    Note plans by thoracic surgery to consult Interventional Radiology for anterior mediastinal mass

## 2021-01-13 ENCOUNTER — APPOINTMENT (INPATIENT)
Dept: RADIOLOGY | Facility: HOSPITAL | Age: 71
DRG: 270 | End: 2021-01-13
Payer: COMMERCIAL

## 2021-01-13 LAB
INR PPP: 1.42 (ref 0.84–1.19)
PROTHROMBIN TIME: 17.3 SECONDS (ref 11.6–14.5)

## 2021-01-13 PROCEDURE — 99232 SBSQ HOSP IP/OBS MODERATE 35: CPT | Performed by: INTERNAL MEDICINE

## 2021-01-13 PROCEDURE — 47000 NEEDLE BIOPSY OF LIVER PERQ: CPT

## 2021-01-13 PROCEDURE — 88112 CYTOPATH CELL ENHANCE TECH: CPT | Performed by: PATHOLOGY

## 2021-01-13 PROCEDURE — 99024 POSTOP FOLLOW-UP VISIT: CPT | Performed by: THORACIC SURGERY (CARDIOTHORACIC VASCULAR SURGERY)

## 2021-01-13 PROCEDURE — 88305 TISSUE EXAM BY PATHOLOGIST: CPT | Performed by: PATHOLOGY

## 2021-01-13 PROCEDURE — 88341 IMHCHEM/IMCYTCHM EA ADD ANTB: CPT | Performed by: PATHOLOGY

## 2021-01-13 PROCEDURE — 99152 MOD SED SAME PHYS/QHP 5/>YRS: CPT

## 2021-01-13 PROCEDURE — 0FB13ZX EXCISION OF RIGHT LOBE LIVER, PERCUTANEOUS APPROACH, DIAGNOSTIC: ICD-10-PCS | Performed by: RADIOLOGY

## 2021-01-13 PROCEDURE — 85610 PROTHROMBIN TIME: CPT | Performed by: PHYSICIAN ASSISTANT

## 2021-01-13 PROCEDURE — 88342 IMHCHEM/IMCYTCHM 1ST ANTB: CPT | Performed by: PATHOLOGY

## 2021-01-13 PROCEDURE — 99152 MOD SED SAME PHYS/QHP 5/>YRS: CPT | Performed by: RADIOLOGY

## 2021-01-13 PROCEDURE — 76942 ECHO GUIDE FOR BIOPSY: CPT | Performed by: RADIOLOGY

## 2021-01-13 PROCEDURE — 47000 NEEDLE BIOPSY OF LIVER PERQ: CPT | Performed by: RADIOLOGY

## 2021-01-13 PROCEDURE — 99153 MOD SED SAME PHYS/QHP EA: CPT

## 2021-01-13 PROCEDURE — 88333 PATH CONSLTJ SURG CYTO XM 1: CPT | Performed by: PATHOLOGY

## 2021-01-13 RX ORDER — FENTANYL CITRATE 50 UG/ML
INJECTION, SOLUTION INTRAMUSCULAR; INTRAVENOUS CODE/TRAUMA/SEDATION MEDICATION
Status: COMPLETED | OUTPATIENT
Start: 2021-01-13 | End: 2021-01-13

## 2021-01-13 RX ORDER — LIDOCAINE WITH 8.4% SOD BICARB 0.9%(10ML)
SYRINGE (ML) INJECTION CODE/TRAUMA/SEDATION MEDICATION
Status: COMPLETED | OUTPATIENT
Start: 2021-01-13 | End: 2021-01-13

## 2021-01-13 RX ORDER — DILTIAZEM HYDROCHLORIDE 120 MG/1
120 CAPSULE, COATED, EXTENDED RELEASE ORAL DAILY
Status: DISCONTINUED | OUTPATIENT
Start: 2021-01-14 | End: 2021-01-14

## 2021-01-13 RX ORDER — MIDAZOLAM HYDROCHLORIDE 2 MG/2ML
INJECTION, SOLUTION INTRAMUSCULAR; INTRAVENOUS CODE/TRAUMA/SEDATION MEDICATION
Status: COMPLETED | OUTPATIENT
Start: 2021-01-13 | End: 2021-01-13

## 2021-01-13 RX ADMIN — MIDAZOLAM 0.5 MG: 1 INJECTION INTRAMUSCULAR; INTRAVENOUS at 08:43

## 2021-01-13 RX ADMIN — MIDAZOLAM 0.5 MG: 1 INJECTION INTRAMUSCULAR; INTRAVENOUS at 09:11

## 2021-01-13 RX ADMIN — MIDAZOLAM 1 MG: 1 INJECTION INTRAMUSCULAR; INTRAVENOUS at 08:36

## 2021-01-13 RX ADMIN — FENTANYL CITRATE 50 MCG: 50 INJECTION INTRAMUSCULAR; INTRAVENOUS at 08:36

## 2021-01-13 RX ADMIN — FENTANYL CITRATE 25 MCG: 50 INJECTION INTRAMUSCULAR; INTRAVENOUS at 09:17

## 2021-01-13 RX ADMIN — FENTANYL CITRATE 25 MCG: 50 INJECTION INTRAMUSCULAR; INTRAVENOUS at 08:51

## 2021-01-13 RX ADMIN — MIDAZOLAM 0.5 MG: 1 INJECTION INTRAMUSCULAR; INTRAVENOUS at 08:51

## 2021-01-13 RX ADMIN — DOCUSATE SODIUM 100 MG: 100 CAPSULE, LIQUID FILLED ORAL at 17:32

## 2021-01-13 RX ADMIN — FENTANYL CITRATE 25 MCG: 50 INJECTION INTRAMUSCULAR; INTRAVENOUS at 08:43

## 2021-01-13 RX ADMIN — DILTIAZEM HYDROCHLORIDE 30 MG: 30 TABLET, FILM COATED ORAL at 12:32

## 2021-01-13 RX ADMIN — DILTIAZEM HYDROCHLORIDE 30 MG: 30 TABLET, FILM COATED ORAL at 22:11

## 2021-01-13 RX ADMIN — METOPROLOL SUCCINATE 100 MG: 100 TABLET, EXTENDED RELEASE ORAL at 09:55

## 2021-01-13 RX ADMIN — OXYCODONE HYDROCHLORIDE 5 MG: 5 TABLET ORAL at 09:55

## 2021-01-13 RX ADMIN — METOPROLOL SUCCINATE 100 MG: 100 TABLET, EXTENDED RELEASE ORAL at 22:11

## 2021-01-13 RX ADMIN — DILTIAZEM HYDROCHLORIDE 30 MG: 30 TABLET, FILM COATED ORAL at 05:04

## 2021-01-13 RX ADMIN — Medication 10 ML: at 08:51

## 2021-01-13 RX ADMIN — DILTIAZEM HYDROCHLORIDE 30 MG: 30 TABLET, FILM COATED ORAL at 17:32

## 2021-01-13 NOTE — PROGRESS NOTES
Progress Note -    Dolly Lloyd 65 y o  female MRN: [id number]  Unit/Bed#: [facility] 426-01 Encounter: [id number]    Assessment:  65 y o  F with a 6 cm anterior mediastinal mass and a large pericardial effusion s/p subxiphoid pericardial window on 1/4/2021, with continued high output from the drain  IR biopsy of liver masses (&/or anterior mediastinal mass) deferred due to patient's INR: 1 64  Repeat INR this morning pending  Pericardial drain: 80 cc: light serosang  PLAN:  - follow am INR   - IR biopsy tentatively today  Subjective:   C/o: no new complaints  Objective:     Vitals: Temp:  [98 3 °F (36 8 °C)-99 5 °F (37 5 °C)] 99 °F (37 2 °C)  HR:  [] 98  Resp:  [14-20] 14  BP: (100-130)/(57-74) 130/74  Body mass index is 32 39 kg/m²  I/O       01/11 0701 - 01/12 0700 01/12 0701 - 01/13 0700    P  O  100 1210    NG/GT 0 0    IV Piggyback  500    Total Intake(mL/kg) 100 (1 2) 1710 (20)    Urine (mL/kg/hr) 1925 (0 9) 650 (0 3)    Drains 200 80    Total Output 2125 730    Net -2025 +980                Physical Exam:  GEN: NAD  HEENT: atraumatic, normocephalic  CV: RRR  Lung: Normal effort; pericardial drain in situ  Ab: Soft, NT/ND  Extrem: No CCE  Neuro: A+Ox3    Lab, Imaging and other studies: I have personally reviewed pertinent reports      VTE Pharmacologic Prophylaxis: Sequential compression device (Venodyne)   VTE Mechanical Prophylaxis: sequential compression device

## 2021-01-13 NOTE — ASSESSMENT & PLAN NOTE
CT on admission shows: Innumerable vague hypodensities throughout the liver suspicious for metastases    Biopsy liver as per Interventional Radiology

## 2021-01-13 NOTE — BRIEF OP NOTE (RAD/CATH)
INTERVENTIONAL RADIOLOGY PROCEDURE NOTE    Date: 1/13/2021    Procedure: IR LIVER BIOPSY    Assistants:  Gloria Morrison    Preoperative diagnosis:   1  Essential hypertension    2  Shortness of breath    3  Cardiomegaly    4  Pericardial effusion    5  Atrial flutter ([address] )    6  Malignant pleural effusion    7  Pericardial effusion without cardiac tamponade    8  Mediastinal mass    9  Pain and swelling of right upper extremity         Postoperative diagnosis: Same  Surgeon: Ольга Wang MD     Assistant: None  No qualified resident was available  Blood loss: minimal    Specimens: 18g core x6, touch prep x4, 8cc bloody fluid for cyto     Findings: R liver mass biopsy  US guided  flowable D stat for hemostasis    Complications: None immediate      Anesthesia: conscious sedation

## 2021-01-13 NOTE — ASSESSMENT & PLAN NOTE
· Patient was brought to ED by has been due to coughing, lower extremity edema and dyspnea  · EKG showed atrial flutter, QTc prolongation  Her son passed away on 12/21/2020  · Patient was evaluated by Cardiology  · Cardiac echo with large pericardial effusion without tamponade, EF 65%  · Initially was treated with Cardizem drip, Currently off Cardizem drip,   · on p o  Metoprolol  · Has a PICC line due to difficult IV access  · Due to possible right upper extremity hematoma or proximal biceps tendon tear, currently heparin drip is on hold  · Will need AC once pericardial drain is removed and decision on mediastinal biopsy and cleared by thoracic surgery  Note plans by thoracic surgery to consult Interventional Radiology  NOTE INTERVENTIONAL RADIOLOGY INPUT-proceed with liver biopsy for tissue sampling

## 2021-01-13 NOTE — PROGRESS NOTES
Progress Note - Abhay Carnes 1950, 65 y o  female MRN: [id number]    Unit/Bed#: [facility] 426-01 Encounter: [id number]    Primary Care Provider: Nahomy Chaney MD   Date and time admitted to hospital: 1/3/2021  8:26 AM        * Atrial flutter ([address] )  Assessment & Plan  · Patient was brought to ED by has been due to coughing, lower extremity edema and dyspnea  · EKG showed atrial flutter, QTc prolongation  Her son passed away on 12/21/2020  · Patient was evaluated by Cardiology  · Cardiac echo with large pericardial effusion without tamponade, EF 65%  · Initially was treated with Cardizem drip, Currently off Cardizem drip,   · on p o  Metoprolol  · Has a PICC line due to difficult IV access  · Due to possible right upper extremity hematoma or proximal biceps tendon tear, currently heparin drip is on hold  · Will need AC once pericardial drain is removed and decision on mediastinal biopsy and cleared by thoracic surgery  Note plans by thoracic surgery to consult Interventional Radiology  NOTE INTERVENTIONAL RADIOLOGY INPUT-proceed with liver biopsy for tissue sampling   Hypodense mass of liver  Assessment & Plan  CT on admission shows: Innumerable vague hypodensities throughout the liver suspicious for metastases  Biopsy liver as per Interventional Radiology    Pain and swelling of right upper extremity  Assessment & Plan  Right upper extremity swelling noted   Likely hematoma  Unable to obtain CT scan due to not having IV access  US obtained that shows possible hematoma vs  Proximal biceps tendon repair  General surgery consult appreciated- recommends conservative managment and holding AC  Patient does not have motor strength loss  neurovascular intact  Pain is significantly improved since last night  Patient is able to extend her shoulder, slightly limited due to pain  At this point, patient will follow up outpatient   If persistent in 3 months, may obtain MRI    Mediastinal mass  Assessment & Plan  · Patient with remote history of left breast cancer status post mastectomy over 20 years ago  · No chemotherapy or radiation  · Anterior mediastinal mass seen on CT scan with diffuse mediastinal adenopathy, Innumerable vague hypodensities throughout the liver are suspicious for metastases  · Unclear etiology  · Thoracic surgery is following   · s/p IR biopsy for pericardial fluid pathology on 21, shows atypical reactive cells  · Thoracic surgery consulted Interventional Radiology or anterior mediastinal biopsy        Pericardial effusion without cardiac tamponade  Assessment & Plan  Evaluated by thoracic surgery  Underwent pericardial drain placement 2021  Still draining, will need to keep drain as per CT surgery  Repeat echo shows no residual pericardial effusion  Monitor drain output    Essential hypertension  Assessment & Plan  Continue metoprolol  Hold lisinopril for now  Continue to monitor        VTE Pharmacologic Prophylaxis:   Pharmacologic: Enoxaparin (Lovenox)  Mechanical VTE Prophylaxis in Place: No    Patient Centered Rounds: I have performed bedside rounds with nursing staff today  Time Spent for Care: 15 minutes  More than 50% of total time spent on counseling and coordination of care as described above  Current Length of Stay: 10 day(s)    Current Patient Status: Inpatient   Certification Statement: The patient will continue to require additional inpatient hospital stay due to Need to monitor symptoms        Code Status: Level 1 - Full Code      Subjective:   No acute distress    Objective:     Vitals:   Temp (24hrs), Av 9 °F (37 2 °C), Min:98 3 °F (36 8 °C), Max:99 5 °F (37 5 °C)    Temp:  [98 3 °F (36 8 °C)-99 5 °F (37 5 °C)] 99 1 °F (37 3 °C)  HR:  [] 113  Resp:  [14-25] 22  BP: (100-142)/(52-74) 115/56  SpO2:  [95 %-100 %] 100 %  Body mass index is 32 2 kg/m²  Input and Output Summary (last 24 hours):        Intake/Output Summary (Last 24 hours) at 1/13/2021 0923  Last data filed at 1/13/2021 0700  Gross per 24 hour   Intake 1410 ml   Output 1075 ml   Net 335 ml       Physical Exam:     Physical Exam  Constitutional:       Appearance: Normal appearance  HENT:      Head: Normocephalic and atraumatic  Nose: Nose normal       Mouth/Throat:      Mouth: Mucous membranes are moist    Cardiovascular:      Rate and Rhythm: Normal rate and regular rhythm  Heart sounds: No murmur  Pulmonary:      Effort: Pulmonary effort is normal       Breath sounds: Normal breath sounds  Abdominal:      General: Abdomen is flat  There is no distension  Palpations: Abdomen is soft  There is no mass  Musculoskeletal: Normal range of motion  General: No swelling  Skin:     General: Skin is warm and dry  Coloration: Skin is not jaundiced  Neurological:      General: No focal deficit present  Mental Status: She is alert and oriented to person, place, and time  Cranial Nerves: No cranial nerve deficit  Sensory: No sensory deficit  Psychiatric:         Mood and Affect: Mood normal          Behavior: Behavior normal          Additional Data:     Labs:    Results from last 7 days   Lab Units 01/12/21  0536   WBC Thousand/uL 12 48*   HEMOGLOBIN g/dL 8 5*   HEMATOCRIT % 28 3*   PLATELETS Thousands/uL 237     Results from last 7 days   Lab Units 01/12/21  0536   POTASSIUM mmol/L 3 9   CHLORIDE mmol/L 107   CO2 mmol/L 29   BUN mg/dL 9   CREATININE mg/dL 0 71   CALCIUM mg/dL 8 2*     Results from last 7 days   Lab Units 01/13/21  0452   INR  1 42*       * I Have Reviewed All Lab Data Listed Above  * Additional Pertinent Lab Tests Reviewed:  All Labs Within Last 24 Hours Reviewed      Recent Cultures (last 7 days):           Last 24 Hours Medication List:   Current Facility-Administered Medications   Medication Dose Route Frequency Provider Last Rate    acetaminophen  650 mg Oral Q6H PRN Awilda Chaudhari PA-C      aluminum-magnesium hydroxide-simethicone  30 mL Oral Q6H PRN Gume Holt MD      diltiazem  30 mg Oral Q6H [address] Laverna [place], DO      docusate sodium  100 mg Oral BID Gume Holt MD      fentanyl citrate (PF)   Intravenous Code/Trauma/Sedation Med Juancarlos Lara MD      HYDROmorphone  0 2 mg Intravenous Q4H PRN Euell [place], PA-C      HYDROmorphone  0 2 mg Intravenous Once Euell [place], PA-C      lidocaine 1% buffered   Infiltration Code/Trauma/Sedation Med Juancarlos Lara MD      metoprolol succinate  100 mg Oral Q12H [address] Laverna [place], DO      midazolam    Code/Trauma/Sedation Med Juancarlos Lara MD      ondansetron  4 mg Intravenous Q6H PRN Gume Holt MD      oxyCODONE  2 5 mg Oral Q4H PRN Euell [place], PA-C      oxyCODONE  5 mg Oral Q4H PRN Euell [place], PA-C      senna  1 tablet Oral Daily Gume Holt MD          Today, Patient Was Seen By: Laquita Barry DO    ** Please Note: Dictation voice to text software may have been used in the creation of this document   **

## 2021-01-13 NOTE — SEDATION DOCUMENTATION
Liver biopsy completed by Dr Tano Reece  Patient tolerated well, denies pain post procedure  Report called to GARRET Osborn  Right abdominal puncture site dressing remains clean, dry and intact

## 2021-01-13 NOTE — QUICK NOTE
QUICK NOTE - Deterioration Index  Zi Georges 65 y o  female MRN: [id number]  Unit/Bed#: [facility] 426-01 Encounter: [id number]    Date Paged: 21  Time Paged: [phone]  Room #: 634  Arrival Time: 926  Deterioration index score at time of page: 63 5  %  Need to escalate level of care: no     PROBLEMS resulting in high DI score:   40% Respiratory rate is 36   18% Age is 70   17% Supplemental oxygen is Nasal cannula   10% Pulse is 117   6% Cardiac rhythm is Atrial fibrillation   5% WBC count is 12 48 Thousand/uL   2% Hematocrit is 28 3 %   1% Pulse oximetry is 99 %   <1% Temperature is 99 1 °F (37 3 °C)   <1% Potassium is 3 9 mmol/L   <9% Systolic is 770   <2% Sodium is 140 mmol/L      64 yo with mediastinal and liver masses of unclear etiology  PLAN:     Patient currently in IR for liver biopsy, no anesthesia or VS documentation seen to explain high RR  Has been on 2 L NC without increasing requirements  Please contact critical care via [employer] with any questions or concerns       Vitals:   Vitals:    21 0900 21 0903 21 0908 21 0913   BP:  136/65 114/57 115/56   BP Location:       Pulse:  (!) 116 (!) 117 (!) 113   Resp: (!) 24 (!) 24 (!) 24 22   Temp:       TempSrc:       SpO2:  99% 99% 100%   Weight:       Height:           Respiratory:  SpO2: SpO2: 100 %, SpO2 Activity: SpO2 Activity: At Rest, SpO2 Device: O2 Device: Nasal cannula  Nasal Cannula O2 Flow Rate (L/min): 2 L/min    Temperature: Temp (24hrs), Av 9 °F (37 2 °C), Min:98 3 °F (36 8 °C), Max:99 5 °F (37 5 °C)  Current: Temperature: 99 1 °F (37 3 °C)    Labs:   Results from last 7 days   Lab Units 21  0536 01/10/21  0455 21  0504   WBC Thousand/uL 12 48* 10 37* 14 80*   HEMOGLOBIN g/dL 8 5* 8 8* 9 8*   HEMATOCRIT % 28 3* 28 7* 33 3*   PLATELETS Thousands/uL 237 193 244     Results from last 7 days   Lab Units 21  0536 01/10/21  0736 21  0504   SODIUM mmol/L 140 143 141   POTASSIUM mmol/L 3 9 4 4 4 5   CHLORIDE mmol/L 107 111* 109*   CO2 mmol/L 29 29 27   BUN mg/dL 9 12 16   CREATININE mg/dL 0 71 0 84 0 79   CALCIUM mg/dL 8 2* 8 6 8 6                       Code Status: Level 1 - Full Code

## 2021-01-13 NOTE — ASSESSMENT & PLAN NOTE
Evaluated by thoracic surgery  Underwent pericardial drain placement 01/04/2021  Still draining, will need to keep drain as per CT surgery     Repeat echo shows no residual pericardial effusion  Monitor drain output

## 2021-01-13 NOTE — PROGRESS NOTES
CARDIOLOGY PROGRESS NOTE     PATIENT INFORMATION     Name: Dolly Lloyd   Age & Sex: 65 y o  female   MRN: [id number]  Hospital Stay Days: 10  [facility]   Encounter: [id number]    ASSESSMENT/PLAN     Principal Problem:    Atrial flutter ([address] )  Active Problems:    Essential hypertension    Pericardial effusion without cardiac tamponade    Mediastinal mass    Pain and swelling of right upper extremity    Hypodense mass of liver    Impression:  Ms Mango Denny is a 65yo female with PMH of hypertension and breast cancer in 1999 who presented on 1/3 for concern of shortness of breath and was found to be in afib/flutter  Metoprolol dose increased for treatment of arrhythmia resulting in rate control and conversion back to NSR  Patient has continued with paroxysmal afib/flutter while admitted, occasionally with rapid rates  Metoprolol was increased to 100mg BID and patient was started on Cardizem 30mg Q6 hours       CTA done on presentation was significant for pericardial effusion without tamponade as well as anterior mediastinal mass with pleural effusions  Had pericardial window done on 1/4 with placement of drain which remains in place with significant output  Repeat echo on 1/7 with no residual effusion  Pericardial fluid being retested and patient now s/p IR liver biopsy for evaluation of possible underlying malignancy      Plan:  Pericardial Effusion  CTA and TTE done on 1/3 significant for large pericardial effusion without tamponade  Had pericardial window and drain placement on 1/4  Repeat echo 1/7 with resolution of effusion   CT chest done 1/11 continues to report that mediastinal mass anterior to R atrium is likely cause of effusion  125cc serosanguinous fluid drained over past 24 hours  S/P IR liver biopsy this AM - tolerated well, increased heart rate which improved after AM dose of Metoprolol  Thoracic surgery managing drain  Will continue drain placement due to high output  Repeated fluid cytology to evaluate again for malignancy as none diagnosed previously  Okay to remove once drainage output less than 50mL in 24 hours     Atrial Fibrillation/Flutter  Patient with multiple episodes of paroxysmal atrial fibrillation/flutter throughout admission  Increased dosage of metoprolol succinate to 100mg BID on  and added Cardizem 30mg Q6 hours  Remains in afib/flutter, but with better rates in high 80's to 90's  Continue Metoprolol 100mg BID and Cardizem 30mg Q6 hours  Holding anticoagulation due to pericardial drainage, R arm hematoma, and procedure with IR  Continue to monitor on telemetry   T/C addition of digoxin bolus if blood pressure cannot tolerate cardizem dosing     Hypertension  Patient on lisinopril 30mg at home  Held due to episodes of hypotension while admitted  BP remains accepatble at 124/63  Will consider adding back Lisinopril, likely at lower dose, if blood pressure elevates  SUBJECTIVE     Patient seen and examined  No acute events overnight  Just returned from IR liver biopsy  States she has some left sided abdominal pain, but otherwise feels well  Denies chest pain, palpitations, and dyspnea  Does endorse some tiredness post anesthesia  OBJECTIVE     Vitals:    21 0253 21 0455 21 0600 21 0655   BP: 116/60 130/74  120/66   BP Location: Right arm      Pulse: 98   96   Resp: 14   20   Temp: 99 °F (37 2 °C)   99 1 °F (37 3 °C)   TempSrc: Oral      SpO2: 95%   96%   Weight:   85 1 kg (187 lb 9 6 oz)    Height:          Temperature:   Temp (24hrs), Av 9 °F (37 2 °C), Min:98 3 °F (36 8 °C), Max:99 5 °F (37 5 °C)    Temperature: 99 1 °F (37 3 °C)  Intake & Output:  I/O       701 -  0700  07 -  0700    P  O  100 1210     NG/GT 0 0     IV Piggyback  500     Total Intake(mL/kg) 100 (1 2) 1710 (20 1)     Urine (mL/kg/hr) 1925 (0 9) 950 (0 5)     Drains 200 125     Total Output 2125 1075     Net - +635 Weights:   IBW: 54 7 kg    Body mass index is 32 2 kg/m²  Weight (last 2 days)     Date/Time   Weight    01/13/21 0600   85 1 (187 6)    01/12/21 0600   85 6 (188 71)    01/11/21 0600   86 4 (190 4)            Physical Exam  Vitals signs reviewed  Constitutional:       General: She is sleeping  Appearance: Normal appearance  She is well-developed  She is not ill-appearing or diaphoretic  Comments: Patient examined bedside after IR liver biopsy, appears tired post anesthesia, but easily aroused    HENT:      Head: Normocephalic and atraumatic  Right Ear: External ear normal       Left Ear: External ear normal       Nose: Nose normal       Mouth/Throat:      Mouth: Mucous membranes are moist       Pharynx: Oropharynx is clear  Eyes:      General: No scleral icterus  Right eye: No discharge  Left eye: No discharge  Conjunctiva/sclera: Conjunctivae normal    Cardiovascular:      Rate and Rhythm: Tachycardia present  Rhythm irregular  Heart sounds: Normal heart sounds  No murmur  Pulmonary:      Effort: Pulmonary effort is normal  No respiratory distress  Breath sounds: Normal breath sounds  No wheezing  Abdominal:      General: Bowel sounds are normal  There is no distension  Palpations: Abdomen is soft  Tenderness: There is no abdominal tenderness  Musculoskeletal: Normal range of motion  General: Swelling (legs and R upper ext 2/2 hematoma) present  No tenderness  Right lower leg: Edema present  Left lower leg: Edema present  Skin:     General: Skin is warm and dry  Coloration: Skin is not jaundiced  Neurological:      General: No focal deficit present  Mental Status: She is oriented to person, place, and time and easily aroused  Cranial Nerves: No cranial nerve deficit  Motor: No weakness     Psychiatric:         Mood and Affect: Mood normal          Behavior: Behavior normal  Behavior is cooperative  Thought Content: Thought content normal        LABORATORY DATA     Labs: I have personally reviewed pertinent reports  Results from last 7 days   Lab Units 01/12/21  0536 01/10/21  0455 01/09/21  0504   WBC Thousand/uL 12 48* 10 37* 14 80*   HEMOGLOBIN g/dL 8 5* 8 8* 9 8*   HEMATOCRIT % 28 3* 28 7* 33 3*   PLATELETS Thousands/uL 237 193 244      Results from last 7 days   Lab Units 01/12/21  0536 01/10/21  0736 01/09/21  0504   POTASSIUM mmol/L 3 9 4 4 4 5   CHLORIDE mmol/L 107 111* 109*   CO2 mmol/L 29 29 27   BUN mg/dL 9 12 16   CREATININE mg/dL 0 71 0 84 0 79   CALCIUM mg/dL 8 2* 8 6 8 6              Results from last 7 days   Lab Units 01/13/21  0452 01/08/21  0530 01/08/21  0452 01/07/21  2359   INR  1 42* 1 64*  --   --    PTT seconds  --  79* 95* 86*               IMAGING & DIAGNOSTIC TESTING     Radiology Results: I have personally reviewed pertinent reports  and I have personally reviewed pertinent films in PACS  Xr Chest Portable    Result Date: 1/5/2021  Impression: Pericardial drain placement with slight decrease in size of the cardiac silhouette  Small effusions and bibasilar atelectasis  Workstation performed: [id number]     Xr Chest 1 View Portable    Result Date: 1/3/2021  Impression: Marked enlargement of the cardiac silhouette which could be due to cardiomegaly or a pericardial effusion  Right middle lobe pneumonia  Workstation performed: [id number]      Msk Limited    Result Date: 1/8/2021  Impression: Previously noted collection is contiguous with the bicipital groove, and the long head biceps tendon is not visualized within the bicipital groove  Findings most likely represent proximal tendon tear with distal retraction  If collection does not resolve spontaneously (within 1-3 months), recommend right humeral MRI with contrast as hemorrhagic neoplasm is within the differential  The study was marked in EPIC for significant notification   Workstation performed: [id number]     Cta Chest Pe Study    Result Date: 1/3/2021  Impression: Large pericardial effusion measuring approximately 3 3 cm area of bulging of the right pericardium or a potential adjacent right mediastinal mass measuring approximately 6 6 cm #2/100  The pericardial effusion may be on a malignant basis  Prominent diffuse mediastinal adenopathy Interlobular septal thickening and small pleural effusions in keeping with pulmonary edema  Posterior right upper lobe consolidation #3/55 in a wedge-shaped appearance with a apex extending towards the right hilum may represent pneumonia or a postobstructive pneumonitis from a central mass  A discrete mass is not identified but could be obscured  Innumerable vague hypodensities throughout the liver are suspicious for metastases  I personally discussed this study with Mya Sanderson on 1/3/2021 at 5:18 PM  Workstation performed: [id number]     Us Extremity Soft Tissue    Result Date: 1/8/2021  Impression: Complex fluid collection in the medial right upper arm with hematocrit level in keeping with hematoma  Given the location, the possibility of associated underlying biceps tendon rupture is not excluded  This can be evaluated with [facility] shoulder ultrasound if clinically indicated  Recommend follow-up ultrasound in 1 month to confirm resolution  The study was marked in [facility] for immediate notification  Workstation performed: [id number]     Other Diagnostic Testing: I have personally reviewed pertinent reports        ACTIVE MEDICATIONS     Current Facility-Administered Medications   Medication Dose Route Frequency    acetaminophen (TYLENOL) tablet 650 mg  650 mg Oral Q6H PRN    aluminum-magnesium hydroxide-simethicone (MYLANTA) oral suspension 30 mL  30 mL Oral Q6H PRN    diltiazem (CARDIZEM) tablet 30 mg  30 mg Oral Q6H [address]    docusate sodium (COLACE) capsule 100 mg  100 mg Oral BID    HYDROmorphone (DILAUDID) injection 0 2 mg  0 2 mg Intravenous Q4H PRN    HYDROmorphone (DILAUDID) injection 0 2 mg  0 2 mg Intravenous Once    metoprolol succinate (TOPROL-XL) 24 hr tablet 100 mg  100 mg Oral Q12H [address]    ondansetron (ZOFRAN) injection 4 mg  4 mg Intravenous Q6H PRN    oxyCODONE (ROXICODONE) IR tablet 2 5 mg  2 5 mg Oral Q4H PRN    oxyCODONE (ROXICODONE) IR tablet 5 mg  5 mg Oral Q4H PRN    senna (SENOKOT) tablet 8 6 mg  1 tablet Oral Daily     VTE Pharmacologic Prophylaxis: held for procedure today with IR  VTE Mechanical Prophylaxis: sequential compression device    ==  Echo Deepa DO  Internal Medicine Resident, PGY-1  [facility]

## 2021-01-13 NOTE — INTERVAL H&P NOTE
Update: (This section must be completed if the H&P was completed greater than 24 hrs to procedure or admission)    H&P reviewed  After examining the patient, I find no changed to the H&P since it had been written  70F with mediastinal mass and liver masses, unclear etiology but suspicious for malignancy  Biopsy indicated for diagnosis  Risks benefits alternatives discussed    I would favor liver biopsy over mediastinal biopsy given internal mammary artery    We will proceed with ultrasound guidance    Patient re-evaluated   Accept as history and physical     Arlet Martínez MD/January 13, 2021/8:36 AM

## 2021-01-14 PROBLEM — D64.9 ANEMIA: Status: ACTIVE | Noted: 2021-01-14

## 2021-01-14 PROBLEM — D72.829 LEUKOCYTOSIS: Status: ACTIVE | Noted: 2021-01-14

## 2021-01-14 LAB
ALBUMIN SERPL BCP-MCNC: 2.2 G/DL (ref 3.5–5)
ALP SERPL-CCNC: 69 U/L (ref 46–116)
ALT SERPL W P-5'-P-CCNC: 21 U/L (ref 12–78)
ANION GAP SERPL CALCULATED.3IONS-SCNC: 3 MMOL/L (ref 4–13)
AST SERPL W P-5'-P-CCNC: 15 U/L (ref 5–45)
BASOPHILS # BLD AUTO: 0.06 THOUSANDS/ΜL (ref 0–0.1)
BASOPHILS NFR BLD AUTO: 0 % (ref 0–1)
BILIRUB SERPL-MCNC: 0.94 MG/DL (ref 0.2–1)
BUN SERPL-MCNC: 8 MG/DL (ref 5–25)
CALCIUM ALBUM COR SERPL-MCNC: 9.5 MG/DL (ref 8.3–10.1)
CALCIUM SERPL-MCNC: 8.1 MG/DL (ref 8.3–10.1)
CHLORIDE SERPL-SCNC: 107 MMOL/L (ref 100–108)
CO2 SERPL-SCNC: 26 MMOL/L (ref 21–32)
CREAT SERPL-MCNC: 0.81 MG/DL (ref 0.6–1.3)
EOSINOPHIL # BLD AUTO: 0.53 THOUSAND/ΜL (ref 0–0.61)
EOSINOPHIL NFR BLD AUTO: 3 % (ref 0–6)
ERYTHROCYTE [DISTWIDTH] IN BLOOD BY AUTOMATED COUNT: 19.5 % (ref 11.6–15.1)
GFR SERPL CREATININE-BSD FRML MDRD: 84 ML/MIN/1.73SQ M
GLUCOSE SERPL-MCNC: 98 MG/DL (ref 65–140)
HCT VFR BLD AUTO: 26.9 % (ref 34.8–46.1)
HCT VFR BLD AUTO: 29.3 % (ref 34.8–46.1)
HGB BLD-MCNC: 8 G/DL (ref 11.5–15.4)
HGB BLD-MCNC: 8.6 G/DL (ref 11.5–15.4)
IMM GRANULOCYTES # BLD AUTO: 0.27 THOUSAND/UL (ref 0–0.2)
IMM GRANULOCYTES NFR BLD AUTO: 2 % (ref 0–2)
LYMPHOCYTES # BLD AUTO: 1.16 THOUSANDS/ΜL (ref 0.6–4.47)
LYMPHOCYTES NFR BLD AUTO: 7 % (ref 14–44)
MCH RBC QN AUTO: 20.5 PG (ref 26.8–34.3)
MCHC RBC AUTO-ENTMCNC: 29.7 G/DL (ref 31.4–37.4)
MCV RBC AUTO: 69 FL (ref 82–98)
MONOCYTES # BLD AUTO: 1.89 THOUSAND/ΜL (ref 0.17–1.22)
MONOCYTES NFR BLD AUTO: 12 % (ref 4–12)
NEUTROPHILS # BLD AUTO: 12.5 THOUSANDS/ΜL (ref 1.85–7.62)
NEUTS SEG NFR BLD AUTO: 76 % (ref 43–75)
NRBC BLD AUTO-RTO: 0 /100 WBCS
PLATELET # BLD AUTO: 241 THOUSANDS/UL (ref 149–390)
POTASSIUM SERPL-SCNC: 4.2 MMOL/L (ref 3.5–5.3)
PROCALCITONIN SERPL-MCNC: 0.22 NG/ML
PROT SERPL-MCNC: 5.4 G/DL (ref 6.4–8.2)
RBC # BLD AUTO: 3.9 MILLION/UL (ref 3.81–5.12)
SODIUM SERPL-SCNC: 136 MMOL/L (ref 136–145)
WBC # BLD AUTO: 16.41 THOUSAND/UL (ref 4.31–10.16)

## 2021-01-14 PROCEDURE — 85025 COMPLETE CBC W/AUTO DIFF WBC: CPT | Performed by: PHYSICIAN ASSISTANT

## 2021-01-14 PROCEDURE — 80053 COMPREHEN METABOLIC PANEL: CPT | Performed by: PHYSICIAN ASSISTANT

## 2021-01-14 PROCEDURE — 85018 HEMOGLOBIN: CPT | Performed by: INTERNAL MEDICINE

## 2021-01-14 PROCEDURE — 84145 PROCALCITONIN (PCT): CPT | Performed by: PHYSICIAN ASSISTANT

## 2021-01-14 PROCEDURE — 99232 SBSQ HOSP IP/OBS MODERATE 35: CPT | Performed by: INTERNAL MEDICINE

## 2021-01-14 PROCEDURE — 85014 HEMATOCRIT: CPT | Performed by: INTERNAL MEDICINE

## 2021-01-14 RX ORDER — DILTIAZEM HYDROCHLORIDE 120 MG/1
120 CAPSULE, COATED, EXTENDED RELEASE ORAL DAILY
Status: DISCONTINUED | OUTPATIENT
Start: 2021-01-14 | End: 2021-01-17

## 2021-01-14 RX ADMIN — ACETAMINOPHEN 650 MG: 325 TABLET, FILM COATED ORAL at 20:20

## 2021-01-14 RX ADMIN — ALTEPLASE 2 MG: 2.2 INJECTION, POWDER, LYOPHILIZED, FOR SOLUTION INTRAVENOUS at 18:26

## 2021-01-14 RX ADMIN — DILTIAZEM HYDROCHLORIDE 120 MG: 120 CAPSULE, COATED, EXTENDED RELEASE ORAL at 08:43

## 2021-01-14 RX ADMIN — SODIUM CHLORIDE, SODIUM LACTATE, POTASSIUM CHLORIDE, AND CALCIUM CHLORIDE 250 ML: .6; .31; .03; .02 INJECTION, SOLUTION INTRAVENOUS at 04:59

## 2021-01-14 RX ADMIN — METOPROLOL SUCCINATE 100 MG: 100 TABLET, EXTENDED RELEASE ORAL at 20:20

## 2021-01-14 RX ADMIN — METOPROLOL SUCCINATE 100 MG: 100 TABLET, EXTENDED RELEASE ORAL at 08:42

## 2021-01-14 NOTE — ASSESSMENT & PLAN NOTE
Multifactorial  Patient with hematoma over extremity  Monitor drainage  Possibly component of anemia of chronic disease    Rule out occult GI blood loss  Serial hemoglobin hematocrit  Heme check stools  Drop in hemoglobin may be partially contributing to  tachycardia

## 2021-01-14 NOTE — ASSESSMENT & PLAN NOTE
Possible stress reaction from procedure  Will follow-up with serial labs    Monitor temperature curve

## 2021-01-14 NOTE — PROGRESS NOTES
Progress Note - [facility] [place] 1950, 65 y o  female MRN: [id number]    Unit/Bed#: [facility] 426-01 Encounter: [id number]    Primary Care Provider: Emilee Waldron MD   Date and time admitted to hospital: 1/3/2021  8:26 AM        * Atrial flutter ([address] )  Assessment & Plan  · Patient was brought to ED by has been due to coughing, lower extremity edema and dyspnea  · EKG showed atrial flutter, QTc prolongation  Her son passed away on 12/21/2020  · Patient was evaluated by Cardiology  · Cardiac echo with large pericardial effusion without tamponade, EF 65%  · Initially was treated with Cardizem drip, Currently off Cardizem drip,   · on p o  Metoprolol  · Has a PICC line due to difficult IV access  · Due to possible right upper extremity hematoma or proximal biceps tendon tear, currently heparin drip is on hold  · Will need AC once pericardial drain is removed and decision on mediastinal biopsy and cleared by thoracic surgery  Note plans by thoracic surgery to consult Interventional Radiology  NOTE INTERVENTIONAL RADIOLOGY INPUT-proceed with liver biopsy for tissue sampling   Anemia  Assessment & Plan  Multifactorial  Patient with hematoma over extremity  Possibly component of anemia of chronic disease    Rule out occult blood loss  Serial hemoglobin hematocrit  Heme check stools  Drop in hemoglobin may be partially contributing to and tachycardia  Leukocytosis  Assessment & Plan  Possible stress reaction from procedure  Will follow-up with serial labs  Monitor temperature curve    Hypodense mass of liver  Assessment & Plan  CT on admission shows: Innumerable vague hypodensities throughout the liver suspicious for metastases  Biopsy liver as per Interventional Radiology    Pain and swelling of right upper extremity  Assessment & Plan  Right upper extremity swelling noted   Likely hematoma  Unable to obtain CT scan due to not having IV access     US obtained that shows possible hematoma vs  Proximal biceps tendon repair  General surgery consult appreciated- recommends conservative managment and holding AC  Patient does not have motor strength loss  neurovascular intact  Pain is significantly improved since last night  Patient is able to extend her shoulder, slightly limited due to pain  At this point, patient will follow up outpatient  If persistent in 3 months, may obtain MRI    Mediastinal mass  Assessment & Plan  · Patient with remote history of left breast cancer status post mastectomy over 20 years ago  · No chemotherapy or radiation  · Anterior mediastinal mass seen on CT scan with diffuse mediastinal adenopathy, Innumerable vague hypodensities throughout the liver are suspicious for metastases  · Unclear etiology  · Thoracic surgery is following   · s/p IR biopsy for pericardial fluid pathology on 21, shows atypical reactive cells  · Thoracic surgery consulted Interventional Radiology-status post liver biopsy        Pericardial effusion without cardiac tamponade  Assessment & Plan  Evaluated by thoracic surgery  Underwent pericardial drain placement 2021  Still draining, will need to keep drain as per CT surgery  Repeat echo shows no residual pericardial effusion  Monitor drain output    Essential hypertension  Assessment & Plan  Continue metoprolol  Hold lisinopril for now  Continue to monitor      VTE Pharmacologic Prophylaxis:   Pharmacologic: On hold due to cardiac drain  Mechanical VTE Prophylaxis in Place: No    Patient Centered Rounds: I have performed bedside rounds with nursing staff today  Time Spent for Care: 15 minutes  More than 50% of total time spent on counseling and coordination of care as described above      Current Length of Stay: 11 day(s)    Current Patient Status: Inpatient       Code Status: Level 1 - Full Code      Subjective:   No acute distress    Objective:     Vitals:   Temp (24hrs), Av 1 °F (37 3 °C), Min:98 2 °F (36 8 °C), Max:100 3 °F (37 9 °C)    Temp:  [98 2 °F (36 8 °C)-100 3 °F (37 9 °C)] 98 9 °F (37 2 °C)  HR:  [] 133  Resp:  [16-24] 18  BP: ()/(50-76) 119/76  SpO2:  [85 %-100 %] 91 %  Body mass index is 32 27 kg/m²  Input and Output Summary (last 24 hours): Intake/Output Summary (Last 24 hours) at 1/14/2021 0909  Last data filed at 1/14/2021 0600  Gross per 24 hour   Intake 820 ml   Output 1005 ml   Net -185 ml       Physical Exam:     Physical Exam  HENT:      Head: Normocephalic and atraumatic  Cardiovascular:      Rate and Rhythm: Normal rate and regular rhythm  Heart sounds: No murmur  Pulmonary:      Effort: Pulmonary effort is normal  No respiratory distress  Breath sounds: Normal breath sounds  Abdominal:      General: Abdomen is flat  Palpations: Abdomen is soft  Skin:     General: Skin is warm and dry  Neurological:      General: No focal deficit present  Mental Status: She is oriented to person, place, and time  Mental status is at baseline  Additional Data:     Labs:    Results from last 7 days   Lab Units 01/14/21  0741   WBC Thousand/uL 16 41*   HEMOGLOBIN g/dL 8 0*   HEMATOCRIT % 26 9*   PLATELETS Thousands/uL 241   NEUTROS PCT % 76*   LYMPHS PCT % 7*   MONOS PCT % 12   EOS PCT % 3     Results from last 7 days   Lab Units 01/14/21  0626   POTASSIUM mmol/L 4 2   CHLORIDE mmol/L 107   CO2 mmol/L 26   BUN mg/dL 8   CREATININE mg/dL 0 81   CALCIUM mg/dL 8 1*   ALK PHOS U/L 69   ALT U/L 21   AST U/L 15     Results from last 7 days   Lab Units 01/13/21  0452   INR  1 42*       * I Have Reviewed All Lab Data Listed Above  * Additional Pertinent Lab Tests Reviewed:  All Labs Within Last 24 Hours Reviewed        Recent Cultures (last 7 days):           Last 24 Hours Medication List:   Current Facility-Administered Medications   Medication Dose Route Frequency Provider Last Rate    acetaminophen  650 mg Oral Q6H PRN Melissa Dennison PA-C      alteplase  2 mg Intracatheter Once [employer], DAVID      aluminum-magnesium hydroxide-simethicone  30 mL Oral Q6H PRN Carlos Shea MD      diltiazem  120 mg Oral Daily Rhonda Berry PA-C      docusate sodium  100 mg Oral BID Carlos Shea MD      HYDROmorphone  0 2 mg Intravenous Q4H PRN Evon Fears, DAVID      HYDROmorphone  0 2 mg Intravenous Once Evon Fears, DAVID      metoprolol succinate  100 mg Oral Q12H [address] Prisca Fried DO      ondansetron  4 mg Intravenous Q6H PRN Carlos Shea MD      oxyCODONE  2 5 mg Oral Q4H PRN Evon Fears, DAVID      oxyCODONE  5 mg Oral Q4H PRN Evon Fears, DAVID      senna  1 tablet Oral Daily Carlos Shea MD          Today, Patient Was Seen By: Hakeem Eckert DO    ** Please Note: Dictation voice to text software may have been used in the creation of this document   **

## 2021-01-14 NOTE — PROGRESS NOTES
CARDIOLOGY PROGRESS NOTE     PATIENT INFORMATION     Name: Myrna Soares   Age & Sex: 65 y o  female   MRN: [id number]  Hospital Stay Days: 11  Unit/Bed#: [facility] 426-01   Encounter: [id number]    ASSESSMENT/PLAN     Principal Problem:    Atrial flutter ([address] )  Active Problems:    Essential hypertension    Pericardial effusion without cardiac tamponade    Mediastinal mass    Pain and swelling of right upper extremity    Hypodense mass of liver    Leukocytosis    Anemia    Impression:  Ms Crystal Smith is a 65yo female with PMH of hypertension and breast cancer in 1999 who presented on 1/3 for concern of shortness of breath and was found to be in afib/flutter  Metoprolol dose increased for treatment of arrhythmia resulting in rate control and conversion back to NSR  Patient has continued with paroxysmal afib/flutter while admitted, occasionally with rapid rates  Metoprolol was increased to 100mg BID and patient was started on Cardizem 30mg Q6 hours  Cardizem transitioned to long acting 120mg daily on 1/14      CTA done on presentation was significant for pericardial effusion without tamponade as well as anterior mediastinal mass with pleural effusions  Had pericardial window done on 1/4 with placement of drain which remains in place with significant output  Repeat echo on 1/7 with no residual effusion  Pericardial fluid being retested and patient now s/p IR liver biopsy for evaluation of possible underlying malignancy      Plan:  Pericardial Effusion  CTA and TTE done on 1/3 significant for large pericardial effusion without tamponade  Had pericardial window and drain placement on 1/4  Repeat echo 1/7 with resolution of effusion   CT chest done 1/11 continues to report that mediastinal mass anterior to R atrium is likely cause of effusion  230cc serosanguinous fluid drained over past 24 hours  S/P IR liver biopsy  With gradual decline in Hgb - tachycardia potentially 2/2 anemia  Thoracic surgery managing drain  Will continue drain placement due to high output  Repeated fluid cytology to evaluate again for malignancy as none diagnosed previously  Okay to remove once drainage output less than 50mL in 24 hours     Atrial Fibrillation/Flutter  Patient with multiple episodes of paroxysmal atrial fibrillation/flutter throughout admission  Remains in afib/flutter, rates increased to 130's overnight  Continue Metoprolol 100mg BID and Cardizem 120mg daily  Holding anticoagulation due to pericardial drainage, R arm hematoma, and procedure with IR  Will need anticoagulation after resolution  Continue to monitor on telemetry   T/C addition of digoxin bolus if blood pressure cannot tolerate cardizem dosing     Hypertension  Patient on lisinopril 30mg at home  Held due to episodes of hypotension while admitted  BP remains accepatble at 124/63  Will consider adding back Lisinopril, likely at lower dose, if blood pressure elevates  Anemia  Patient with gradual drop in Hgb over admission  Hgb 8 0 this morning  Potentially contributing to tachycardia  Primary team ordered FOBT  Continue to trend H/H  Transfuse for <7 0    SUBJECTIVE     Patient seen and examined  No acute events overnight  Reports she is still asymptomatic even though rates have increased overnight  No chest pain, dyspnea, N/V, diaphoresis, dizziness, lightheadedness, or palpitations  Denies melena/hematochezia  Pain/swelling in arm improving  IR biopsy site still remains slightly tender      OBJECTIVE     Vitals:    21 0413 21 0600 21 0711 21 1110   BP: 104/56 90/52 119/76 114/51   BP Location:  Right arm     Pulse: (!) 133  (!) 133 (!) 44   Resp:   18 20   Temp: 98 8 °F (37 1 °C)  98 9 °F (37 2 °C)    TempSrc:       SpO2: 99%  91% 92%   Weight:  85 3 kg (188 lb)     Height:          Temperature:   Temp (24hrs), Av 1 °F (37 3 °C), Min:98 2 °F (36 8 °C), Max:100 3 °F (37 9 °C)    Temperature: 98 9 °F (37 2 °C)  Intake & Output:  I/O 01/12 0701 - 01/13 0700 01/13 0701 - 01/14 0700 01/14 0701 - 01/15 0700    P  O  1210 820     NG/GT 0 0     IV Piggyback 500      Total Intake(mL/kg) 1710 (20 1) 820 (9 6)     Urine (mL/kg/hr) 950 (0 5) 775 (0 4)     Drains 125 230     Total Output 1075 1005     Net +635 -185                Weights:   IBW: 54 7 kg    Body mass index is 32 27 kg/m²  Weight (last 2 days)     Date/Time   Weight    01/14/21 0600   85 3 (188)    01/13/21 0600   85 1 (187 6)    01/12/21 0600   85 6 (188 71)            Physical Exam  Vitals signs reviewed  Constitutional:       General: She is not in acute distress  Appearance: Normal appearance  She is well-developed  She is not ill-appearing or diaphoretic  HENT:      Head: Normocephalic and atraumatic  Right Ear: External ear normal       Left Ear: External ear normal       Nose: Nose normal       Mouth/Throat:      Mouth: Mucous membranes are moist       Pharynx: Oropharynx is clear  Eyes:      General: No scleral icterus  Right eye: No discharge  Left eye: No discharge  Conjunctiva/sclera: Conjunctivae normal    Cardiovascular:      Rate and Rhythm: Tachycardia present  Rhythm irregular  Heart sounds: Normal heart sounds  No murmur  Pulmonary:      Effort: Pulmonary effort is normal  No respiratory distress  Breath sounds: Normal breath sounds  No wheezing  Abdominal:      General: Bowel sounds are normal  There is no distension  Palpations: Abdomen is soft  Tenderness: There is no abdominal tenderness  Musculoskeletal: Normal range of motion  General: Swelling (legs and R upper ext 2/2 hematoma) present  No tenderness  Right lower leg: Edema present  Left lower leg: Edema present  Skin:     General: Skin is warm and dry  Coloration: Skin is not jaundiced  Neurological:      General: No focal deficit present        Mental Status: She is alert, oriented to person, place, and time and easily aroused  Cranial Nerves: No cranial nerve deficit  Motor: No weakness  Psychiatric:         Mood and Affect: Mood normal          Behavior: Behavior normal  Behavior is cooperative  Thought Content: Thought content normal        LABORATORY DATA     Labs: I have personally reviewed pertinent reports  Results from last 7 days   Lab Units 01/14/21  0741 01/12/21  0536 01/10/21  0455   WBC Thousand/uL 16 41* 12 48* 10 37*   HEMOGLOBIN g/dL 8 0* 8 5* 8 8*   HEMATOCRIT % 26 9* 28 3* 28 7*   PLATELETS Thousands/uL 241 237 193   NEUTROS PCT % 76*  --   --    MONOS PCT % 12  --   --       Results from last 7 days   Lab Units 01/14/21  0626 01/12/21  0536 01/10/21  0736   POTASSIUM mmol/L 4 2 3 9 4 4   CHLORIDE mmol/L 107 107 111*   CO2 mmol/L 26 29 29   BUN mg/dL 8 9 12   CREATININE mg/dL 0 81 0 71 0 84   CALCIUM mg/dL 8 1* 8 2* 8 6   ALK PHOS U/L 69  --   --    ALT U/L 21  --   --    AST U/L 15  --   --               Results from last 7 days   Lab Units 01/13/21  0452 01/08/21  0530 01/08/21  0452 01/07/21  2359   INR  1 42* 1 64*  --   --    PTT seconds  --  79* 95* 86*               IMAGING & DIAGNOSTIC TESTING     Radiology Results: I have personally reviewed pertinent reports  Xr Chest Portable    Result Date: 1/5/2021  Impression: Pericardial drain placement with slight decrease in size of the cardiac silhouette  Small effusions and bibasilar atelectasis  Workstation performed: [id number]     Xr Chest 1 View Portable    Result Date: 1/3/2021  Impression: Marked enlargement of the cardiac silhouette which could be due to cardiomegaly or a pericardial effusion  Right middle lobe pneumonia  Workstation performed: [id number]      [facility] Limited    Result Date: 1/8/2021  Impression: Previously noted collection is contiguous with the bicipital groove, and the long head biceps tendon is not visualized within the bicipital groove    Findings most likely represent proximal tendon tear with distal retraction  If collection does not resolve spontaneously (within 1-3 months), recommend right humeral MRI with contrast as hemorrhagic neoplasm is within the differential  The study was marked in EPIC for significant notification  Workstation performed: [id number]     Cta Chest Pe Study    Result Date: 1/3/2021  Impression: Large pericardial effusion measuring approximately 3 3 cm area of bulging of the right pericardium or a potential adjacent right mediastinal mass measuring approximately 6 6 cm #2/100  The pericardial effusion may be on a malignant basis  Prominent diffuse mediastinal adenopathy Interlobular septal thickening and small pleural effusions in keeping with pulmonary edema  Posterior right upper lobe consolidation #3/55 in a wedge-shaped appearance with a apex extending towards the right hilum may represent pneumonia or a postobstructive pneumonitis from a central mass  A discrete mass is not identified but could be obscured  Innumerable vague hypodensities throughout the liver are suspicious for metastases  I personally discussed this study with Kelli Montes on 1/3/2021 at 5:18 PM  Workstation performed: [id number]     Us Extremity Soft Tissue    Result Date: 1/8/2021  Impression: Complex fluid collection in the medial right upper arm with hematocrit level in keeping with hematoma  Given the location, the possibility of associated underlying biceps tendon rupture is not excluded  This can be evaluated with MSK shoulder ultrasound if clinically indicated  Recommend follow-up ultrasound in 1 month to confirm resolution  The study was marked in [facility] for immediate notification  Workstation performed: [id number]     Other Diagnostic Testing: I have personally reviewed pertinent reports        ACTIVE MEDICATIONS     Current Facility-Administered Medications   Medication Dose Route Frequency    acetaminophen (TYLENOL) tablet 650 mg  650 mg Oral Q6H PRN    alteplase (CATHFLO) injection 2 mg  2 mg Intracatheter Once    aluminum-magnesium hydroxide-simethicone (MYLANTA) oral suspension 30 mL  30 mL Oral Q6H PRN    diltiazem (CARDIZEM CD) 24 hr capsule 120 mg  120 mg Oral Daily    docusate sodium (COLACE) capsule 100 mg  100 mg Oral BID    HYDROmorphone (DILAUDID) injection 0 2 mg  0 2 mg Intravenous Q4H PRN    HYDROmorphone (DILAUDID) injection 0 2 mg  0 2 mg Intravenous Once    metoprolol succinate (TOPROL-XL) 24 hr tablet 100 mg  100 mg Oral Q12H [facility]    ondansetron (ZOFRAN) injection 4 mg  4 mg Intravenous Q6H PRN    oxyCODONE (ROXICODONE) IR tablet 2 5 mg  2 5 mg Oral Q4H PRN    oxyCODONE (ROXICODONE) IR tablet 5 mg  5 mg Oral Q4H PRN    senna (SENOKOT) tablet 8 6 mg  1 tablet Oral Daily     ==  Jennifer De La Torre DO  Internal Medicine Resident, PGY-1  [facility]

## 2021-01-14 NOTE — ASSESSMENT & PLAN NOTE
· Patient with remote history of left breast cancer status post mastectomy over 20 years ago  · No chemotherapy or radiation  · Anterior mediastinal mass seen on CT scan with diffuse mediastinal adenopathy, Innumerable vague hypodensities throughout the liver are suspicious for metastases  · Unclear etiology  · Thoracic surgery is following   · s/p IR biopsy for pericardial fluid pathology on 1/4/21, shows atypical reactive cells     · Thoracic surgery consulted Interventional Radiology-status post liver biopsy

## 2021-01-15 ENCOUNTER — APPOINTMENT (INPATIENT)
Dept: RADIOLOGY | Facility: HOSPITAL | Age: 71
DRG: 270 | End: 2021-01-15
Payer: COMMERCIAL

## 2021-01-15 PROBLEM — R50.9 FEVER: Status: ACTIVE | Noted: 2021-01-15

## 2021-01-15 LAB
ANION GAP SERPL CALCULATED.3IONS-SCNC: 4 MMOL/L (ref 4–13)
BASOPHILS # BLD AUTO: 0.06 THOUSANDS/ΜL (ref 0–0.1)
BASOPHILS NFR BLD AUTO: 0 % (ref 0–1)
BUN SERPL-MCNC: 9 MG/DL (ref 5–25)
CALCIUM SERPL-MCNC: 8.4 MG/DL (ref 8.3–10.1)
CHLORIDE SERPL-SCNC: 104 MMOL/L (ref 100–108)
CO2 SERPL-SCNC: 26 MMOL/L (ref 21–32)
CREAT SERPL-MCNC: 0.82 MG/DL (ref 0.6–1.3)
EOSINOPHIL # BLD AUTO: 0.48 THOUSAND/ΜL (ref 0–0.61)
EOSINOPHIL NFR BLD AUTO: 3 % (ref 0–6)
ERYTHROCYTE [DISTWIDTH] IN BLOOD BY AUTOMATED COUNT: 19.5 % (ref 11.6–15.1)
FERRITIN SERPL-MCNC: 271 NG/ML (ref 8–388)
GFR SERPL CREATININE-BSD FRML MDRD: 83 ML/MIN/1.73SQ M
GLUCOSE SERPL-MCNC: 104 MG/DL (ref 65–140)
HCT VFR BLD AUTO: 27.8 % (ref 34.8–46.1)
HGB BLD-MCNC: 8.5 G/DL (ref 11.5–15.4)
IMM GRANULOCYTES # BLD AUTO: 0.38 THOUSAND/UL (ref 0–0.2)
IMM GRANULOCYTES NFR BLD AUTO: 2 % (ref 0–2)
IRON SATN MFR SERPL: 9 %
IRON SERPL-MCNC: 15 UG/DL (ref 50–170)
LYMPHOCYTES # BLD AUTO: 0.93 THOUSANDS/ΜL (ref 0.6–4.47)
LYMPHOCYTES NFR BLD AUTO: 5 % (ref 14–44)
MCH RBC QN AUTO: 20.8 PG (ref 26.8–34.3)
MCHC RBC AUTO-ENTMCNC: 30.6 G/DL (ref 31.4–37.4)
MCV RBC AUTO: 68 FL (ref 82–98)
MONOCYTES # BLD AUTO: 2.11 THOUSAND/ΜL (ref 0.17–1.22)
MONOCYTES NFR BLD AUTO: 11 % (ref 4–12)
NEUTROPHILS # BLD AUTO: 14.87 THOUSANDS/ΜL (ref 1.85–7.62)
NEUTS SEG NFR BLD AUTO: 79 % (ref 43–75)
NRBC BLD AUTO-RTO: 0 /100 WBCS
PLATELET # BLD AUTO: 304 THOUSANDS/UL (ref 149–390)
PMV BLD AUTO: 11.5 FL (ref 8.9–12.7)
POTASSIUM SERPL-SCNC: 3.9 MMOL/L (ref 3.5–5.3)
PROCALCITONIN SERPL-MCNC: 0.4 NG/ML
RBC # BLD AUTO: 4.08 MILLION/UL (ref 3.81–5.12)
SODIUM SERPL-SCNC: 134 MMOL/L (ref 136–145)
TIBC SERPL-MCNC: 176 UG/DL (ref 250–450)
VIT B12 SERPL-MCNC: 874 PG/ML (ref 100–900)
WBC # BLD AUTO: 18.83 THOUSAND/UL (ref 4.31–10.16)

## 2021-01-15 PROCEDURE — 83540 ASSAY OF IRON: CPT | Performed by: INTERNAL MEDICINE

## 2021-01-15 PROCEDURE — 85025 COMPLETE CBC W/AUTO DIFF WBC: CPT | Performed by: STUDENT IN AN ORGANIZED HEALTH CARE EDUCATION/TRAINING PROGRAM

## 2021-01-15 PROCEDURE — 84145 PROCALCITONIN (PCT): CPT | Performed by: PHYSICIAN ASSISTANT

## 2021-01-15 PROCEDURE — 71045 X-RAY EXAM CHEST 1 VIEW: CPT

## 2021-01-15 PROCEDURE — 99233 SBSQ HOSP IP/OBS HIGH 50: CPT | Performed by: INTERNAL MEDICINE

## 2021-01-15 PROCEDURE — 99222 1ST HOSP IP/OBS MODERATE 55: CPT | Performed by: INTERNAL MEDICINE

## 2021-01-15 PROCEDURE — 83550 IRON BINDING TEST: CPT | Performed by: INTERNAL MEDICINE

## 2021-01-15 PROCEDURE — 99232 SBSQ HOSP IP/OBS MODERATE 35: CPT | Performed by: INTERNAL MEDICINE

## 2021-01-15 PROCEDURE — 82728 ASSAY OF FERRITIN: CPT | Performed by: INTERNAL MEDICINE

## 2021-01-15 PROCEDURE — 87040 BLOOD CULTURE FOR BACTERIA: CPT | Performed by: INTERNAL MEDICINE

## 2021-01-15 PROCEDURE — 80048 BASIC METABOLIC PNL TOTAL CA: CPT | Performed by: STUDENT IN AN ORGANIZED HEALTH CARE EDUCATION/TRAINING PROGRAM

## 2021-01-15 PROCEDURE — 83020 HEMOGLOBIN ELECTROPHORESIS: CPT | Performed by: INTERNAL MEDICINE

## 2021-01-15 PROCEDURE — 82607 VITAMIN B-12: CPT | Performed by: INTERNAL MEDICINE

## 2021-01-15 RX ADMIN — DOCUSATE SODIUM 100 MG: 100 CAPSULE, LIQUID FILLED ORAL at 09:27

## 2021-01-15 RX ADMIN — DILTIAZEM HYDROCHLORIDE 120 MG: 120 CAPSULE, COATED, EXTENDED RELEASE ORAL at 09:27

## 2021-01-15 RX ADMIN — STANDARDIZED SENNA CONCENTRATE 8.6 MG: 8.6 TABLET ORAL at 09:27

## 2021-01-15 RX ADMIN — METOPROLOL SUCCINATE 100 MG: 100 TABLET, EXTENDED RELEASE ORAL at 09:27

## 2021-01-15 RX ADMIN — DOCUSATE SODIUM 100 MG: 100 CAPSULE, LIQUID FILLED ORAL at 17:39

## 2021-01-15 RX ADMIN — METOPROLOL SUCCINATE 100 MG: 100 TABLET, EXTENDED RELEASE ORAL at 22:08

## 2021-01-15 NOTE — PROGRESS NOTES
CARDIOLOGY PROGRESS NOTE     PATIENT INFORMATION     Name: Soha Hightower   Age & Sex: 65 y o  female   MRN: [id number]  Hospital Stay Days: 12  Unit/Bed#: [facility] 426-01   Encounter: [id number]    ASSESSMENT/PLAN     Principal Problem:    Atrial flutter ([address] )  Active Problems:    Essential hypertension    Pericardial effusion without cardiac tamponade    Mediastinal mass    Pain and swelling of right upper extremity    Hypodense mass of liver    Leukocytosis    Anemia    Fever    Impression:  Ms Ana Maria Ziegler is a 65yo female with PMH of hypertension and breast cancer in 1999 who presented on 1/3 for concern of shortness of breath and was found to be in afib/flutter  Metoprolol dose increased for treatment of arrhythmia resulting in rate control and conversion back to NSR  Patient has continued with paroxysmal afib/flutter while admitted, occasionally with rapid rates  Metoprolol was increased to 100mg BID and patient was started on Cardizem 30mg Q6 hours  Cardizem transitioned to long acting 120mg daily on 1/14      CTA done on presentation was significant for pericardial effusion without tamponade as well as anterior mediastinal mass with pleural effusions  Had pericardial window done on 1/4 with placement of drain which remains in place with significant output  Repeat echo on 1/7 with no residual effusion  Pericardial fluid being retested and patient now s/p IR liver biopsy for evaluation of possible underlying malignancy      Plan:  Pericardial Effusion  CTA and TTE done on 1/3 significant for large pericardial effusion without tamponade  Had pericardial window and drain placement on 1/4  Repeat echo 1/7 with resolution of effusion   CT chest done 1/11 continues to report that mediastinal mass anterior to R atrium is likely cause of effusion  Likely malignant effusion  100cc serosanguinous fluid drained over past 24 hours  S/P IR liver biopsy  With gradual decline in Hgb - tachycardia potentially 2/2 anemia  Will need repeat echo 24 hours after drain removal, and if started on anticoagulation at that time will need repeat echo 2-3 days after initiating anticoagulation  Thoracic surgery managing drain - following peripherally  Will continue drain placement due to high output  Repeat fluid cytology significant for some atypical cells, but no over malignancy  Okay to remove once drainage output less than 50mL in 24 hours  Reached out to thoracic surgery/primary team today regarding potentially starting heparin drip - will follow up     Atrial Fibrillation/Flutter  Patient with multiple episodes of paroxysmal atrial fibrillation/flutter throughout admission   Remains in afib/flutter, rates increased to 130's overnight  Continue Metoprolol 100mg BID and Cardizem 120mg daily  Holding anticoagulation due to pericardial drainage, R arm hematoma, and procedure with IR  Will need anticoagulation after resolution  Reached out to thoracic surgery regarding when to restart  Continue to monitor on telemetry   T/C addition of digoxin bolus if blood pressure cannot tolerate cardizem dosing     SIRS  Patient is tachycardic, with fever overnight, and elevated WBC  CT chest on 1/11 with signs of possible RUL pneumonia  Procal elevated this morning at 0 4  Fever overnight to 100 8, improved with tylenol  Currently patient denying any symptoms of cough  Labs and Blood cultures pending  CXR ordered    Hypertension  Patient on lisinopril 30mg at home  Held due to episodes of hypotension while admitted  BP remains accepatble at 124/63  Will consider adding back Lisinopril, likely at lower dose, if blood pressure elevates      Anemia  Patient with gradual drop in Hgb over admission  Hgb 8 0 this morning  Potentially contributing to tachycardia  Primary team ordered FOBT  Continue to trend H/H  Transfuse for <7 0    SUBJECTIVE     Patient seen and examined   Overnight became tachycardic, remained in atrial fibrillation/flutter but with rapid rates this morning  Patient reports feeling well overall  Reports that she was told she had a fever overnight, and endorsed some chills but no rigors  Denies cough, shortness of breath, chest pain currently  Reports last BM yesterday, and has had "gassy" feelings in her abdomen, but pain improving from prior biopsy  Remains asymptomatic when heart rate elevates  OBJECTIVE     Vitals:    21 2326 01/15/21 0312 01/15/21 0600 01/15/21 0657   BP: (!) 105/48 119/66  124/64   BP Location: Right arm Right arm     Pulse: 90 87  93   Resp: 16 18  18   Temp: 99 4 °F (37 4 °C) 98 9 °F (37 2 °C)  98 3 °F (36 8 °C)   TempSrc: Oral Oral     SpO2: 93% 95%  92%   Weight:   85 2 kg (187 lb 14 4 oz)    Height:          Temperature:   Temp (24hrs), Av 6 °F (37 6 °C), Min:98 3 °F (36 8 °C), Max:101 8 °F (38 8 °C)    Temperature: 98 3 °F (36 8 °C)  Intake & Output:  I/O        07 -  0700 701 - 01/15 0700 01/15 07 -  0700    P  O  820 360     NG/GT 0 0     IV Piggyback       Total Intake(mL/kg) 820 (9 6) 360 (4 2)     Urine (mL/kg/hr) 775 (0 4) 1025 (0 5)     Drains 230 100 40    Total Output 1005 1125 40    Net -185 -765 -40               Weights:   IBW: 54 7 kg    Body mass index is 32 25 kg/m²  Weight (last 2 days)     Date/Time   Weight    01/15/21 06   85 2 (187 9)    21 06   85 3 (188)    21 06   85 1 (187 6)            Physical Exam  Vitals signs reviewed  Constitutional:       General: She is not in acute distress  Appearance: Normal appearance  She is well-developed  She is not ill-appearing or diaphoretic  Comments: Evaluated sitting at edge of bed, appears comfortable   HENT:      Head: Normocephalic and atraumatic  Right Ear: External ear normal       Left Ear: External ear normal       Nose: Nose normal       Mouth/Throat:      Mouth: Mucous membranes are moist       Pharynx: Oropharynx is clear  Eyes:      General: No scleral icterus  Right eye: No discharge  Left eye: No discharge  Conjunctiva/sclera: Conjunctivae normal    Cardiovascular:      Rate and Rhythm: Tachycardia present  Rhythm irregular  Heart sounds: Normal heart sounds  No murmur  Pulmonary:      Effort: Pulmonary effort is normal  No respiratory distress  Breath sounds: Examination of the right-upper field reveals decreased breath sounds  Examination of the right-middle field reveals decreased breath sounds  Decreased breath sounds present  No wheezing  Chest:      Comments: Drain in place with blood tinged serosanguinous fluid  Abdominal:      General: Bowel sounds are normal  There is no distension  Palpations: Abdomen is soft  Tenderness: There is no abdominal tenderness  Musculoskeletal: Normal range of motion  General: Swelling (legs and R upper ext 2/2 hematoma) present  No tenderness  Right lower leg: Edema present  Left lower leg: Edema present  Comments: Swelling of R upper arm 2/2 hematoma - improving   Skin:     General: Skin is warm and dry  Coloration: Skin is not jaundiced  Neurological:      General: No focal deficit present  Mental Status: She is alert, oriented to person, place, and time and easily aroused  Cranial Nerves: No cranial nerve deficit  Motor: No weakness  Psychiatric:         Mood and Affect: Mood normal          Behavior: Behavior normal  Behavior is cooperative  Thought Content: Thought content normal        LABORATORY DATA     Labs: I have personally reviewed pertinent reports      Results from last 7 days   Lab Units 01/14/21 2027 01/14/21  0741 01/12/21  0536 01/10/21  0455   WBC Thousand/uL  --  16 41* 12 48* 10 37*   HEMOGLOBIN g/dL 8 6* 8 0* 8 5* 8 8*   HEMATOCRIT % 29 3* 26 9* 28 3* 28 7*   PLATELETS Thousands/uL  --  241 237 193   NEUTROS PCT %  --  76*  --   --    MONOS PCT %  --  12  --   --       Results from last 7 days   Lab Units 01/14/21  0626 01/12/21  0536 01/10/21  0736   POTASSIUM mmol/L 4 2 3 9 4 4   CHLORIDE mmol/L 107 107 111*   CO2 mmol/L 26 29 29   BUN mg/dL 8 9 12   CREATININE mg/dL 0 81 0 71 0 84   CALCIUM mg/dL 8 1* 8 2* 8 6   ALK PHOS U/L 69  --   --    ALT U/L 21  --   --    AST U/L 15  --   --               Results from last 7 days   Lab Units 01/13/21  0452   INR  1 42*               IMAGING & DIAGNOSTIC TESTING     Radiology Results: I have personally reviewed pertinent reports  Xr Chest Portable    Result Date: 1/5/2021  Impression: Pericardial drain placement with slight decrease in size of the cardiac silhouette  Small effusions and bibasilar atelectasis  Workstation performed: [id number]     Xr Chest 1 View Portable    Result Date: 1/3/2021  Impression: Marked enlargement of the cardiac silhouette which could be due to cardiomegaly or a pericardial effusion  Right middle lobe pneumonia  Workstation performed: [id number]      [employer] Limited    Result Date: 1/8/2021  Impression: Previously noted collection is contiguous with the bicipital groove, and the long head biceps tendon is not visualized within the bicipital groove  Findings most likely represent proximal tendon tear with distal retraction  If collection does not resolve spontaneously (within 1-3 months), recommend right humeral MRI with contrast as hemorrhagic neoplasm is within the differential  The study was marked in EPIC for significant notification  Workstation performed: [id number]     Cta Chest Pe Study    Result Date: 1/3/2021  Impression: Large pericardial effusion measuring approximately 3 3 cm area of bulging of the right pericardium or a potential adjacent right mediastinal mass measuring approximately 6 6 cm #2/100  The pericardial effusion may be on a malignant basis  Prominent diffuse mediastinal adenopathy Interlobular septal thickening and small pleural effusions in keeping with pulmonary edema   Posterior right upper lobe consolidation #3/55 in a wedge-shaped appearance with a apex extending towards the right hilum may represent pneumonia or a postobstructive pneumonitis from a central mass  A discrete mass is not identified but could be obscured  Innumerable vague hypodensities throughout the liver are suspicious for metastases  I personally discussed this study with Nicki Coleman on 1/3/2021 at 5:18 PM  Workstation performed: [id number]     Us Extremity Soft Tissue    Result Date: 1/8/2021  Impression: Complex fluid collection in the medial right upper arm with hematocrit level in keeping with hematoma  Given the location, the possibility of associated underlying biceps tendon rupture is not excluded  This can be evaluated with MSK shoulder ultrasound if clinically indicated  Recommend follow-up ultrasound in 1 month to confirm resolution  The study was marked in [facility] for immediate notification  Workstation performed: [id number]     Other Diagnostic Testing: I have personally reviewed pertinent reports        ACTIVE MEDICATIONS     Current Facility-Administered Medications   Medication Dose Route Frequency    acetaminophen (TYLENOL) tablet 650 mg  650 mg Oral Q6H PRN    aluminum-magnesium hydroxide-simethicone (MYLANTA) oral suspension 30 mL  30 mL Oral Q6H PRN    diltiazem (CARDIZEM CD) 24 hr capsule 120 mg  120 mg Oral Daily    docusate sodium (COLACE) capsule 100 mg  100 mg Oral BID    HYDROmorphone (DILAUDID) injection 0 2 mg  0 2 mg Intravenous Q4H PRN    HYDROmorphone (DILAUDID) injection 0 2 mg  0 2 mg Intravenous Once    metoprolol succinate (TOPROL-XL) 24 hr tablet 100 mg  100 mg Oral Q12H [facility]PH[facility]IE    ondansetron (ZOFRAN) injection 4 mg  4 mg Intravenous Q6H PRN    oxyCODONE (ROXICODONE) IR tablet 2 5 mg  2 5 mg Oral Q4H PRN    oxyCODONE (ROXICODONE) IR tablet 5 mg  5 mg Oral Q4H PRN    senna (SENOKOT) tablet 8 6 mg  1 tablet Oral Daily     VTE Pharmacologic Prophylaxis: Reason for no pharmacologic prophylaxis hematoma, liver biopsy, pericardial effusion  VTE Mechanical Prophylaxis: sequential compression device    ==  Olvin Moyer DO  Internal Medicine Resident, PGY-1  [address] [facility]

## 2021-01-15 NOTE — ASSESSMENT & PLAN NOTE
· Patient with remote history of left breast cancer status post mastectomy over 20 years ago  · No chemotherapy or radiation  · Anterior mediastinal mass seen on CT scan with diffuse mediastinal adenopathy, Innumerable vague hypodensities throughout the liver are suspicious for metastases  · Unclear etiology  · Thoracic surgery is following   · s/p IR biopsy for pericardial fluid pathology on 1/4/21, shows atypical reactive cells  · Thoracic surgery consulted Interventional Radiology-status post liver biopsy  · Communicated with thoracic surgery    Will consult Oncology regarding abnormal pathology

## 2021-01-15 NOTE — PROGRESS NOTES
Progress Note - Rosemarie Ya 1950, 65 y o  female MRN: [id number]    Unit/Bed#: [facility] 426-01 Encounter: [id number]    Primary Care Provider: Farzana Garrido MD   Date and time admitted to hospital: 1/3/2021  8:26 AM        * Atrial flutter ([address] )  Assessment & Plan  · Patient was brought to ED by has been due to coughing, lower extremity edema and dyspnea  · EKG showed atrial flutter, QTc prolongation  Her son passed away on 12/21/2020  · Patient was evaluated by Cardiology  · Cardiac echo with large pericardial effusion without tamponade, EF 65%  · Initially was treated with Cardizem drip, Currently off Cardizem drip,   · on p o  Metoprolol  · Has a PICC line due to difficult IV access  · Due to possible right upper extremity hematoma or proximal biceps tendon tear, currently heparin drip is on hold  · Will need AC once pericardial drain is removed and decision on mediastinal biopsy and cleared by thoracic surgery  Note plans by thoracic surgery to consult Interventional Radiology  NOTE INTERVENTIONAL RADIOLOGY INPUT-proceed with liver biopsy for tissue sampling   Fever  Assessment & Plan  Differential would include occult blood stream infection, versus tumor fever versus pneumonia   Rule out occult bloodstream infection-check blood cultures  Rule out respiratory process-chest x-ray negative  To consider CT of chest in next 24 hours of fever continues  Follow up on sputum culture  Will consult Oncology regarding tumor burden  Monitor output from drain      Anemia  Assessment & Plan  Multifactorial  Patient with hematoma over extremity  Monitor drainage  Possibly component of anemia of chronic disease    Rule out occult GI blood loss  Serial hemoglobin hematocrit  Heme check stools  Drop in hemoglobin may be partially contributing to  tachycardia  Leukocytosis  Assessment & Plan  Possible stress reaction from procedure  Will follow-up with serial labs    Monitor temperature curve    Hypodense mass of liver  Assessment & Plan  CT on admission shows: Innumerable vague hypodensities throughout the liver suspicious for metastases  Biopsy liver as per Interventional Radiology    Pain and swelling of right upper extremity  Assessment & Plan  Right upper extremity swelling noted   Likely hematoma  Unable to obtain CT scan due to not having IV access  US obtained that shows possible hematoma vs  Proximal biceps tendon repair  General surgery consult appreciated- recommends conservative managment and holding AC  Patient does not have motor strength loss  neurovascular intact  Pain is significantly improved since last night  Patient is able to extend her shoulder, slightly limited due to pain  At this point, patient will follow up outpatient  If persistent in 3 months, may obtain MRI    Mediastinal mass  Assessment & Plan  · Patient with remote history of left breast cancer status post mastectomy over 20 years ago  · No chemotherapy or radiation  · Anterior mediastinal mass seen on CT scan with diffuse mediastinal adenopathy, Innumerable vague hypodensities throughout the liver are suspicious for metastases  · Unclear etiology  · Thoracic surgery is following   · s/p IR biopsy for pericardial fluid pathology on 1/4/21, shows atypical reactive cells  · Thoracic surgery consulted Interventional Radiology-status post liver biopsy  · Communicated with thoracic surgery    Will consult Oncology regarding abnormal pathology        Pericardial effusion without cardiac tamponade  Assessment & Plan  Evaluated by thoracic surgery  Underwent pericardial drain placement 01/04/2021  Note thoracic surgery input  Repeat echo shows no residual pericardial effusion  Monitor drain output    Essential hypertension  Assessment & Plan  Continue metoprolol  Hold lisinopril for now  Continue to monitor      VTE Pharmacologic Prophylaxis:   Pharmacologic: will kota thoracic timing of ac  Mechanical VTE Prophylaxis in Place: No    Patient Centered Rounds: I have performed bedside rounds with nursing staff today  Time Spent for Care: 15 minutes  More than 50% of total time spent on counseling and coordination of care as described above  Current Length of Stay: 12 day(s)    Current Patient Status: Inpatient   Certification Statement: The patient will continue to require additional inpatient hospital stay due to Monitor symptoms      Code Status: Level 1 - Full Code      Subjective:   No acute distress    Objective:     Vitals:   Temp (24hrs), Av 4 °F (37 4 °C), Min:98 3 °F (36 8 °C), Max:101 8 °F (38 8 °C)    Temp:  [98 3 °F (36 8 °C)-101 8 °F (38 8 °C)] 98 7 °F (37 1 °C)  HR:  [] 126  Resp:  [16-20] 20  BP: (105-124)/(45-70) 117/70  SpO2:  [91 %-95 %] 93 %  Body mass index is 32 25 kg/m²  Input and Output Summary (last 24 hours): Intake/Output Summary (Last 24 hours) at 1/15/2021 1227  Last data filed at 1/15/2021 0929  Gross per 24 hour   Intake 420 ml   Output 665 ml   Net -245 ml       Physical Exam:     Physical Exam  HENT:      Head: Normocephalic and atraumatic  Cardiovascular:      Rate and Rhythm: Normal rate  Musculoskeletal:         General: No tenderness  Neurological:      Mental Status: She is alert  Additional Data:     Labs:    Results from last 7 days   Lab Units 21  0741   WBC Thousand/uL  --  16 41*   HEMOGLOBIN g/dL 8 6* 8 0*   HEMATOCRIT % 29 3* 26 9*   PLATELETS Thousands/uL  --  241   NEUTROS PCT %  --  76*   LYMPHS PCT %  --  7*   MONOS PCT %  --  12   EOS PCT %  --  3     Results from last 7 days   Lab Units 21  0626   POTASSIUM mmol/L 4 2   CHLORIDE mmol/L 107   CO2 mmol/L 26   BUN mg/dL 8   CREATININE mg/dL 0 81   CALCIUM mg/dL 8 1*   ALK PHOS U/L 69   ALT U/L 21   AST U/L 15     Results from last 7 days   Lab Units 21  0452   INR  1 42*       * I Have Reviewed All Lab Data Listed Above    * Additional Pertinent Lab Tests Reviewed: All Labs Within Last 24 Hours Reviewed        Recent Cultures (last 7 days):           Last 24 Hours Medication List:   Current Facility-Administered Medications   Medication Dose Route Frequency Provider Last Rate    acetaminophen  650 mg Oral Q6H PRN Carry DAVID Monroe      aluminum-magnesium hydroxide-simethicone  30 mL Oral Q6H PRN Bienvenido Frausto MD      diltiazem  120 mg Oral Daily Rhonda Berry PA-C      diltiazem  30 mg Oral Q6H PRN Sloane Quiñonez MD      docusate sodium  100 mg Oral BID Bienvenido Frausto MD      HYDROmorphone  0 2 mg Intravenous Q4H PRN Carry DAVID Monroe      HYDROmorphone  0 2 mg Intravenous Once Carry DAVID Monroe      metoprolol succinate  100 mg Oral Q12H [address] Jase Troncoso DO      ondansetron  4 mg Intravenous Q6H PRN Bienvenido Frausto MD      oxyCODONE  2 5 mg Oral Q4H PRN Carry DAVID Monroe      oxyCODONE  5 mg Oral Q4H PRN Carry DAVID Monroe      senna  1 tablet Oral Daily Bienvenido Frausto MD          Today, Patient Was Seen By: Bianca Brown DO    ** Please Note: Dictation voice to text software may have been used in the creation of this document   **

## 2021-01-15 NOTE — ASSESSMENT & PLAN NOTE
Differential would include occult blood stream infection, versus tumor fever versus pneumonia   Rule out occult bloodstream infection-check blood cultures  Rule out respiratory process-chest x-ray negative  To consider CT of chest in next 24 hours of fever continues    Follow up on sputum culture  Will consult Oncology regarding tumor burden  Monitor output from drain

## 2021-01-15 NOTE — CONSULTS
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:  She status post IR guided biopsy of 1 of the liver lesions on 01/13/2021  The pathology is still pending  The patient was told that we will discuss the etiology of her metastatic disease once the pathology is available, hopefully early next week  We will then discuss treatment options  She lives in [place] and would like to see an oncologist in that area  2  She has chronic microcytic anemia 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  Limited workup will be done during this hospitalization  Reason for consultation:  Mediastinal mass, liver metastasis of unknown pathology  History of present illness: This is a 66-year-old female with history of hypertension and chronic microcytic anemia  The patient was admitted to the hospital on 01/03/2021 due to significant shortness of breath at rest   She was then evaluated with CTA of the chest on 01/03/2021 which was negative for PE  However, she was found to have large pericardial effusion with mediastinal mass measuring 6 6 cm along with diffuse mediastinal adenopathy  She underwent subxiphoid pericardial window on 01/04/2021 with continued high output from her drain  Cytology from the pericardial fluid showed atypical cellular changes  Another CT scan of the chest with contrast on 01/11/2021 and showed metastatic mediastinal mass measuring 5 5 cm with innumerable hepatic metastasis  IR guided biopsy from 1 of the liver lesions was done on 01/13/2021 the pathology is still pending  Patient's most recent CBC from this morning showed hemoglobin of 8 5 with MCV of 68  Her white cell count seems to be above normal at 18 8 with mainly neutrophilia and mild monocytosis    Her platelet count is normal 304  Review of Systems:   Review of Systems   Constitutional: Positive for activity change, appetite change and fatigue  Negative for chills and fever  HENT: Positive for hearing loss  Negative for ear pain and sore throat  Eyes: Negative for pain and visual disturbance  Respiratory: Positive for cough and shortness of breath (At rest and on minimal activities)  Cardiovascular: Negative for chest pain and palpitations  Gastrointestinal: Positive for abdominal pain  Negative for vomiting  Genitourinary: Negative for dysuria and hematuria  Musculoskeletal: Negative for arthralgias and back pain  Skin: Negative for color change and rash  Neurological: Negative for seizures and syncope  All other systems reviewed and are negative  Past Medical History:   Diagnosis Date    Arthritis     Hypertension        Past Surgical History:   Procedure Laterality Date    IR BIOPSY LIVER MASS  1/13/2021    MASTECTOMY      left     PERICARDIAL WINDOW N/A 1/4/2021    Procedure: WINDOW PERICARDIAL, subxiphoid ;  Surgeon: Caryn Hemphill MD;  Location: [facility];  Service: Thoracic       History reviewed  No pertinent family history      Social History     Socioeconomic History    Marital status: /Civil Union     Spouse name: None    Number of children: None    Years of education: None    Highest education level: None   Occupational History    None   Social Needs    Financial resource strain: None    Food insecurity     Worry: None     Inability: None    Transportation needs     Medical: None     Non-medical: None   Tobacco Use    Smoking status: Never Smoker    Smokeless tobacco: Never Used   Substance and Sexual Activity    Alcohol use: Yes     Frequency: Monthly or less     Drinks per session: 1 or 2     Binge frequency: Never    Drug use: Never    Sexual activity: None   Lifestyle    Physical activity     Days per week: None     Minutes per session: None    Stress: None   Relationships    Social connections     Talks on phone: None     Gets together: None     Attends Buddhism service: None     Active member of club or organization: None     Attends meetings of clubs or organizations: None     Relationship status: None    Intimate partner violence     Fear of current or ex partner: None     Emotionally abused: None     Physically abused: None     Forced sexual activity: None   Other Topics Concern    None   Social History Narrative    None         Current Facility-Administered Medications:     acetaminophen (TYLENOL) tablet 650 mg, 650 mg, Oral, Q6H PRN, Zac Sesay PA-C, 650 mg at 01/14/21 2020    aluminum-magnesium hydroxide-simethicone (MYLANTA) oral suspension 30 mL, 30 mL, Oral, Q6H PRN, Tobi Grace MD    diltiazem (CARDIZEM CD) 24 hr capsule 120 mg, 120 mg, Oral, Daily, Rhonda Berry PA-C, 120 mg at 01/15/21 0927    diltiazem (CARDIZEM) tablet 30 mg, 30 mg, Oral, Q6H PRN, Francois Mayers MD    docusate sodium (COLACE) capsule 100 mg, 100 mg, Oral, BID, Tobi Grace MD, 100 mg at 01/15/21 4439    HYDROmorphone (DILAUDID) injection 0 2 mg, 0 2 mg, Intravenous, Q4H PRN, Zac Sesay PA-C, 0 2 mg at 01/09/21 0645    HYDROmorphone (DILAUDID) injection 0 2 mg, 0 2 mg, Intravenous, Once, Stacie SCARLET Munoz PA-C    metoprolol succinate (TOPROL-XL) 24 hr tablet 100 mg, 100 mg, Oral, Q12H [address], Lukas Dubin, DO, 100 mg at 01/15/21 0927    ondansetron (ZOFRAN) injection 4 mg, 4 mg, Intravenous, Q6H PRN, Tobi Grace MD    oxyCODONE (ROXICODONE) IR tablet 2 5 mg, 2 5 mg, Oral, Q4H PRN, Zac Sesay PA-C    oxyCODONE (ROXICODONE) IR tablet 5 mg, 5 mg, Oral, Q4H PRN, Zac Sesay PA-C, 5 mg at 01/13/21 2526    senna (SENOKOT) tablet 8 6 mg, 1 tablet, Oral, Daily, Tobi Grace MD, 8 6 mg at 01/15/21 7842    Medications Prior to Admission   Medication    lisinopril (ZESTRIL) 30 mg tablet    metoprolol tartrate (LOPRESSOR) 25 mg tablet       No Known Allergies      Physical Exam:    /70   Pulse (!) 126   Temp 98 7 °F (37 1 °C)   Resp 20   Ht 5' 4" (1 626 m)   Wt 85 2 kg (187 lb 14 4 oz)   SpO2 93%   BMI 32 25 kg/m²     Physical Exam  Constitutional:       General: She is in acute distress  Appearance: She is well-developed  She is obese  She is ill-appearing and diaphoretic  HENT:      Head: Normocephalic and atraumatic  Nose: Nose normal    Eyes:      General: No scleral icterus  Right eye: No discharge  Left eye: No discharge  Conjunctiva/sclera: Conjunctivae normal       Pupils: Pupils are equal, round, and reactive to light  Neck:      Musculoskeletal: Normal range of motion and neck supple  Thyroid: No thyromegaly  Vascular: No JVD  Trachea: No tracheal deviation  Cardiovascular:      Rate and Rhythm: Regular rhythm  Tachycardia present  Heart sounds: Murmur present  No friction rub  Pulmonary:      Effort: Respiratory distress present  Breath sounds: No stridor  Rhonchi present  No wheezing or rales  Comments: Decreased breath sounds on the bases bilaterally  Chest:      Chest wall: No tenderness  Abdominal:      General: There is distension  Palpations: Abdomen is soft  There is no hepatomegaly or splenomegaly  Tenderness: There is no abdominal tenderness  There is no guarding or rebound  Comments: Tenderness on mild palpation in the right upper quadrant area were the liver biopsy was taken, drain tube has bloody discharge   Musculoskeletal:         General: No tenderness or deformity  Right lower leg: Edema present  Left lower leg: Edema present  Lymphadenopathy:      Cervical: No cervical adenopathy  Skin:     General: Skin is warm  Coloration: Skin is not pale  Findings: No erythema or rash  Neurological:      Mental Status: She is alert and oriented to person, place, and time        Deep Tendon Reflexes: Reflexes are normal and symmetric  Psychiatric:         Behavior: Behavior normal          Thought Content:  Thought content normal          Judgment: Judgment normal          Recent Results (from the past 48 hour(s))   Comprehensive metabolic panel    Collection Time: 01/14/21  6:26 AM   Result Value Ref Range    Sodium 136 136 - 145 mmol/L    Potassium 4 2 3 5 - 5 3 mmol/L    Chloride 107 100 - 108 mmol/L    CO2 26 21 - 32 mmol/L    ANION GAP 3 (L) 4 - 13 mmol/L    BUN 8 5 - 25 mg/dL    Creatinine 0 81 0 60 - 1 30 mg/dL    Glucose 98 65 - 140 mg/dL    Calcium 8 1 (L) 8 3 - 10 1 mg/dL    Corrected Calcium 9 5 8 3 - 10 1 mg/dL    AST 15 5 - 45 U/L    ALT 21 12 - 78 U/L    Alkaline Phosphatase 69 46 - 116 U/L    Total Protein 5 4 (L) 6 4 - 8 2 g/dL    Albumin 2 2 (L) 3 5 - 5 0 g/dL    Total Bilirubin 0 94 0 20 - 1 00 mg/dL    eGFR 84 ml/min/1 73sq m   Procalcitonin    Collection Time: 01/14/21  6:26 AM   Result Value Ref Range    Procalcitonin 0 22 <=0 25 ng/ml   CBC and differential    Collection Time: 01/14/21  7:41 AM   Result Value Ref Range    WBC 16 41 (H) 4 31 - 10 16 Thousand/uL    RBC 3 90 3 81 - 5 12 Million/uL    Hemoglobin 8 0 (L) 11 5 - 15 4 g/dL    Hematocrit 26 9 (L) 34 8 - 46 1 %    MCV 69 (L) 82 - 98 fL    MCH 20 5 (L) 26 8 - 34 3 pg    MCHC 29 7 (L) 31 4 - 37 4 g/dL    RDW 19 5 (H) 11 6 - 15 1 %    Platelets 213 753 - 105 Thousands/uL    nRBC 0 /100 WBCs    Neutrophils Relative 76 (H) 43 - 75 %    Immat GRANS % 2 0 - 2 %    Lymphocytes Relative 7 (L) 14 - 44 %    Monocytes Relative 12 4 - 12 %    Eosinophils Relative 3 0 - 6 %    Basophils Relative 0 0 - 1 %    Neutrophils Absolute 12 50 (H) 1 85 - 7 62 Thousands/µL    Immature Grans Absolute 0 27 (H) 0 00 - 0 20 Thousand/uL    Lymphocytes Absolute 1 16 0 60 - 4 47 Thousands/µL    Monocytes Absolute 1 89 (H) 0 17 - 1 22 Thousand/µL    Eosinophils Absolute 0 53 0 00 - 0 61 Thousand/µL    Basophils Absolute 0 06 0 00 - 0 10 Thousands/µL   Hemoglobin and hematocrit, blood    Collection Time: 01/14/21  5:42 PM   Result Value Ref Range    Hemoglobin      Hematocrit     Hemoglobin and hematocrit, blood    Collection Time: 01/14/21  8:27 PM   Result Value Ref Range    Hemoglobin 8 6 (L) 11 5 - 15 4 g/dL    Hematocrit 29 3 (L) 34 8 - 46 1 %   Procalcitonin Reflex    Collection Time: 01/15/21  4:39 AM   Result Value Ref Range    Procalcitonin 0 40 (H) <=0 25 ng/ml       Xr Chest Portable    Result Date: 1/15/2021  Narrative: CHEST INDICATION:   fever, possible pna, elevated procal  COMPARISON:  Chest radiographs January 5, 2021 and CT chest January 11, 2021  EXAM PERFORMED/VIEWS:  XR CHEST PORTABLE  AP semierect Images: 1 FINDINGS: The heart is enlarged  Atherosclerotic changes within the aortic arch  Large bore drainage catheter in the pericardial space  Lungs are fairly well aerated  Small bilateral pleural effusions, right side greater than left  Persistent infiltrates in the right middle lobe and lower lobes bilaterally, right side greater than left  Age-appropriate degenerative changes are noted in the spine  Degenerative changes in the shoulders bilaterally  Postoperative changes cervical spine  Right upper extremity PICC line with tip in superior vena cava  Impression: Stable appearance of the chest  Workstation performed: [id number]     Xr Chest Portable    Result Date: 1/5/2021  Narrative: CHEST INDICATION:   Assess pericardial drain placement  COMPARISON:  Chest radiograph and CT from 1/31/2020  EXAM PERFORMED/VIEWS:  XR CHEST PORTABLE FINDINGS: Slight decrease in size of the cardiac silhouette cardiac silhouette with pericardial drain  No change in small effusions and bibasilar atelectasis  No pneumothorax  Osseous structures appear within normal limits for patient age  Cervicothoracic fusion  Impression: Pericardial drain placement with slight decrease in size of the cardiac silhouette  Small effusions and bibasilar atelectasis   Workstation performed: [id number]     Xr Chest 1 View Portable    Result Date: 1/3/2021  Narrative: CHEST INDICATION:   dyspnea  COMPARISON:  Chest radiograph from 2/6/2006  EXAM PERFORMED/VIEWS:  XR CHEST PORTABLE FINDINGS: Marked enlargement of the cardiac silhouette  Right middle lobe consolidation  No effusion or pneumothorax  Osseous structures appear within normal limits for patient age  Cervicothoracic fusion  Impression: Marked enlargement of the cardiac silhouette which could be due to cardiomegaly or a pericardial effusion  Right middle lobe pneumonia  Workstation performed: [id number]     Ct Chest W Contrast    Result Date: 1/11/2021  Narrative: CT CHEST WITH IV CONTRAST INDICATION:   mediastinal mass  Remote history of breast cancer status post left mastectomy 20 years ago  COMPARISON:  CT scan 1/3/2021 TECHNIQUE: CT examination of the chest was performed  Axial, sagittal, and coronal 2D reformatted images were created from the source data and submitted for interpretation  Radiation dose length product (DLP) for this visit:  423 28 mGy-cm   This examination, like all CT scans performed in the [facility], was performed utilizing techniques to minimize radiation dose exposure, including the use of iterative  reconstruction and automated exposure control  IV Contrast:  85 mL of iohexol (OMNIPAQUE) FINDINGS: LUNGS:  Consolidation in the posterior right upper lobe reidentified suspicious for pneumonia  Bibasilar atelectasis  No endotracheal or endobronchial lesion  PLEURA:  Small right pleural effusion  Trace left effusion  HEART/GREAT VESSELS:  Cardiomegaly  Pericardial drain has been placed  Residual mass anterior to the right atrium measuring approximately 4 5 x 5 5 cm suspicious for metastatic disease  MEDIASTINUM AND ROLANDA:  Lymphadenopathy in the prevascular space measuring up to 1 5 cm  CHEST WALL AND LOWER NECK:   Status post left mastectomy   VISUALIZED STRUCTURES IN THE UPPER ABDOMEN: Innumerable hypodense lesions throughout the liver consistent with metastatic disease the largest in the right lobe measures 3 4 x 3 4 cm  Upper abdomen otherwise unremarkable  OSSEOUS STRUCTURES:  No acute fracture or destructive osseous lesion  Impression: Findings suspicious for metastatic mediastinal mass measuring 4 5 x 5 5 cm anterior to the right atrium, likely the cause of the pericardial effusion  Innumerable hepatic metastases  Right upper lobe consolidation suspicious for pneumonia with right-sided pleural effusion  Workstation performed: [id number]     Ir Biopsy Liver Mass    Result Date: 1/13/2021  Narrative: Ultrasound-guided biopsy of liver mass Clinical History: Mediastinal mass, pericardial effusion  Liver masses  Biopsy for diagnosis  Distant history of breast cancer Conscious sedation time: 45 minutes Technique: The patient was seen in the holding area and identified verbally and by wristband  After discussion of the procedure and its details, risks, benefits and alternatives, both verbal and written informed consent were obtained from the patient  The patient was then brought to the ultrasound area and placed supine on the table  After a brief ultrasound examination was performed of the abdomen and correlated with prior imaging, a site on the right lateral abdomen was prepped and draped in usual sterile fashion  Sterile ultrasound technique with sterile gel and sterile probe covers was also utilized  A preprocedure timeout was performed per standard department protocol  Under sonographic guidance, lidocaine was administered to the skin and underlying soft tissues  A [employer] 18-gauge biopsy needle set was advanced into the peripheral mass under direct ultrasound guidance  The inner stylette was removed and an 18-gauge biopsy needle was advanced into the lesion and core samples obtained under continuous sonographic guidance  The needle was repositioned and additional samples taken  Fluid was aspirated and placed directly in CytoLyt  D-Stat was administered under ultrasound to the biopsy tract for hemostatic purposes  The patient tolerated the procedure well and suffered no immediate complications  Findings: Hypoechoic liver mass in the right inferior liver measuring at least 1 2 cm  Needle at the margin of the lesion  Cores obtained from this location  Needle through the midportion of the lesion  4 cores obtained from this location  8 mL of thick bloody fluid obtained from the center of the lesion through the outer needle  Final imaging with postbiopsy changes, no hematoma Specimen: Touch prep, 18-gauge core x6, fluid for cytology     Impression: Impression: Ultrasound-guided biopsy of a hypoechoic liver mass  Imaging appearance is concerning for a partially necrotic mass  Workstation performed: [id number]     Us Msk Limited    Result Date: 1/8/2021  Narrative: Right upper arm ULTRASOUND INDICATION:   shoulder US to rule out beceps tendon rupture  TERESE CAVANAUGH's note reviewed, patient admitted with atrial flutter, noticed upper extremity swelling overnight COMPARISON:  Ultrasound from earlier today TECHNIQUE:   Real-time ultrasound of the right upper was performed with a linear transducer with both volumetric sweeps and still imaging techniques  FINDINGS:  Focused ultrasound performed of the right upper arm  The long head biceps tendon is not visualized within the bicipital groove as noted on image 3  The previously noted collection appears contiguous with the bicipital groove  Please note that the biceps muscle belly was unable to be evaluated in the midportion due to patient's overlying PICC  The distal biceps muscle appears edematous, the distal tendon was unable to be distinctly visualized, again due to the overlying PICC limiting visualization       Impression: Previously noted collection is contiguous with the bicipital groove, and the long head biceps tendon is not visualized within the bicipital groove  Findings most likely represent proximal tendon tear with distal retraction  If collection does not resolve spontaneously (within 1-3 months), recommend right humeral MRI with contrast as hemorrhagic neoplasm is within the differential  The study was marked in EPIC for significant notification  Workstation performed: [id number]     Cta Chest Pe Study    Result Date: 1/3/2021  Narrative: CTA - CHEST WITH IV CONTRAST - PULMONARY ANGIOGRAM INDICATION:   PE suspected, intermediate prob, positive D-dimer tachycardia and d dimer elevation  COMPARISON: None  TECHNIQUE: CTA examination of the chest was performed using angiographic technique according to a protocol specifically tailored to evaluate for pulmonary embolism  Axial, sagittal, and coronal 2D reformatted images were created from the source data and  submitted for interpretation  In addition, coronal 3D MIP postprocessing was performed on the acquisition scanner  Radiation dose length product (DLP) for this visit:  444 53 mGy-cm   This examination, like all CT scans performed in the [facility], was performed utilizing techniques to minimize radiation dose exposure, including the use of iterative  reconstruction and automated exposure control  IV Contrast:  85 mL of iohexol (OMNIPAQUE)  FINDINGS: PULMONARY ARTERIAL TREE:  No pulmonary embolus is seen  LUNGS:  Posterior right upper lobe consolidation #3/55 in a wedge-shaped appearance with a apex extending towards the right hilum may represent pneumonia or a postobstructive pneumonitis from a central mass  A discrete mass is not identified but could be  obscured  No endotracheal or endobronchial lesion  Mild diffuse interlobular septal thickening in keeping with mild pulmonary edema  Areas of bibasilar airspace disease are likely atelectasis  PLEURA:  Small pleural effusions   HEART/GREAT VESSELS:  Large pericardial effusion measuring approximately 3 3 cm at its thickest  Focal area of bulging of the right pericardium or a potential right mediastinal mass measuring approximately 6 6 cm #2/100  No thoracic aortic aneurysm or dissection  MEDIASTINUM AND ROLANDA:  Prominent diffuse mediastinal adenopathy predominantly throughout the left side of the mediastinum  CHEST WALL AND LOWER NECK:   Status post left mastectomy  VISUALIZED STRUCTURES IN THE UPPER ABDOMEN:  Innumerable vague hypodensities throughout the liver suspicious for metastases  OSSEOUS STRUCTURES:  No acute fracture or destructive osseous lesion  Impression: Large pericardial effusion measuring approximately 3 3 cm area of bulging of the right pericardium or a potential adjacent right mediastinal mass measuring approximately 6 6 cm #2/100  The pericardial effusion may be on a malignant basis  Prominent diffuse mediastinal adenopathy Interlobular septal thickening and small pleural effusions in keeping with pulmonary edema  Posterior right upper lobe consolidation #3/55 in a wedge-shaped appearance with a apex extending towards the right hilum may represent pneumonia or a postobstructive pneumonitis from a central mass  A discrete mass is not identified but could be obscured  Innumerable vague hypodensities throughout the liver are suspicious for metastases  I personally discussed this study with Prema Morrison on 1/3/2021 at 5:18 PM  Workstation performed: [id number]     Us Extremity Soft Tissue    Result Date: 1/8/2021  Narrative: SUPERFICIAL SOFT TISSUE ULTRASOUND INDICATION:  right upper extremity swelling, rule out hematoma  COMPARISON:  None TECHNIQUE:   Real-time ultrasound of the right upper extremity was performed with a linear transducer with both volumetric sweeps and still imaging techniques   FINDINGS:  There is a complex irregularly marginated fluid collection in the medial right upper arm with layering echogenic fluid and floating debris, likely due to hematocrit level and blood clot within a hematoma, likely related to anticoagulation/coagulopathy  It measures 8 2 x 5 6 x 7 5 cm No demonstrable hypervascularity  Given the location, the possibility of associated underlying biceps tendon rupture is not excluded  This can be evaluated with MSK shoulder ultrasound if clinically indicated  Impression: Complex fluid collection in the medial right upper arm with hematocrit level in keeping with hematoma  Given the location, the possibility of associated underlying biceps tendon rupture is not excluded  This can be evaluated with MSK shoulder ultrasound if clinically indicated  Recommend follow-up ultrasound in 1 month to confirm resolution  The study was marked in [facility] for immediate notification  Workstation performed: [id number]       Labs and pertinent reports reviewed  This note has been generated by voice recognition software system  Therefore, there may be spelling, grammar, and or syntax errors  Please contact if questions arise  Minor Graham

## 2021-01-15 NOTE — CASE MANAGEMENT
Pt's LOS is 12 days  Pt is not yet medically ready for d/c  Pt still has high drainage output  Pt has no anticipated aftercare needs  Once pt's drain is removed and is cleared for d/c by [facility], then pt will d/c to home w/ no needs from Case Mgt  CM to follow

## 2021-01-15 NOTE — ASSESSMENT & PLAN NOTE
Evaluated by thoracic surgery  Underwent pericardial drain placement 01/04/2021  Note thoracic surgery input  Repeat echo shows no residual pericardial effusion  Monitor drain output

## 2021-01-16 ENCOUNTER — APPOINTMENT (INPATIENT)
Dept: RADIOLOGY | Facility: HOSPITAL | Age: 71
DRG: 270 | End: 2021-01-16
Payer: COMMERCIAL

## 2021-01-16 LAB
ALBUMIN SERPL BCP-MCNC: 2.4 G/DL (ref 3.5–5)
ALP SERPL-CCNC: 77 U/L (ref 46–116)
ALT SERPL W P-5'-P-CCNC: 23 U/L (ref 12–78)
ANION GAP SERPL CALCULATED.3IONS-SCNC: 6 MMOL/L (ref 4–13)
AST SERPL W P-5'-P-CCNC: 21 U/L (ref 5–45)
BASOPHILS # BLD AUTO: 0.08 THOUSANDS/ΜL (ref 0–0.1)
BASOPHILS NFR BLD AUTO: 0 % (ref 0–1)
BILIRUB SERPL-MCNC: 1.24 MG/DL (ref 0.2–1)
BUN SERPL-MCNC: 10 MG/DL (ref 5–25)
CALCIUM ALBUM COR SERPL-MCNC: 9.5 MG/DL (ref 8.3–10.1)
CALCIUM SERPL-MCNC: 8.2 MG/DL (ref 8.3–10.1)
CHLORIDE SERPL-SCNC: 105 MMOL/L (ref 100–108)
CO2 SERPL-SCNC: 26 MMOL/L (ref 21–32)
CREAT SERPL-MCNC: 0.8 MG/DL (ref 0.6–1.3)
EOSINOPHIL # BLD AUTO: 0.56 THOUSAND/ΜL (ref 0–0.61)
EOSINOPHIL NFR BLD AUTO: 3 % (ref 0–6)
ERYTHROCYTE [DISTWIDTH] IN BLOOD BY AUTOMATED COUNT: 19.7 % (ref 11.6–15.1)
GFR SERPL CREATININE-BSD FRML MDRD: 85 ML/MIN/1.73SQ M
GLUCOSE SERPL-MCNC: 73 MG/DL (ref 65–140)
HCT VFR BLD AUTO: 28 % (ref 34.8–46.1)
HGB BLD-MCNC: 8.1 G/DL (ref 11.5–15.4)
IMM GRANULOCYTES # BLD AUTO: 0.37 THOUSAND/UL (ref 0–0.2)
IMM GRANULOCYTES NFR BLD AUTO: 2 % (ref 0–2)
LYMPHOCYTES # BLD AUTO: 0.96 THOUSANDS/ΜL (ref 0.6–4.47)
LYMPHOCYTES NFR BLD AUTO: 5 % (ref 14–44)
MCH RBC QN AUTO: 20 PG (ref 26.8–34.3)
MCHC RBC AUTO-ENTMCNC: 28.9 G/DL (ref 31.4–37.4)
MCV RBC AUTO: 69 FL (ref 82–98)
MONOCYTES # BLD AUTO: 2.03 THOUSAND/ΜL (ref 0.17–1.22)
MONOCYTES NFR BLD AUTO: 11 % (ref 4–12)
NEUTROPHILS # BLD AUTO: 15.3 THOUSANDS/ΜL (ref 1.85–7.62)
NEUTS SEG NFR BLD AUTO: 79 % (ref 43–75)
NRBC BLD AUTO-RTO: 0 /100 WBCS
PLATELET # BLD AUTO: 301 THOUSANDS/UL (ref 149–390)
PMV BLD AUTO: 12 FL (ref 8.9–12.7)
POTASSIUM SERPL-SCNC: 3.8 MMOL/L (ref 3.5–5.3)
PROCALCITONIN SERPL-MCNC: 0.06 NG/ML
PROT SERPL-MCNC: 6.1 G/DL (ref 6.4–8.2)
RBC # BLD AUTO: 4.04 MILLION/UL (ref 3.81–5.12)
SODIUM SERPL-SCNC: 137 MMOL/L (ref 136–145)
WBC # BLD AUTO: 19.3 THOUSAND/UL (ref 4.31–10.16)

## 2021-01-16 PROCEDURE — 99232 SBSQ HOSP IP/OBS MODERATE 35: CPT | Performed by: INTERNAL MEDICINE

## 2021-01-16 PROCEDURE — G1004 CDSM NDSC: HCPCS

## 2021-01-16 PROCEDURE — 74177 CT ABD & PELVIS W/CONTRAST: CPT

## 2021-01-16 PROCEDURE — 84145 PROCALCITONIN (PCT): CPT | Performed by: INTERNAL MEDICINE

## 2021-01-16 PROCEDURE — 71260 CT THORAX DX C+: CPT

## 2021-01-16 PROCEDURE — 80053 COMPREHEN METABOLIC PANEL: CPT | Performed by: INTERNAL MEDICINE

## 2021-01-16 PROCEDURE — 85025 COMPLETE CBC W/AUTO DIFF WBC: CPT | Performed by: INTERNAL MEDICINE

## 2021-01-16 RX ADMIN — METOPROLOL SUCCINATE 100 MG: 100 TABLET, EXTENDED RELEASE ORAL at 09:30

## 2021-01-16 RX ADMIN — STANDARDIZED SENNA CONCENTRATE 8.6 MG: 8.6 TABLET ORAL at 09:30

## 2021-01-16 RX ADMIN — IOHEXOL 100 ML: 350 INJECTION, SOLUTION INTRAVENOUS at 11:47

## 2021-01-16 RX ADMIN — DOCUSATE SODIUM 100 MG: 100 CAPSULE, LIQUID FILLED ORAL at 18:15

## 2021-01-16 RX ADMIN — DOCUSATE SODIUM 100 MG: 100 CAPSULE, LIQUID FILLED ORAL at 09:30

## 2021-01-16 RX ADMIN — METOPROLOL SUCCINATE 100 MG: 100 TABLET, EXTENDED RELEASE ORAL at 21:26

## 2021-01-16 RX ADMIN — DILTIAZEM HYDROCHLORIDE 120 MG: 120 CAPSULE, COATED, EXTENDED RELEASE ORAL at 09:31

## 2021-01-16 NOTE — ASSESSMENT & PLAN NOTE
· Patient was brought to ED by has been due to coughing, lower extremity edema and dyspnea  · EKG showed atrial flutter, QTc prolongation  Her son passed away on 12/21/2020  · Patient was evaluated by Cardiology  · Cardiac echo with large pericardial effusion without tamponade, EF 65%  · Initially was treated with Cardizem drip, Currently off Cardizem drip,   · on p o  Metoprolol  · Has a PICC line due to difficult IV access  · Due to possible right upper extremity hematoma or proximal biceps tendon tear, currently heparin drip is on hold  Will need AC once pericardial drain is removed and decision on mediastinal biopsy and cleared by thoracic surgery  Note plans by thoracic surgery to consult Interventional Radiology  Status post liver biopsy

## 2021-01-16 NOTE — PROGRESS NOTES
Progress Note - Francesca Del Rosario 1950, 65 y o  female MRN: [id number]    Unit/Bed#: [facility] 426-01 Encounter: [id number]    Primary Care Provider: Kb Power MD   Date and time admitted to hospital: 1/3/2021  8:26 AM        * Atrial flutter ([address] )  Assessment & Plan  · Patient was brought to ED by has been due to coughing, lower extremity edema and dyspnea  · EKG showed atrial flutter, QTc prolongation  Her son passed away on 12/21/2020  · Patient was evaluated by Cardiology  · Cardiac echo with large pericardial effusion without tamponade, EF 65%  · Initially was treated with Cardizem drip, Currently off Cardizem drip,   · on p o  Metoprolol  · Has a PICC line due to difficult IV access  · Due to possible right upper extremity hematoma or proximal biceps tendon tear, currently heparin drip is on hold  Will need AC once pericardial drain is removed and decision on mediastinal biopsy and cleared by thoracic surgery  Note plans by thoracic surgery to consult Interventional Radiology  Status post liver biopsy  Fever  Assessment & Plan  Differential would include occult blood stream infection, versus tumor fever versus pneumonia   Rule out occult bloodstream infection-check blood cultures  Rule out respiratory process-chest x-ray negative  To consider CT of chest in next 24 hours of fever continues  Follow up on sputum culture  Will consult Oncology regarding tumor burden  Monitor output from drain      Anemia  Assessment & Plan  Multifactorial  Patient with hematoma over extremity  Monitor drainage  Possibly component of anemia of chronic disease    Rule out occult GI blood loss  Serial hemoglobin hematocrit  Heme check stools  Drop in hemoglobin may be partially contributing to  tachycardia  Leukocytosis  Assessment & Plan  Possible stress reaction from procedure     However patient has worsening leukocytosis in conjunction with fever  Potential etiology would be underlying tumor fever versus occult infection rule out GI process rule out pulmonary process  Rule out worsening infiltrate on lung scan  Follow-up on procalcitonin    Hypodense mass of liver  Assessment & Plan  CT on admission shows: Innumerable vague hypodensities throughout the liver suspicious for metastases  Biopsy liver as per Interventional Radiology    Essential hypertension  Assessment & Plan  Continue metoprolol  Hold lisinopril for now  Continue to monitor        VTE Pharmacologic Prophylaxis:   Pharmacologic will start anticoagulation once drain is able be pulled out  Will need repeat echo 2 days after initiation of anticoagulation to confirm no recurrence of pericardial effusion  Mechanical VTE Prophylaxis in Place: No    Patient Centered Rounds: I have performed bedside rounds with nursing staff today  Time Spent for Care: 15 minutes  More than 50% of total time spent on counseling and coordination of care as described above  Current Length of Stay: 13 day(s)    Current Patient Status: Inpatient   Certification Statement: The patient will continue to require additional inpatient hospital stay due to Need to monitor symptoms        Code Status: Level 1 - Full Code      Subjective:   No acute distress    Objective:     Vitals:   Temp (24hrs), Av 4 °F (37 4 °C), Min:98 7 °F (37 1 °C), Max:100 4 °F (38 °C)    Temp:  [98 7 °F (37 1 °C)-100 4 °F (38 °C)] 98 7 °F (37 1 °C)  HR:  [] 90  Resp:  [14-20] 18  BP: (104-117)/(50-70) 108/51  SpO2:  [93 %-97 %] 95 %  Body mass index is 31 83 kg/m²  Input and Output Summary (last 24 hours): Intake/Output Summary (Last 24 hours) at 2021 0926  Last data filed at 2021 5661  Gross per 24 hour   Intake 600 ml   Output 340 ml   Net 260 ml       Physical Exam:     Physical Exam  Constitutional:       Appearance: Normal appearance  HENT:      Head: Normocephalic and atraumatic        Nose: Nose normal       Mouth/Throat:      Mouth: Mucous membranes are dry  Cardiovascular:      Rate and Rhythm: Normal rate and regular rhythm  Pulses: Normal pulses  Heart sounds: Normal heart sounds  Pulmonary:      Effort: Pulmonary effort is normal       Breath sounds: Normal breath sounds  Abdominal:      General: Abdomen is flat  There is no distension  Palpations: Abdomen is soft  Musculoskeletal:         General: No swelling  Skin:     General: Skin is warm and dry  Neurological:      General: No focal deficit present  Mental Status: She is alert and oriented to person, place, and time  Additional Data:     Labs:    Results from last 7 days   Lab Units 01/16/21  0507   WBC Thousand/uL 19 30*   HEMOGLOBIN g/dL 8 1*   HEMATOCRIT % 28 0*   PLATELETS Thousands/uL 301   NEUTROS PCT % 79*   LYMPHS PCT % 5*   MONOS PCT % 11   EOS PCT % 3     Results from last 7 days   Lab Units 01/16/21  0507   POTASSIUM mmol/L 3 8   CHLORIDE mmol/L 105   CO2 mmol/L 26   BUN mg/dL 10   CREATININE mg/dL 0 80   CALCIUM mg/dL 8 2*   ALK PHOS U/L 77   ALT U/L 23   AST U/L 21     Results from last 7 days   Lab Units 01/13/21  0452   INR  1 42*       * I Have Reviewed All Lab Data Listed Above  * Additional Pertinent Lab Tests Reviewed: All Labs Within Last 24 Hours Reviewed      Recent Cultures (last 7 days):     Results from last 7 days   Lab Units 01/15/21  1242   BLOOD CULTURE  Received in Microbiology Lab  Culture in Progress  Received in Microbiology Lab  Culture in Progress         Last 24 Hours Medication List:   Current Facility-Administered Medications   Medication Dose Route Frequency Provider Last Rate    acetaminophen  650 mg Oral Q6H PRN Gerardo Valerio PA-C      aluminum-magnesium hydroxide-simethicone  30 mL Oral Q6H PRN Guerita Garzon MD      diltiazem  120 mg Oral Daily Rhonda Berry PA-C      diltiazem  30 mg Oral Q6H PRN Chelsea Bauer MD      docusate sodium  100 mg Oral BID Guerita Garzon MD      HYDROmorphone  0 2 mg Intravenous Q4H PRN Jessica Hum, PA-C      HYDROmorphone  0 2 mg Intravenous Once Jessica Hum, PA-C      metoprolol succinate  100 mg Oral Q12H [facility] Andrew Uribe DO      ondansetron  4 mg Intravenous Q6H PRN Krishna Sandoval MD      oxyCODONE  2 5 mg Oral Q4H PRN Jessica Hum, PA-C      oxyCODONE  5 mg Oral Q4H PRN Jessica Hum, PA-C      senna  1 tablet Oral Daily Krishna Sandoval MD          Today, Patient Was Seen By: Javy Mcdaniel DO    ** Please Note: Dictation voice to text software may have been used in the creation of this document   **

## 2021-01-16 NOTE — PROGRESS NOTES
Progress Note - Cardiology   Silvia Wong 65 y o  female MRN: [id number]  Unit/Bed#: [facility] 426-01 Encounter: [id number]  01/16/21  4:01 PM    Impression and Plan:    25-year-old with hypertension, presented with cough, found to be in atrial flutter/fibrillation, echo showed large circumferential pericardial effusion without tamponade   Also with anterior mediastinal mass with hepatic metastasis     Pericardial drainage-1/4/2021 -720 mL of slightly bloody fluid did not suggest malignancy but this is still suspected in the setting of significant volume of drainage  via the pericardial drain   Has had paroxysmal episodes of atrial fibrillation/flutter here , and currently in atrial flutter with rapid rates      Second sample of pericardial fluid also without malignant cells      Plan:     Pericardial effusion:  Suspected to be malignant,  Status post drainage and continues to drain:  Still draining over 140 cc over the last 24 hours   Most recent, echo-1/7 without pericardial effusion  No evidence of pericarditis   Repeat cytology and  index cytology negative for malignancy  Also has liver metastasis with a  tumor wrapped around the SVC, adjacent to the right atrium, now status post liver biopsy of metastatic lesion  Check echo about 24 hrs after pericardial drain is removed-plan is for removal after she consistently range less than 50 cc per day  Pericardial fluid at the time of drainage was slightly bloody  Beau Guillermo was not on anticoagulation coming into the hospital -when she came in in atrial fibrillation but was started on therapeutic Lovenox in the hospital  for her atrial fibrillation    it is not clear if she bled into the pericardial effusion on therapeutic Lovenox or had slightly bloody effusion to begin with      Atrial fibrillation/flutter:  Paroxysmal episodes in the hospital,  heart rates are now controlled on metoprolol 100 b i d   And diltiazem CD 1 20 mg daily from tomorrow  Also added diltiazem 30 mg q 6h p r n  But has not needed any in the last 24 hours      Now status post liver biopsy      Holding anticoagulation in the setting of blood-tinged pericardial drainage    Developing mild anasarca, continue to follow for now, weight overall has come down during her stay     Can start anticoagulation when no further procedures are scheduled and pericardial drain is out with no reaccumulation on echo  And would check another echo about 48 hours after starting anticoagulation to check for recurrence - if not ongoing bleeding or drainage becomes cirrhosis, will consider starting anticoagulation here while she still has a drain in place      ===================================================================    Chief Complaint:   Chief Complaint   Patient presents with    Shortness of Breath     Patient had SOB and lower extremity swelling, found to have atrial flutter at [facility]  Transfer here private for [address] [place] [address]  Subjective/Objective     Subjective:  Denies any complaints, feels    Objective:  No distress at the time of exam    Patient Active Problem List   Diagnosis    Atrial flutter ([address] )    Essential hypertension    Cardiomegaly    Pericardial effusion without cardiac tamponade    Mediastinal mass    Pain and swelling of right upper extremity    Hypodense mass of liver    Leukocytosis    Anemia    Fever       Vitals: /62   Pulse 95   Temp 98 6 °F (37 °C)   Resp 17   Ht 5' 4" (1 626 m)   Wt 84 1 kg (185 lb 6 5 oz)   SpO2 95%   BMI 31 83 kg/m²     I/O this shift:  In: 180 [P O :180]  Out: -   Wt Readings from Last 3 Encounters:   01/16/21 84 1 kg (185 lb 6 5 oz)   01/03/21 89 8 kg (198 lb)       Intake/Output Summary (Last 24 hours) at 1/16/2021 1601  Last data filed at 1/16/2021 0808  Gross per 24 hour   Intake 180 ml   Output 300 ml   Net -120 ml     I/O last 3 completed shifts:   In: 600 [P O :600]  Out: 900 [Urine:725; Drains:175]    Invasive Devices     Peripherally Inserted Central Catheter Line            PICC Line 01/05/21 Right Brachial 11 days          Drain            Closed/Suction Drain Right;Medial Chest Bulb 24 Fr  11 days                  Physical Exam:  GEN: Rosa Bowman appears chronically ill, alert and oriented x 3, pleasant and cooperative   HEENT: pupils equal, round, and reactive to light; extraocular muscles intact  NECK: supple, no carotid bruits or JVD  HEART: irregular rhythm, normal S1 and S2, no murmur, no clicks, gallops or rubs   LUNGS: clear to auscultation bilaterally; no wheezes or rhonchi, no rales  ABDOMEN/GI: normal bowel sounds, soft, no tenderness, no distention  EXTREMITIES/Musculoskeltal: peripheral pulses normal; no clubbing, cyanosis, bilateral lower extremity edema  NEURO: no focal motor findings   SKIN: normal without suspicious lesions on exposed skin              Lab Results:       Results from last 7 days   Lab Units 01/16/21  0507 01/15/21  1243 01/14/21  2027 01/14/21  0741   WBC Thousand/uL 19 30* 18 83*  --  16 41*   HEMOGLOBIN g/dL 8 1* 8 5* 8 6* 8 0*   HEMATOCRIT % 28 0* 27 8* 29 3* 26 9*   PLATELETS Thousands/uL 301 304  --  241         Results from last 7 days   Lab Units 01/16/21  0507 01/15/21  1243 01/14/21  0626   POTASSIUM mmol/L 3 8 3 9 4 2   CHLORIDE mmol/L 105 104 107   CO2 mmol/L 26 26 26   BUN mg/dL 10 9 8   CREATININE mg/dL 0 80 0 82 0 81   CALCIUM mg/dL 8 2* 8 4 8 1*   ALK PHOS U/L 77  --  69   ALT U/L 23  --  21   AST U/L 21  --  15     Results from last 7 days   Lab Units 01/13/21  0452   INR  1 42*       Imaging: I have personally reviewed pertinent reports      EKG/Telemtry:  No events, atrial fibrillation with controlled rates    Scheduled Meds:  Current Facility-Administered Medications   Medication Dose Route Frequency Provider Last Rate    acetaminophen  650 mg Oral Q6H PRN Kurt Mcclain PA-C      aluminum-magnesium hydroxide-simethicone  30 mL Oral Q6H PRN Jere Campuzano MD      diltiazem 120 mg Oral Daily Rhonda Berry PA-C      diltiazem  30 mg Oral Q6H PRN Sherry Lubin MD      docusate sodium  100 mg Oral BID Kristen Pacheco MD      HYDROmorphone  0 2 mg Intravenous Q4H PRN Sarah Torres PA-C      HYDROmorphone  0 2 mg Intravenous Once Sarah Torres PA-C      metoprolol succinate  100 mg Oral Q12H [address] Glenice Free, DO      ondansetron  4 mg Intravenous Q6H PRN Kristen Pacheco MD      oxyCODONE  2 5 mg Oral Q4H PRN Sarah Torres PA-C      oxyCODONE  5 mg Oral Q4H PRN Sarah Torres PA-C      senna  1 tablet Oral Daily Kristen Pacheco MD           This note was completed in part utilizing [employer] direct voice recognition software  Grammatical errors, random word insertion, spelling mistakes, occasional wrong word or "sound-alike" substitutions and incomplete sentences may be an occasional consequence of the system secondary to software limitations, ambient noise and hardware issues  At the time of dictation, efforts were made to edit, clarify and /or correct errors  Please read the chart carefully and recognize, using context, where substitutions have occurred  If you have any questions or concerns about the context, text or information contained within the body of this dictation, please contact myself, the provider, for further clarification

## 2021-01-16 NOTE — ASSESSMENT & PLAN NOTE
Possible stress reaction from procedure  However patient has worsening leukocytosis in conjunction with fever  Potential etiology would be underlying tumor fever versus occult infection rule out GI process rule out pulmonary process  Rule out worsening infiltrate on lung scan  Follow-up on procalcitonin in a m  Note infiltrate noted on CT  Patient does not have any respiratory symptoms currently though  Procalcitonin is negative    Continue to trend white count and temperature curve  To consider Infectious Disease evaluation  Suspect tumor fever

## 2021-01-17 LAB
ANION GAP SERPL CALCULATED.3IONS-SCNC: 3 MMOL/L (ref 4–13)
ANION GAP SERPL CALCULATED.3IONS-SCNC: 5 MMOL/L (ref 4–13)
BASOPHILS # BLD AUTO: 0.05 THOUSANDS/ΜL (ref 0–0.1)
BASOPHILS NFR BLD AUTO: 0 % (ref 0–1)
BUN SERPL-MCNC: 10 MG/DL (ref 5–25)
BUN SERPL-MCNC: 25 MG/DL (ref 5–25)
CALCIUM SERPL-MCNC: 8.4 MG/DL (ref 8.3–10.1)
CALCIUM SERPL-MCNC: 8.6 MG/DL (ref 8.3–10.1)
CHLORIDE SERPL-SCNC: 104 MMOL/L (ref 100–108)
CHLORIDE SERPL-SCNC: 109 MMOL/L (ref 100–108)
CO2 SERPL-SCNC: 27 MMOL/L (ref 21–32)
CO2 SERPL-SCNC: 33 MMOL/L (ref 21–32)
CREAT SERPL-MCNC: 0.74 MG/DL (ref 0.6–1.3)
CREAT SERPL-MCNC: 1.05 MG/DL (ref 0.6–1.3)
EOSINOPHIL # BLD AUTO: 0.82 THOUSAND/ΜL (ref 0–0.61)
EOSINOPHIL NFR BLD AUTO: 5 % (ref 0–6)
ERYTHROCYTE [DISTWIDTH] IN BLOOD BY AUTOMATED COUNT: 19.4 % (ref 11.6–15.1)
GFR SERPL CREATININE-BSD FRML MDRD: 61 ML/MIN/1.73SQ M
GFR SERPL CREATININE-BSD FRML MDRD: 94 ML/MIN/1.73SQ M
GLUCOSE SERPL-MCNC: 101 MG/DL (ref 65–140)
GLUCOSE SERPL-MCNC: 129 MG/DL (ref 65–140)
HCT VFR BLD AUTO: 27.1 % (ref 34.8–46.1)
HGB BLD-MCNC: 8.1 G/DL (ref 11.5–15.4)
IMM GRANULOCYTES # BLD AUTO: 0.27 THOUSAND/UL (ref 0–0.2)
IMM GRANULOCYTES NFR BLD AUTO: 2 % (ref 0–2)
LYMPHOCYTES # BLD AUTO: 0.54 THOUSANDS/ΜL (ref 0.6–4.47)
LYMPHOCYTES NFR BLD AUTO: 3 % (ref 14–44)
MCH RBC QN AUTO: 20.4 PG (ref 26.8–34.3)
MCHC RBC AUTO-ENTMCNC: 29.9 G/DL (ref 31.4–37.4)
MCV RBC AUTO: 68 FL (ref 82–98)
MONOCYTES # BLD AUTO: 1.54 THOUSAND/ΜL (ref 0.17–1.22)
MONOCYTES NFR BLD AUTO: 9 % (ref 4–12)
NEUTROPHILS # BLD AUTO: 13.48 THOUSANDS/ΜL (ref 1.85–7.62)
NEUTS SEG NFR BLD AUTO: 81 % (ref 43–75)
NRBC BLD AUTO-RTO: 0 /100 WBCS
PLATELET # BLD AUTO: 333 THOUSANDS/UL (ref 149–390)
PMV BLD AUTO: 11.4 FL (ref 8.9–12.7)
POTASSIUM SERPL-SCNC: 3.8 MMOL/L (ref 3.5–5.3)
POTASSIUM SERPL-SCNC: 3.9 MMOL/L (ref 3.5–5.3)
PROCALCITONIN SERPL-MCNC: 0.06 NG/ML
RBC # BLD AUTO: 3.97 MILLION/UL (ref 3.81–5.12)
SODIUM SERPL-SCNC: 140 MMOL/L (ref 136–145)
SODIUM SERPL-SCNC: 141 MMOL/L (ref 136–145)
WBC # BLD AUTO: 16.7 THOUSAND/UL (ref 4.31–10.16)

## 2021-01-17 PROCEDURE — 99232 SBSQ HOSP IP/OBS MODERATE 35: CPT | Performed by: INTERNAL MEDICINE

## 2021-01-17 PROCEDURE — 84145 PROCALCITONIN (PCT): CPT | Performed by: INTERNAL MEDICINE

## 2021-01-17 PROCEDURE — 80048 BASIC METABOLIC PNL TOTAL CA: CPT | Performed by: INTERNAL MEDICINE

## 2021-01-17 PROCEDURE — 99024 POSTOP FOLLOW-UP VISIT: CPT | Performed by: THORACIC SURGERY (CARDIOTHORACIC VASCULAR SURGERY)

## 2021-01-17 PROCEDURE — 85025 COMPLETE CBC W/AUTO DIFF WBC: CPT | Performed by: INTERNAL MEDICINE

## 2021-01-17 RX ORDER — DILTIAZEM HYDROCHLORIDE 180 MG/1
180 CAPSULE, COATED, EXTENDED RELEASE ORAL DAILY
Status: DISCONTINUED | OUTPATIENT
Start: 2021-01-18 | End: 2021-01-25

## 2021-01-17 RX ADMIN — METOPROLOL SUCCINATE 100 MG: 100 TABLET, EXTENDED RELEASE ORAL at 20:48

## 2021-01-17 RX ADMIN — DILTIAZEM HYDROCHLORIDE 30 MG: 30 TABLET, FILM COATED ORAL at 22:39

## 2021-01-17 RX ADMIN — DILTIAZEM HYDROCHLORIDE 120 MG: 120 CAPSULE, COATED, EXTENDED RELEASE ORAL at 10:07

## 2021-01-17 RX ADMIN — DILTIAZEM HYDROCHLORIDE 30 MG: 30 TABLET, FILM COATED ORAL at 12:45

## 2021-01-17 RX ADMIN — METOPROLOL SUCCINATE 100 MG: 100 TABLET, EXTENDED RELEASE ORAL at 10:00

## 2021-01-17 NOTE — PROGRESS NOTES
Progress Note - Cardiology   Carolina Guardado 65 y o  female MRN: [id number]  Unit/Bed#: [facility] 426-01 Encounter: [id number]  01/17/21  11:07 AM    Impression and Plan:    57-year-old with hypertension, presented with cough, found to be in atrial flutter/fibrillation, echo showed large circumferential pericardial effusion without tamponade   Also with anterior mediastinal mass with hepatic metastasis     Pericardial drainage-1/4/2021 -720 mL of slightly bloody fluid did not suggest malignancy but this is still suspected in the setting of significant volume of drainage  via the pericardial drain   Has had paroxysmal episodes of atrial fibrillation/flutter here , and currently in atrial flutter with rapid rates      Second sample of pericardial fluid also without malignant cells      Plan:     Pericardial effusion:  Suspected to be malignant but t pericardial fluid samples- cytology was  negative,  Status post drainage and continues to drain:  Still draining over 150 cc over the last 24 hours   Most recent, echo-1/7 without pericardial effusion  No evidence of pericarditis   Repeat cytology and  index cytology negative for malignancy  Also has liver metastasis with a  tumor wrapped around the SVC, adjacent to the right atrium, now status post liver biopsy of metastatic lesion  Plan is to remove pericardial drain when daily drainage is about 50 cc  Check echo about 24 hrs after pericardial drain is removed-plan is for removal after she consistently range less than 50 cc per day    Pericardial fluid at the time of drainage was slightly bloody  Cj Fowler was not on anticoagulation coming into the hospital with the pericardial effusion but since she was in atrial fibrillation but was started on therapeutic Lovenox in the hospital  for her atrial fibrillation but held at the time of the drainage and since   it is not clear if she bled into the pericardial effusion on therapeutic Lovenox or had slightly bloody effusion to begin with      Atrial fibrillation/flutter:  Paroxysmal episodes in the hospital,  heart rates are borderline elevated-on metoprolol 100 b i d  And diltiazem CD 1 20 mg daily  Will give 2 doses of short-acting Cardizem -30 mg today and change to 180 mg daily from tomorrow  While I added diltiazem 30 mg q 6h p r n heart, has not received any     Now status post liver biopsy      Holding anticoagulation in the setting of blood-tinged pericardial drainage    Developing mild anasarca, continue to follow for now, weight overall has come down during her stay    Leukocytosis but without signs of infection     Can start anticoagulation when no further procedures are scheduled and pericardial drain is out with no reaccumulation on echo  And would check another echo about 48 hours after starting anticoagulation to check for recurrence       ===================================================================    Chief Complaint:   Chief Complaint   Patient presents with    Shortness of Breath     Patient had SOB and lower extremity swelling, found to have atrial flutter at [facility]  Transfer here private for [facility]           Subjective/Objective     Subjective:  Denies any complaints, feels    Objective:  No distress at the time of exam    Patient Active Problem List   Diagnosis    Atrial flutter ([address] )    Essential hypertension    Cardiomegaly    Pericardial effusion without cardiac tamponade    Mediastinal mass    Pain and swelling of right upper extremity    Hypodense mass of liver    Leukocytosis    Anemia    Fever       Vitals: /52   Pulse (!) 131   Temp 98 9 °F (37 2 °C)   Resp 14   Ht 5' 4" (1 626 m)   Wt 85 2 kg (187 lb 13 3 oz)   SpO2 95%   BMI 32 24 kg/m²     I/O this shift:  In: 180 [P O :180]  Out: 470 [Urine:400; Drains:70]  Wt Readings from Last 3 Encounters:   01/17/21 85 2 kg (187 lb 13 3 oz)   01/03/21 89 8 kg (198 lb)       Intake/Output Summary (Last 24 hours) at 1/17/2021 1107  Last data filed at 1/17/2021 1000  Gross per 24 hour   Intake 180 ml   Output 920 ml   Net -740 ml     I/O last 3 completed shifts: In: 180 [P O :180]  Out: 510 [Urine:300; Drains:210]    Invasive Devices     Peripherally Inserted Central Catheter Line            PICC Line 01/05/21 Right Brachial 11 days          Drain            Closed/Suction Drain Right;Medial Chest Bulb 24 Fr  12 days                  Physical Exam:  GEN: Werner Auguste appears chronically ill, alert and oriented x 3, pleasant and cooperative   HEENT: pupils equal, round, and reactive to light; extraocular muscles intact  NECK: supple, no carotid bruits or JVD  HEART: irregular rhythm, normal S1 and S2, no murmur, no clicks, gallops or rubs   LUNGS: clear to auscultation bilaterally; no wheezes or rhonchi, no rales  ABDOMEN/GI: normal bowel sounds, soft, no tenderness, no distention  EXTREMITIES/Musculoskeltal: peripheral pulses normal; no clubbing, cyanosis, bilateral lower extremity edema  NEURO: no focal motor findings   SKIN: normal without suspicious lesions on exposed skin              Lab Results:       Results from last 7 days   Lab Units 01/17/21  0955 01/16/21  0507 01/15/21  1243   WBC Thousand/uL 16 70* 19 30* 18 83*   HEMOGLOBIN g/dL 8 1* 8 1* 8 5*   HEMATOCRIT % 27 1* 28 0* 27 8*   PLATELETS Thousands/uL 333 301 304         Results from last 7 days   Lab Units 01/17/21  0955 01/17/21  0642 01/16/21  0507  01/14/21  0626   POTASSIUM mmol/L 3 8 3 9 3 8   < > 4 2   CHLORIDE mmol/L 109* 104 105   < > 107   CO2 mmol/L 27 33* 26   < > 26   BUN mg/dL 10 25 10   < > 8   CREATININE mg/dL 0 74 1 05 0 80   < > 0 81   CALCIUM mg/dL 8 4 8 6 8 2*   < > 8 1*   ALK PHOS U/L  --   --  77  --  69   ALT U/L  --   --  23  --  21   AST U/L  --   --  21  --  15    < > = values in this interval not displayed  Results from last 7 days   Lab Units 01/13/21  0452   INR  1 42*       Imaging: I have personally reviewed pertinent reports  EKG/Telemtry:  No events, atrial fibrillation with borderline elevated rates    Scheduled Meds:  Current Facility-Administered Medications   Medication Dose Route Frequency Provider Last Rate    acetaminophen  650 mg Oral Q6H PRN Sandra Lynn PA-C      aluminum-magnesium hydroxide-simethicone  30 mL Oral Q6H PRN Mercedes Islas MD      diltiazem  120 mg Oral Daily Rhonda Berry PA-C      diltiazem  30 mg Oral Q6H PRN Lesly Mejia MD      docusate sodium  100 mg Oral BID Mercedes Islas MD      HYDROmorphone  0 2 mg Intravenous Q4H PRN Sandra Lynn PA-C      HYDROmorphone  0 2 mg Intravenous Once Sandra Lynn PA-C      metoprolol succinate  100 mg Oral Q12H [address] Marcella Ebbs, DO      ondansetron  4 mg Intravenous Q6H PRN Mercedes Islas MD      oxyCODONE  2 5 mg Oral Q4H PRN Sandra Lynn PA-C      oxyCODONE  5 mg Oral Q4H PRN Sandra Lynn PA-C      senna  1 tablet Oral Daily Mercedes Islas MD           This note was completed in part utilizing [employer] direct voice recognition software  Grammatical errors, random word insertion, spelling mistakes, occasional wrong word or "sound-alike" substitutions and incomplete sentences may be an occasional consequence of the system secondary to software limitations, ambient noise and hardware issues  At the time of dictation, efforts were made to edit, clarify and /or correct errors  Please read the chart carefully and recognize, using context, where substitutions have occurred  If you have any questions or concerns about the context, text or information contained within the body of this dictation, please contact myself, the provider, for further clarification

## 2021-01-17 NOTE — ASSESSMENT & PLAN NOTE
· Patient was brought to ED by has been due to coughing, lower extremity edema and dyspnea  · EKG showed atrial flutter, QTc prolongation  Her son passed away on 12/21/2020  · Patient was evaluated by Cardiology  · Cardiac echo with large pericardial effusion without tamponade, EF 65%  · Initially was treated with Cardizem drip, Currently off Cardizem drip,   · on p o  Metoprolol  · Has a PICC line due to difficult IV access  · Due to possible right upper extremity hematoma or proximal biceps tendon tear, currently heparin drip is on hold     Will need AC once pericardial drain is removed and no further intervention indicated for pericardial disease

## 2021-01-17 NOTE — PROGRESS NOTES
Pericardial drain output is still pretty high at 150 cc for the last 24 hours  Will maintain the pericardial drain for now  Cytology demonstrated some atypical cellular changes but was not definitive for cancer  Tissue biopsy is still pending

## 2021-01-17 NOTE — PROGRESS NOTES
Progress Note - Zhou Graft 1950, 65 y o  female MRN: [id number]    Unit/Bed#: [facility] 426-01 Encounter: [id number]    Primary Care Provider: Armando Samuels MD   Date and time admitted to hospital: 1/3/2021  8:26 AM        * Atrial flutter ([address] )  Assessment & Plan  · Patient was brought to ED by has been due to coughing, lower extremity edema and dyspnea  · EKG showed atrial flutter, QTc prolongation  Her son passed away on 12/21/2020  · Patient was evaluated by Cardiology  · Cardiac echo with large pericardial effusion without tamponade, EF 65%  · Initially was treated with Cardizem drip, Currently off Cardizem drip,   · on p o  Metoprolol  · Has a PICC line due to difficult IV access  · Due to possible right upper extremity hematoma or proximal biceps tendon tear, currently heparin drip is on hold  Will need AC once pericardial drain is removed and no further intervention indicated for pericardial disease      Fever  Assessment & Plan  Differential would include occult blood stream infection, versus tumor fever versus pneumonia   Rule out occult bloodstream infection-check blood cultures  Rule out respiratory process-chest x-ray negative  To consider CT of chest in next 24 hours of fever continues  Follow up on sputum culture  Will consult Oncology regarding tumor burden  Monitor output from drain      Leukocytosis  Assessment & Plan  Possible stress reaction from procedure  However patient has worsening leukocytosis in conjunction with fever  Potential etiology would be underlying tumor fever versus occult infection rule out GI process rule out pulmonary process  Rule out worsening infiltrate on lung scan  Follow-up on procalcitonin in a m  Note infiltrate noted on CT  Patient does not have any respiratory symptoms currently though  Procalcitonin is negative    Continue to trend white count and temperature curve  To consider Infectious Disease evaluation  Suspect tumor fever      Hypodense mass of liver  Assessment & Plan  CT on admission shows: Innumerable vague hypodensities throughout the liver suspicious for metastases  Biopsy liver as per Interventional Radiology    Pain and swelling of right upper extremity  Assessment & Plan  Right upper extremity swelling noted   Likely hematoma  Unable to obtain CT scan due to not having IV access  US obtained that shows possible hematoma vs  Proximal biceps tendon repair  General surgery consult appreciated- recommends conservative managment and holding AC  Patient does not have motor strength loss  neurovascular intact  Pain is significantly improved since last night  Patient is able to extend her shoulder, slightly limited due to pain  At this point, patient will follow up outpatient  If persistent in 3 months, may obtain MRI    Mediastinal mass  Assessment & Plan  · Patient with remote history of left breast cancer status post mastectomy over 20 years ago  · No chemotherapy or radiation  · Anterior mediastinal mass seen on CT scan with diffuse mediastinal adenopathy, Innumerable vague hypodensities throughout the liver are suspicious for metastases  · Unclear etiology  · Thoracic surgery is following   · s/p IR biopsy for pericardial fluid pathology on 1/4/21, shows atypical reactive cells  · Thoracic surgery consulted Interventional Radiology-status post liver biopsy  · Communicated with thoracic surgery  Will consult Oncology regarding abnormal pathology        Pericardial effusion without cardiac tamponade  Assessment & Plan  Evaluated by thoracic surgery  Underwent pericardial drain placement 01/04/2021  Note thoracic surgery input  Repeat echo shows no residual pericardial effusion  Monitor drain output      VTE Pharmacologic Prophylaxis:   Pharmacologic: Risk prohibitive due to bleeding    Likely start anticoagulation once pericardial drain  Mechanical VTE Prophylaxis in Place: No    Patient Centered Rounds: I have performed bedside rounds with nursing staff today  Time Spent for Care: 15 minutes  More than 50% of total time spent on counseling and coordination of care as described above  Current Length of Stay: 14 day(s)    Current Patient Status: Inpatient   Certification Statement: The patient will continue to require additional inpatient hospital stay due to Need to monitor symptoms        Code Status: Level 1 - Full Code      Subjective:   No acute distress    Objective:     Vitals:   Temp (24hrs), Av 7 °F (37 1 °C), Min:98 °F (36 7 °C), Max:99 °F (37 2 °C)    Temp:  [98 °F (36 7 °C)-99 °F (37 2 °C)] 98 9 °F (37 2 °C)  HR:  [] 106  Resp:  [14-18] 14  BP: (116-121)/(54-68) 118/68  SpO2:  [94 %-98 %] 95 %  Body mass index is 32 24 kg/m²  Input and Output Summary (last 24 hours): Intake/Output Summary (Last 24 hours) at 2021 1002  Last data filed at 2021 0900  Gross per 24 hour   Intake 180 ml   Output 850 ml   Net -670 ml       Physical Exam:     Physical Exam    Additional Data:     Labs:    Results from last 7 days   Lab Units 21  0507   WBC Thousand/uL 19 30*   HEMOGLOBIN g/dL 8 1*   HEMATOCRIT % 28 0*   PLATELETS Thousands/uL 301   NEUTROS PCT % 79*   LYMPHS PCT % 5*   MONOS PCT % 11   EOS PCT % 3     Results from last 7 days   Lab Units 21  0642 21  0507   POTASSIUM mmol/L 3 9 3 8   CHLORIDE mmol/L 104 105   CO2 mmol/L 33* 26   BUN mg/dL 25 10   CREATININE mg/dL 1 05 0 80   CALCIUM mg/dL 8 6 8 2*   ALK PHOS U/L  --  77   ALT U/L  --  23   AST U/L  --  21     Results from last 7 days   Lab Units 21  0452   INR  1 42*       * I Have Reviewed All Lab Data Listed Above  * Additional Pertinent Lab Tests Reviewed: All Labs Within Last 24 Hours Reviewed        Recent Cultures (last 7 days):     Results from last 7 days   Lab Units 01/15/21  1242   BLOOD CULTURE  No Growth at 24 hrs  No Growth at 24 hrs         Last 24 Hours Medication List:   Current Facility-Administered Medications   Medication Dose Route Frequency Provider Last Rate    acetaminophen  650 mg Oral Q6H PRN Kristina Reese PA-C      aluminum-magnesium hydroxide-simethicone  30 mL Oral Q6H PRN Zoraida Lozano MD      diltiazem  120 mg Oral Daily Rhonda Berry PA-C      diltiazem  30 mg Oral Q6H PRN Shabbir Cullen MD      docusate sodium  100 mg Oral BID Zoraida Lozano MD      HYDROmorphone  0 2 mg Intravenous Q4H PRN Kristina Reese PA-C      HYDROmorphone  0 2 mg Intravenous Once Kristina Reese PA-C      metoprolol succinate  100 mg Oral Q12H [facility] Chas Bronson DO      ondansetron  4 mg Intravenous Q6H PRN Zoraida Lozano MD      oxyCODONE  2 5 mg Oral Q4H PRN Kristina Reese PA-C      oxyCODONE  5 mg Oral Q4H PRN Kristina Reese PA-C      senna  1 tablet Oral Daily Zoraida Lozano MD          Today, Patient Was Seen By: Yonas Callejas DO    ** Please Note: Dictation voice to text software may have been used in the creation of this document   **

## 2021-01-18 ENCOUNTER — TELEPHONE (OUTPATIENT)
Dept: HEMATOLOGY ONCOLOGY | Facility: CLINIC | Age: 71
End: 2021-01-18

## 2021-01-18 PROCEDURE — 99233 SBSQ HOSP IP/OBS HIGH 50: CPT | Performed by: INTERNAL MEDICINE

## 2021-01-18 PROCEDURE — 99232 SBSQ HOSP IP/OBS MODERATE 35: CPT | Performed by: INTERNAL MEDICINE

## 2021-01-18 RX ADMIN — METOPROLOL SUCCINATE 100 MG: 100 TABLET, EXTENDED RELEASE ORAL at 09:34

## 2021-01-18 RX ADMIN — DILTIAZEM HYDROCHLORIDE 180 MG: 180 CAPSULE, COATED, EXTENDED RELEASE ORAL at 09:34

## 2021-01-18 RX ADMIN — METOPROLOL SUCCINATE 100 MG: 100 TABLET, EXTENDED RELEASE ORAL at 21:56

## 2021-01-18 NOTE — TELEPHONE ENCOUNTER
New Patient GI Form   Patient Details:  Selvin Correa  1950  [id number]     Background Information:  [address] starts by opening a telephone encounter and gathering the following information   Who is calling to schedule and relationship? Provider   To which speciality is the referral?  Medical Oncology   Reason for Visit? 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 Yes   Date of Tissue Diagnosis  (If done outside of [facility] please obtain report and slides)  (If no tissue diagnosis, please stop and discuss with Navigator prior to scheduling)  21   Is patient aware of the diagnosis? Yes   Has Imaging been completed? Yes   If YES, where was the imaging done? (If outside [place] [address] obtain records)  [facility]   Have any endoscopies been done (colonoscopy, EGD, EUS)     If YES where were they performed? (If outside of [facility] obtain the records)     Has blood work been done? Yes   If YES, where was the blood work done? (If outside of [facility] obtain records)  [facility]   Is there a personal history of cancer? (If YES please list type)  No   Is there a family history of cancer? (If YES please list type)     Scheduling Information:   Preferred Abhay Tino   Are there any days the patient cannot be seen? Miscellaneous: Pt  Scheduled with Dr Lucina Wang in [facility] on 21   After completing the above information, please route to finance, nurse navigation and clinical trials for review

## 2021-01-18 NOTE — ASSESSMENT & PLAN NOTE
Right upper extremity swelling noted   Likely hematoma  US obtained that shows possible hematoma vs  Proximal biceps tendon repair  General surgery consult appreciated- recommends conservative managment and holding AC  Patient does not have motor strength loss  neurovascular intact  Pain is significantly improved since last night  Patient is able to extend her shoulder, slightly limited due to pain  At this point, patient will follow up outpatient   If persistent in 3 months, may obtain MRI  Swelling has decreased significantly

## 2021-01-18 NOTE — PROGRESS NOTES
Medical Oncology/Hematology Progress Note  Dimitry Figueroa, female, 65 y  o , 1950,  PPHP 426/PPHP 426-01, [id number]     Assessment and Plan  1  Metastatic gastrointestinal stromal tumor (GIST):  The exact primary is not entirely clear  The patient seems to extensive hepatic metastasis  He was educated about the new diagnosis  I did also discuss the diagnosis with her  over the phone extensively  The patient was told that she has noncurable type of malignancy  However, treatable with one of the tyrosine kinase inhibitors pending mutational analysis  I did discuss the her a pathology with the Dr Hayden Larkin from the pathology department to send the biopsy for genomic testing through [facility]  She will then need to be seen in the Oncology office hopefully next week to discuss oral targeted therapy  2  Mediastinal mass:  Please consider upper endoscopy which may provide more information about the exact primary of GIST  3  Pericardial effusion:  Another pericardial window procedure is planned by the cardiothoracic surgical team     4 Microcytic anemia:  Multifactorial, the hemoglobin electrophoresis is still pending to rule out any hint of hemoglobinopathy  Iron panel came back showing normal ferritin, low iron saturation of 9% and low TIBC which is more compatible with chronic disease  I still think that she would benefit from 1 or 2 doses of Venofer IV to improve her hemoglobin level  Subjective:    Review of Systems   Constitutional: Positive for activity change and fatigue  Negative for chills and fever  HENT: Negative for ear pain and sore throat  Eyes: Negative for pain and visual disturbance  Respiratory: Positive for shortness of breath  Negative for cough  Cardiovascular: Negative for chest pain and palpitations  Gastrointestinal: Negative for abdominal pain and vomiting  Genitourinary: Negative for dysuria and hematuria     Musculoskeletal: Negative for arthralgias and back pain  Skin: Negative for color change and rash  Neurological: Negative for seizures and syncope  All other systems reviewed and are negative  Objective: This is a 40-year-old female with history of hypertension and chronic microcytic anemia  The patient was admitted to the hospital on 01/03/2021 due to significant shortness of breath at rest   She was then evaluated with CTA of the chest on 01/03/2021 which was negative for PE  However, she was found to have large pericardial effusion with mediastinal mass measuring 6 6 cm along with diffuse mediastinal adenopathy  She underwent subxiphoid pericardial window on 01/04/2021 with continued high output from her drain  Cytology from the pericardial fluid showed atypical cellular changes  Another CT scan of the chest with contrast on 01/11/2021 and showed metastatic mediastinal mass measuring 5 5 cm with innumerable hepatic metastasis  IR guided biopsy from 1 of the liver lesions was done on 01/13/2021 the pathology Spindle cell neoplasm, 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      Medication Administration - last 24 hours from 01/17/2021 1446 to 01/18/2021 1446       Date/Time Order Dose Route Action Action by     01/18/2021 1119 docusate sodium (COLACE) capsule 100 mg 100 mg Oral Not Given Shauna Calderon RN     01/17/2021 1720 docusate sodium (COLACE) capsule 100 mg 100 mg Oral Not Given Shauna Calderon RN     01/18/2021 1119 senna (SENOKOT) tablet 8 6 mg 8 6 mg Oral Not Given Shauna Calderon RN     01/18/2021 0934 metoprolol succinate (TOPROL-XL) 24 hr tablet 100 mg 100 mg Oral Given Shauna Calderon RN     01/17/2021 2048 metoprolol succinate (TOPROL-XL) 24 hr tablet 100 mg 100 mg Oral Given George Rosen     01/17/2021 2239 diltiazem (CARDIZEM) tablet 30 mg 30 mg Oral Given George Rosen     01/18/2021 0934 diltiazem (CARDIZEM CD) 24 hr capsule 180 mg 180 mg Oral Given Demarcus GARRET Posadas          /53   Pulse 101   Temp 99 5 °F (37 5 °C)   Resp 19   Ht 5' 4" (1 626 m)   Wt 82 3 kg (181 lb 7 oz)   SpO2 96%   BMI 31 14 kg/m²       Physical Exam  Constitutional:       General: She is in acute distress  Appearance: She is well-developed  She is obese  She is ill-appearing  She is not diaphoretic  HENT:      Head: Normocephalic and atraumatic  Nose: Nose normal    Eyes:      General: No scleral icterus  Right eye: No discharge  Left eye: No discharge  Conjunctiva/sclera: Conjunctivae normal       Pupils: Pupils are equal, round, and reactive to light  Neck:      Musculoskeletal: Normal range of motion and neck supple  Thyroid: No thyromegaly  Vascular: No JVD  Trachea: No tracheal deviation  Cardiovascular:      Rate and Rhythm: Normal rate and regular rhythm  Heart sounds: Normal heart sounds  No murmur  No friction rub  Pulmonary:      Effort: Pulmonary effort is normal  No respiratory distress  Breath sounds: Normal breath sounds  No stridor  No wheezing or rales  Chest:      Chest wall: No tenderness  Abdominal:      General: There is no distension  Palpations: Abdomen is soft  There is no hepatomegaly or splenomegaly  Tenderness: There is abdominal tenderness (On deep palpation in the right upper quadrant area)  There is no guarding or rebound  Musculoskeletal: Normal range of motion  General: No tenderness or deformity  Lymphadenopathy:      Cervical: No cervical adenopathy  Skin:     General: Skin is warm and dry  Coloration: Skin is not pale  Findings: No erythema or rash  Neurological:      Mental Status: She is alert and oriented to person, place, and time  Cranial Nerves: No cranial nerve deficit  Coordination: Coordination normal       Deep Tendon Reflexes: Reflexes are normal and symmetric     Psychiatric:         Behavior: Behavior normal          Thought Content:  Thought content normal          Judgment: Judgment normal          Recent Results (from the past 48 hour(s))   Basic metabolic panel    Collection Time: 01/17/21  6:42 AM   Result Value Ref Range    Sodium 140 136 - 145 mmol/L    Potassium 3 9 3 5 - 5 3 mmol/L    Chloride 104 100 - 108 mmol/L    CO2 33 (H) 21 - 32 mmol/L    ANION GAP 3 (L) 4 - 13 mmol/L    BUN 25 5 - 25 mg/dL    Creatinine 1 05 0 60 - 1 30 mg/dL    Glucose 101 65 - 140 mg/dL    Calcium 8 6 8 3 - 10 1 mg/dL    eGFR 61 ml/min/1 73sq m   Procalcitonin Reflex    Collection Time: 01/17/21  7:54 AM   Result Value Ref Range    Procalcitonin 0 06 <=0 25 ng/ml   CBC and differential    Collection Time: 01/17/21  9:55 AM   Result Value Ref Range    WBC 16 70 (H) 4 31 - 10 16 Thousand/uL    RBC 3 97 3 81 - 5 12 Million/uL    Hemoglobin 8 1 (L) 11 5 - 15 4 g/dL    Hematocrit 27 1 (L) 34 8 - 46 1 %    MCV 68 (L) 82 - 98 fL    MCH 20 4 (L) 26 8 - 34 3 pg    MCHC 29 9 (L) 31 4 - 37 4 g/dL    RDW 19 4 (H) 11 6 - 15 1 %    MPV 11 4 8 9 - 12 7 fL    Platelets 472 267 - 615 Thousands/uL    nRBC 0 /100 WBCs    Neutrophils Relative 81 (H) 43 - 75 %    Immat GRANS % 2 0 - 2 %    Lymphocytes Relative 3 (L) 14 - 44 %    Monocytes Relative 9 4 - 12 %    Eosinophils Relative 5 0 - 6 %    Basophils Relative 0 0 - 1 %    Neutrophils Absolute 13 48 (H) 1 85 - 7 62 Thousands/µL    Immature Grans Absolute 0 27 (H) 0 00 - 0 20 Thousand/uL    Lymphocytes Absolute 0 54 (L) 0 60 - 4 47 Thousands/µL    Monocytes Absolute 1 54 (H) 0 17 - 1 22 Thousand/µL    Eosinophils Absolute 0 82 (H) 0 00 - 0 61 Thousand/µL    Basophils Absolute 0 05 0 00 - 0 10 Thousands/µL   Basic metabolic panel    Collection Time: 01/17/21  9:55 AM   Result Value Ref Range    Sodium 141 136 - 145 mmol/L    Potassium 3 8 3 5 - 5 3 mmol/L    Chloride 109 (H) 100 - 108 mmol/L    CO2 27 21 - 32 mmol/L    ANION GAP 5 4 - 13 mmol/L    BUN 10 5 - 25 mg/dL    Creatinine 0 74 0 60 - 1 30 mg/dL    Glucose 129 65 - 140 mg/dL    Calcium 8 4 8 3 - 10 1 mg/dL    eGFR 94 ml/min/1 73sq m       Xr Chest Portable    Result Date: 1/15/2021  Narrative: CHEST INDICATION:   fever, possible pna, elevated procal  COMPARISON:  Chest radiographs January 5, 2021 and CT chest January 11, 2021  EXAM PERFORMED/VIEWS:  XR CHEST PORTABLE  AP semierect Images: 1 FINDINGS: The heart is enlarged  Atherosclerotic changes within the aortic arch  Large bore drainage catheter in the pericardial space  Lungs are fairly well aerated  Small bilateral pleural effusions, right side greater than left  Persistent infiltrates in the right middle lobe and lower lobes bilaterally, right side greater than left  Age-appropriate degenerative changes are noted in the spine  Degenerative changes in the shoulders bilaterally  Postoperative changes cervical spine  Right upper extremity PICC line with tip in superior vena cava  Impression: Stable appearance of the chest  Workstation performed: [id number]     Xr Chest Portable    Result Date: 1/5/2021  Narrative: CHEST INDICATION:   Assess pericardial drain placement  COMPARISON:  Chest radiograph and CT from 1/31/2020  EXAM PERFORMED/VIEWS:  XR CHEST PORTABLE FINDINGS: Slight decrease in size of the cardiac silhouette cardiac silhouette with pericardial drain  No change in small effusions and bibasilar atelectasis  No pneumothorax  Osseous structures appear within normal limits for patient age  Cervicothoracic fusion  Impression: Pericardial drain placement with slight decrease in size of the cardiac silhouette  Small effusions and bibasilar atelectasis  Workstation performed: [id number]     Xr Chest 1 View Portable    Result Date: 1/3/2021  Narrative: CHEST INDICATION:   dyspnea  COMPARISON:  Chest radiograph from 2/6/2006  EXAM PERFORMED/VIEWS:  XR CHEST PORTABLE FINDINGS: Marked enlargement of the cardiac silhouette  Right middle lobe consolidation  No effusion or pneumothorax   Osseous structures appear within normal limits for patient age  Cervicothoracic fusion  Impression: Marked enlargement of the cardiac silhouette which could be due to cardiomegaly or a pericardial effusion  Right middle lobe pneumonia  Workstation performed: [id number]     Ct Chest W Contrast    Result Date: 1/11/2021  Narrative: CT CHEST WITH IV CONTRAST INDICATION:   mediastinal mass  Remote history of breast cancer status post left mastectomy 20 years ago  COMPARISON:  CT scan 1/3/2021 TECHNIQUE: CT examination of the chest was performed  Axial, sagittal, and coronal 2D reformatted images were created from the source data and submitted for interpretation  Radiation dose length product (DLP) for this visit:  423 28 mGy-cm   This examination, like all CT scans performed in the [facility], was performed utilizing techniques to minimize radiation dose exposure, including the use of iterative  reconstruction and automated exposure control  IV Contrast:  85 mL of iohexol (OMNIPAQUE) FINDINGS: LUNGS:  Consolidation in the posterior right upper lobe reidentified suspicious for pneumonia  Bibasilar atelectasis  No endotracheal or endobronchial lesion  PLEURA:  Small right pleural effusion  Trace left effusion  HEART/GREAT VESSELS:  Cardiomegaly  Pericardial drain has been placed  Residual mass anterior to the right atrium measuring approximately 4 5 x 5 5 cm suspicious for metastatic disease  MEDIASTINUM AND ROLANDA:  Lymphadenopathy in the prevascular space measuring up to 1 5 cm  CHEST WALL AND LOWER NECK:   Status post left mastectomy  VISUALIZED STRUCTURES IN THE UPPER ABDOMEN:  Innumerable hypodense lesions throughout the liver consistent with metastatic disease the largest in the right lobe measures 3 4 x 3 4 cm  Upper abdomen otherwise unremarkable  OSSEOUS STRUCTURES:  No acute fracture or destructive osseous lesion       Impression: Findings suspicious for metastatic mediastinal mass measuring 4 5 x 5 5 cm anterior to the right atrium, likely the cause of the pericardial effusion  Innumerable hepatic metastases  Right upper lobe consolidation suspicious for pneumonia with right-sided pleural effusion  Workstation performed: [id number]     Ct Chest Abdomen Pelvis W Contrast    Result Date: 1/16/2021  Narrative: CT CHEST, ABDOMEN AND PELVIS WITH IV CONTRAST INDICATION:   Sepsis Febrile illness  DELANEY CHÁVEZ's note today reviewed, patient initially presented with cough and lower extremity edema  Fever and leukocytosis  Recent liver biopsy  WBCs ~19 COMPARISON:  Chest CT 1/11/21 and 1/1/21 TECHNIQUE: CT examination of the chest, abdomen and pelvis was performed  Axial, sagittal, and coronal 2D reformatted images were created from the source data and submitted for interpretation  Radiation dose length product (DLP) for this visit:  879 45 mGy-cm   This examination, like all CT scans performed in the [facility], was performed utilizing techniques to minimize radiation dose exposure, including the use of iterative  reconstruction and automated exposure control  IV Contrast:  100 mL of iohexol (OMNIPAQUE) Enteric Contrast: Enteric contrast was administered  FINDINGS: CHEST LUNGS:  Peripheral wedge shaped opacity in the lateral segment middle lobe, slowly appearing more confluent and well-circumscribed  No pulmonary embolism noted in the corresponding arteries on the prior study PLEURA:  Small pleural effusions, right > left HEART/GREAT VESSELS:  5 2 x 6 5 cm mass is noted ascending into the right atrial lumen, and predominantly located in the atrial wall Pericardial drain present without evidence of effusion MEDIASTINUM AND ROLANDA:  Unremarkable   CHEST WALL AND LOWER NECK:   Left mastectomy ABDOMEN LIVER/BILIARY TREE:  Numerous hepatic hypodense masses, the largest of which is located in the right lobe measuring up to 9 cm and on series 2/53 Dense fluid surrounds the inferior margin of the right hepatic lobe, likely represents hemorrhage GALLBLADDER:  No calcified gallstones  No pericholecystic inflammatory change  SPLEEN:  Unremarkable  PANCREAS:  Unremarkable  ADRENAL GLANDS:  Unremarkable  KIDNEYS/URETERS:  Unremarkable  No hydronephrosis  STOMACH AND BOWEL:  Unremarkable  APPENDIX:  No findings to suggest appendicitis  ABDOMINOPELVIC CAVITY:  No ascites  No pneumoperitoneum  No lymphadenopathy  VESSELS:  Unremarkable for patient's age  PELVIS REPRODUCTIVE ORGANS:  Leiomyomatous uterus URINARY BLADDER:  Unremarkable  ABDOMINAL WALL/INGUINAL REGIONS:  Unremarkable  OSSEOUS STRUCTURES:  No acute fracture or destructive osseous lesion  Impression: 1  Stable peripheral middle lobe opacity, consistent with pneumonia 2  Stable cardiac/mediastinal mass extending into the right atrial lumen, consistent with malignancy 3  No pericardial effusion status post drain 4  Small pleural effusions 5  Hepatic masses consistent with malignancy 6  Dense material noted adjacent to the liver and in the paracolic gutter, likely represents hemorrhage from recent biopsy Workstation performed: [id number]     Ir Biopsy Liver Mass    Result Date: 1/13/2021  Narrative: Ultrasound-guided biopsy of liver mass Clinical History: Mediastinal mass, pericardial effusion  Liver masses  Biopsy for diagnosis  Distant history of breast cancer Conscious sedation time: 45 minutes Technique: The patient was seen in the holding area and identified verbally and by wristband  After discussion of the procedure and its details, risks, benefits and alternatives, both verbal and written informed consent were obtained from the patient  The patient was then brought to the ultrasound area and placed supine on the table  After a brief ultrasound examination was performed of the abdomen and correlated with prior imaging, a site on the right lateral abdomen was prepped and draped in usual sterile fashion   Sterile ultrasound technique with sterile gel and sterile probe covers was also utilized  A preprocedure timeout was performed per standard department protocol  Under sonographic guidance, lidocaine was administered to the skin and underlying soft tissues  A [employer] 18-gauge biopsy needle set was advanced into the peripheral mass under direct ultrasound guidance  The inner stylette was removed and an 18-gauge biopsy needle was advanced into the lesion and core samples obtained under continuous sonographic guidance  The needle was repositioned and additional samples taken  Fluid was aspirated and placed directly in CytoLyt  D-Stat was administered under ultrasound to the biopsy tract for hemostatic purposes  The patient tolerated the procedure well and suffered no immediate complications  Findings: Hypoechoic liver mass in the right inferior liver measuring at least 1 2 cm  Needle at the margin of the lesion  Cores obtained from this location  Needle through the midportion of the lesion  4 cores obtained from this location  8 mL of thick bloody fluid obtained from the center of the lesion through the outer needle  Final imaging with postbiopsy changes, no hematoma Specimen: Touch prep, 18-gauge core x6, fluid for cytology     Impression: Impression: Ultrasound-guided biopsy of a hypoechoic liver mass  Imaging appearance is concerning for a partially necrotic mass  Workstation performed: [id number]     Us Msk Limited    Result Date: 1/8/2021  Narrative: Right upper arm ULTRASOUND INDICATION:   shoulder US to rule out beceps tendon rupture  TERESE CAVANAUGH's note reviewed, patient admitted with atrial flutter, noticed upper extremity swelling overnight COMPARISON:  Ultrasound from earlier today TECHNIQUE:   Real-time ultrasound of the right upper was performed with a linear transducer with both volumetric sweeps and still imaging techniques  FINDINGS:  Focused ultrasound performed of the right upper arm    The long head biceps tendon is not visualized within the bicipital groove as noted on image 3  The previously noted collection appears contiguous with the bicipital groove  Please note that the biceps muscle belly was unable to be evaluated in the midportion due to patient's overlying PICC  The distal biceps muscle appears edematous, the distal tendon was unable to be distinctly visualized, again due to the overlying PICC limiting visualization  Impression: Previously noted collection is contiguous with the bicipital groove, and the long head biceps tendon is not visualized within the bicipital groove  Findings most likely represent proximal tendon tear with distal retraction  If collection does not resolve spontaneously (within 1-3 months), recommend right humeral MRI with contrast as hemorrhagic neoplasm is within the differential  The study was marked in EPIC for significant notification  Workstation performed: [id number]     Cta Chest Pe Study    Result Date: 1/3/2021  Narrative: CTA - CHEST WITH IV CONTRAST - PULMONARY ANGIOGRAM INDICATION:   PE suspected, intermediate prob, positive D-dimer tachycardia and d dimer elevation  COMPARISON: None  TECHNIQUE: CTA examination of the chest was performed using angiographic technique according to a protocol specifically tailored to evaluate for pulmonary embolism  Axial, sagittal, and coronal 2D reformatted images were created from the source data and  submitted for interpretation  In addition, coronal 3D MIP postprocessing was performed on the acquisition scanner  Radiation dose length product (DLP) for this visit:  444 53 mGy-cm   This examination, like all CT scans performed in the [facility], was performed utilizing techniques to minimize radiation dose exposure, including the use of iterative  reconstruction and automated exposure control  IV Contrast:  85 mL of iohexol (OMNIPAQUE)  FINDINGS: PULMONARY ARTERIAL TREE:  No pulmonary embolus is seen   LUNGS:  Posterior right upper lobe consolidation #3/55 in a wedge-shaped appearance with a apex extending towards the right hilum may represent pneumonia or a postobstructive pneumonitis from a central mass  A discrete mass is not identified but could be  obscured  No endotracheal or endobronchial lesion  Mild diffuse interlobular septal thickening in keeping with mild pulmonary edema  Areas of bibasilar airspace disease are likely atelectasis  PLEURA:  Small pleural effusions  HEART/GREAT VESSELS:  Large pericardial effusion measuring approximately 3 3 cm at its thickest  Focal area of bulging of the right pericardium or a potential right mediastinal mass measuring approximately 6 6 cm #2/100  No thoracic aortic aneurysm or dissection  MEDIASTINUM AND ROLANDA:  Prominent diffuse mediastinal adenopathy predominantly throughout the left side of the mediastinum  CHEST WALL AND LOWER NECK:   Status post left mastectomy  VISUALIZED STRUCTURES IN THE UPPER ABDOMEN:  Innumerable vague hypodensities throughout the liver suspicious for metastases  OSSEOUS STRUCTURES:  No acute fracture or destructive osseous lesion  Impression: Large pericardial effusion measuring approximately 3 3 cm area of bulging of the right pericardium or a potential adjacent right mediastinal mass measuring approximately 6 6 cm #2/100  The pericardial effusion may be on a malignant basis  Prominent diffuse mediastinal adenopathy Interlobular septal thickening and small pleural effusions in keeping with pulmonary edema  Posterior right upper lobe consolidation #3/55 in a wedge-shaped appearance with a apex extending towards the right hilum may represent pneumonia or a postobstructive pneumonitis from a central mass  A discrete mass is not identified but could be obscured  Innumerable vague hypodensities throughout the liver are suspicious for metastases    I personally discussed this study with Irena Galvez on 1/3/2021 at 5:18 PM  Workstation performed: [id number]     Us Extremity Soft Tissue    Result Date: 1/8/2021  Narrative: SUPERFICIAL SOFT TISSUE ULTRASOUND INDICATION:  right upper extremity swelling, rule out hematoma  COMPARISON:  None TECHNIQUE:   Real-time ultrasound of the right upper extremity was performed with a linear transducer with both volumetric sweeps and still imaging techniques  FINDINGS:  There is a complex irregularly marginated fluid collection in the medial right upper arm with layering echogenic fluid and floating debris, likely due to hematocrit level and blood clot within a hematoma, likely related to anticoagulation/coagulopathy  It measures 8 2 x 5 6 x 7 5 cm No demonstrable hypervascularity  Given the location, the possibility of associated underlying biceps tendon rupture is not excluded  This can be evaluated with [facility] shoulder ultrasound if clinically indicated  Impression: Complex fluid collection in the medial right upper arm with hematocrit level in keeping with hematoma  Given the location, the possibility of associated underlying biceps tendon rupture is not excluded  This can be evaluated with MSK shoulder ultrasound if clinically indicated  Recommend follow-up ultrasound in 1 month to confirm resolution  The study was marked in [facility] for immediate notification  Workstation performed: [id number]       I have personally reviewed labs, imaging studies, and pertinent reports  This note has been generated by voice recognition software system  Therefore, there may be spelling, grammar, and or syntax errors  Please contact if questions arise

## 2021-01-18 NOTE — UTILIZATION REVIEW
Continued Stay Review    Date: 01/17/2021                          Current Patient Class: Inpatient  Current Level of Care: Med/Surg    HPI:66 y o  female initially admitted on 01/03/2021  Atrial Flutter  Pericardial effusion  Assessment/Plan: Pericardial drain output is still pretty high at 150 cc for the last 24 hours  Continue pericardial drain  VTE Pharmacologic Prophylaxis:   Pharmacologic: Risk prohibitive due to bleeding    Likely start anticoagulation once pericardial drain  Mechanical VTE Prophylaxis in Place: No    Pertinent Labs/Diagnostic Results:     Results from last 7 days   Lab Units 01/17/21  0955 01/16/21  0507 01/15/21  1243 01/14/21 2027 01/14/21  0741   WBC Thousand/uL 16 70* 19 30* 18 83*  --  16 41*   HEMOGLOBIN g/dL 8 1* 8 1* 8 5* 8 6* 8 0*   HEMATOCRIT % 27 1* 28 0* 27 8* 29 3* 26 9*   PLATELETS Thousands/uL 333 301 304  --  241   NEUTROS ABS Thousands/µL 13 48* 15 30* 14 87*  --  12 50*     Results from last 7 days   Lab Units 01/17/21  0955 01/17/21  0642 01/16/21  0507 01/15/21  1243 01/14/21  0626   SODIUM mmol/L 141 140 137 134* 136   POTASSIUM mmol/L 3 8 3 9 3 8 3 9 4 2   CHLORIDE mmol/L 109* 104 105 104 107   CO2 mmol/L 27 33* 26 26 26   ANION GAP mmol/L 5 3* 6 4 3*   BUN mg/dL 10 25 10 9 8   CREATININE mg/dL 0 74 1 05 0 80 0 82 0 81   EGFR ml/min/1 73sq m 94 61 85 83 84   CALCIUM mg/dL 8 4 8 6 8 2* 8 4 8 1*     Results from last 7 days   Lab Units 01/16/21  0507 01/14/21  0626   AST U/L 21 15   ALT U/L 23 21   ALK PHOS U/L 77 69   TOTAL PROTEIN g/dL 6 1* 5 4*   ALBUMIN g/dL 2 4* 2 2*   TOTAL BILIRUBIN mg/dL 1 24* 0 94     Results from last 7 days   Lab Units 01/17/21  0955 01/17/21  0642 01/16/21  0507 01/15/21  1243 01/14/21  0626 01/12/21  0536   GLUCOSE RANDOM mg/dL 129 101 73 104 98 97     Results from last 7 days   Lab Units 01/13/21  0452   PROTIME seconds 17 3*   INR  1 42*     Results from last 7 days   Lab Units 01/17/21  0754 01/16/21  0939 01/15/21  0439 21  0626   PROCALCITONIN ng/ml 0 06 0 06 0 40* 0 22     Results from last 7 days   Lab Units 01/15/21  1243   FERRITIN ng/mL 271     Results from last 7 days   Lab Units 01/15/21  1242   BLOOD CULTURE  No Growth at 72 hrs  No Growth at 72 hrs  Vital Signs:   Vitals:   Temp (24hrs), Av 7 °F (37 1 °C), Min:98 °F (36 7 °C), Max:99 °F (37 2 °C)     Temp:  [98 °F (36 7 °C)-99 °F (37 2 °C)] 98 9 °F (37 2 °C)  HR:  [] 106  Resp:  [14-18] 14  BP: (116-121)/(54-68) 118/68  SpO2:  [94 %-98 %] 95 %  Body mass index is 32 24 kg/m²  Medications:   Scheduled Medications:  diltiazem, 180 mg, Oral, Daily  docusate sodium, 100 mg, Oral, BID  HYDROmorphone, 0 2 mg, Intravenous, Once  metoprolol succinate, 100 mg, Oral, Q12H STEPHANIE  senna, 1 tablet, Oral, Daily      Continuous IV Infusions:     PRN Meds:  acetaminophen, 650 mg, Oral, Q6H PRN  aluminum-magnesium hydroxide-simethicone, 30 mL, Oral, Q6H PRN  diltiazem, 30 mg, Oral, Q6H PRN  HYDROmorphone, 0 2 mg, Intravenous, Q4H PRN  ondansetron, 4 mg, Intravenous, Q6H PRN  oxyCODONE, 2 5 mg, Oral, Q4H PRN  oxyCODONE, 5 mg, Oral, Q4H PRN        Discharge Plan: D    Network Utilization Review Department  ATTENTION: Please call with any questions or concerns to [phone] and carefully listen to the prompts so that you are directed to the right person  All voicemails are confidential   Teofilo Diamond all requests for admission clinical reviews, approved or denied determinations and any other requests to dedicated fax number below belonging to the campus where the patient is receiving treatment   List of dedicated fax numbers for the Facilities:  [address] DENIALS (Administrative/Medical Necessity) [phone]   [address] (Maternity/NICU/Pediatrics) 261 [facility],[address] Floor [facility]   [address] [facility] [address] [address] [place] [address] Ayo Arshad [address]81 [phone]   [address] 951 [phone]

## 2021-01-18 NOTE — ASSESSMENT & PLAN NOTE
· Patient with remote history of left breast cancer status post mastectomy over 20 years ago  · No chemotherapy or radiation  · Anterior mediastinal mass seen on CT scan with diffuse mediastinal adenopathy, Innumerable vague hypodensities throughout the liver are suspicious for metastases  · Unclear etiology  · Thoracic surgery is following   · s/p IR biopsy for pericardial fluid pathology on 1/4/21, shows atypical reactive cells  · Thoracic surgery consulted Interventional Radiology-status post liver biopsy  Awaiting results  · Communicated with thoracic surgery    Will consult Oncology regarding abnormal pathology  · Tumor wrapped around SVC

## 2021-01-18 NOTE — ASSESSMENT & PLAN NOTE
Evaluated by thoracic surgery  Underwent pericardial drain placement 01/04/2021  Still with significant drainage  Drain was pulled today by thoracic surgery and plan for right VATS, pericardial window with biopsy of anterior mediastinal mass on Thursday

## 2021-01-18 NOTE — ASSESSMENT & PLAN NOTE
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

## 2021-01-18 NOTE — PROGRESS NOTES
Cardiology Team 2 Progress Note - Robyn Canseco 65 y o  female MRN: [id number]    Unit/Bed#: [facility] 426-01 Encounter: [id number]          Subjective:   Patient seen and examined  No significant complaints  Pericardial drain is still maintaining high output with 270 in the last 24 hours  Heart rate otherwise is improved  Patient overall is feeling on edge, trapping the hospital and not able to be seen seen by her family members  She is awaiting the biopsy results  Otherwise, she has no complaints  Objective:     Vitals: Blood pressure (!) 119/48, pulse 100, temperature 98 5 °F (36 9 °C), resp  rate 17, height 5' 4" (1 626 m), weight 82 3 kg (181 lb 7 oz), SpO2 95 %  , Body mass index is 31 14 kg/m² ,   Orthostatic Blood Pressures      Most Recent Value   Blood Pressure  (!) 119/48 filed at 01/18/2021 0716   Patient Position - Orthostatic VS  Sitting filed at 01/15/2021 1912            Intake/Output Summary (Last 24 hours) at 1/18/2021 1015  Last data filed at 1/18/2021 0900  Gross per 24 hour   Intake 840 ml   Output 640 ml   Net 200 ml         Physical Exam:    GEN: Robyn Canseco appears well, alert and oriented x 3, pleasant and cooperative   HEENT:  Normocephalic, atraumatic, anicteric, moist mucous membranes  NECK: No JVD or carotid bruits   HEART: Irregular rhythm, normal rate, normal S1 and S2, no murmurs, clicks, gallops or rubs; intact drain site with serosanguineous fluid in bulb  LUNGS: Clear to auscultation bilaterally; no wheezes, rales, or rhonchi; respiration nonlabored on room air  ABDOMEN:  Normoactive bowel sounds, soft, no tenderness, no distention  EXTREMITIES: peripheral pulses palpable; no edema  NEURO: no gross focal findings; cranial nerves grossly intact   SKIN:  Dry, intact, warm to touch      Current Facility-Administered Medications:     acetaminophen (TYLENOL) tablet 650 mg, 650 mg, Oral, Q6H PRN, Morales Vega PA-C, 650 mg at 01/14/21 2020    aluminum-magnesium hydroxide-simethicone (MYLANTA) oral suspension 30 mL, 30 mL, Oral, Q6H PRN, Tobi Grace MD    diltiazem (CARDIZEM CD) 24 hr capsule 180 mg, 180 mg, Oral, Daily, Ginger Conway MD, 180 mg at 01/18/21 0934    diltiazem (CARDIZEM) tablet 30 mg, 30 mg, Oral, Q6H PRN, Francois Mayers MD    docusate sodium (COLACE) capsule 100 mg, 100 mg, Oral, BID, Tobi Grace MD, 100 mg at 01/16/21 1815    HYDROmorphone (DILAUDID) injection 0 2 mg, 0 2 mg, Intravenous, Q4H PRN, Zac Sesay PA-C, 0 2 mg at 01/09/21 0645    HYDROmorphone (DILAUDID) injection 0 2 mg, 0 2 mg, Intravenous, Once, Stacie H DAVID Munoz    metoprolol succinate (TOPROL-XL) 24 hr tablet 100 mg, 100 mg, Oral, Q12H [facility], Roland Dubin, DO, 100 mg at 01/18/21 0934    ondansetron (ZOFRAN) injection 4 mg, 4 mg, Intravenous, Q6H PRN, Tobi Grace MD    oxyCODONE (ROXICODONE) IR tablet 2 5 mg, 2 5 mg, Oral, Q4H PRN, Zac Sesay PA-C    oxyCODONE (ROXICODONE) IR tablet 5 mg, 5 mg, Oral, Q4H PRN, Zac Sesay PA-C, 5 mg at 01/13/21 7201    senna (SENOKOT) tablet 8 6 mg, 1 tablet, Oral, Daily, Tobi Grace MD, 8 6 mg at 01/16/21 0930    Labs & Results:    Lab Results   Component Value Date    TROPONINI <0 02 01/03/2021    TROPONINI <0 02 01/03/2021    TROPONINI <0 03 01/03/2021       Lab Results   Component Value Date    CALCIUM 8 4 01/17/2021    K 3 8 01/17/2021    CO2 27 01/17/2021     (H) 01/17/2021    BUN 10 01/17/2021    CREATININE 0 74 01/17/2021       Lab Results   Component Value Date    WBC 16 70 (H) 01/17/2021    HGB 8 1 (L) 01/17/2021    HCT 27 1 (L) 01/17/2021    MCV 68 (L) 01/17/2021     01/17/2021     Results from last 7 days   Lab Units 01/13/21  0452   INR  1 42*       No results found for: CHOL  No results found for: HDL  No results found for: LDLCALC  No results found for: TRIG    Lab Results   Component Value Date    ALT 23 01/16/2021    AST 21 01/16/2021       Telemetry:   Personally reviewed by Kinjal Carlos MD:  Atrial flutter with heart rate ranging 80s to 120s with rare PVCs    Imaging:  No new cardiac images to be seen      VTE Prophylaxis: Sequential compression device [place])       Assessment:  Principal Problem:    Atrial flutter (HCC)  Active Problems:    Essential hypertension    Pericardial effusion without cardiac tamponade    Mediastinal mass    Pain and swelling of right upper extremity    Hypodense mass of liver    Leukocytosis    Anemia    Fever        65 y o  female who presented with cough and shortness of breath  Cardiology was initially consulted for atrial flutter/fibrillation and diuresis  Course was complicated by newly diagnosed large pericardial effusion and mediastinal mass  Patient has been rate-controlled as of late with transient resumption of sinus rhythm  She is status post liver biopsy and awaiting pathology         1  Pericardial effusion  -1/3 TTE:  Large with no tamponade  Resolved s/p pericardial window with bloody drainage and drain/bulb placement on 01/04 per echo on the 7th  -per primary team and thoracic surgery there was concern for malignant etiology stemming from thoracic mass near SVC  -cytology negative x2, s/p liver biopsy and awaiting pathology  -continuous serosanguineous drainage with 270 cc in the last 24 hours     2  Atrial fibrillation/flutter  -[id number] 3, HAS-BLED 2  -metoprolol succinate 100 mg b i d , diltiazem 180 q d   -anticoagulation currently held due to persistent pericardial drainage and right arm hematoma; initial pericardial fluid collection was bloody he had with concern for pericardial bleed with previous therapeutic Lovenox     3  Essential hypertension  -controlled while inpatient on metoprolol and diltiazem, current /48  -home lisinopril currently held due to previous hypotension       Plan:  1  In regards her atrial flutter, rate remain controlled on 2 agents  2  Blood pressure remains controlled otherwise      3  Will defer initiation of anticoagulation to primary and surgical teams at this point  If agreeable, can initiate heparin with no bolus and assess for bleeding into the pericardium  Otherwise, remainder of plan as highlighted above  Case discussed and reviewed with Dr Atul Whitfield who agrees with my assessment and plan  Thank you for involving us in the care of your patient  [facility]/ [facility]\""/Care Everywhere records reviewed: yes    ** Please Note: Fluency DirectDictation voice to text software may have been used in the creation of this document   **

## 2021-01-18 NOTE — ASSESSMENT & PLAN NOTE
Differential would include occult blood stream infection, versus tumor fever versus pneumonia   Rule out occult bloodstream infection-check blood cultures  Rule out respiratory process-chest x-ray negative  To consider CT of chest in next 24 hours of fever continues    Follow up on sputum culture  Will consult Oncology regarding tumor burden  Monitor output from drain  Has been afebrile

## 2021-01-18 NOTE — ASSESSMENT & PLAN NOTE
Multifactorial  Patient with hematoma over extremity  Monitor drainage  Possibly component of anemia of chronic disease  Rule out occult GI blood loss  Serial hemoglobin hematocrit  Heme check stools  Drop in hemoglobin may be partially contributing to  Tachycardia    Currently hb 8 5, MCV 68, RDW elevated

## 2021-01-18 NOTE — ASSESSMENT & PLAN NOTE
· Anterior mediastinal mass seen on CT scan with diffuse mediastinal adenopathy, Innumerable vague hypodensities throughout the liver are suspicious for metastases  · Thoracic surgeries planning right VATS, pericardial window with biopsy of the anterior mediastinal mass on Thursday

## 2021-01-18 NOTE — ASSESSMENT & PLAN NOTE
CT on admission shows: Innumerable vague hypodensities throughout the liver suspicious for metastases    Awaiting liver biopsy results

## 2021-01-18 NOTE — ASSESSMENT & PLAN NOTE
· Patient was brought to ED by has been due to coughing, lower extremity edema and dyspnea  · EKG showed atrial flutter, QTc prolongation  Her son passed away on 12/21/2020  · Patient was evaluated by Cardiology  · Cardiac echo with large pericardial effusion without tamponade, EF 65%  · Initially was treated with Cardizem drip, Currently off Cardizem drip,   · on p o  Metoprolol  · Has a PICC line due to difficult IV access  · Due to possible right upper extremity hematoma or proximal biceps tendon tear, currently heparin drip is on hold     Will start anticoagulation after pericardial drain is removed   No further intervention indicated for pericardial disease

## 2021-01-18 NOTE — ASSESSMENT & PLAN NOTE
Improving  Workup negative for obvious source of infection  Will observe off of antibiotic therapy  Potential etiology would be underlying tumor fever versus occult infection rule out GI process rule out pulmonary process  Procalcitonin is negative  Continue to trend white count and temperature curve

## 2021-01-18 NOTE — ASSESSMENT & PLAN NOTE
Right upper extremity swelling noted   Likely hematoma  Unable to obtain CT scan due to not having IV access  US obtained that shows possible hematoma vs  Proximal biceps tendon repair  General surgery consult appreciated- recommends conservative managment and holding AC  Patient does not have motor strength loss  neurovascular intact  Pain is significantly improved since last night  Patient is able to extend her shoulder, slightly limited due to pain  At this point, patient will follow up outpatient   If persistent in 3 months, may obtain MRI  Swelling has decreased significantly

## 2021-01-18 NOTE — PROGRESS NOTES
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  No primary seen on CT scan  Gastroenterology consulted  Made patient NPO from midnight   Likely upper endoscopy tomorrow  Oncology on board  Recommended Oral tyrosine kinase inhibitor that can be started outpatient     Pericardial effusion without cardiac tamponade  Assessment & Plan  Evaluated by thoracic surgery  Underwent pericardial drain placement 01/04/2021  Note thoracic surgery input  Repeat echo shows no residual pericardial effusion  Monitor drain output  1/18 110ml drained so far  As per thoracic surgery drain can be removed when there was 50 cc per day or less  Echo should be done 24 hours afterward it is  Can restart anticoagulation at that time  Another repeat echo 48 hours after starting anticoagulation should be done    Mediastinal mass  Assessment & Plan  · Patient with remote history of left breast cancer status post mastectomy over 20 years ago  · No chemotherapy or radiation  · Anterior mediastinal mass seen on CT scan with diffuse mediastinal adenopathy, Innumerable vague hypodensities throughout the liver are suspicious for metastases  · Unclear etiology  · Thoracic surgery is following   · s/p IR biopsy for pericardial fluid pathology on 1/4/21, shows atypical reactive cells  · Thoracic surgery consulted Interventional Radiology-status post liver biopsy  · Communicated with thoracic surgery     · Oncology on board  · Tumor wrapped around SVC        * Atrial flutter [facility])  Assessment & Plan  · Patient was brought to ED by has been due to coughing, lower extremity edema and dyspnea  · EKG showed atrial flutter, QTc prolongation  Her son passed away on 12/21/2020  · Patient was evaluated by Cardiology  · Cardiac echo with large pericardial effusion without tamponade, EF 65%  · Initially was treated with Cardizem drip, Currently off Cardizem drip,   · on p o  Metoprolol  · Has a PICC line due to difficult IV access  · Due to possible right upper extremity hematoma or proximal biceps tendon tear, currently heparin drip is on hold  Will start anticoagulation after pericardial drain is removed   No further intervention indicated for pericardial disease        Fever  Assessment & Plan  Differential would include occult blood stream infection, versus tumor fever versus pneumonia   Rule out occult bloodstream infection-check blood cultures  Rule out respiratory process-chest x-ray negative  To consider CT of chest in next 24 hours of fever continues  Follow up on sputum culture  Monitor output from drain  Has been afebrile      Anemia  Assessment & Plan  Multifactorial  Patient with hematoma over extremity  Monitor drainage  Possibly component of anemia of chronic disease    Rule out occult GI blood loss  Serial hemoglobin hematocrit  Heme check stools  Drop in hemoglobin may be partially contributing to  Tachycardia  Currently hb 8 1, MCV 68, RDW elevated    Leukocytosis  Assessment & Plan  Possible stress reaction from procedure  However patient has worsening leukocytosis in conjunction with fever  Potential etiology would be underlying tumor fever versus occult infection rule out GI process rule out pulmonary process  Rule out worsening infiltrate on lung scan  Note infiltrate noted on CT  Patient does not have any respiratory symptoms currently though  Procalcitonin is negative    Continue to trend white count and temperature curve  Suspect tumor related      Pain and swelling of right upper extremity  Assessment & Plan  Right upper extremity swelling noted   Likely hematoma  Unable to obtain CT scan due to not having IV access  US obtained that shows possible hematoma vs  Proximal biceps tendon repair  General surgery consult appreciated- recommends conservative managment and holding AC  Patient does not have motor strength loss  neurovascular intact  Pain is significantly improved since last night  Patient is able to extend her shoulder, slightly limited due to pain  At this point, patient will follow up outpatient  If persistent in 3 months, may obtain MRI  Swelling has decreased significantly     Essential hypertension  Assessment & Plan  Continue metoprolol  Hold lisinopril for now  Continue to monitor      VTE Pharmacologic Prophylaxis:   Pharmacologic: None due to bloody pericardial effusion  Mechanical VTE Prophylaxis in Place: Yes    Discussions with Specialists or Other Care Team Provider: None    Education and Discussions with Family / Patient:  Spoke with the patient's   He is very concerned and anxious about her condition  He would like to be made aware of the liver biopsy results as soon as they are out  Current Length of Stay: 15 day(s)    Current Patient Status: Inpatient     Discharge Plan / Estimated Discharge Date: None    Code Status: Level 1 - Full Code      Subjective:   Patient drained 110 mL since midnight  She says the swelling in her right upper extremity is decreased  She still feels soreness throughout her right arm  She has no other complaints  Objective:     Vitals:   Temp (24hrs), Av 8 °F (37 1 °C), Min:98 4 °F (36 9 °C), Max:99 5 °F (37 5 °C)    Temp:  [98 4 °F (36 9 °C)-99 5 °F (37 5 °C)] 99 5 °F (37 5 °C)  HR:  [] 101  Resp:  [17-19] 19  BP: (101-122)/(45-67) 116/53  SpO2:  [91 %-96 %] 96 %  Body mass index is 31 14 kg/m²  Input and Output Summary (last 24 hours):        Intake/Output Summary (Last 24 hours) at 2021 [place] filed at 1/18/2021 1420  Gross per 24 hour   Intake 740 ml   Output 672 ml   Net 68 ml       Physical Exam:     Physical Exam  HENT:      Head: Normocephalic and atraumatic  Nose: Nose normal       Mouth/Throat:      Mouth: Mucous membranes are moist    Eyes:      Extraocular Movements: Extraocular movements intact  Pupils: Pupils are equal, round, and reactive to light  Neck:      Musculoskeletal: Normal range of motion  Cardiovascular:      Rate and Rhythm: Normal rate and regular rhythm  Pulses: Normal pulses  Pulmonary:      Effort: Pulmonary effort is normal       Breath sounds: Normal breath sounds  Abdominal:      General: Abdomen is flat  Bowel sounds are normal       Palpations: Abdomen is soft  Musculoskeletal: Normal range of motion  Skin:     General: Skin is warm  Comments: Drain in place with 40ml approximately in bag   Neurological:      General: No focal deficit present  Mental Status: She is alert and oriented to person, place, and time  Mental status is at baseline  Psychiatric:         Mood and Affect: Mood normal              Additional Data:     Labs:    Results from last 7 days   Lab Units 01/17/21  0955   WBC Thousand/uL 16 70*   HEMOGLOBIN g/dL 8 1*   HEMATOCRIT % 27 1*   PLATELETS Thousands/uL 333   NEUTROS PCT % 81*   LYMPHS PCT % 3*   MONOS PCT % 9   EOS PCT % 5     Results from last 7 days   Lab Units 01/17/21  0955  01/16/21  0507   POTASSIUM mmol/L 3 8   < > 3 8   CHLORIDE mmol/L 109*   < > 105   CO2 mmol/L 27   < > 26   BUN mg/dL 10   < > 10   CREATININE mg/dL 0 74   < > 0 80   CALCIUM mg/dL 8 4   < > 8 2*   ALK PHOS U/L  --   --  77   ALT U/L  --   --  23   AST U/L  --   --  21    < > = values in this interval not displayed  Results from last 7 days   Lab Units 01/13/21  0452   INR  1 42*       * I Have Reviewed All Lab Data Listed Above  * Additional Pertinent Lab Tests Reviewed:  Janell 64 Admission Reviewed    Imaging:    Imaging Reports Reviewed Today Include: CT chest/abd/pelvis  Imaging Personally Reviewed by Myself Includes:  CT chest/abd/pelvis    Recent Cultures (last 7 days):     Results from last 7 days   Lab Units 01/15/21  1242   BLOOD CULTURE  No Growth at 48 hrs  No Growth at 48 hrs  Last 24 Hours Medication List:   Current Facility-Administered Medications   Medication Dose Route Frequency Provider Last Rate    acetaminophen  650 mg Oral Q6H PRN Rosa Jacinto, DAVID      aluminum-magnesium hydroxide-simethicone  30 mL Oral Q6H PRN Harpreet Pittman MD      diltiazem  180 mg Oral Daily Danya Rivero MD      diltiazem  30 mg Oral Q6H PRN Carol Doll MD      docusate sodium  100 mg Oral BID Harpreet Pittman MD      HYDROmorphone  0 2 mg Intravenous Q4H PRN Rosa Sidrae, DAVID      HYDROmorphone  0 2 mg Intravenous Once Rosa DAVID Jacinto      metoprolol succinate  100 mg Oral Q12H [facility] [place] DO Suly      ondansetron  4 mg Intravenous Q6H PRN Harpreet Pittman MD      oxyCODONE  2 5 mg Oral Q4H PRN Rosa Sidrae, DAVID      oxyCODONE  5 mg Oral Q4H PRN Rosa Jacinto, DAVID      senna  1 tablet Oral Daily Harpreet Pittman MD          Today, Patient Was Seen By: Divya Guzmán MD    ** Please Note: This note has been constructed using a voice recognition system   **

## 2021-01-18 NOTE — ASSESSMENT & PLAN NOTE
Multifactorial  Patient with hematoma over extremity  Monitor drainage  Possibly component of anemia of chronic disease    Rule out occult GI blood loss  Serial hemoglobin hematocrit  Heme check stools  Drop in hemoglobin may be partially contributing to  Tachycardia    Currently hb 8 1, MCV 68, RDW elevated

## 2021-01-18 NOTE — ASSESSMENT & PLAN NOTE
Differential would include occult blood stream infection, versus tumor fever versus pneumonia  Rule out occult bloodstream infection- blood cultures 1/15 negative  Note CT findings concerning for possible infiltrate  However patient does not have any respiratory symptoms  Procalcitonin is also negative x2  Patient has a PE, will do lower extremity Doppler    DVT can cause fever

## 2021-01-18 NOTE — ASSESSMENT & PLAN NOTE
Possible stress reaction from procedure  However patient has worsening leukocytosis in conjunction with fever  Potential etiology would be underlying tumor fever versus occult infection rule out GI process rule out pulmonary process  Rule out worsening infiltrate on lung scan  Note infiltrate noted on CT  Patient does not have any respiratory symptoms currently though  Procalcitonin is negative    Continue to trend white count and temperature curve  Suspect tumor related

## 2021-01-18 NOTE — ASSESSMENT & PLAN NOTE
· Patient was brought to ED by has been due to coughing, lower extremity edema and dyspnea  · EKG showed atrial flutter, QTc prolongation  Her son passed away on 12/21/2020  · Patient was evaluated by Cardiology  · Cardiac echo with large pericardial effusion without tamponade, EF 65%  · Initially was treated with Cardizem drip, Currently off Cardizem drip,   · on p o  Metoprolol and diltiazem     · Has a PICC line due to difficult IV access  · AC on hold due to pericardial effusion, serosanguineous drainage, risk of bleeding in the pericardial space

## 2021-01-18 NOTE — ASSESSMENT & PLAN NOTE
Evaluated by thoracic surgery  Underwent pericardial drain placement 01/04/2021  Note thoracic surgery input  Repeat echo shows no residual pericardial effusion  Monitor drain output  1/18 110ml drained so far  As per thoracic surgery drain can be removed when there was 50 cc per day or less    Echo should be done 24 hours afterward it is  Can restart anticoagulation at that time  Another repeat echo 48 hours after starting anticoagulation should be done

## 2021-01-19 ENCOUNTER — APPOINTMENT (INPATIENT)
Dept: NON INVASIVE DIAGNOSTICS | Facility: HOSPITAL | Age: 71
DRG: 270 | End: 2021-01-19
Payer: COMMERCIAL

## 2021-01-19 ENCOUNTER — APPOINTMENT (INPATIENT)
Dept: RADIOLOGY | Facility: HOSPITAL | Age: 71
DRG: 270 | End: 2021-01-19
Payer: COMMERCIAL

## 2021-01-19 PROBLEM — I26.99 PULMONARY EMBOLUS (HCC): Status: ACTIVE | Noted: 2021-01-19

## 2021-01-19 PROBLEM — C50.112: Status: ACTIVE | Noted: 2020-11-20

## 2021-01-19 LAB
ALBUMIN SERPL BCP-MCNC: 2.4 G/DL (ref 3.5–5)
ALP SERPL-CCNC: 71 U/L (ref 46–116)
ALT SERPL W P-5'-P-CCNC: 24 U/L (ref 12–78)
ANION GAP SERPL CALCULATED.3IONS-SCNC: 5 MMOL/L (ref 4–13)
ANISOCYTOSIS BLD QL SMEAR: PRESENT
AST SERPL W P-5'-P-CCNC: 19 U/L (ref 5–45)
BASOPHILS # BLD MANUAL: 0.13 THOUSAND/UL (ref 0–0.1)
BASOPHILS NFR MAR MANUAL: 1 % (ref 0–1)
BILIRUB DIRECT SERPL-MCNC: 0.47 MG/DL (ref 0–0.2)
BILIRUB SERPL-MCNC: 0.99 MG/DL (ref 0.2–1)
BUN SERPL-MCNC: 8 MG/DL (ref 5–25)
CALCIUM SERPL-MCNC: 8.5 MG/DL (ref 8.3–10.1)
CHLORIDE SERPL-SCNC: 109 MMOL/L (ref 100–108)
CO2 SERPL-SCNC: 26 MMOL/L (ref 21–32)
CREAT SERPL-MCNC: 0.77 MG/DL (ref 0.6–1.3)
DEPRECATED HGB OTHER BLD-IMP: 0 %
EOSINOPHIL # BLD MANUAL: 1.06 THOUSAND/UL (ref 0–0.4)
EOSINOPHIL NFR BLD MANUAL: 8 % (ref 0–6)
ERYTHROCYTE [DISTWIDTH] IN BLOOD BY AUTOMATED COUNT: 20.1 % (ref 11.6–15.1)
GFR SERPL CREATININE-BSD FRML MDRD: 89 ML/MIN/1.73SQ M
GLUCOSE SERPL-MCNC: 145 MG/DL (ref 65–140)
HCT VFR BLD AUTO: 28.2 % (ref 34.8–46.1)
HGB A MFR BLD: 1.5 % (ref 1.8–3.2)
HGB A MFR BLD: 98.5 % (ref 96.4–98.8)
HGB BLD-MCNC: 8.5 G/DL (ref 11.5–15.4)
HGB C MFR BLD: 0 %
HGB F MFR BLD: 0 % (ref 0–2)
HGB FRACT BLD-IMP: ABNORMAL
HGB S BLD QL SOLY: NEGATIVE
HGB S MFR BLD: 0 %
HYPERCHROMIA BLD QL SMEAR: PRESENT
LYMPHOCYTES # BLD AUTO: 0.79 THOUSAND/UL (ref 0.6–4.47)
LYMPHOCYTES # BLD AUTO: 6 % (ref 14–44)
MCH RBC QN AUTO: 20.7 PG (ref 26.8–34.3)
MCHC RBC AUTO-ENTMCNC: 30.1 G/DL (ref 31.4–37.4)
MCV RBC AUTO: 69 FL (ref 82–98)
METAMYELOCYTES NFR BLD MANUAL: 1 % (ref 0–1)
MICROCYTES BLD QL AUTO: PRESENT
MONOCYTES # BLD AUTO: 0.4 THOUSAND/UL (ref 0–1.22)
MONOCYTES NFR BLD: 3 % (ref 4–12)
NEUTROPHILS # BLD MANUAL: 10.59 THOUSAND/UL (ref 1.85–7.62)
NEUTS BAND NFR BLD MANUAL: 1 % (ref 0–8)
NEUTS SEG NFR BLD AUTO: 79 % (ref 43–75)
NRBC BLD AUTO-RTO: 0 /100 WBCS
NRBC BLD AUTO-RTO: 1 /100 WBC (ref 0–2)
OVALOCYTES BLD QL SMEAR: PRESENT
PLATELET # BLD AUTO: 354 THOUSANDS/UL (ref 149–390)
PLATELET BLD QL SMEAR: ADEQUATE
PMV BLD AUTO: 11.3 FL (ref 8.9–12.7)
POIKILOCYTOSIS BLD QL SMEAR: PRESENT
POTASSIUM SERPL-SCNC: 3.6 MMOL/L (ref 3.5–5.3)
PROT SERPL-MCNC: 5.9 G/DL (ref 6.4–8.2)
RBC # BLD AUTO: 4.11 MILLION/UL (ref 3.81–5.12)
RBC MORPH BLD: PRESENT
SCHISTOCYTES BLD QL SMEAR: PRESENT
SMUDGE CELLS BLD QL SMEAR: PRESENT
SODIUM SERPL-SCNC: 140 MMOL/L (ref 136–145)
TARGETS BLD QL SMEAR: PRESENT
VARIANT LYMPHS # BLD AUTO: 1 %
WBC # BLD AUTO: 13.24 THOUSAND/UL (ref 4.31–10.16)

## 2021-01-19 PROCEDURE — 85027 COMPLETE CBC AUTOMATED: CPT | Performed by: INTERNAL MEDICINE

## 2021-01-19 PROCEDURE — 99232 SBSQ HOSP IP/OBS MODERATE 35: CPT | Performed by: INTERNAL MEDICINE

## 2021-01-19 PROCEDURE — 99223 1ST HOSP IP/OBS HIGH 75: CPT | Performed by: INTERNAL MEDICINE

## 2021-01-19 PROCEDURE — 93970 EXTREMITY STUDY: CPT

## 2021-01-19 PROCEDURE — 99024 POSTOP FOLLOW-UP VISIT: CPT | Performed by: THORACIC SURGERY (CARDIOTHORACIC VASCULAR SURGERY)

## 2021-01-19 PROCEDURE — 80076 HEPATIC FUNCTION PANEL: CPT | Performed by: INTERNAL MEDICINE

## 2021-01-19 PROCEDURE — 71045 X-RAY EXAM CHEST 1 VIEW: CPT

## 2021-01-19 PROCEDURE — 80048 BASIC METABOLIC PNL TOTAL CA: CPT | Performed by: INTERNAL MEDICINE

## 2021-01-19 PROCEDURE — 85007 BL SMEAR W/DIFF WBC COUNT: CPT | Performed by: INTERNAL MEDICINE

## 2021-01-19 RX ORDER — POLYETHYLENE GLYCOL 3350 17 G/17G
238 POWDER, FOR SOLUTION ORAL ONCE
Status: COMPLETED | OUTPATIENT
Start: 2021-01-19 | End: 2021-01-19

## 2021-01-19 RX ORDER — SODIUM CHLORIDE 9 MG/ML
125 INJECTION, SOLUTION INTRAVENOUS CONTINUOUS
Status: CANCELLED | OUTPATIENT
Start: 2021-01-19

## 2021-01-19 RX ORDER — ONDANSETRON 2 MG/ML
4 INJECTION INTRAMUSCULAR; INTRAVENOUS ONCE AS NEEDED
Status: CANCELLED | OUTPATIENT
Start: 2021-01-19

## 2021-01-19 RX ORDER — SODIUM CHLORIDE 9 MG/ML
20 INJECTION, SOLUTION INTRAVENOUS CONTINUOUS
Status: CANCELLED | OUTPATIENT
Start: 2021-01-19

## 2021-01-19 RX ADMIN — BISACODYL 10 MG: 5 TABLET ORAL at 17:41

## 2021-01-19 RX ADMIN — DILTIAZEM HYDROCHLORIDE 180 MG: 180 CAPSULE, COATED, EXTENDED RELEASE ORAL at 09:18

## 2021-01-19 RX ADMIN — BISACODYL 10 MG: 5 TABLET ORAL at 20:42

## 2021-01-19 RX ADMIN — METOPROLOL SUCCINATE 100 MG: 100 TABLET, EXTENDED RELEASE ORAL at 09:18

## 2021-01-19 RX ADMIN — METOPROLOL SUCCINATE 100 MG: 100 TABLET, EXTENDED RELEASE ORAL at 20:42

## 2021-01-19 RX ADMIN — POLYETHYLENE GLYCOL 3350 238 G: 17 POWDER, FOR SOLUTION ORAL at 17:41

## 2021-01-19 RX ADMIN — DOCUSATE SODIUM 100 MG: 100 CAPSULE, LIQUID FILLED ORAL at 17:41

## 2021-01-19 NOTE — CONSULTS
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  Patient usually admitted with shortness of acute pericardial effusion status post pericardial window and pericardial drain  On admission patient noted to pericardial/mediastinal mass and multiple liver metastasis  Status post IM guided biopsy on 01/13/2020 for liver metastatic sites which is suggestive of gastrointestinal stromal tumor  No primary site seen on imaging including CT scan of the chest/abdomen  GI team consulted for possible upper endoscopy to evaluate her primary site of the tumor    -- 1/13 IR biopsy of liver lesions : GIST, unclear primary site on imaging   -- mediastinal mass nearly 6 cm, pericardial vs GI origin   -- pt will benefit from EGD to look for primary site   -- med-onc following, pt is informed that this is noncurable cancer but is treatable with one of the tyr kinase inhibitor   -- pt has remained NPO and will need EGD likely today    2  Elevated bilirubin, likely 2/2 liver metastasis  Hyperbilirubinemia upto 1 2 in the setting of normal aminotransferases and ALP  Last blood work 1/16  Likely 2/2 to liver mets  -- continue to trend LFTs daily   -- watch for signs of jaundice    3  Pericardial effusion, possible malignant, s/p pericardial window and drain 1/3/21  Pericardial drain in situ, draining close to 150 cc/day  Thoracic sx following  Atypical cells on cytology  Currently SOB has resolved  Cardiology team following  4  New onset A flutter on admission, currently NSR with controlled HR   A/C on hold given the pericardial drain and RUE hematoma  Rx as per primary team and cardiology  5  Microcytic anemia, likely chronic  Iron panel suggestive of low iron, low TIBC, normal ferritin  Hb stable currently  No signs of GI loss  Med onc following  HPI:  Patient is a 40-year-old female with past medical history of hypertension, chronic microcytic in with initially admitted to the hospital on 01/03/2021 due to shortness of breath at rest   She was evaluated with CTA of the chest on 01/03 which was negative for PE however she was found to have a large pericardial effusion with mediastinal mass measuring up to 6 6 cm with diffuse mediastinal adenopathy  Subsequently she underwent subxiphoid pericardial window and pericardial drain on 01/04/2021 with continued high output nearly 200-250 cc per day  Thoracic surgery has been following her since  Pericardial fluid cytology done twice on 01/04 and 1/11 suggestive of atypical cellular changes  Repeat imaging showed multiple metastatic sites and multiple metastatic lesions in the liver  She subsequently underwent IR guided liver biopsy on 01/13/2021 which was suggestive of gastrointestinal stromal tumor  Imaging including CT abdomen/chest did not reveal any primary site of the tumor  On evaluation today patient does not report any abdominal pain, nausea, vomiting, chest pain, loss of appetite, diarrhea, constipation  As per the patient she has had regular colonoscopies, twice so far  Last 1 was 4 years ago and she was recommended repeat in 5 years  She follows with Dr Nathalie Duenas for colonoscopies  As per the patient last EGD was done in June 2020 for dyspepsia, at the time she was diagnosed with peptic ulcer and was put on Protonix  This was staffed with Dr Alfreda Carmen, no formal reports available  REVIEW OF SYSTEMS:    CONSTITUTIONAL: Denies any fever, chills, rigors, and weight loss  HEENT: No earache or tinnitus   Denies hearing loss or visual disturbances  CARDIOVASCULAR: No chest pain or palpitations  RESPIRATORY: Denies any cough, hemoptysis, shortness of breath or dyspnea on exertion  GASTROINTESTINAL: As noted in the History of Present Illness  GENITOURINARY: No problems with urination  Denies any hematuria or dysuria  NEUROLOGIC: No dizziness or vertigo, denies headaches  MUSCULOSKELETAL: Denies any muscle or joint pain  SKIN: Denies skin rashes or itching  ENDOCRINE: Denies excessive thirst  Denies intolerance to heat or cold  PSYCHOSOCIAL: Denies depression or anxiety  Denies any recent memory loss  Historical Information   Past Medical History:   Diagnosis Date    Arthritis     Hypertension      Past Surgical History:   Procedure Laterality Date    IR BIOPSY LIVER MASS  1/13/2021    MASTECTOMY      left     PERICARDIAL WINDOW N/A 1/4/2021    Procedure: WINDOW PERICARDIAL, subxiphoid ;  Surgeon: Roman Franco MD;  Location: [facility];  Service: Thoracic     Social History   Social History     Substance and Sexual Activity   Alcohol Use Yes    Frequency: Monthly or less    Drinks per session: 1 or 2    Binge frequency: Never     Social History     Substance and Sexual Activity   Drug Use Never     Social History     Tobacco Use   Smoking Status Never Smoker   Smokeless Tobacco Never Used     History reviewed  No pertinent family history      Meds/Allergies     Medications Prior to Admission   Medication    lisinopril (ZESTRIL) 30 mg tablet    metoprolol tartrate (LOPRESSOR) 25 mg tablet     Current Facility-Administered Medications   Medication Dose Route Frequency    acetaminophen (TYLENOL) tablet 650 mg  650 mg Oral Q6H PRN    aluminum-magnesium hydroxide-simethicone (MYLANTA) oral suspension 30 mL  30 mL Oral Q6H PRN    diltiazem (CARDIZEM CD) 24 hr capsule 180 mg  180 mg Oral Daily    diltiazem (CARDIZEM) tablet 30 mg  30 mg Oral Q6H PRN    docusate sodium (COLACE) capsule 100 mg  100 mg Oral BID    HYDROmorphone (DILAUDID) injection 0 2 mg  0 2 mg Intravenous Q4H PRN    HYDROmorphone (DILAUDID) injection 0 2 mg  0 2 mg Intravenous Once    metoprolol succinate (TOPROL-XL) 24 hr tablet 100 mg  100 mg Oral Q12H [facility]    ondansetron (ZOFRAN) injection 4 mg  4 mg Intravenous Q6H PRN    oxyCODONE (ROXICODONE) IR tablet 2 5 mg  2 5 mg Oral Q4H PRN    oxyCODONE (ROXICODONE) IR tablet 5 mg  5 mg Oral Q4H PRN    senna (SENOKOT) tablet 8 6 mg  1 tablet Oral Daily       No Known Allergies        Objective     Blood pressure 121/95, pulse 85, temperature 98 4 °F (36 9 °C), resp  rate 20, height 5' 4" (1 626 m), weight 82 7 kg (182 lb 4 8 oz), SpO2 96 %  Body mass index is 31 29 kg/m²  Intake/Output Summary (Last 24 hours) at 1/19/2021 0809  Last data filed at 1/19/2021 0541  Gross per 24 hour   Intake 980 ml   Output 157 ml   Net 823 ml         PHYSICAL EXAM:      General Appearance:   Alert, cooperative, no distress   HEENT:   Normocephalic, atraumatic, anicteric      Neck:  Supple, symmetrical, trachea midline   Lungs:   Clear to auscultation bilaterally; no rales, rhonchi or wheezing; respirations unlabored    Heart[de-identified]   Regular rate and rhythm; no murmur, rub, or gallop  Abdomen:   Soft, non-tender, non-distended; normal bowel sounds; no masses, no organomegaly    Genitalia:   Deferred    Rectal:   Deferred    Extremities:  No cyanosis, clubbing or edema    Pulses:  2+ and symmetric all extremities    Skin:  No jaundice, rashes, or lesions      Lab Results:   No results displayed because visit has over 200 results  Imaging Studies: I have personally reviewed pertinent imaging studies

## 2021-01-19 NOTE — PROGRESS NOTES
CARDIOLOGY PROGRESS NOTE     PATIENT INFORMATION     Name: Martina Cabral   Age & Sex: 65 y o  female   MRN: [id number]  Hospital Stay Days: 16  [facility] 426-01   Encounter: [id number]    ASSESSMENT/PLAN     Principal Problem:    Atrial flutter ([address] )  Active Problems:    Essential hypertension    Pericardial effusion without cardiac tamponade    Mediastinal mass    Pain and swelling of right upper extremity    Hypodense mass of liver    Leukocytosis    Anemia    Fever    Impression:  Ms Carli Mchugh is a 63yo female with PMH of hypertension and breast cancer in 1999 who presented on 1/3 for concern of shortness of breath and was found to be in afib/flutter  Metoprolol dose increased for treatment of arrhythmia resulting in rate control and conversion back to NSR  Patient has continued with paroxysmal afib/flutter while admitted, occasionally with rapid rates  Controlled on Metoprolol 100mg BID and Cardizem 180mg daily  CTA done on presentation was significant for pericardial effusion without tamponade as well as anterior mediastinal mass with pleural effusions  Had pericardial window done on 1/4 with placement of drain which remains in place with significant output  Repeat echo on 1/7 with no residual effusion  s/p IR liver biopsy significant for spindle cell tumor diagnosed as GIST  Heme onc following and GI on board for evaluation for primary tumor     Plan:  Pericardial Effusion  CTA and TTE done on 1/3 significant for large pericardial effusion without tamponade  Had pericardial window and drain placement on 1/4  Repeat echo 1/7 with resolution of effusion  CT chest done 1/11 continues to report that mediastinal mass anterior to R atrium is likely cause of effusion   CT chest/abd/pelvis with no pericardial effusion s/p drain  Likely malignant effusion  157cc serosanguinous fluid blood tinged drained over past 24 hours  S/P IR liver biopsy - 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  Will need repeat echo 24 hours after drain removal, and if started on anticoagulation at that time will need repeat echo 2-3 days after initiating anticoagulation  Thoracic surgery managing drain  Will continue drain placement due to high output  Okay to remove once drainage output less than 50mL in 24 hours  Plan for potential VATS with 2nd pericardial drain to pleural cavity per primary team's note     Atrial Fibrillation/Flutter  Patient with multiple episodes of paroxysmal atrial fibrillation/flutter throughout admission  Continue Metoprolol 100mg BID and Cardizem 180mg daily  Holding anticoagulation due to pericardial drainage, R arm hematoma, and procedure with IR  Will need anticoagulation after resolution  Defer initiation to primary and surgical teams at this point - when agreeable recommend initiating heparin drip with no bolus, and close monitoring for increased bleeding from pericardial drain  Continue to monitor on telemetry   T/C addition of digoxin bolus if blood pressure cannot tolerate cardizem dosing     Hypertension  Patient on lisinopril 30mg at home  Held due to episodes of hypotension while admitted  BP remains accepatble at 124/63  Will consider adding back Lisinopril, likely at lower dose, if blood pressure elevates      Anemia  Patient with gradual drop in Hgb over admission  Hgb 8 1 this morning  Potentially contributing to tachycardia  Primary team ordered FOBT   Continue to monitor CBC  Transfuse for <7 0    SUBJECTIVE     Patient seen and examined  No acute events overnight  Patient reports that she feels well overall  Denies any chest pain or palpitations  Aware of situation  Currently no shortness of breath or lightheadedness  Swelling of R upper extremity improving with decreased pain       OBJECTIVE     Vitals:    01/18/21 2235 01/19/21 0216 01/19/21 0600 01/19/21 0705   BP: 113/54 113/54  121/95   BP Location:       Pulse: 80 77  85   Resp: 17 20  20   Temp: 98 3 °F (36 8 °C) 98 1 °F (36 7 °C)  98 4 °F (36 9 °C)   TempSrc:       SpO2: 97% 95%  96%   Weight:   82 7 kg (182 lb 4 8 oz)    Height:          Temperature:   Temp (24hrs), Av 3 °F (36 8 °C), Min:97 4 °F (36 3 °C), Max:99 5 °F (37 5 °C)    Temperature: 98 4 °F (36 9 °C)  Intake & Output:  I/O        07 -  0700 701 -  07 -  07    P  O  1020 980     NG/GT 0 0     Total Intake(mL/kg) 1020 (12 4) 980 (11 9)     Urine (mL/kg/hr) 800 (0 4)      Drains 270 157     Total Output 1070 157     Net -50 +823                Weights:   IBW: 54 7 kg    Body mass index is 31 29 kg/m²  Weight (last 2 days)     Date/Time   Weight    21 06   82 7 (182 3)    21 06   82 3 (181 44)    21 06   85 2 (187 83)            Physical Exam  Vitals signs reviewed  Constitutional:       General: She is not in acute distress  Appearance: Normal appearance  She is well-developed  She is not ill-appearing or diaphoretic  Comments: Evaluated sitting at edge of bed, appears comfortable   HENT:      Head: Normocephalic and atraumatic  Right Ear: External ear normal       Left Ear: External ear normal       Nose: Nose normal       Mouth/Throat:      Mouth: Mucous membranes are moist       Pharynx: Oropharynx is clear  Eyes:      General: No scleral icterus  Right eye: No discharge  Left eye: No discharge  Conjunctiva/sclera: Conjunctivae normal    Cardiovascular:      Rate and Rhythm: Normal rate  Rhythm irregular  Heart sounds: Normal heart sounds  No murmur  Pulmonary:      Effort: Pulmonary effort is normal  No respiratory distress  Breath sounds: Examination of the right-upper field reveals decreased breath sounds  Examination of the right-middle field reveals decreased breath sounds  Decreased breath sounds present  No wheezing     Chest:      Comments: Drain in place with blood tinged serosanguinous fluid  Abdominal:      General: Bowel sounds are normal  There is no distension  Palpations: Abdomen is soft  Tenderness: There is no abdominal tenderness  Musculoskeletal: Normal range of motion  General: Swelling (legs and R upper ext 2/2 hematoma) present  No tenderness  Right lower leg: Edema present  Left lower leg: Edema present  Comments: Swelling of R upper arm 2/2 hematoma - improving   Skin:     General: Skin is warm and dry  Coloration: Skin is not jaundiced  Neurological:      General: No focal deficit present  Mental Status: She is alert, oriented to person, place, and time and easily aroused  Cranial Nerves: No cranial nerve deficit  Motor: No weakness  Psychiatric:         Mood and Affect: Mood normal          Behavior: Behavior normal  Behavior is cooperative  Thought Content: Thought content normal        LABORATORY DATA     Labs: I have personally reviewed pertinent reports  Results from last 7 days   Lab Units 01/17/21  0955 01/16/21  0507 01/15/21  1243   WBC Thousand/uL 16 70* 19 30* 18 83*   HEMOGLOBIN g/dL 8 1* 8 1* 8 5*   HEMATOCRIT % 27 1* 28 0* 27 8*   PLATELETS Thousands/uL 333 301 304   NEUTROS PCT % 81* 79* 79*   MONOS PCT % 9 11 11      Results from last 7 days   Lab Units 01/17/21  0955 01/17/21  0642 01/16/21  0507  01/14/21  0626   POTASSIUM mmol/L 3 8 3 9 3 8   < > 4 2   CHLORIDE mmol/L 109* 104 105   < > 107   CO2 mmol/L 27 33* 26   < > 26   BUN mg/dL 10 25 10   < > 8   CREATININE mg/dL 0 74 1 05 0 80   < > 0 81   CALCIUM mg/dL 8 4 8 6 8 2*   < > 8 1*   ALK PHOS U/L  --   --  77  --  69   ALT U/L  --   --  23  --  21   AST U/L  --   --  21  --  15    < > = values in this interval not displayed  Results from last 7 days   Lab Units 01/13/21  0452   INR  1 42*               IMAGING & DIAGNOSTIC TESTING     Radiology Results: I have personally reviewed pertinent reports      Xr Chest Portable    Result Date: 1/5/2021  Impression: Pericardial drain placement with slight decrease in size of the cardiac silhouette  Small effusions and bibasilar atelectasis  Workstation performed: [id number]     Xr Chest 1 View Portable    Result Date: 1/3/2021  Impression: Marked enlargement of the cardiac silhouette which could be due to cardiomegaly or a pericardial effusion  Right middle lobe pneumonia  Workstation performed: [id number]     Us Msk Limited    Result Date: 1/8/2021  Impression: Previously noted collection is contiguous with the bicipital groove, and the long head biceps tendon is not visualized within the bicipital groove  Findings most likely represent proximal tendon tear with distal retraction  If collection does not resolve spontaneously (within 1-3 months), recommend right humeral MRI with contrast as hemorrhagic neoplasm is within the differential  The study was marked in EPIC for significant notification  Workstation performed: [id number]     Cta Chest Pe Study    Result Date: 1/3/2021  Impression: Large pericardial effusion measuring approximately 3 3 cm area of bulging of the right pericardium or a potential adjacent right mediastinal mass measuring approximately 6 6 cm #2/100  The pericardial effusion may be on a malignant basis  Prominent diffuse mediastinal adenopathy Interlobular septal thickening and small pleural effusions in keeping with pulmonary edema  Posterior right upper lobe consolidation #3/55 in a wedge-shaped appearance with a apex extending towards the right hilum may represent pneumonia or a postobstructive pneumonitis from a central mass  A discrete mass is not identified but could be obscured  Innumerable vague hypodensities throughout the liver are suspicious for metastases    I personally discussed this study with Jim Burden on 1/3/2021 at 5:18 PM  Workstation performed: [id number]     Us Extremity Soft Tissue    Result Date: 1/8/2021  Impression: Complex fluid collection in the medial right upper arm with hematocrit level in keeping with hematoma  Given the location, the possibility of associated underlying biceps tendon rupture is not excluded  This can be evaluated with MSK shoulder ultrasound if clinically indicated  Recommend follow-up ultrasound in 1 month to confirm resolution  The study was marked in [facility] for immediate notification  Workstation performed: [id number]     Other Diagnostic Testing: I have personally reviewed pertinent reports        ACTIVE MEDICATIONS     Current Facility-Administered Medications   Medication Dose Route Frequency    acetaminophen (TYLENOL) tablet 650 mg  650 mg Oral Q6H PRN    aluminum-magnesium hydroxide-simethicone (MYLANTA) oral suspension 30 mL  30 mL Oral Q6H PRN    diltiazem (CARDIZEM CD) 24 hr capsule 180 mg  180 mg Oral Daily    diltiazem (CARDIZEM) tablet 30 mg  30 mg Oral Q6H PRN    docusate sodium (COLACE) capsule 100 mg  100 mg Oral BID    HYDROmorphone (DILAUDID) injection 0 2 mg  0 2 mg Intravenous Q4H PRN    HYDROmorphone (DILAUDID) injection 0 2 mg  0 2 mg Intravenous Once    metoprolol succinate (TOPROL-XL) 24 hr tablet 100 mg  100 mg Oral Q12H [facility]    ondansetron (ZOFRAN) injection 4 mg  4 mg Intravenous Q6H PRN    oxyCODONE (ROXICODONE) IR tablet 2 5 mg  2 5 mg Oral Q4H PRN    oxyCODONE (ROXICODONE) IR tablet 5 mg  5 mg Oral Q4H PRN    senna (SENOKOT) tablet 8 6 mg  1 tablet Oral Daily     VTE Pharmacologic Prophylaxis: held 2/2 acute bleed - hematoma, pericardial effusion, CT scan showing potential bleed from liver  VTE Mechanical Prophylaxis: sequential compression device    ==  Echo DO Deepa  Internal Medicine Resident, PGY-1  [address] [facility]

## 2021-01-19 NOTE — PROGRESS NOTES
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    Plan:  - I had a lengthy conversation today with the patient, her  and medical oncologist Dr Marium Lopez about the patient's metastatic GIST  She has a few problems at this time including her continued high output pericardial effusion, metastatic just of unknown primary, and large 6 cm anterior mediastinal mass  I agree that she should undergo an EGD today to evaluate for primary  I discussed her high output pericardial effusion with Dr Marium Lopez  I think that this is related to her anterior mediastinal mass and likely metastatic GIST to that area  This is not resolved with over 2 weeks of a drain in place  He does not feel this would respond quickly to medical management for her GIST  Additionally he feels that more tissue may be helpful to guide targeted therapy  - In that regard I think the patient would benefit from a right VATS, pericardial window with biopsy of her anterior mediastinal mass  That would provide more tissue for treatment and would drain her pericardial effusion into the right pleural space that could be more forgiving  I discussed all the above with the patient and her   They are in agreement  - will plan on a right VATS pericardial window and biopsy of anterior mediastinal mass on Thursday 1/21  Keep NPO after midnight Wednesday night  Please continue to hold anticoagulation until after that procedure    - will pull her pericardial drain today to allow for some mild fluid buildup for that procedure      Subjective/Objective     Chief Complaint:   Chief Complaint   Patient presents with    Shortness of Breath     Patient had SOB and lower extremity swelling, found to have atrial flutter at [facility]  Transfer here private for [facility]  Subjective:  Patient doing well this morning  No major complaints  No pain at her incision site  Objective:     Vitals: Blood pressure 121/95, pulse 85, temperature 98 4 °F (36 9 °C), resp  rate 20, height 5' 4" (1 626 m), weight 82 7 kg (182 lb 4 8 oz), SpO2 96 %  ,Body mass index is 31 29 kg/m²  I/O       01/17 0701 - 01/18 0700 01/18 0701 - 01/19 0700 01/19 0701 - 01/20 0700    P  O  1020 980     NG/GT 0 0 0    Total Intake(mL/kg) 1020 (12 4) 980 (11 9) 0 (0)    Urine (mL/kg/hr) 800 (0 4)  350 (0 9)    Drains 270 157 70    Total Output 1070 157 420    Net -50 +823 -420                 Physical Exam: General appearance: alert, appears stated age and cooperative  Head: Normocephalic, without obvious abnormality, atraumatic  Eyes: conjunctivae/corneas clear  PERRL, EOM's intact  Fundi benign  Neck: no adenopathy, no carotid bruit, no JVD and supple, symmetrical, trachea midline  Lungs: clear to auscultation bilaterally  Incisions: clean,dry, intact  No evidence of infection  Heart: regular rate and rhythm, S1, S2 normal, no murmur, click, rub or gallop  Pericardial drain in place with serous output  Abdomen: soft, non-tender; bowel sounds normal; no masses,  no organomegaly  Extremities:  Bilateral upper extremity swelling  Skin: Skin color, texture, turgor normal  No rashes or lesions  Neurologic: Grossly normal    I personally reviewed pathology and discuss this with the patient  Her liver biopsy was consistent with a metastatic gist             Lab, Imaging and other studies: I have personally reviewed pertinent reports

## 2021-01-20 ENCOUNTER — ANESTHESIA EVENT (INPATIENT)
Dept: PERIOP | Facility: HOSPITAL | Age: 71
DRG: 270 | End: 2021-01-20
Payer: COMMERCIAL

## 2021-01-20 LAB
ANION GAP SERPL CALCULATED.3IONS-SCNC: 4 MMOL/L (ref 4–13)
APTT PPP: 24 SECONDS (ref 23–37)
APTT PPP: 67 SECONDS (ref 23–37)
BACTERIA BLD CULT: NORMAL
BACTERIA BLD CULT: NORMAL
BUN SERPL-MCNC: 10 MG/DL (ref 5–25)
CALCIUM SERPL-MCNC: 8.5 MG/DL (ref 8.3–10.1)
CHLORIDE SERPL-SCNC: 106 MMOL/L (ref 100–108)
CO2 SERPL-SCNC: 27 MMOL/L (ref 21–32)
CREAT SERPL-MCNC: 0.9 MG/DL (ref 0.6–1.3)
ERYTHROCYTE [DISTWIDTH] IN BLOOD BY AUTOMATED COUNT: 20 % (ref 11.6–15.1)
GFR SERPL CREATININE-BSD FRML MDRD: 74 ML/MIN/1.73SQ M
GLUCOSE SERPL-MCNC: 100 MG/DL (ref 65–140)
HCT VFR BLD AUTO: 29.3 % (ref 34.8–46.1)
HGB BLD-MCNC: 8.5 G/DL (ref 11.5–15.4)
INR PPP: 1.43 (ref 0.84–1.19)
MCH RBC QN AUTO: 20.2 PG (ref 26.8–34.3)
MCHC RBC AUTO-ENTMCNC: 29 G/DL (ref 31.4–37.4)
MCV RBC AUTO: 70 FL (ref 82–98)
NRBC BLD AUTO-RTO: 1 /100 WBCS
PLATELET # BLD AUTO: 371 THOUSANDS/UL (ref 149–390)
PMV BLD AUTO: 11.4 FL (ref 8.9–12.7)
POTASSIUM SERPL-SCNC: 3.8 MMOL/L (ref 3.5–5.3)
PROTHROMBIN TIME: 17.4 SECONDS (ref 11.6–14.5)
RBC # BLD AUTO: 4.2 MILLION/UL (ref 3.81–5.12)
SODIUM SERPL-SCNC: 137 MMOL/L (ref 136–145)
WBC # BLD AUTO: 15.24 THOUSAND/UL (ref 4.31–10.16)

## 2021-01-20 PROCEDURE — 99024 POSTOP FOLLOW-UP VISIT: CPT | Performed by: THORACIC SURGERY (CARDIOTHORACIC VASCULAR SURGERY)

## 2021-01-20 PROCEDURE — 99232 SBSQ HOSP IP/OBS MODERATE 35: CPT | Performed by: INTERNAL MEDICINE

## 2021-01-20 PROCEDURE — 80048 BASIC METABOLIC PNL TOTAL CA: CPT | Performed by: INTERNAL MEDICINE

## 2021-01-20 PROCEDURE — 93970 EXTREMITY STUDY: CPT | Performed by: SURGERY

## 2021-01-20 PROCEDURE — 85730 THROMBOPLASTIN TIME PARTIAL: CPT | Performed by: INTERNAL MEDICINE

## 2021-01-20 PROCEDURE — 85610 PROTHROMBIN TIME: CPT | Performed by: INTERNAL MEDICINE

## 2021-01-20 PROCEDURE — 85027 COMPLETE CBC AUTOMATED: CPT | Performed by: INTERNAL MEDICINE

## 2021-01-20 RX ORDER — HEPARIN SODIUM 1000 [USP'U]/ML
6400 INJECTION, SOLUTION INTRAVENOUS; SUBCUTANEOUS
Status: DISCONTINUED | OUTPATIENT
Start: 2021-01-20 | End: 2021-01-21

## 2021-01-20 RX ORDER — CEFAZOLIN SODIUM 2 G/50ML
2000 SOLUTION INTRAVENOUS
Status: DISPENSED | OUTPATIENT
Start: 2021-01-21 | End: 2021-01-22

## 2021-01-20 RX ORDER — HEPARIN SODIUM 1000 [USP'U]/ML
3200 INJECTION, SOLUTION INTRAVENOUS; SUBCUTANEOUS
Status: DISCONTINUED | OUTPATIENT
Start: 2021-01-20 | End: 2021-01-21

## 2021-01-20 RX ORDER — HEPARIN SODIUM 10000 [USP'U]/100ML
3-30 INJECTION, SOLUTION INTRAVENOUS
Status: DISCONTINUED | OUTPATIENT
Start: 2021-01-20 | End: 2021-01-21

## 2021-01-20 RX ORDER — SODIUM CHLORIDE, SODIUM LACTATE, POTASSIUM CHLORIDE, CALCIUM CHLORIDE 600; 310; 30; 20 MG/100ML; MG/100ML; MG/100ML; MG/100ML
75 INJECTION, SOLUTION INTRAVENOUS CONTINUOUS
Status: DISCONTINUED | OUTPATIENT
Start: 2021-01-21 | End: 2021-01-22

## 2021-01-20 RX ADMIN — HEPARIN SODIUM 18 UNITS/KG/HR: 10000 INJECTION, SOLUTION INTRAVENOUS at 13:40

## 2021-01-20 RX ADMIN — DILTIAZEM HYDROCHLORIDE 180 MG: 180 CAPSULE, COATED, EXTENDED RELEASE ORAL at 09:17

## 2021-01-20 RX ADMIN — DOCUSATE SODIUM 100 MG: 100 CAPSULE, LIQUID FILLED ORAL at 17:23

## 2021-01-20 RX ADMIN — METOPROLOL SUCCINATE 100 MG: 100 TABLET, EXTENDED RELEASE ORAL at 09:17

## 2021-01-20 RX ADMIN — DOCUSATE SODIUM 100 MG: 100 CAPSULE, LIQUID FILLED ORAL at 09:17

## 2021-01-20 RX ADMIN — METOPROLOL SUCCINATE 100 MG: 100 TABLET, EXTENDED RELEASE ORAL at 21:35

## 2021-01-20 NOTE — ASSESSMENT & PLAN NOTE
Discussed with radiology today  CT scan from 01/03/2021 and 01/16/2021 showed pulmonary infarct, posterior segment right upper lobe pulmonary embolus is visible on CT scan  True emboli versus tumor emboli given tumor invading the right atrium  Will order lower extremity venous Doppler  Discussed the finding with the patient   She is amenable to anticoagulation  Started patient on Heparin drip will discontinue at midnight

## 2021-01-20 NOTE — ASSESSMENT & PLAN NOTE
· Anterior mediastinal mass seen on CT scan with diffuse mediastinal adenopathy, Innumerable vague hypodensities throughout the liver are suspicious for metastases  · Thoracic surgeries planning right VATS, pericardial window with biopsy of the anterior mediastinal mass on 1/21/21

## 2021-01-20 NOTE — ASSESSMENT & PLAN NOTE
Discussed with radiology today  CT scan from 01/03/2021 and 01/16/2021 showed pulmonary infarct, posterior segment right upper lobe pulmonary embolus is visible on CT scan  True emboli versus tumor emboli given tumor invading the right atrium  Will order lower extremity venous Doppler  Discussed the finding with the patient  Discussed the risk versus benefits of anticoagulation  She says she is overwhelmed, not sure about anticoagulation due to risk of bleeding in the pericardial space  We are going to readdress anticoagulation tomorrow

## 2021-01-20 NOTE — PROGRESS NOTES
Progress Note - Thoracic Surgery   Lunsford Carmencita 65 y o  female MRN: [id number]  Unit/Bed#: [facility] 426-01 Encounter: [id number]    Assessment:  65 y o  F with a 6 cm anterior mediastinal mass and a large pericardial effusion s/p subxiphoid pericardial window on 1/4/2021, with continued high output from the drain -> pericardial drain was discontinued yesterday  Pathology from liver biopsy came back as metastatic GIST    Plan:  NPO for EGD, colonoscopy today  Plan for R VATS, pericardial window, anterior mediastinal mass biopsy tomorrow   Remainder of care per primary team     Subjective/Objective     Subjective: No acute events overnight  Patient denies any chest pain, SOB  Tolerating diet without nausea/vomiting  No fevers, chills  Objective:     Blood pressure 115/65, pulse 88, temperature 98 9 °F (37 2 °C), temperature source Oral, resp  rate 18, height 5' 4" (1 626 m), weight 82 7 kg (182 lb 4 8 oz), SpO2 95 %  ,Body mass index is 31 29 kg/m²  Intake/Output Summary (Last 24 hours) at 1/20/2021 0608  Last data filed at 1/20/2021 2321  Gross per 24 hour   Intake 300 ml   Output 595 ml   Net -295 ml       Invasive Devices     Peripherally Inserted Central Catheter Line            PICC Line 01/05/21 Right Brachial 14 days                Physical Exam:   NAD, alert and oriented x3  Normocephalic, atraumatic  MMM, EOMI, PERRLA  Norm resp effort on RA  Prior pericardial drain site with clean dressing in place     RRR  Abd soft, NT/ND  No calf tenderness or peripheral edema  Motor/sensation intact in distal extremities  CN grossly intact  -rash/lesions      Lab, Imaging and other studies:  CBC:   Lab Results   Component Value Date    WBC 15 24 (H) 01/20/2021    HGB 8 5 (L) 01/20/2021    HCT 29 3 (L) 01/20/2021    MCV 70 (L) 01/20/2021     01/20/2021    MCH 20 2 (L) 01/20/2021    MCHC 29 0 (L) 01/20/2021    RDW 20 0 (H) 01/20/2021    MPV 11 4 01/20/2021    NRBC 1 01/20/2021   , CMP:   Lab Results Component Value Date    SODIUM 140 01/19/2021    K 3 6 01/19/2021     (H) 01/19/2021    CO2 26 01/19/2021    BUN 8 01/19/2021    CREATININE 0 77 01/19/2021    CALCIUM 8 5 01/19/2021    AST 19 01/19/2021    ALT 24 01/19/2021    ALKPHOS 71 01/19/2021    EGFR 89 01/19/2021   , Coagulation: No results found for: PT, INR, APTT  VTE Pharmacologic Prophylaxis: Sequential compression device (Venodyne)   VTE Mechanical Prophylaxis: sequential compression device

## 2021-01-20 NOTE — ASSESSMENT & PLAN NOTE
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

## 2021-01-20 NOTE — ASSESSMENT & PLAN NOTE
· Patient was brought to ED by has been due to coughing, lower extremity edema and dyspnea  · EKG showed atrial flutter, QTc prolongation  Her son passed away on 12/21/2020  · Patient was evaluated by Cardiology  · Cardiac echo with large pericardial effusion without tamponade, EF 65%  · Initially was treated with Cardizem drip, Currently off Cardizem drip,   · on p o  Metoprolol and diltiazem     · Has a PICC line due to difficult IV access  · Slight AFib with RVR overnight but relatively well controlled on beta-blocker and calcium channel blocker

## 2021-01-20 NOTE — PROGRESS NOTES
CARDIOLOGY PROGRESS NOTE     PATIENT INFORMATION     Name: Eliza Lazaro   Age & Sex: 65 y o  female   MRN: [id number]  Hospital Stay Days: 17  Unit/Bed#: [facility] 426-01   Encounter: [id number]    ASSESSMENT/PLAN     Principal Problem:    Atrial flutter ([address] )  Active Problems:    Essential hypertension    Pericardial effusion without cardiac tamponade    Mediastinal mass    Pain and swelling of right upper extremity    Hypodense mass of liver    Leukocytosis    Anemia    Fever    Pulmonary embolus (HCC)    Plan:  Pericardial Effusion  CTA and TTE done on 1/3 significant for large pericardial effusion without tamponade  Had pericardial window and drain placement on 1/4  Repeat echo 1/7 with resolution of effusion  CT chest done 1/11 continues to report that mediastinal mass anterior to R atrium is likely cause of effusion  CT chest/abd/pelvis with no pericardial effusion s/p drain  Likely malignant effusion with continued reaccumulation  LORENZO drain pulled 1/20  S/P IR liver biopsy - 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, pericardial fluid positive for spindle cells  Recommend repeat echo on Friday - post op day 1 after repeat pericardial window and VATS with thoracic surgery  Thoracic surgery to biopsy anterior mediastinal mass as well     Atrial Fibrillation/Flutter  Patient persistently in atrial fibrillation/flutter throughout admission  Continue Metoprolol 100mg BID and Cardizem 180mg daily - did have transient increase in rates this morning, but improved to low 100's without intervention  Holding anticoagulation due to pericardial drainage, R arm hematoma, and procedure with IR  Will need anticoagulation going forward  Defer initiation to primary and surgical teams at this point - when agreeable recommend initiating heparin drip with no bolus, and close monitoring for reaccumulation in pericardial sac  Continue to monitor on telemetry      Hypertension  Patient on lisinopril 30mg at home  Held due to episodes of hypotension while admitted  BP remains accepatble at 124/63  Will consider adding back Lisinopril, likely at lower dose, if blood pressure elevates      Anemia  Patient with gradual drop in Hgb over admission  Hgb 8 1 this morning  Potentially contributing to tachycardia  Continue to monitor CBC  Transfuse for <7 0    Pulmonary Embolism  CXR done on 1/19 for verification of PICC placement demonstrated improving R middle lobe opacity compatible with pulmonary infarct after reviewing previous imaging  Upon reevaluation, though to by posterior segment right upper lobe PE with increasing R pulmonary emboli on repeat CT, however, unable to determine whether true emboli or tumor emboli given location of anterior mediastinal mass  Will defer anticoagulation initiation to primary team and surgeons at this time  Patient with ongoing workup for malignancy  Heme Onc following    GIST  Hepatic biopsy significant for spindle cell tumor suggestive of GIST  Patient scheduled for EGD/colonscopy today with GI for evaluation for primary tumor  Scheduled for VATS with Thoracic surgery on 1/21 with pericardial window and biopsy of mediastinal mass for more tissue to be evaluated  Heme/Onc following    SUBJECTIVE     Patient seen and examined  No acute events overnight  Patient reports no chest pain, palpitations, dyspnea  Remains asymptomatic when rates are rapid  Denies any lightheadedness/dizziness  Does endorse some fatigue and overall feeling overwhelmed  Anxious to have scopes done today, so she can eat and sleep since she was unable to sleep overnight due to bowel prep       OBJECTIVE     Vitals:    01/19/21 2347 01/20/21 0311 01/20/21 0600 01/20/21 0711   BP: 115/65 115/65  139/58   BP Location: Right arm Right arm     Pulse: 74 88  (!) 45   Resp: 20 18 19   Temp: 98 5 °F (36 9 °C) 98 9 °F (37 2 °C)  98 °F (36 7 °C)   TempSrc: Oral Oral     SpO2: 95% 95%  94%   Weight:   83 7 kg (184 lb 9 6 oz) Height:          Temperature:   Temp (24hrs), Av 3 °F (36 8 °C), Min:98 °F (36 7 °C), Max:98 9 °F (37 2 °C)    Temperature: 98 °F (36 7 °C)  Intake & Output:  I/O        07 -  0700 701 -  0700 701 -  0700    P  O  980 300     NG/GT 0 0     Total Intake(mL/kg) 980 (11 9) 300 (3 6)     Urine (mL/kg/hr)  525 (0 3)     Drains 157 70     Stool  0     Total Output 157 595     Net +823 -295            Unmeasured Urine Occurrence  2 x     Unmeasured Stool Occurrence  6 x         Weights:   IBW: 54 7 kg    Body mass index is 31 69 kg/m²  Weight (last 2 days)     Date/Time   Weight    21 06   83 7 (184 6)    21 06   82 7 (182 3)    21 06   82 3 (181 44)            Physical Exam  Vitals signs reviewed  Constitutional:       General: She is not in acute distress  Appearance: Normal appearance  She is well-developed  She is not ill-appearing or diaphoretic  Comments: Evaluated sitting in bedside chair, appears comfortable   HENT:      Head: Normocephalic and atraumatic  Right Ear: External ear normal       Left Ear: External ear normal       Nose: Nose normal       Mouth/Throat:      Mouth: Mucous membranes are moist       Pharynx: Oropharynx is clear  Eyes:      General: No scleral icterus  Right eye: No discharge  Left eye: No discharge  Conjunctiva/sclera: Conjunctivae normal    Cardiovascular:      Rate and Rhythm: Normal rate  Rhythm irregular  Heart sounds: Normal heart sounds  No murmur  Pulmonary:      Effort: Pulmonary effort is normal  No respiratory distress  Breath sounds: Examination of the right-upper field reveals decreased breath sounds  Examination of the right-middle field reveals decreased breath sounds  Decreased breath sounds present  No wheezing  Chest:      Comments: Drain removed  Abdominal:      General: Bowel sounds are normal  There is no distension  Palpations: Abdomen is soft  Tenderness: There is no abdominal tenderness  Musculoskeletal: Normal range of motion  General: Swelling (legs and R upper ext 2/2 hematoma) present  No tenderness  Right lower leg: Edema present  Left lower leg: Edema present  Comments: Swelling of R upper arm 2/2 hematoma - improving   Skin:     General: Skin is warm and dry  Coloration: Skin is not jaundiced  Neurological:      General: No focal deficit present  Mental Status: She is alert, oriented to person, place, and time and easily aroused  Cranial Nerves: No cranial nerve deficit  Motor: No weakness  Psychiatric:         Mood and Affect: Mood normal          Behavior: Behavior normal  Behavior is cooperative  Thought Content: Thought content normal        LABORATORY DATA     Labs: I have personally reviewed pertinent reports  Results from last 7 days   Lab Units 01/20/21  0501 01/19/21  1748 01/17/21  0955 01/16/21  0507 01/15/21  1243   WBC Thousand/uL 15 24* 13 24* 16 70* 19 30* 18 83*   HEMOGLOBIN g/dL 8 5* 8 5* 8 1* 8 1* 8 5*   HEMATOCRIT % 29 3* 28 2* 27 1* 28 0* 27 8*   PLATELETS Thousands/uL 371 354 333 301 304   NEUTROS PCT %  --   --  81* 79* 79*   MONOS PCT %  --   --  9 11 11   MONO PCT %  --  3*  --   --   --       Results from last 7 days   Lab Units 01/20/21  0501 01/19/21  1748 01/17/21  0955  01/16/21  0507  01/14/21  0626   POTASSIUM mmol/L 3 8 3 6 3 8   < > 3 8   < > 4 2   CHLORIDE mmol/L 106 109* 109*   < > 105   < > 107   CO2 mmol/L 27 26 27   < > 26   < > 26   BUN mg/dL 10 8 10   < > 10   < > 8   CREATININE mg/dL 0 90 0 77 0 74   < > 0 80   < > 0 81   CALCIUM mg/dL 8 5 8 5 8 4   < > 8 2*   < > 8 1*   ALK PHOS U/L  --  71  --   --  77  --  69   ALT U/L  --  24  --   --  23  --  21   AST U/L  --  19  --   --  21  --  15    < > = values in this interval not displayed                              IMAGING & DIAGNOSTIC TESTING     Radiology Results: I have personally reviewed pertinent reports  Xr Chest Portable    Result Date: 1/5/2021  Impression: Pericardial drain placement with slight decrease in size of the cardiac silhouette  Small effusions and bibasilar atelectasis  Workstation performed: [id number]     Xr Chest 1 View Portable    Result Date: 1/3/2021  Impression: Marked enlargement of the cardiac silhouette which could be due to cardiomegaly or a pericardial effusion  Right middle lobe pneumonia  Workstation performed: [id number]     Us Msk Limited    Result Date: 1/8/2021  Impression: Previously noted collection is contiguous with the bicipital groove, and the long head biceps tendon is not visualized within the bicipital groove  Findings most likely represent proximal tendon tear with distal retraction  If collection does not resolve spontaneously (within 1-3 months), recommend right humeral MRI with contrast as hemorrhagic neoplasm is within the differential  The study was marked in EPIC for significant notification  Workstation performed: [id number]     Cta Chest Pe Study    Result Date: 1/3/2021  Impression: Large pericardial effusion measuring approximately 3 3 cm area of bulging of the right pericardium or a potential adjacent right mediastinal mass measuring approximately 6 6 cm #2/100  The pericardial effusion may be on a malignant basis  Prominent diffuse mediastinal adenopathy Interlobular septal thickening and small pleural effusions in keeping with pulmonary edema  Posterior right upper lobe consolidation #3/55 in a wedge-shaped appearance with a apex extending towards the right hilum may represent pneumonia or a postobstructive pneumonitis from a central mass  A discrete mass is not identified but could be obscured  Innumerable vague hypodensities throughout the liver are suspicious for metastases    I personally discussed this study with Magen Mg on 1/3/2021 at 5:18 PM  Workstation performed: [id number]     Us Extremity Soft Tissue    Result Date: 1/8/2021  Impression: Complex fluid collection in the medial right upper arm with hematocrit level in keeping with hematoma  Given the location, the possibility of associated underlying biceps tendon rupture is not excluded  This can be evaluated with MSK shoulder ultrasound if clinically indicated  Recommend follow-up ultrasound in 1 month to confirm resolution  The study was marked in [facility] for immediate notification  Workstation performed: [id number]     Other Diagnostic Testing: I have personally reviewed pertinent reports        ACTIVE MEDICATIONS     Current Facility-Administered Medications   Medication Dose Route Frequency    acetaminophen (TYLENOL) tablet 650 mg  650 mg Oral Q6H PRN    aluminum-magnesium hydroxide-simethicone (MYLANTA) oral suspension 30 mL  30 mL Oral Q6H PRN    diltiazem (CARDIZEM CD) 24 hr capsule 180 mg  180 mg Oral Daily    diltiazem (CARDIZEM) tablet 30 mg  30 mg Oral Q6H PRN    docusate sodium (COLACE) capsule 100 mg  100 mg Oral BID    HYDROmorphone (DILAUDID) injection 0 2 mg  0 2 mg Intravenous Q4H PRN    HYDROmorphone (DILAUDID) injection 0 2 mg  0 2 mg Intravenous Once    metoprolol succinate (TOPROL-XL) 24 hr tablet 100 mg  100 mg Oral Q12H STEPHANIE    ondansetron (ZOFRAN) injection 4 mg  4 mg Intravenous Q6H PRN    oxyCODONE (ROXICODONE) IR tablet 2 5 mg  2 5 mg Oral Q4H PRN    oxyCODONE (ROXICODONE) IR tablet 5 mg  5 mg Oral Q4H PRN    senna (SENOKOT) tablet 8 6 mg  1 tablet Oral Daily     VTE Pharmacologic Prophylaxis: held 2/2 to acute bleed/upcoming interventions  VTE Mechanical Prophylaxis: sequential compression device    ==  Momo Ch DO  Internal Medicine Resident, PGY-1  [facility]

## 2021-01-20 NOTE — ASSESSMENT & PLAN NOTE
Differential would include occult blood stream infection, versus tumor fever versus pneumonia  Rule out occult bloodstream infection- blood cultures 1/15 negative  Note CT findings concerning for possible infiltrate  However patient does not have any respiratory symptoms  Procalcitonin is also negative x2    Patient has a PE  Lower extremity doppler negative

## 2021-01-20 NOTE — PROGRESS NOTES
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  No primary seen on CT scan  Gastroenterology on board  Oncology on board  Recommended Oral tyrosine kinase inhibitor that can be started outpatient   EGD/colonoscopy was canceled as patient is undergoing procedure tomorrow  Gastroenterology will reassess after procedure  Pulmonary embolus [facility])  Assessment & Plan  Discussed with radiology today  CT scan from 01/03/2021 and 01/16/2021 showed pulmonary infarct, posterior segment right upper lobe pulmonary embolus is visible on CT scan  True emboli versus tumor emboli given tumor invading the right atrium  Will order lower extremity venous Doppler  Discussed the finding with the patient   She is amenable to anticoagulation  Started patient on Heparin drip will discontinue at midnight     Pericardial effusion without cardiac tamponade  Assessment & Plan  Evaluated by thoracic surgery  Underwent pericardial drain placement 01/04/2021  Still had significant drainage  Drain was removed by thoracic surgery and plan for VATS, pericardial window with biopsy of anterior mediastinal mass on 1/21/21  Cardiology will reassess pericardial effusion postop day 1 after AP window with echo      Mediastinal mass  Assessment & Plan  · Anterior mediastinal mass seen on CT scan with diffuse mediastinal adenopathy, Innumerable vague hypodensities throughout the liver are suspicious for metastases  · Thoracic surgeries planning right VATS, pericardial window with biopsy of the anterior mediastinal mass on 1/21/21      * Atrial flutter ([address] )  Assessment & Plan  · Patient was brought to ED by has been due to coughing, lower extremity edema and dyspnea  · EKG showed atrial flutter, QTc prolongation  Her son passed away on 12/21/2020  · Patient was evaluated by Cardiology  · Cardiac echo with large pericardial effusion without tamponade, EF 65%  · Initially was treated with Cardizem drip, Currently off Cardizem drip,   · on p o  Metoprolol and diltiazem  · Has a PICC line due to difficult IV access  · Slight AFib with RVR overnight but relatively well controlled on beta-blocker and calcium channel blocker      Fever  Assessment & Plan  Differential would include occult blood stream infection, versus tumor fever versus pneumonia  Rule out occult bloodstream infection- blood cultures 1/15 negative  Note CT findings concerning for possible infiltrate  However patient does not have any respiratory symptoms  Procalcitonin is also negative x2  Patient has a PE  Lower extremity doppler negative    Anemia  Assessment & Plan  Multifactorial  Patient with hematoma over extremity  Monitor drainage  Possibly component of anemia of chronic disease  Rule out occult GI blood loss  Serial hemoglobin hematocrit  Heme check stools  Drop in hemoglobin may be partially contributing to  Tachycardia  Currently hb 8 5, MCV 68, RDW elevated  Heme/onc recommends IV Venofer 1 or 2 doses  Iron panel suggestive of Anemia of chronic disease  Hb electrophoresis pending to r/o hemoglobinopathy      Leukocytosis  Assessment & Plan  Improving  Workup negative for obvious source of infection  Will observe off of antibiotic therapy  Potential etiology would be underlying tumor fever versus occult infection rule out GI process rule out pulmonary process  Procalcitonin is negative  Continue to trend white count and temperature curve           Pain and swelling of right upper extremity  Assessment & Plan  Right upper extremity swelling noted Likely hematoma  US obtained that shows possible hematoma vs  Proximal biceps tendon repair  General surgery consult appreciated- recommends conservative managment and holding AC  Patient does not have motor strength loss  neurovascular intact  Pain is significantly improved since last night  Patient is able to extend her shoulder, slightly limited due to pain  At this point, patient will follow up outpatient  If persistent in 3 months, may obtain MRI  Swelling has decreased significantly     Essential hypertension  Assessment & Plan  Continue metoprolol and diltiazem  Hold lisinopril for now  Continue to monitor          VTE Pharmacologic Prophylaxis:   Pharmacologic: Heparin Drip  Mechanical VTE Prophylaxis in Place: Yes    Discussions with Specialists or Other Care Team Provider: Heme/Onc, Thoracic surgery, Cardio     Education and Discussions with Family / Patient: Spoke with the patients  Renee Mathur  Updated him on her condition and the plan  Current Length of Stay: 17 day(s)    Current Patient Status: Inpatient     Discharge Plan / Estimated Discharge Date: None    Code Status: Level 1 - Full Code      Subjective:   Patient states she is doing well today  Drain has been removed  She is aware that she will be undergoing procedure tomorrow  She is okay with getting anticoagulation if necessary  No other complaints  Objective:     Vitals:   Temp (24hrs), Av 2 °F (36 8 °C), Min:97 5 °F (36 4 °C), Max:98 9 °F (37 2 °C)    Temp:  [97 5 °F (36 4 °C)-98 9 °F (37 2 °C)] 97 5 °F (36 4 °C)  HR:  [] 88  Resp:  [17-20] 17  BP: ()/(58-65) 98/58  SpO2:  [94 %-97 %] 97 %  Body mass index is 31 69 kg/m²  Input and Output Summary (last 24 hours):        Intake/Output Summary (Last 24 hours) at 2021 1609  Last data filed at 2021 1424  Gross per 24 hour   Intake 300 ml   Output 350 ml   Net -50 ml       Physical Exam:     Physical Exam  HENT:      Head: Normocephalic and atraumatic  Nose: Nose normal       Mouth/Throat:      Mouth: Mucous membranes are moist    Eyes:      Extraocular Movements: Extraocular movements intact  Pupils: Pupils are equal, round, and reactive to light  Neck:      Musculoskeletal: Normal range of motion  Cardiovascular:      Rate and Rhythm: Normal rate and regular rhythm  Pulses: Normal pulses  Pulmonary:      Effort: Pulmonary effort is normal       Breath sounds: Normal breath sounds  Abdominal:      General: Abdomen is flat  Bowel sounds are normal       Palpations: Abdomen is soft  Musculoskeletal: Normal range of motion  Skin:     General: Skin is warm  Neurological:      General: No focal deficit present  Mental Status: She is alert and oriented to person, place, and time  Mental status is at baseline  Psychiatric:         Mood and Affect: Mood normal              Additional Data:     Labs:    Results from last 7 days   Lab Units 01/20/21  0501 01/19/21  1748 01/17/21  0955   WBC Thousand/uL 15 24* 13 24* 16 70*   HEMOGLOBIN g/dL 8 5* 8 5* 8 1*   HEMATOCRIT % 29 3* 28 2* 27 1*   PLATELETS Thousands/uL 371 354 333   NEUTROS PCT %  --   --  81*   LYMPHS PCT %  --   --  3*   LYMPHO PCT %  --  6*  --    MONOS PCT %  --   --  9   MONO PCT %  --  3*  --    EOS PCT %  --  8* 5     Results from last 7 days   Lab Units 01/20/21  0501 01/19/21  1748   POTASSIUM mmol/L 3 8 3 6   CHLORIDE mmol/L 106 109*   CO2 mmol/L 27 26   BUN mg/dL 10 8   CREATININE mg/dL 0 90 0 77   CALCIUM mg/dL 8 5 8 5   ALK PHOS U/L  --  71   ALT U/L  --  24   AST U/L  --  19     Results from last 7 days   Lab Units 01/20/21  1338   INR  1 43*       * I Have Reviewed All Lab Data Listed Above  * Additional Pertinent Lab Tests Reviewed:  Janell 64 Admission Reviewed    Imaging:    Imaging Reports Reviewed Today Include: None  Imaging Personally Reviewed by Myself Includes:  None    Recent Cultures (last 7 days):     Results from last 7 days   Lab Units 01/15/21  1242   BLOOD CULTURE  No Growth After 5 Days  No Growth After 5 Days  Last 24 Hours Medication List:   Current Facility-Administered Medications   Medication Dose Route Frequency Provider Last Rate    acetaminophen  650 mg Oral Q6H PRN Morales Vega PA-C      aluminum-magnesium hydroxide-simethicone  30 mL Oral Q6H PRN Davis Gomes MD      Melissa Argueta ON 1/21/2021] cefazolin  2,000 mg Intravenous On Call To [address] MD      diltiazem  180 mg Oral Daily Sherryle Faster, MD      diltiazem  30 mg Oral Q6H PRN Kemal Chavez MD      docusate sodium  100 mg Oral BID Davis Gomes MD      heparin (porcine)  3-30 Units/kg/hr (Order-Specific) Intravenous Titrated Pratima Lennon MD 18 Units/kg/hr (01/20/21 1340)    heparin (porcine)  3,200 Units Intravenous Q1H PRN Pratima Lennon MD      heparin (porcine)  6,400 Units Intravenous Q1H PRN Pratima Lennon MD      HYDROmorphone  0 2 mg Intravenous Q4H PRN Morales Vega PA-C      HYDROmorphone  0 2 mg Intravenous Once Morales Vega PA-C      [START ON 1/21/2021] lactated ringers  75 mL/hr Intravenous Continuous Jack Luevano MD      metoprolol succinate  100 mg Oral Q12H [facility] Simone Cruz DO      ondansetron  4 mg Intravenous Q6H PRN Davis Gomes MD      oxyCODONE  2 5 mg Oral Q4H PRN Morales Vega PA-C      oxyCODONE  5 mg Oral Q4H PRN Morales Vega PA-C      senna  1 tablet Oral Daily Davis Gomes MD          Today, Patient Was Seen By: Pratima Lennon MD    ** Please Note: This note has been constructed using a voice recognition system   **

## 2021-01-20 NOTE — ASSESSMENT & PLAN NOTE
Multifactorial  Patient with hematoma over extremity  Monitor drainage  Possibly component of anemia of chronic disease  Rule out occult GI blood loss  Serial hemoglobin hematocrit  Heme check stools  Drop in hemoglobin may be partially contributing to  Tachycardia    Currently hb 8 5, MCV 68, RDW elevated  Heme/onc recommends IV Venofer 1 or 2 doses  Iron panel suggestive of Anemia of chronic disease  Hb electrophoresis pending to r/o hemoglobinopathy

## 2021-01-20 NOTE — QUICK NOTE
After discussion with anesthesia team, decision made to hold EGD and colonoscopy earlier today as patient is scheduled for pericardial window tomorrow with thoracic sx  Pts need of egd and colonoscopy will be reassessed post thorac sx procedure tomorrow  Will likely do it later this week vs on urgent outpatient basis  This was discussed with patient in detail and answered all her questions  She is in agreement with the plan   Discussed with primary team

## 2021-01-20 NOTE — PROGRESS NOTES
Progress Note - Ana Luisa Zendejas 1950, 65 y o  female MRN: [id number]    Unit/Bed#: [facility] 426-01 Encounter: [id number]    Primary Care Provider: Annie Pate MD   Date and time admitted to hospital: 1/3/2021  8:26 AM        * Atrial flutter ([address] )  Assessment & Plan  · Patient was brought to ED by has been due to coughing, lower extremity edema and dyspnea  · EKG showed atrial flutter, QTc prolongation  Her son passed away on 12/21/2020  · Patient was evaluated by Cardiology  · Cardiac echo with large pericardial effusion without tamponade, EF 65%  · Initially was treated with Cardizem drip, Currently off Cardizem drip,   · on p o  Metoprolol and diltiazem  · Has a PICC line due to difficult IV access  · AC on hold due to pericardial effusion, serosanguineous drainage, risk of bleeding in the pericardial space      Mediastinal mass  Assessment & Plan  · Anterior mediastinal mass seen on CT scan with diffuse mediastinal adenopathy, Innumerable vague hypodensities throughout the liver are suspicious for metastases  · Thoracic surgeries planning right VATS, pericardial window with biopsy of the anterior mediastinal mass on Thursday      Hypodense mass of liver  Assessment & Plan  CT on admission shows: Innumerable vague hypodensities throughout the liver suspicious for metastases    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  No primary seen on CT scan  Gastroenterology consulted, plan for scope tomorrow  Oncology on board  Recommended Oral tyrosine kinase inhibitor that can be started outpatient     Pericardial effusion without cardiac tamponade  Assessment & Plan  Evaluated by thoracic surgery  Underwent pericardial drain placement 01/04/2021  Still with significant drainage  Drain was pulled today by thoracic surgery and plan for right VATS, pericardial window with biopsy of anterior mediastinal mass on Thursday    Pulmonary embolus [facility])  Assessment & Plan  Discussed with radiology today  CT scan from 01/03/2021 and 01/16/2021 showed pulmonary infarct, posterior segment right upper lobe pulmonary embolus is visible on CT scan  True emboli versus tumor emboli given tumor invading the right atrium  Will order lower extremity venous Doppler  Discussed the finding with the patient  Discussed the risk versus benefits of anticoagulation  She says she is overwhelmed, not sure about anticoagulation due to risk of bleeding in the pericardial space  We are going to readdress anticoagulation tomorrow  Fever  Assessment & Plan  Differential would include occult blood stream infection, versus tumor fever versus pneumonia  Rule out occult bloodstream infection- blood cultures 1/15 negative  Note CT findings concerning for possible infiltrate  However patient does not have any respiratory symptoms  Procalcitonin is also negative x2  Patient has a PE, will do lower extremity Doppler  DVT can cause fever        Anemia  Assessment & Plan  Multifactorial  Patient with hematoma over extremity  Monitor drainage  Possibly component of anemia of chronic disease  Rule out occult GI blood loss  Serial hemoglobin hematocrit  Heme check stools  Drop in hemoglobin may be partially contributing to  Tachycardia  Currently hb 8 5, MCV 68, RDW elevated    Leukocytosis  Assessment & Plan  Improving  Workup negative for obvious source of infection  Will observe off of antibiotic therapy  Potential etiology would be underlying tumor fever versus occult infection rule out GI process rule out pulmonary process  Procalcitonin is negative  Continue to trend white count and temperature curve  Pain and swelling of right upper extremity  Assessment & Plan  Right upper extremity swelling noted   Likely hematoma  US obtained that shows possible hematoma vs  Proximal biceps tendon repair    General surgery consult appreciated- recommends conservative managment and holding AC  Patient does not have motor strength loss  neurovascular intact  Pain is significantly improved since last night  Patient is able to extend her shoulder, slightly limited due to pain  At this point, patient will follow up outpatient  If persistent in 3 months, may obtain MRI  Swelling has decreased significantly     Essential hypertension  Assessment & Plan  Continue metoprolol and diltiazem  Hold lisinopril for now  Continue to monitor      VTE Pharmacologic Prophylaxis:   Pharmacologic: Hold due to pericardial effusion  Mechanical VTE Prophylaxis in Place: Yes    Patient Centered Rounds: I have performed bedside rounds with nursing staff today  Discussions with Specialists or Other Care Team Provider:  Thoracic surgery    Education and Discussions with Family / Patient:  Patient    Time Spent for Care: 30 minutes  More than 50% of total time spent on counseling and coordination of care as described above  Current Length of Stay: 16 day(s)    Current Patient Status: Inpatient   Certification Statement: The patient will continue to require additional inpatient hospital stay due to EGD and colonoscopy tomorrow, vats pericardial window on Thursday    Discharge Plan: To be determined    Code Status: Level 1 - Full Code      Subjective:   Patient was seen and evaluated bedside, she is feeling well, denies chest pain palpitation shortness of breath or abdominal pain  Objective:     Vitals:   Temp (24hrs), Av 2 °F (36 8 °C), Min:98 °F (36 7 °C), Max:98 4 °F (36 9 °C)    Temp:  [98 °F (36 7 °C)-98 4 °F (36 9 °C)] 98 1 °F (36 7 °C)  HR:  [70-86] 70  Resp:  [16-20] 18  BP: ()/(54-95) 111/63  SpO2:  [95 %-97 %] 95 %  Body mass index is 31 29 kg/m²  Input and Output Summary (last 24 hours):        Intake/Output Summary (Last 24 hours) at 2021  Last data filed at 2021 1700  Gross per 24 hour   Intake 300 ml   Output 650 ml   Net -350 ml       Physical Exam: Physical Exam  Constitutional:       General: She is not in acute distress  Appearance: She is obese  HENT:      Head: Atraumatic  Neck:      Musculoskeletal: Neck supple  Cardiovascular:      Rate and Rhythm: Normal rate and regular rhythm  Heart sounds: No murmur  No friction rub  No gallop  Pulmonary:      Effort: Pulmonary effort is normal  No respiratory distress  Breath sounds: Normal breath sounds  No wheezing  Abdominal:      General: Bowel sounds are normal  There is no distension  Palpations: Abdomen is soft  Musculoskeletal:         General: Swelling present  Skin:     General: Skin is warm and dry  Neurological:      General: No focal deficit present  Mental Status: She is alert  Psychiatric:         Mood and Affect: Mood normal          Additional Data:     Labs:    Results from last 7 days   Lab Units 01/19/21  1748 01/17/21  0955   WBC Thousand/uL 13 24* 16 70*   HEMOGLOBIN g/dL 8 5* 8 1*   HEMATOCRIT % 28 2* 27 1*   PLATELETS Thousands/uL 354 333   BANDS PCT % 1  --    NEUTROS PCT %  --  81*   LYMPHS PCT %  --  3*   LYMPHO PCT % 6*  --    MONOS PCT %  --  9   MONO PCT % 3*  --    EOS PCT % 8* 5     Results from last 7 days   Lab Units 01/19/21  1748   SODIUM mmol/L 140   POTASSIUM mmol/L 3 6   CHLORIDE mmol/L 109*   CO2 mmol/L 26   BUN mg/dL 8   CREATININE mg/dL 0 77   ANION GAP mmol/L 5   CALCIUM mg/dL 8 5   ALBUMIN g/dL 2 4*   TOTAL BILIRUBIN mg/dL 0 99   ALK PHOS U/L 71   ALT U/L 24   AST U/L 19   GLUCOSE RANDOM mg/dL 145*     Results from last 7 days   Lab Units 01/13/21  0452   INR  1 42*             Results from last 7 days   Lab Units 01/17/21  0754 01/16/21  0939 01/15/21  0439 01/14/21  0626   PROCALCITONIN ng/ml 0 06 0 06 0 40* 0 22           * I Have Reviewed All Lab Data Listed Above  * Additional Pertinent Lab Tests Reviewed:  All Labs For Current Hospital Admission Reviewed    Imaging:    Imaging Reports Reviewed Today Include: all  Imaging Personally Reviewed by Myself Includes:      Recent Cultures (last 7 days):     Results from last 7 days   Lab Units 01/15/21  1242   BLOOD CULTURE  No Growth After 4 Days  No Growth After 4 Days  Last 24 Hours Medication List:   Current Facility-Administered Medications   Medication Dose Route Frequency Provider Last Rate    acetaminophen  650 mg Oral Q6H PRN SHERON Walton-LEONARD      aluminum-magnesium hydroxide-simethicone  30 mL Oral Q6H PRN Mercedes Islas MD      bisacodyl  10 mg Oral Q4H Erum Denton DO      diltiazem  180 mg Oral Daily Melissa Brown MD      diltiazem  30 mg Oral Q6H PRN Lesly Mejia MD      docusate sodium  100 mg Oral BID Mercedes Islas MD      HYDROmorphone  0 2 mg Intravenous Q4H PRN Sandra Lynn PA-C      HYDROmorphone  0 2 mg Intravenous Once Sandra Lynn PA-C      metoprolol succinate  100 mg Oral Q12H [address] Marcella Ebbs, DO      ondansetron  4 mg Intravenous Q6H PRN Mercedes Islas MD      oxyCODONE  2 5 mg Oral Q4H PRN Sandra Lynn PA-C      oxyCODONE  5 mg Oral Q4H PRN Sandra Lynn PA-C      senna  1 tablet Oral Daily Mercedes Islas MD          Today, Patient Was Seen By: Tabitha Pastrana MD    ** Please Note: Dictation voice to text software may have been used in the creation of this document   **

## 2021-01-20 NOTE — ASSESSMENT & PLAN NOTE
Evaluated by thoracic surgery  Underwent pericardial drain placement 01/04/2021  Still had significant drainage  Drain was removed by thoracic surgery and plan for VATS, pericardial window with biopsy of anterior mediastinal mass on 1/21/21  Cardiology will reassess pericardial effusion postop day 1 after AP window with echo

## 2021-01-21 ENCOUNTER — APPOINTMENT (INPATIENT)
Dept: RADIOLOGY | Facility: HOSPITAL | Age: 71
DRG: 270 | End: 2021-01-21
Payer: COMMERCIAL

## 2021-01-21 ENCOUNTER — ANESTHESIA (INPATIENT)
Dept: PERIOP | Facility: HOSPITAL | Age: 71
DRG: 270 | End: 2021-01-21
Payer: COMMERCIAL

## 2021-01-21 VITALS — HEART RATE: 97 BPM

## 2021-01-21 LAB
ABO GROUP BLD: NORMAL
ANION GAP SERPL CALCULATED.3IONS-SCNC: 7 MMOL/L (ref 4–13)
BLD GP AB SCN SERPL QL: NEGATIVE
BUN SERPL-MCNC: 12 MG/DL (ref 5–25)
CALCIUM SERPL-MCNC: 8.4 MG/DL (ref 8.3–10.1)
CHLORIDE SERPL-SCNC: 107 MMOL/L (ref 100–108)
CO2 SERPL-SCNC: 25 MMOL/L (ref 21–32)
CREAT SERPL-MCNC: 0.86 MG/DL (ref 0.6–1.3)
ERYTHROCYTE [DISTWIDTH] IN BLOOD BY AUTOMATED COUNT: 20.3 % (ref 11.6–15.1)
GFR SERPL CREATININE-BSD FRML MDRD: 78 ML/MIN/1.73SQ M
GLUCOSE SERPL-MCNC: 112 MG/DL (ref 65–140)
HCT VFR BLD AUTO: 27.8 % (ref 34.8–46.1)
HGB BLD-MCNC: 8.3 G/DL (ref 11.5–15.4)
MCH RBC QN AUTO: 20.6 PG (ref 26.8–34.3)
MCHC RBC AUTO-ENTMCNC: 29.9 G/DL (ref 31.4–37.4)
MCV RBC AUTO: 69 FL (ref 82–98)
NRBC BLD AUTO-RTO: 0 /100 WBCS
PLATELET # BLD AUTO: 365 THOUSANDS/UL (ref 149–390)
PMV BLD AUTO: 12 FL (ref 8.9–12.7)
POTASSIUM SERPL-SCNC: 3.8 MMOL/L (ref 3.5–5.3)
RBC # BLD AUTO: 4.03 MILLION/UL (ref 3.81–5.12)
RH BLD: POSITIVE
SODIUM SERPL-SCNC: 139 MMOL/L (ref 136–145)
SPECIMEN EXPIRATION DATE: NORMAL
WBC # BLD AUTO: 14.31 THOUSAND/UL (ref 4.31–10.16)

## 2021-01-21 PROCEDURE — 88305 TISSUE EXAM BY PATHOLOGIST: CPT | Performed by: PATHOLOGY

## 2021-01-21 PROCEDURE — 86900 BLOOD TYPING SEROLOGIC ABO: CPT | Performed by: SURGERY

## 2021-01-21 PROCEDURE — 88342 IMHCHEM/IMCYTCHM 1ST ANTB: CPT | Performed by: PATHOLOGY

## 2021-01-21 PROCEDURE — 85027 COMPLETE CBC AUTOMATED: CPT | Performed by: INTERNAL MEDICINE

## 2021-01-21 PROCEDURE — 99232 SBSQ HOSP IP/OBS MODERATE 35: CPT | Performed by: INTERNAL MEDICINE

## 2021-01-21 PROCEDURE — 0BJ08ZZ INSPECTION OF TRACHEOBRONCHIAL TREE, VIA NATURAL OR ARTIFICIAL OPENING ENDOSCOPIC: ICD-10-PCS | Performed by: THORACIC SURGERY (CARDIOTHORACIC VASCULAR SURGERY)

## 2021-01-21 PROCEDURE — 88112 CYTOPATH CELL ENHANCE TECH: CPT | Performed by: PATHOLOGY

## 2021-01-21 PROCEDURE — 80048 BASIC METABOLIC PNL TOTAL CA: CPT | Performed by: INTERNAL MEDICINE

## 2021-01-21 PROCEDURE — 86923 COMPATIBILITY TEST ELECTRIC: CPT

## 2021-01-21 PROCEDURE — C9290 INJ, BUPIVACAINE LIPOSOME: HCPCS | Performed by: THORACIC SURGERY (CARDIOTHORACIC VASCULAR SURGERY)

## 2021-01-21 PROCEDURE — 32659 THORACOSCOPY W/SAC DRAINAGE: CPT | Performed by: THORACIC SURGERY (CARDIOTHORACIC VASCULAR SURGERY)

## 2021-01-21 PROCEDURE — 71045 X-RAY EXAM CHEST 1 VIEW: CPT

## 2021-01-21 PROCEDURE — 99233 SBSQ HOSP IP/OBS HIGH 50: CPT | Performed by: INTERNAL MEDICINE

## 2021-01-21 PROCEDURE — 0W9D40Z DRAINAGE OF PERICARDIAL CAVITY WITH DRAINAGE DEVICE, PERCUTANEOUS ENDOSCOPIC APPROACH: ICD-10-PCS | Performed by: THORACIC SURGERY (CARDIOTHORACIC VASCULAR SURGERY)

## 2021-01-21 PROCEDURE — 86850 RBC ANTIBODY SCREEN: CPT | Performed by: SURGERY

## 2021-01-21 PROCEDURE — 88342 IMHCHEM/IMCYTCHM 1ST ANTB: CPT

## 2021-01-21 PROCEDURE — 86901 BLOOD TYPING SEROLOGIC RH(D): CPT | Performed by: SURGERY

## 2021-01-21 PROCEDURE — 0WBC4ZX EXCISION OF MEDIASTINUM, PERCUTANEOUS ENDOSCOPIC APPROACH, DIAGNOSTIC: ICD-10-PCS | Performed by: THORACIC SURGERY (CARDIOTHORACIC VASCULAR SURGERY)

## 2021-01-21 PROCEDURE — 99024 POSTOP FOLLOW-UP VISIT: CPT | Performed by: THORACIC SURGERY (CARDIOTHORACIC VASCULAR SURGERY)

## 2021-01-21 PROCEDURE — 88341 IMHCHEM/IMCYTCHM EA ADD ANTB: CPT

## 2021-01-21 RX ORDER — FENTANYL CITRATE/PF 50 MCG/ML
25 SYRINGE (ML) INJECTION
Status: DISCONTINUED | OUTPATIENT
Start: 2021-01-21 | End: 2021-01-21 | Stop reason: HOSPADM

## 2021-01-21 RX ORDER — DEXAMETHASONE SODIUM PHOSPHATE 10 MG/ML
INJECTION, SOLUTION INTRAMUSCULAR; INTRAVENOUS AS NEEDED
Status: DISCONTINUED | OUTPATIENT
Start: 2021-01-21 | End: 2021-01-21

## 2021-01-21 RX ORDER — GLYCOPYRROLATE 0.2 MG/ML
INJECTION INTRAMUSCULAR; INTRAVENOUS AS NEEDED
Status: DISCONTINUED | OUTPATIENT
Start: 2021-01-21 | End: 2021-01-21

## 2021-01-21 RX ORDER — FENTANYL CITRATE 50 UG/ML
INJECTION, SOLUTION INTRAMUSCULAR; INTRAVENOUS AS NEEDED
Status: DISCONTINUED | OUTPATIENT
Start: 2021-01-21 | End: 2021-01-21

## 2021-01-21 RX ORDER — LIDOCAINE HYDROCHLORIDE 10 MG/ML
INJECTION, SOLUTION EPIDURAL; INFILTRATION; INTRACAUDAL; PERINEURAL AS NEEDED
Status: DISCONTINUED | OUTPATIENT
Start: 2021-01-21 | End: 2021-01-21

## 2021-01-21 RX ORDER — SODIUM CHLORIDE 9 MG/ML
INJECTION INTRAVENOUS AS NEEDED
Status: DISCONTINUED | OUTPATIENT
Start: 2021-01-21 | End: 2021-01-21 | Stop reason: HOSPADM

## 2021-01-21 RX ORDER — ONDANSETRON 2 MG/ML
4 INJECTION INTRAMUSCULAR; INTRAVENOUS ONCE AS NEEDED
Status: DISCONTINUED | OUTPATIENT
Start: 2021-01-21 | End: 2021-01-21 | Stop reason: HOSPADM

## 2021-01-21 RX ORDER — SODIUM CHLORIDE, SODIUM LACTATE, POTASSIUM CHLORIDE, CALCIUM CHLORIDE 600; 310; 30; 20 MG/100ML; MG/100ML; MG/100ML; MG/100ML
75 INJECTION, SOLUTION INTRAVENOUS CONTINUOUS
Status: DISCONTINUED | OUTPATIENT
Start: 2021-01-21 | End: 2021-01-22

## 2021-01-21 RX ORDER — ONDANSETRON 2 MG/ML
INJECTION INTRAMUSCULAR; INTRAVENOUS AS NEEDED
Status: DISCONTINUED | OUTPATIENT
Start: 2021-01-21 | End: 2021-01-21

## 2021-01-21 RX ORDER — PROPOFOL 10 MG/ML
INJECTION, EMULSION INTRAVENOUS AS NEEDED
Status: DISCONTINUED | OUTPATIENT
Start: 2021-01-21 | End: 2021-01-21

## 2021-01-21 RX ORDER — CEFAZOLIN SODIUM 1 G/3ML
INJECTION, POWDER, FOR SOLUTION INTRAMUSCULAR; INTRAVENOUS AS NEEDED
Status: DISCONTINUED | OUTPATIENT
Start: 2021-01-21 | End: 2021-01-21

## 2021-01-21 RX ORDER — SODIUM CHLORIDE, SODIUM LACTATE, POTASSIUM CHLORIDE, CALCIUM CHLORIDE 600; 310; 30; 20 MG/100ML; MG/100ML; MG/100ML; MG/100ML
INJECTION, SOLUTION INTRAVENOUS CONTINUOUS PRN
Status: DISCONTINUED | OUTPATIENT
Start: 2021-01-21 | End: 2021-01-21

## 2021-01-21 RX ORDER — SUCCINYLCHOLINE/SOD CL,ISO/PF 100 MG/5ML
SYRINGE (ML) INTRAVENOUS AS NEEDED
Status: DISCONTINUED | OUTPATIENT
Start: 2021-01-21 | End: 2021-01-21

## 2021-01-21 RX ORDER — ROCURONIUM BROMIDE 10 MG/ML
INJECTION, SOLUTION INTRAVENOUS AS NEEDED
Status: DISCONTINUED | OUTPATIENT
Start: 2021-01-21 | End: 2021-01-21

## 2021-01-21 RX ORDER — BUPIVACAINE HYDROCHLORIDE 2.5 MG/ML
INJECTION, SOLUTION EPIDURAL; INFILTRATION; INTRACAUDAL AS NEEDED
Status: DISCONTINUED | OUTPATIENT
Start: 2021-01-21 | End: 2021-01-21 | Stop reason: HOSPADM

## 2021-01-21 RX ORDER — NEOSTIGMINE METHYLSULFATE 1 MG/ML
INJECTION INTRAVENOUS AS NEEDED
Status: DISCONTINUED | OUTPATIENT
Start: 2021-01-21 | End: 2021-01-21

## 2021-01-21 RX ADMIN — SODIUM CHLORIDE, SODIUM LACTATE, POTASSIUM CHLORIDE, AND CALCIUM CHLORIDE 75 ML/HR: .6; .31; .03; .02 INJECTION, SOLUTION INTRAVENOUS at 13:34

## 2021-01-21 RX ADMIN — SODIUM CHLORIDE, SODIUM LACTATE, POTASSIUM CHLORIDE, AND CALCIUM CHLORIDE: .6; .31; .03; .02 INJECTION, SOLUTION INTRAVENOUS at 16:02

## 2021-01-21 RX ADMIN — GLYCOPYRROLATE 0.4 MG: 0.2 INJECTION, SOLUTION INTRAMUSCULAR; INTRAVENOUS at 18:44

## 2021-01-21 RX ADMIN — SODIUM CHLORIDE, SODIUM LACTATE, POTASSIUM CHLORIDE, AND CALCIUM CHLORIDE 75 ML/HR: .6; .31; .03; .02 INJECTION, SOLUTION INTRAVENOUS at 00:19

## 2021-01-21 RX ADMIN — Medication 25 MCG: at 19:41

## 2021-01-21 RX ADMIN — Medication 100 MG: at 16:10

## 2021-01-21 RX ADMIN — DILTIAZEM HYDROCHLORIDE 180 MG: 180 CAPSULE, COATED, EXTENDED RELEASE ORAL at 09:30

## 2021-01-21 RX ADMIN — CEFAZOLIN 2000 MG: 1 INJECTION, POWDER, FOR SOLUTION INTRAVENOUS at 16:42

## 2021-01-21 RX ADMIN — METOPROLOL SUCCINATE 100 MG: 100 TABLET, EXTENDED RELEASE ORAL at 09:31

## 2021-01-21 RX ADMIN — FENTANYL CITRATE 50 MCG: 50 INJECTION INTRAMUSCULAR; INTRAVENOUS at 16:04

## 2021-01-21 RX ADMIN — SODIUM CHLORIDE, SODIUM LACTATE, POTASSIUM CHLORIDE, AND CALCIUM CHLORIDE: .6; .31; .03; .02 INJECTION, SOLUTION INTRAVENOUS at 19:16

## 2021-01-21 RX ADMIN — FENTANYL CITRATE 50 MCG: 50 INJECTION INTRAMUSCULAR; INTRAVENOUS at 17:05

## 2021-01-21 RX ADMIN — LIDOCAINE HYDROCHLORIDE 50 MG: 10 INJECTION, SOLUTION EPIDURAL; INFILTRATION; INTRACAUDAL; PERINEURAL at 16:10

## 2021-01-21 RX ADMIN — PROPOFOL 80 MG: 10 INJECTION, EMULSION INTRAVENOUS at 16:10

## 2021-01-21 RX ADMIN — ONDANSETRON 4 MG: 2 INJECTION INTRAMUSCULAR; INTRAVENOUS at 18:36

## 2021-01-21 RX ADMIN — METOPROLOL SUCCINATE 100 MG: 100 TABLET, EXTENDED RELEASE ORAL at 21:40

## 2021-01-21 RX ADMIN — Medication 25 MCG: at 19:30

## 2021-01-21 RX ADMIN — ROCURONIUM BROMIDE 50 MG: 50 INJECTION, SOLUTION INTRAVENOUS at 16:19

## 2021-01-21 RX ADMIN — NEOSTIGMINE METHYLSULFATE 3 MG: 1 INJECTION INTRAVENOUS at 18:44

## 2021-01-21 RX ADMIN — OXYCODONE HYDROCHLORIDE 5 MG: 5 TABLET ORAL at 21:27

## 2021-01-21 RX ADMIN — Medication 25 MCG: at 19:47

## 2021-01-21 RX ADMIN — NOREPINEPHRINE BITARTRATE 3 MCG/MIN: 1 INJECTION, SOLUTION, CONCENTRATE INTRAVENOUS at 16:15

## 2021-01-21 RX ADMIN — DEXAMETHASONE SODIUM PHOSPHATE 10 MG: 10 INJECTION, SOLUTION INTRAMUSCULAR; INTRAVENOUS at 18:36

## 2021-01-21 NOTE — ANESTHESIA PREPROCEDURE EVALUATION
Patient: Iman Gutiérrez    Procedure Summary     Date:  12/11/19 Room / Location:   [facility] 4 /  [facility]    Anesthesia Start:  0953 Anesthesia Stop:  1026    Procedure:  CERVICAL CONIZATION (N/A Cervix) Diagnosis:       BLAS III (cervical intraepithelial neoplasia grade III) with severe dysplasia      (BLAS III (cervical intraepithelial neoplasia grade III) with severe dysplasia [D06.9])    Surgeon:  Erin Nicholson DO Provider:  Mary Meneses CRNA    Anesthesia Type:  MAC ASA Status:  2          Anesthesia Type: MAC    Vitals  Vitals Value Taken Time   /60 12/11/2019 10:25 AM   Temp 98.1 °F (36.7 °C) 12/11/2019 10:25 AM   Pulse 74 12/11/2019 10:25 AM   Resp 14 12/11/2019 10:25 AM   SpO2 95 % 12/11/2019 10:25 AM           Post Anesthesia Care and Evaluation    Patient location during evaluation: bedside  Patient participation: complete - patient participated  Level of consciousness: awake and alert  Pain score: 0  Pain management: adequate  Airway patency: patent  Anesthetic complications: No anesthetic complications    Cardiovascular status: acceptable  Respiratory status: acceptable  Hydration status: acceptable       Procedure:  THORACOSCOPY VIDEO ASSISTED SURGERY (VATS)  Pericardial window and biopsy of anterior mediastinal mass (Right Chest)  BRONCHOSCOPY FLEXIBLE (N/A Bronchus)  WINDOW PERICARDIAL (Right Chest)    Pericardial effusion s/p drain - drain removed 1/19/20    EF 70%, moderate MR, moderate TR, no residual pericardial effusion on this echo    Right posterior segment pulmonary infarct 2/2 PE vs tumor - noted 1/03/20 on Hep gtt    Afib with RVR controlled on PO metoprolol and diltiazem    Relevant Problems   CARDIO   (+) Atrial flutter (HCC)   (+) Essential hypertension   (+) Pulmonary embolus (HCC)      HEMATOLOGY   (+) Anemia      Other   (+) Mediastinal mass   (+) Pericardial effusion without cardiac tamponade       ECHO SUMMARY 01/07/21     LEFT VENTRICLE:  Systolic function was vigorous  Ejection fraction was estimated to be 70 %  There were no regional wall motion abnormalities  Wall thickness was at the upper limits of normal   There was no evidence of concentric hypertrophy      LEFT ATRIUM:  The atrium was mildly dilated      MITRAL VALVE:  There was mild to moderate regurgitation      AORTIC VALVE:  There was mild regurgitation      TRICUSPID VALVE:  There was moderate regurgitation      PULMONIC VALVE:  There was trace regurgitation      PERICARDIUM:  There was moderate thickening  There was no residual pericardial effusion  There was no evidence for pericardial constriction  Physical Exam    Airway    Mallampati score: II  TM Distance: >3 FB  Neck ROM: limited     Dental       Cardiovascular  Rhythm: irregular, Rate: abnormal, Cardiovascular exam normal    Pulmonary  Pulmonary exam normal Breath sounds clear to auscultation,     Other Findings  Patient in aflutter/afib per last telemetry  Anesthesia Plan  ASA Score- 4     Anesthesia Type- general with ASA Monitors  Additional Monitors: arterial line  Airway Plan: ETT      Comment: Risks/benefits and alternatives discussed with patient including likely possibility of PONV and sore throat, as well as the rare possibilities of aspiration, dental/oropharyngeal/ocular injuries, or grave/life threatening anesthetic and surgical emergencies          Plan Factors-Exercise tolerance (METS): <4 METS  Patient summary reviewed  Patient instructed to abstain from smoking on day of procedure  Patient did not smoke on day of surgery  Induction- intravenous  Postoperative Plan- Plan for postoperative opioid use  Planned trial extubation    Informed Consent- Anesthetic plan and risks discussed with patient  I personally reviewed this patient with the CRNA  Discussed and agreed on the Anesthesia Plan with the CRNA  Brian Gonzalez

## 2021-01-21 NOTE — ASSESSMENT & PLAN NOTE
Discussed with radiology today  CT scan from 01/03/2021 and 01/16/2021 showed pulmonary infarct, posterior segment right upper lobe pulmonary embolus is visible on CT scan  True emboli versus tumor emboli given tumor invading the right atrium  Will order lower extremity venous Doppler  Discussed the finding with the patient   She is amenable to anticoagulation  Heparin drip held at midnight prior to procedure  Plan to start heparin drip after cleared by thoracic surgery post VATS with pericardial window/biopsy

## 2021-01-21 NOTE — PROGRESS NOTES
Progress Note - Thoracic Surgery   Dimitry Figueroa 65 y o  female MRN: [id number]  Unit/Bed#: [facility] 426-01 Encounter: [id number]    Assessment:  65yo  F with 6 cm anterior mediastinal mass and large pericardial effusion s/p subxiphoid pericardial window 1/4/21, with persistently high output from pericardial drain, s/p drain removal 1/19/21    Plan:   To OR today for R VATS, pericardial window, anterior mediastinal mass biopsy   NPO since MN   IVF while NPO   Heparin gtt on hold since MN    Subjective/Objective     Subjective:   No acute events overnight  Patient anxious about procedure today  Denies any CP or SOB  Objective:    Blood pressure 98/54, pulse 90, temperature 98 °F (36 7 °C), resp  rate 18, height 5' 4" (1 626 m), weight 83 7 kg (184 lb 9 6 oz), SpO2 97 %  ,Body mass index is 31 69 kg/m²        Intake/Output Summary (Last 24 hours) at 1/21/2021 0500  Last data filed at 1/21/2021 0004  Gross per 24 hour   Intake 117 43 ml   Output 350 ml   Net -232 57 ml       Invasive Devices     Peripherally Inserted Central Catheter Line            PICC Line 01/05/21 Right Brachial 15 days                Physical Exam:   Gen:  NAD  CV:  warm, well-perfused  Lungs: nl effort on RA   Pericardial drain site C/D/I   Abd:  soft, NT/ND  Ext:  no CCE  Neuro: A&Ox3     Results from last 7 days   Lab Units 01/20/21  0501 01/19/21  1748 01/17/21  0955   WBC Thousand/uL 15 24* 13 24* 16 70*   HEMOGLOBIN g/dL 8 5* 8 5* 8 1*   HEMATOCRIT % 29 3* 28 2* 27 1*   PLATELETS Thousands/uL 371 354 333     Results from last 7 days   Lab Units 01/20/21  0501 01/19/21  1748 01/17/21  0955   POTASSIUM mmol/L 3 8 3 6 3 8   CHLORIDE mmol/L 106 109* 109*   CO2 mmol/L 27 26 27   BUN mg/dL 10 8 10   CREATININE mg/dL 0 90 0 77 0 74   CALCIUM mg/dL 8 5 8 5 8 4     Results from last 7 days   Lab Units 01/20/21  1946 01/20/21  1338   INR   --  1 43*   PTT seconds 67* 24

## 2021-01-21 NOTE — ASSESSMENT & PLAN NOTE
· Patient was brought to ED by has been due to coughing, lower extremity edema and dyspnea  · EKG showed atrial flutter, QTc prolongation  Her son passed away on 12/21/2020  · Patient was evaluated by Cardiology  · Cardiac echo with large pericardial effusion without tamponade, EF 65%  · Initially was treated with Cardizem drip, Currently off Cardizem drip,   · on p o  Metoprolol and diltiazem  · Has a PICC line due to difficult IV access  · Slight AFib with RVR overnight but relatively well controlled on beta-blocker and calcium channel blocker  · Continue metoprolol 100 mg b i d   And Cardizem 180 mg daily

## 2021-01-21 NOTE — ASSESSMENT & PLAN NOTE
Multifactorial  Patient with hematoma over extremity  Monitor drainage  Possibly component of anemia of chronic disease  Rule out occult GI blood loss  Serial hemoglobin hematocrit  Heme check stools  Drop in hemoglobin may be partially contributing to  Tachycardia    Currently hb 8 3, MCV 68, RDW elevated  Heme/onc recommends IV Venofer 1 or 2 doses  Iron panel suggestive of Anemia of chronic disease  Hb electrophoresis pending to r/o hemoglobinopathy

## 2021-01-21 NOTE — ANESTHESIA PROCEDURE NOTES
Arterial Line Insertion  Performed by: Luh Rodriguez CRNA  Authorized by: Lucila Carl MD   Patient understanding: patient states understanding of the procedure being performed  Patient consent: the patient's understanding of the procedure matches consent given  Procedure consent: procedure consent matches procedure scheduled  Relevant documents: relevant documents present and verified  Test results: test results available and properly labeled  Site marked: the operative site was marked  Radiology Images: Radiology Images displayed and confirmed   If images not available, report reviewed  Patient identity confirmed: arm band  Indications: multiple ABGs and hemodynamic monitoring  Orientation:  Right  Location: radial artery  Procedure Details:  Number of attempts: 5 or more    Post-procedure:  Waveform: good waveform

## 2021-01-21 NOTE — ASSESSMENT & PLAN NOTE
Evaluated by thoracic surgery  Underwent pericardial drain placement 01/04/2021  Still had significant drainage  Drain was removed by thoracic surgery and plan for VATS, pericardial window with biopsy of anterior mediastinal mass on 1/21/21  Cardiology will reassess pericardial effusion with Echo on 1/22

## 2021-01-21 NOTE — PROGRESS NOTES
CARDIOLOGY PROGRESS NOTE     PATIENT INFORMATION     Name: Mason Barry   Age & Sex: 65 y o  female   MRN: [id number]  Hospital Stay Days: 25  [facility]   Encounter: [id number]    ASSESSMENT/PLAN     Principal Problem:    Atrial flutter ([address] )  Active Problems:    Essential hypertension    Pericardial effusion without cardiac tamponade    Mediastinal mass    Pain and swelling of right upper extremity    Hypodense mass of liver    Leukocytosis    Anemia    Fever    Pulmonary embolus (HCC)    Plan:  Pericardial Effusion  CTA and TTE done on 1/3 significant for large pericardial effusion without tamponade  Had pericardial window and drain placement on 1/4  Repeat echo 1/7 with resolution of effusion  CT chest done 1/11 continues to report that mediastinal mass anterior to R atrium is likely cause of effusion  CT chest/abd/pelvis with no pericardial effusion s/p drain  Likely malignant effusion with continued reaccumulation  LORENZO drain pulled 1/20  S/P IR liver biopsy - 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, pericardial fluid positive for spindle cells  Thoracic surgery to perform VATS with pericardial window and anterior mediastinal mass biopsy today  Will order repeat Echo for tomorrow (1/22) POD 1     Atrial Fibrillation/Flutter  Patient persistently in atrial fibrillation/flutter throughout admission  Continue Metoprolol 100mg BID and Cardizem 180mg daily   Telemetry over 24 hours without significant event, has maintained rates 90's-100's  Plan to start heparin drip after cleared by thoracic surgery post VATS with pericardial window/biopsy  Defer initiation to primary team  Continue to monitor on telemetry      Hypertension  Patient on lisinopril 30mg at home  Held due to episodes of hypotension while admitted   BP remains accepatble at 124/63  Will consider adding back Lisinopril, likely at lower dose, if blood pressure elevates      Anemia  Patient with gradual drop in Hgb over admission  Potentially contributing to tachycardia  Continue to monitor CBC  Transfuse for <7 0     Pulmonary Embolism  CXR done on  for verification of PICC placement demonstrated improving R middle lobe opacity compatible with pulmonary infarct after reviewing previous imaging  Upon reevaluation, thought to be posterior segment right upper lobe PE with increasing R pulmonary emboli on repeat CT, however, unable to determine whether true emboli or tumor emboli given location of anterior mediastinal mass  Defer anticoagulation initiation to primary team and surgeons at this time  Patient with ongoing workup for malignancy  Heme Onc following     GIST  Hepatic biopsy significant for spindle cell tumor suggestive of GIST  EGD/colonoscopy cancelled due to upcoming surgery today - will likely need outpatient evaluation  Scheduled for VATS with Thoracic surgery on today with pericardial window and biopsy of mediastinal mass for more tissue to be evaluated  Heme/Onc following    SUBJECTIVE     Patient seen and examined  No acute events overnight  Reports feeling tired and no really like herself, but attributing to being in the hospital  Frustrated that she completed bowel prep and colonoscopy not done  Anxious for procedure today, and to start moving toward discharge with outpatient follow up  Discussed how much she misses her family, specifically her great grandson  Patient does deny lightheadedness, dizziness, diaphoresis, chest pain, dyspnea, N/V/D/C       OBJECTIVE     Vitals:    21 2304 21 0310 21 0546 21 0703   BP: 95/54 98/54  113/64   BP Location:       Pulse: 83 90  97   Resp:    Temp: 98 7 °F (37 1 °C) 98 °F (36 7 °C)  98 2 °F (36 8 °C)   TempSrc:       SpO2: 96% 97%  95%   Weight:   82 9 kg (182 lb 12 2 oz)    Height:          Temperature:   Temp (24hrs), Av 1 °F (36 7 °C), Min:97 5 °F (36 4 °C), Max:98 7 °F (37 1 °C)    Temperature: 98 2 °F (36 8 °C)  Intake & Output:  I/O       01/19 0701 - 01/20 0700 01/20 0701 - 01/21 0700 01/21 0701 - 01/22 0700    P  O  300 0     I V  (mL/kg)  117 4 (1 4)     NG/GT 0      Total Intake(mL/kg) 300 (3 6) 117 4 (1 4)     Urine (mL/kg/hr) 525 (0 3) 350 (0 2) 300 (2 4)    Drains 70      Stool 0 0     Total Output 595 350 300    Net -295 -232 6 -300           Unmeasured Urine Occurrence 2 x      Unmeasured Stool Occurrence 6 x 3 x         Weights:   IBW: 54 7 kg    Body mass index is 31 37 kg/m²  Weight (last 2 days)     Date/Time   Weight    01/21/21 0546   82 9 (182 76)    01/20/21 0600   83 7 (184 6)    01/19/21 0600   82 7 (182 3)            Physical Exam  Vitals signs reviewed  Constitutional:       General: She is not in acute distress  Appearance: Normal appearance  She is well-developed  She is not ill-appearing or diaphoretic  Comments: Evaluated sitting in bedside chair, appears comfortable   HENT:      Head: Normocephalic and atraumatic  Right Ear: External ear normal       Left Ear: External ear normal       Nose: Nose normal       Mouth/Throat:      Mouth: Mucous membranes are moist       Pharynx: Oropharynx is clear  Eyes:      General: No scleral icterus  Right eye: No discharge  Left eye: No discharge  Conjunctiva/sclera: Conjunctivae normal    Cardiovascular:      Rate and Rhythm: Normal rate  Rhythm irregular  Heart sounds: Normal heart sounds  No murmur  Pulmonary:      Effort: Pulmonary effort is normal  No respiratory distress  Breath sounds: Examination of the right-upper field reveals decreased breath sounds  Examination of the right-middle field reveals decreased breath sounds  Decreased breath sounds present  No wheezing  Chest:      Breasts:         Left: Absent  Comments: Drain removed, site appears CDI  Abdominal:      General: Bowel sounds are normal  There is no distension  Palpations: Abdomen is soft  Tenderness:  There is no abdominal tenderness  Musculoskeletal: Normal range of motion  General: Swelling (legs and R upper ext 2/2 hematoma) present  No tenderness  Right lower leg: Edema present  Left lower leg: Edema present  Comments: Swelling of R upper arm 2/2 hematoma - improving   Skin:     General: Skin is warm and dry  Coloration: Skin is not jaundiced  Neurological:      General: No focal deficit present  Mental Status: She is alert, oriented to person, place, and time and easily aroused  Cranial Nerves: No cranial nerve deficit  Motor: No weakness  Psychiatric:         Mood and Affect: Mood normal          Behavior: Behavior normal  Behavior is cooperative  Thought Content: Thought content normal        LABORATORY DATA     Labs: I have personally reviewed pertinent reports  Results from last 7 days   Lab Units 01/21/21  0525 01/20/21  0501 01/19/21  1748 01/17/21  0955 01/16/21  0507 01/15/21  1243   WBC Thousand/uL 14 31* 15 24* 13 24* 16 70* 19 30* 18 83*   HEMOGLOBIN g/dL 8 3* 8 5* 8 5* 8 1* 8 1* 8 5*   HEMATOCRIT % 27 8* 29 3* 28 2* 27 1* 28 0* 27 8*   PLATELETS Thousands/uL 365 371 354 333 301 304   NEUTROS PCT %  --   --   --  81* 79* 79*   MONOS PCT %  --   --   --  9 11 11   MONO PCT %  --   --  3*  --   --   --       Results from last 7 days   Lab Units 01/21/21  0525 01/20/21  0501 01/19/21 1748  01/16/21  0507   POTASSIUM mmol/L 3 8 3 8 3 6   < > 3 8   CHLORIDE mmol/L 107 106 109*   < > 105   CO2 mmol/L 25 27 26   < > 26   BUN mg/dL 12 10 8   < > 10   CREATININE mg/dL 0 86 0 90 0 77   < > 0 80   CALCIUM mg/dL 8 4 8 5 8 5   < > 8 2*   ALK PHOS U/L  --   --  71  --  77   ALT U/L  --   --  24  --  23   AST U/L  --   --  19  --  21    < > = values in this interval not displayed                Results from last 7 days   Lab Units 01/20/21  1946 01/20/21  1338   INR   --  1 43*   PTT seconds 67* 24               IMAGING & DIAGNOSTIC TESTING     Radiology Results: I have personally reviewed pertinent reports  Xr Chest Portable    Result Date: 1/5/2021  Impression: Pericardial drain placement with slight decrease in size of the cardiac silhouette  Small effusions and bibasilar atelectasis  Workstation performed: [id number]     Xr Chest 1 View Portable    Result Date: 1/3/2021  Impression: Marked enlargement of the cardiac silhouette which could be due to cardiomegaly or a pericardial effusion  Right middle lobe pneumonia  Workstation performed: [id number]     Us Msk Limited    Result Date: 1/8/2021  Impression: Previously noted collection is contiguous with the bicipital groove, and the long head biceps tendon is not visualized within the bicipital groove  Findings most likely represent proximal tendon tear with distal retraction  If collection does not resolve spontaneously (within 1-3 months), recommend right humeral MRI with contrast as hemorrhagic neoplasm is within the differential  The study was marked in EPIC for significant notification  Workstation performed: [id number]     Cta Chest Pe Study    Result Date: 1/3/2021  Impression: Large pericardial effusion measuring approximately 3 3 cm area of bulging of the right pericardium or a potential adjacent right mediastinal mass measuring approximately 6 6 cm #2/100  The pericardial effusion may be on a malignant basis  Prominent diffuse mediastinal adenopathy Interlobular septal thickening and small pleural effusions in keeping with pulmonary edema  Posterior right upper lobe consolidation #3/55 in a wedge-shaped appearance with a apex extending towards the right hilum may represent pneumonia or a postobstructive pneumonitis from a central mass  A discrete mass is not identified but could be obscured  Innumerable vague hypodensities throughout the liver are suspicious for metastases    I personally discussed this study with Josie Fitch on 1/3/2021 at 5:18 PM  Workstation performed: [id number]     Us Extremity Soft Tissue    Result Date: 1/8/2021  Impression: Complex fluid collection in the medial right upper arm with hematocrit level in keeping with hematoma  Given the location, the possibility of associated underlying biceps tendon rupture is not excluded  This can be evaluated with MSK shoulder ultrasound if clinically indicated  Recommend follow-up ultrasound in 1 month to confirm resolution  The study was marked in [facility] for immediate notification  Workstation performed: [id number]     Other Diagnostic Testing: I have personally reviewed pertinent reports        ACTIVE MEDICATIONS     Current Facility-Administered Medications   Medication Dose Route Frequency    acetaminophen (TYLENOL) tablet 650 mg  650 mg Oral Q6H PRN    aluminum-magnesium hydroxide-simethicone (MYLANTA) oral suspension 30 mL  30 mL Oral Q6H PRN    ceFAZolin (ANCEF) IVPB (premix in dextrose) 2,000 mg 50 mL  2,000 mg Intravenous On Call To OR    diltiazem (CARDIZEM CD) 24 hr capsule 180 mg  180 mg Oral Daily    diltiazem (CARDIZEM) tablet 30 mg  30 mg Oral Q6H PRN    docusate sodium (COLACE) capsule 100 mg  100 mg Oral BID    heparin (porcine) 25,000 units in 0 45% NaCl 250 mL infusion (premix)  3-30 Units/kg/hr (Order-Specific) Intravenous Titrated    heparin (porcine) injection 3,200 Units  3,200 Units Intravenous Q1H PRN    heparin (porcine) injection 6,400 Units  6,400 Units Intravenous Q1H PRN    HYDROmorphone (DILAUDID) injection 0 2 mg  0 2 mg Intravenous Q4H PRN    HYDROmorphone (DILAUDID) injection 0 2 mg  0 2 mg Intravenous Once    lactated ringers infusion  75 mL/hr Intravenous Continuous    metoprolol succinate (TOPROL-XL) 24 hr tablet 100 mg  100 mg Oral Q12H STEPHANIE    ondansetron (ZOFRAN) injection 4 mg  4 mg Intravenous Q6H PRN    oxyCODONE (ROXICODONE) IR tablet 2 5 mg  2 5 mg Oral Q4H PRN    oxyCODONE (ROXICODONE) IR tablet 5 mg  5 mg Oral Q4H PRN    senna (SENOKOT) tablet 8 6 mg  1 tablet Oral Daily     VTE Pharmacologic Prophylaxis: held - plan for surgery today  VTE Mechanical Prophylaxis: sequential compression device    ==  Jair Land DO  Internal Medicine Resident, PGY-1  [address] Internal Medicine Residency

## 2021-01-21 NOTE — PROGRESS NOTES
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  No primary seen on CT scan  Gastroenterology on board  Oncology on board  Recommended Oral tyrosine kinase inhibitor that can be started outpatient   EGD/colonoscopy was canceled as patient is undergoing procedure tomorrow  Gastroenterology determined that EGD/colonoscopy can be done outpatient next week or inpatient if the patient is still hospitalized      Pulmonary embolus [facility])  Assessment & Plan  Discussed with radiology today  CT scan from 01/03/2021 and 01/16/2021 showed pulmonary infarct, posterior segment right upper lobe pulmonary embolus is visible on CT scan  True emboli versus tumor emboli given tumor invading the right atrium  Will order lower extremity venous Doppler  Discussed the finding with the patient   She is amenable to anticoagulation  Heparin drip held at midnight prior to procedure  Plan to start heparin drip after cleared by thoracic surgery post VATS with pericardial window/biopsy    Pericardial effusion without cardiac tamponade  Assessment & Plan  Evaluated by thoracic surgery  Underwent pericardial drain placement 01/04/2021  Still had significant drainage  Drain was removed by thoracic surgery and plan for VATS, pericardial window with biopsy of anterior mediastinal mass on 1/21/21  Cardiology will reassess pericardial effusion with Echo on 1/22    Mediastinal mass  Assessment & Plan  · Anterior mediastinal mass seen on CT scan with diffuse mediastinal adenopathy, Innumerable vague hypodensities throughout the liver are suspicious for metastases  · Thoracic surgery doing right VATS, pericardial window with biopsy of the anterior mediastinal mass on 1/21/21      * Atrial flutter ([address] )  Assessment & Plan  · Patient was brought to ED by has been due to coughing, lower extremity edema and dyspnea  · EKG showed atrial flutter, QTc prolongation  Her son passed away on 12/21/2020  · Patient was evaluated by Cardiology  · Cardiac echo with large pericardial effusion without tamponade, EF 65%  · Initially was treated with Cardizem drip, Currently off Cardizem drip,   · on p o  Metoprolol and diltiazem  · Has a PICC line due to difficult IV access  · Slight AFib with RVR overnight but relatively well controlled on beta-blocker and calcium channel blocker  · Continue metoprolol 100 mg b i d  And Cardizem 180 mg daily      Fever  Assessment & Plan  Differential would include occult blood stream infection, versus tumor fever versus pneumonia  Rule out occult bloodstream infection- blood cultures 1/15 negative  Note CT findings concerning for possible infiltrate  However patient does not have any respiratory symptoms  Procalcitonin is also negative x2  Patient has a PE  Lower extremity doppler negative    Anemia  Assessment & Plan  Multifactorial  Patient with hematoma over extremity  Monitor drainage  Possibly component of anemia of chronic disease  Rule out occult GI blood loss  Serial hemoglobin hematocrit  Heme check stools  Drop in hemoglobin may be partially contributing to  Tachycardia  Currently hb 8 3, MCV 68, RDW elevated  Heme/onc recommends IV Venofer 1 or 2 doses  Iron panel suggestive of Anemia of chronic disease  Hb electrophoresis pending to r/o hemoglobinopathy      Leukocytosis  Assessment & Plan  Improving  Workup negative for obvious source of infection  Will observe off of antibiotic therapy    Potential etiology would be underlying tumor fever versus occult infection rule out GI process rule out pulmonary process  Procalcitonin is negative  Continue to trend white count and temperature curve  Pain and swelling of right upper extremity  Assessment & Plan  Right upper extremity swelling noted   Likely hematoma  US obtained that shows possible hematoma vs  Proximal biceps tendon repair  General surgery consult appreciated- recommends conservative managment and holding AC  Patient does not have motor strength loss  neurovascular intact  Pain is significantly improved since last night  Patient is able to extend her shoulder, slightly limited due to pain  At this point, patient will follow up outpatient  If persistent in 3 months, may obtain MRI  Swelling has decreased significantly     Essential hypertension  Assessment & Plan  Continue metoprolol and diltiazem  Hold lisinopril for now  Continue to monitor        VTE Pharmacologic Prophylaxis:   Pharmacologic: None due to procedure  Mechanical VTE Prophylaxis in Place: Yes    Discussions with Specialists or Other Care Team Provider: Cardio, thoracic surgery, heme/onc    Education and Discussions with Family / Patient: Spoke with patients  and updated him  Current Length of Stay: 18 day(s)    Current Patient Status: Inpatient     Discharge Plan / Estimated Discharge Date: None    Code Status: Level 1 - Full Code      Subjective:   Patient states that she is feeling okay  She has no complaints  Denies any pain, shortness of breath  She is worried about her , and how is handling her condition  Objective:     Vitals:   Temp (24hrs), Av 3 °F (36 8 °C), Min:98 °F (36 7 °C), Max:98 8 °F (37 1 °C)    Temp:  [98 °F (36 7 °C)-98 8 °F (37 1 °C)] 98 8 °F (37 1 °C)  HR:  [] 93  Resp:  [16-20] 20  BP: ()/(54-71) 116/71  SpO2:  [95 %-97 %] 97 %  Body mass index is 31 37 kg/m²  Input and Output Summary (last 24 hours):        Intake/Output Summary (Last 24 hours) at 2021 1230  Last data filed at 2021 1441  Gross per 24 hour   Intake 1111 18 ml   Output 650 ml   Net 461 18 ml       Physical Exam:     Physical Exam  HENT:      Head: Normocephalic and atraumatic  Nose: Nose normal       Mouth/Throat:      Mouth: Mucous membranes are moist    Eyes:      Extraocular Movements: Extraocular movements intact  Pupils: Pupils are equal, round, and reactive to light  Neck:      Musculoskeletal: Normal range of motion  Cardiovascular:      Rate and Rhythm: Normal rate and regular rhythm  Pulses: Normal pulses  Pulmonary:      Effort: Pulmonary effort is normal       Breath sounds: Normal breath sounds  Abdominal:      General: Abdomen is flat  Bowel sounds are normal       Palpations: Abdomen is soft  Musculoskeletal: Normal range of motion  Skin:     General: Skin is warm  Neurological:      General: No focal deficit present  Mental Status: She is alert and oriented to person, place, and time  Mental status is at baseline  Additional Data:     Labs:    Results from last 7 days   Lab Units 01/21/21  0525  01/19/21 1748 01/17/21  0955   WBC Thousand/uL 14 31*   < > 13 24* 16 70*   HEMOGLOBIN g/dL 8 3*   < > 8 5* 8 1*   HEMATOCRIT % 27 8*   < > 28 2* 27 1*   PLATELETS Thousands/uL 365   < > 354 333   NEUTROS PCT %  --   --   --  81*   LYMPHS PCT %  --   --   --  3*   LYMPHO PCT %  --   --  6*  --    MONOS PCT %  --   --   --  9   MONO PCT %  --   --  3*  --    EOS PCT %  --   --  8* 5    < > = values in this interval not displayed  Results from last 7 days   Lab Units 01/21/21  0525  01/19/21  1748   POTASSIUM mmol/L 3 8   < > 3 6   CHLORIDE mmol/L 107   < > 109*   CO2 mmol/L 25   < > 26   BUN mg/dL 12   < > 8   CREATININE mg/dL 0 86   < > 0 77   CALCIUM mg/dL 8 4   < > 8 5   ALK PHOS U/L  --   --  71   ALT U/L  --   --  24   AST U/L  --   --  19    < > = values in this interval not displayed       Results from last 7 days   Lab Units 01/20/21  1338   INR  1 43*       * I Have Reviewed All Lab Data Listed Above  * Additional Pertinent Lab Tests Reviewed: Janell 64 Admission Reviewed    Imaging:    Imaging Reports Reviewed Today Include: None  Imaging Personally Reviewed by Myself Includes:  None    Recent Cultures (last 7 days):     Results from last 7 days   Lab Units 01/15/21  1242   BLOOD CULTURE  No Growth After 5 Days  No Growth After 5 Days         Last 24 Hours Medication List:   Current Facility-Administered Medications   Medication Dose Route Frequency Provider Last Rate    [MAR Hold] acetaminophen  650 mg Oral Q6H PRN Valerie Burgess PA-C      [facility] Hold] aluminum-magnesium hydroxide-simethicone  30 mL Oral Q6H PRN Kailey Le MD      bupivacaine (PF)    PRN Carlo Alegria MD      [facility] Hold] cefazolin  2,000 mg Intravenous On Call To Isacc Quintana MD      [facility] Hold] diltiazem  180 mg Oral Daily Jaye Ledezma MD      [facility] Hold] diltiazem  30 mg Oral Q6H PRN Feliberto Ferrara MD      [facility] Hold] docusate sodium  100 mg Oral BID Kailey Le MD      heparin (porcine)  3-30 Units/kg/hr (Order-Specific) Intravenous Titrated Paresh Evans MD Stopped (01/21/21 0004)    [MAR Hold] heparin (porcine)  3,200 Units Intravenous Q1H PRN Paresh Evans MD      [facility] Hold] heparin (porcine)  6,400 Units Intravenous Q1H PRN Paresh Evans MD      [facility] Hold] HYDROmorphone  0 2 mg Intravenous Q4H PRN Valerie Burgess PA-C      [facility] Hold] HYDROmorphone  0 2 mg Intravenous Once Valerie Burgess PA-C      lactated ringers  75 mL/hr Intravenous Continuous Jenelle Guerin MD 75 mL/hr (01/21/21 1334)    [FVJ Hold] metoprolol succinate  100 mg Oral Q12H [address] [employer],       [MAR Hold] ondansetron  4 mg Intravenous Q6H PRN Kailey Le MD      [facility] Hold] oxyCODONE  2 5 mg Oral Q4H PRN Valerie Burgess PA-C      [facility] Hold] oxyCODONE  5 mg Oral Q4H PRN Valerie Burgess PA-C      [facility] Hold] senna  1 tablet Oral Daily Job Anis Steven Vo MD      sodium chloride (PF)    PRN Bolivar Lombard, MD       Facility-Administered Medications Ordered in Other Encounters   Medication Dose Route Frequency Provider Last Rate    ceFAZolin   Intravenous PRN Lolock Stefanoso, CRNA      fentanyl citrate (PF)    PRN Doll Skipper, CRNA      lactated ringers    Continuous PRN Lolock Nyangweso, CRNA      lidocaine (PF)    PRN Doll Skipper, CRNA      norepinephrine (LEVOPHED) 8 mg (DOUBLE CONCENTRATION) IV in sodium chloride 0 9% 250 mL    Continuous PRN Gill Herbert, CRNA 5 mcg/min (01/21/21 1723)    norepinephrine    PRN Doll Skipper, CRNA      propofol    PRN Doll Skipper, CRNA      ROCuronium    PRN Doll Skipper, CRNA      Succinylcholine Chloride    PRN Doll Skipper, CRNA          Today, Patient Was Seen By: Delroy Salazar MD    ** Please Note: This note has been constructed using a voice recognition system   **

## 2021-01-21 NOTE — PROGRESS NOTES
[facility]  Progress Note - Arturo Bustamante 65 y o  female MRN: [id number]    Unit/Bed#: [facility] 426-01 Encounter: [id number]    Assessment/Plan:    Patient was seen on 01/20/2020  1  Metastatic disease- GIST  She is a 77-year-old female was recently identified to have metastatic GIST with unclear primary  She has multiple lesions consistent with metastatic liver disease  EGD and colonoscopy was planned on 01/20/2021 but had to be deferred due to pericardial effusion with cardiac procedure plan on 01/21/2020  Since elective procedure we can plan for this as an outpatient versus next week if the patient still is here hospitalized  Subjective:   She has no acute distress  Denies any nausea, vomiting, fevers, chills  Objective:     Vitals: Blood pressure 109/69, pulse 98, temperature 98 2 °F (36 8 °C), resp  rate 20, height 5' 4" (1 626 m), weight 82 9 kg (182 lb 12 2 oz), SpO2 96 %  ,Body mass index is 31 37 kg/m²  Intake/Output Summary (Last 24 hours) at 1/21/2021 1418  Last data filed at 1/21/2021 1334  Gross per 24 hour   Intake 1111 18 ml   Output 1000 ml   Net 111 18 ml       Physical Exam:    GEN: wn/wd, NAD  HEENT: MMM, no cervical or supraclavicular LAD, anciteric  CV: RRR, no m/r/g  CHEST: CTA b/l, no w/r/r  ABD: +BS, soft, NT/ND, no hepatosplenomegaly  EXT: no c/c/e  SKIN: no rashes  NEURO: aaox3      Invasive Devices     Peripherally Inserted Central Catheter Line            PICC Line 01/05/21 Right Brachial 16 days                        Lab, Imaging and other studies:     No results displayed because visit has over 200 results  I have personally reviewed pertinent reports        Current Facility-Administered Medications   Medication Dose Route Frequency    acetaminophen (TYLENOL) tablet 650 mg  650 mg Oral Q6H PRN    aluminum-magnesium hydroxide-simethicone (MYLANTA) oral suspension 30 mL  30 mL Oral Q6H PRN    ceFAZolin (ANCEF) IVPB (premix in dextrose) 2,000 mg 50 mL  2,000 mg Intravenous On Call To OR    diltiazem (CARDIZEM CD) 24 hr capsule 180 mg  180 mg Oral Daily    diltiazem (CARDIZEM) tablet 30 mg  30 mg Oral Q6H PRN    docusate sodium (COLACE) capsule 100 mg  100 mg Oral BID    heparin (porcine) 25,000 units in 0 45% NaCl 250 mL infusion (premix)  3-30 Units/kg/hr (Order-Specific) Intravenous Titrated    heparin (porcine) injection 3,200 Units  3,200 Units Intravenous Q1H PRN    heparin (porcine) injection 6,400 Units  6,400 Units Intravenous Q1H PRN    HYDROmorphone (DILAUDID) injection 0 2 mg  0 2 mg Intravenous Q4H PRN    HYDROmorphone (DILAUDID) injection 0 2 mg  0 2 mg Intravenous Once    lactated ringers infusion  75 mL/hr Intravenous Continuous    metoprolol succinate (TOPROL-XL) 24 hr tablet 100 mg  100 mg Oral Q12H STEPHANIE    ondansetron (ZOFRAN) injection 4 mg  4 mg Intravenous Q6H PRN    oxyCODONE (ROXICODONE) IR tablet 2 5 mg  2 5 mg Oral Q4H PRN    oxyCODONE (ROXICODONE) IR tablet 5 mg  5 mg Oral Q4H PRN    senna (SENOKOT) tablet 8 6 mg  1 tablet Oral Daily

## 2021-01-21 NOTE — ASSESSMENT & PLAN NOTE
· Anterior mediastinal mass seen on CT scan with diffuse mediastinal adenopathy, Innumerable vague hypodensities throughout the liver are suspicious for metastases  · Thoracic surgery doing right VATS, pericardial window with biopsy of the anterior mediastinal mass on 1/21/21

## 2021-01-21 NOTE — ASSESSMENT & PLAN NOTE
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

## 2021-01-22 ENCOUNTER — APPOINTMENT (INPATIENT)
Dept: NON INVASIVE DIAGNOSTICS | Facility: HOSPITAL | Age: 71
DRG: 270 | End: 2021-01-22
Payer: COMMERCIAL

## 2021-01-22 LAB
ACANTHOCYTES BLD QL SMEAR: PRESENT
ANION GAP SERPL CALCULATED.3IONS-SCNC: 8 MMOL/L (ref 4–13)
ANISOCYTOSIS BLD QL SMEAR: PRESENT
BASOPHILS # BLD MANUAL: 0 THOUSAND/UL (ref 0–0.1)
BASOPHILS NFR MAR MANUAL: 0 % (ref 0–1)
BUN SERPL-MCNC: 16 MG/DL (ref 5–25)
CALCIUM SERPL-MCNC: 8.3 MG/DL (ref 8.3–10.1)
CHLORIDE SERPL-SCNC: 110 MMOL/L (ref 100–108)
CO2 SERPL-SCNC: 21 MMOL/L (ref 21–32)
CREAT SERPL-MCNC: 0.93 MG/DL (ref 0.6–1.3)
DACRYOCYTES BLD QL SMEAR: PRESENT
EOSINOPHIL # BLD MANUAL: 0 THOUSAND/UL (ref 0–0.4)
EOSINOPHIL NFR BLD MANUAL: 0 % (ref 0–6)
ERYTHROCYTE [DISTWIDTH] IN BLOOD BY AUTOMATED COUNT: 20.3 % (ref 11.6–15.1)
GFR SERPL CREATININE-BSD FRML MDRD: 71 ML/MIN/1.73SQ M
GLUCOSE SERPL-MCNC: 207 MG/DL (ref 65–140)
HCT VFR BLD AUTO: 25.4 % (ref 34.8–46.1)
HCT VFR BLD AUTO: 26.9 % (ref 34.8–46.1)
HGB BLD-MCNC: 7.3 G/DL (ref 11.5–15.4)
HGB BLD-MCNC: 7.8 G/DL (ref 11.5–15.4)
LYMPHOCYTES # BLD AUTO: 0.17 THOUSAND/UL (ref 0.6–4.47)
LYMPHOCYTES # BLD AUTO: 1 % (ref 14–44)
MCH RBC QN AUTO: 20.2 PG (ref 26.8–34.3)
MCHC RBC AUTO-ENTMCNC: 28.7 G/DL (ref 31.4–37.4)
MCV RBC AUTO: 70 FL (ref 82–98)
MICROCYTES BLD QL AUTO: PRESENT
MONOCYTES # BLD AUTO: 0.51 THOUSAND/UL (ref 0–1.22)
MONOCYTES NFR BLD: 3 % (ref 4–12)
NEUTROPHILS # BLD MANUAL: 16.03 THOUSAND/UL (ref 1.85–7.62)
NEUTS SEG NFR BLD AUTO: 95 % (ref 43–75)
NRBC BLD AUTO-RTO: 0 /100 WBCS
OVALOCYTES BLD QL SMEAR: PRESENT
PLATELET # BLD AUTO: 381 THOUSANDS/UL (ref 149–390)
PLATELET BLD QL SMEAR: ADEQUATE
PMV BLD AUTO: 11.7 FL (ref 8.9–12.7)
POIKILOCYTOSIS BLD QL SMEAR: PRESENT
POLYCHROMASIA BLD QL SMEAR: PRESENT
POTASSIUM SERPL-SCNC: 4.4 MMOL/L (ref 3.5–5.3)
RBC # BLD AUTO: 3.62 MILLION/UL (ref 3.81–5.12)
RBC MORPH BLD: PRESENT
SODIUM SERPL-SCNC: 139 MMOL/L (ref 136–145)
VARIANT LYMPHS # BLD AUTO: 1 %
WBC # BLD AUTO: 16.87 THOUSAND/UL (ref 4.31–10.16)

## 2021-01-22 PROCEDURE — 80048 BASIC METABOLIC PNL TOTAL CA: CPT | Performed by: SURGERY

## 2021-01-22 PROCEDURE — 99024 POSTOP FOLLOW-UP VISIT: CPT | Performed by: THORACIC SURGERY (CARDIOTHORACIC VASCULAR SURGERY)

## 2021-01-22 PROCEDURE — 93306 TTE W/DOPPLER COMPLETE: CPT | Performed by: INTERNAL MEDICINE

## 2021-01-22 PROCEDURE — 93308 TTE F-UP OR LMTD: CPT

## 2021-01-22 PROCEDURE — 99232 SBSQ HOSP IP/OBS MODERATE 35: CPT | Performed by: INTERNAL MEDICINE

## 2021-01-22 PROCEDURE — 85014 HEMATOCRIT: CPT | Performed by: STUDENT IN AN ORGANIZED HEALTH CARE EDUCATION/TRAINING PROGRAM

## 2021-01-22 PROCEDURE — 85027 COMPLETE CBC AUTOMATED: CPT | Performed by: SURGERY

## 2021-01-22 PROCEDURE — 85018 HEMOGLOBIN: CPT | Performed by: STUDENT IN AN ORGANIZED HEALTH CARE EDUCATION/TRAINING PROGRAM

## 2021-01-22 PROCEDURE — 85007 BL SMEAR W/DIFF WBC COUNT: CPT | Performed by: SURGERY

## 2021-01-22 RX ORDER — ALBUMIN, HUMAN INJ 5% 5 %
12.5 SOLUTION INTRAVENOUS ONCE
Status: COMPLETED | OUTPATIENT
Start: 2021-01-22 | End: 2021-01-22

## 2021-01-22 RX ADMIN — ALBUMIN (HUMAN) 12.5 G: 12.5 INJECTION, SOLUTION INTRAVENOUS at 20:24

## 2021-01-22 RX ADMIN — DOCUSATE SODIUM 100 MG: 100 CAPSULE, LIQUID FILLED ORAL at 08:03

## 2021-01-22 RX ADMIN — SODIUM CHLORIDE, SODIUM LACTATE, POTASSIUM CHLORIDE, AND CALCIUM CHLORIDE 75 ML/HR: .6; .31; .03; .02 INJECTION, SOLUTION INTRAVENOUS at 06:33

## 2021-01-22 RX ADMIN — METOPROLOL SUCCINATE 100 MG: 100 TABLET, EXTENDED RELEASE ORAL at 20:25

## 2021-01-22 RX ADMIN — DILTIAZEM HYDROCHLORIDE 180 MG: 180 CAPSULE, COATED, EXTENDED RELEASE ORAL at 08:02

## 2021-01-22 RX ADMIN — OXYCODONE HYDROCHLORIDE 5 MG: 5 TABLET ORAL at 14:12

## 2021-01-22 RX ADMIN — OXYCODONE HYDROCHLORIDE 5 MG: 5 TABLET ORAL at 05:35

## 2021-01-22 RX ADMIN — METOPROLOL SUCCINATE 100 MG: 100 TABLET, EXTENDED RELEASE ORAL at 08:02

## 2021-01-22 NOTE — CASE MANAGEMENT
Pt's LOS is 19 days  Pt is not yet medically ready  Pt is S/P VATS, S/P Pericardial Window x 2, S/P Biopsy of Anterior Mediastinal Mass  Pt remains cleared to d/c to home w/ no anticipated aftercare needs, once medically cleared for d/c by Cardiothoracic Surgery  CM to follow

## 2021-01-22 NOTE — PROGRESS NOTES
Cardiology Team 2 Progress Note - Geovanni Ramirez 65 y o  female MRN: [id number]    Unit/Bed#: [facility] 426-01 Encounter: [id number]          Subjective:   Patient seen and examined  No significant telemetry events overnight  She remains in rate controlled atrial flutter  She is postop day 1 from VATS assisted right-sided pericardial window and anterior mediastinal mass biopsy  This revealed small bloody pericardial effusion which appeared to originate from a friable mass underneath the pericardium which was biopsied  Of note, the right-sided pericardial tissue was thickened in appearance  On review of system, patient endorses some discomfort in the right flank but otherwise is comfortable  Her edema is improved  Objective:     Vitals: Blood pressure 107/65, pulse 84, temperature (!) 97 3 °F (36 3 °C), resp  rate 20, height 5' 4" (1 626 m), weight 85 4 kg (188 lb 4 4 oz), SpO2 95 %  , Body mass index is 32 32 kg/m² ,   Orthostatic Blood Pressures      Most Recent Value   Blood Pressure  107/65 filed at 01/22/2021 4732   Patient Position - Orthostatic VS  Sitting filed at 01/20/2021 5509            Intake/Output Summary (Last 24 hours) at 1/22/2021 0843  Last data filed at 1/22/2021 0600  Gross per 24 hour   Intake 2291 25 ml   Output 988 ml   Net 1303 25 ml         Physical Exam:    GEN: Geovanni Ramirez appears well, alert and oriented x 3, pleasant and cooperative   HEENT:  Normocephalic, atraumatic, anicteric, moist mucous membranes  NECK: +JVD to the base of the neck; no carotid bruits   HEART:  Irregular rhythm, normal rate, normal S1 and S2, mild pericardial rub, no murmurs, clicks, or gallops; well approximated pericardial window surgical scar   LUNGS:  Right lower posterior to rub, fine left-sided basilar graft; respiration nonlabored on room air; right flank posterior axillary line chest tube site dressed  ABDOMEN:  Normoactive bowel sounds, soft, no tenderness, no distention  EXTREMITIES: peripheral pulses palpable, improving right arm hematoma; 1 +edema in lower extremities  NEURO: no gross focal findings; cranial nerves grossly intact   SKIN:  Dry, intact, warm to touch      Current Facility-Administered Medications:     acetaminophen (TYLENOL) tablet 650 mg, 650 mg, Oral, Q6H PRN, Munira Lloyd MD, 650 mg at 01/14/21 2020    aluminum-magnesium hydroxide-simethicone (MYLANTA) oral suspension 30 mL, 30 mL, Oral, Q6H PRN, Munira Lloyd MD    diltiazem (CARDIZEM CD) 24 hr capsule 180 mg, 180 mg, Oral, Daily, Munira Lloyd MD, 180 mg at 01/22/21 0802    diltiazem (CARDIZEM) tablet 30 mg, 30 mg, Oral, Q6H PRN, Munira Lloyd MD    docusate sodium (COLACE) capsule 100 mg, 100 mg, Oral, BID, Munira Lloyd MD, 100 mg at 01/22/21 0803    HYDROmorphone (DILAUDID) injection 0 2 mg, 0 2 mg, Intravenous, Q4H PRN, Munira Lloyd MD, 0 2 mg at 01/09/21 0645    HYDROmorphone (DILAUDID) injection 0 2 mg, 0 2 mg, Intravenous, Once, Munira Lloyd MD    lactated ringers infusion, 75 mL/hr, Intravenous, Continuous, Munira Lloyd MD, Last Rate: 75 mL/hr at 01/22/21 0633, 75 mL/hr at 01/22/21 2587    lactated ringers infusion, 75 mL/hr, Intravenous, Continuous, Gill Hrebert CRNA    metoprolol succinate (TOPROL-XL) 24 hr tablet 100 mg, 100 mg, Oral, Q12H [address], Munira Lloyd MD, 100 mg at 01/22/21 0802    ondansetron (ZOFRAN) injection 4 mg, 4 mg, Intravenous, Q6H PRN, Munira Lloyd MD    oxyCODONE (ROXICODONE) IR tablet 2 5 mg, 2 5 mg, Oral, Q4H PRN, Munira Lloyd MD    oxyCODONE (ROXICODONE) IR tablet 5 mg, 5 mg, Oral, Q4H PRN, Munira Lloyd MD, 5 mg at 01/22/21 0535    senna (SENOKOT) tablet 8 6 mg, 1 tablet, Oral, Daily, Munira Lloyd MD, 8 6 mg at 01/16/21 0930    Labs & Results:    Lab Results   Component Value Date    TROPONINI <0 02 01/03/2021    TROPONINI <0 02 01/03/2021    TROPONINI <0 03 01/03/2021       Lab Results   Component Value Date    CALCIUM 8 3 01/22/2021    K 4 4 01/22/2021    CO2 21 01/22/2021     (H) 01/22/2021    BUN 16 01/22/2021    CREATININE 0 93 01/22/2021       Lab Results   Component Value Date    WBC 16 87 (H) 01/22/2021    HGB 7 3 (L) 01/22/2021    HCT 25 4 (L) 01/22/2021    MCV 70 (L) 01/22/2021     01/22/2021     Results from last 7 days   Lab Units 01/20/21  1338   INR  1 43*       No results found for: CHOL  No results found for: HDL  No results found for: LDLCALC  No results found for: TRIG    Lab Results   Component Value Date    ALT 24 01/19/2021    AST 19 01/19/2021       Telemetry:   Personally reviewed by Kamilla Curran MD:  Rate controlled atrial flutter with heart rate ranging 70 to 80s and rare PVCs    Imaging:      VTE Prophylaxis: Sequential compression device (Venodyne)       Assessment:  Principal Problem:    Atrial flutter ([address] )  Active Problems:    Essential hypertension    Pericardial effusion without cardiac tamponade    Mediastinal mass    Pain and swelling of right upper extremity    Hypodense mass of liver    Leukocytosis    Anemia    Fever    Pulmonary embolus ([address] )        65 y o  female who presented with cough and shortness of breath  Cardiology was initially consulted for atrial flutter/fibrillation and diuresis  Course was complicated by newly diagnosed large pericardial effusion and mediastinal mass  Patient underwent pericardial window with bulb placement on 1/4  On initial cardiology contact, patient was started on rate control strategy and converted to sinus mechanism  However, she returned to atrial flutter which has since been rate controlled  She is status post liver biopsy 1/13 which revealed GIST tumor on pathology   S/p VATS assisted right-sided pericardial window with mediastinal mass biopsy       1  Pericardial effusion  -1/3 TTE:  Large with no tamponade   Resolved s/p pericardial window with bloody drainage and drain/bulb placement on 01/04 per echo on the 7th  -per primary team and thoracic surgery there was concern for malignant etiology stemming from thoracic mass near SVC  -1/21 VATS: pericardial window into the pleural space, small bloody effusion upon pericardial incision emanating from friable mass appreciated underneath the pericardium which was biopsied  -cytology negative x2, pending biopsy pathology     2  Atrial fibrillation/flutter  -[id number] 3, HAS-BLED 2  -rate control  -metoprolol succinate 100 mg b i d , diltiazem 180 q d   -anticoagulation not recommended due to high bleeding risk with recurrent appreciation of bleeding effusion though likely from friable mass     3  Essential hypertension  -controlled while inpatient on metoprolol and diltiazem, current /65  -home lisinopril currently held in setting of previous hypotension spells  -1/3 TTE:  LVEF 65%, no RWMA, normal RV size/function, mild LA dilatio, n mild-mod TR    4  Other active issues  -GIST tumor on liver biopsy  -negative cytology x2  -mediastinal mass s/p biopsy  -pleural effusion s/p right-sided chest tube      Plan:  1  Patient with some edema which is likely iatrogenic postoperatively  She is comfortable with no evidence of respiratory distress  She is currently urinating well  If not improving or worsening, could administer x1 dose of furosemide IV 20 mg to help maintain appropriate fluid balance as weight is slightly up by 2 kg from preoperative measurement  2  In the meantime continue rate control strategy as her heart rate has significantly improved  3  Limited transthoracic echocardiogram to be obtained postoperatively to assess for pericardial effusion with tissue Dopplers/mitral inflow to also assess for early restrictive/constrictive physiology though her physical exam currently does not suggest so  4  Cardiology to continue following along  Case discussed and reviewed with Dr Torri Roe who agrees with my assessment and plan      Thank you for involving us in the care of your patient  Epic/ Allscripts/Care Everywhere records reviewed: yes    ** Please Note: Fluency DirectDictation voice to text software may have been used in the creation of this document   **

## 2021-01-22 NOTE — ASSESSMENT & PLAN NOTE
Multifactorial  Patient with hematoma over extremity  Monitor drainage  Possibly component of anemia of chronic disease  Rule out occult GI blood loss  Serial hemoglobin hematocrit  Heme check stools  Drop in hemoglobin may be partially contributing to  Tachycardia    Currently hb 7 8 MCV 68, RDW elevated  Heme/onc recommends IV Venofer 1 or 2 doses  Iron panel suggestive of Anemia of chronic disease  Hb electrophoresis pending to r/o hemoglobinopathy

## 2021-01-22 NOTE — ASSESSMENT & PLAN NOTE
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

## 2021-01-22 NOTE — PROGRESS NOTES
Progress Note- Fab Green 65 y o  female MRN: [id number]    Unit/Bed#: [facility] 426-01 Encounter: [id number]      Assessment and Plan:    1  GIST with liver metastasis, unclear primary site  Patient usually admitted with shortness of acute pericardial effusion status post pericardial window and pericardial drain  On admission patient noted to pericardial/mediastinal mass and multiple liver metastasis  Status post IM guided biopsy on 01/13/2020 for liver metastatic sites which is suggestive of gastrointestinal stromal tumor  No primary site seen on imaging including CT scan of the chest/abdomen    -- 1/13 IR biopsy of liver lesions : GIST, unclear primary site on imaging              -- mediastinal mass nearly 6 cm, pericardial vs GI origin              -- med-onc following, pt is informed that this is noncurable cancer but is treatable with one of the tyr kinase inhibitor              -- tentative plan is to perform EGD and colonoscopy inpatient vs urgent outpatient basis next week   -- if the patient is in the hospital till Monday, can do inpatient EGD and colonoscopy, if discharged can be done on outpatient basis sometime next week given patient is currently post op of major thoracic surgery   -- if patient is scheduled for Monday, please put clear liq diet for Sunday and NPO from midnight   -- weekend team will follow   -- this was discussed with the patient and she agrees    2  Pericardial effusion, possible malignant, s/p pericardial window and drain 1/3/21  Pericardial drain in situ, draining close to 150 cc/day  Thoracic sx following  Atypical cells on cytology  Currently SOB has resolved  Cardiology team following       3  New onset A flutter on admission, currently NSR with controlled HR  A/C managed by primary team  Rx as per primary team and cardiology       4  Microcytic anemia, likely chronic  Iron panel suggestive of low iron, low TIBC, normal ferritin  Hb stable currently  No signs of GI loss  Med onc following        ______________________________________________________________________    Subjective:     Pt seen and examined at bedside  Does not report any new complaints  Reports that she is feeling much better and tolerated thoracic sx well yesterday  No acute overnight events       Medication Administration - last 24 hours from 01/21/2021 0924 to 01/22/2021 4490       Date/Time Order Dose Route Action Action by     01/22/2021 0803 docusate sodium (COLACE) capsule 100 mg 100 mg Oral Given Hattie Moore, RN     01/21/2021 2042 docusate sodium (COLACE) capsule 100 mg   Oral MAR Unhold Trupti Perez     01/21/2021 1800 docusate sodium (COLACE) capsule 100 mg   Oral Dose Auto Held Automatic Transfer Provider     01/21/2021 1534 docusate sodium (COLACE) capsule 100 mg   Oral MAR Hold Automatic Transfer Provider     01/21/2021 0929 docusate sodium (COLACE) capsule 100 mg 100 mg Oral Not Given Ermiaseal Ramon, RN     01/22/2021 0804 senna (SENOKOT) tablet 8 6 mg 8 6 mg Oral Refused Hattie Moore, RN     01/21/2021 2042 senna (SENOKOT) tablet 8 6 mg   Oral MAR Unhold Trupti Perez     01/21/2021 1534 senna (SENOKOT) tablet 8 6 mg   Oral MAR Hold Automatic Transfer Provider     01/21/2021 0930 senna (SENOKOT) tablet 8 6 mg 8 6 mg Oral Not Given Janeal Ramon, RN     01/21/2021 2042 ondansetron (ZOFRAN) injection 4 mg   Intravenous MAR Unhold Trupti Perez     01/21/2021 1534 ondansetron (ZOFRAN) injection 4 mg   Intravenous MAR Hold Automatic Transfer Provider     01/21/2021 2042 aluminum-magnesium hydroxide-simethicone (MYLANTA) oral suspension 30 mL   Oral MAR Unhold Trupti Perez     01/21/2021 1534 aluminum-magnesium hydroxide-simethicone (MYLANTA) oral suspension 30 mL   Oral MAR Hold Automatic Transfer Provider     01/21/2021 2042 acetaminophen (TYLENOL) tablet 650 mg   Oral MAR Unhold Trupti Perez     01/21/2021 1534 acetaminophen (TYLENOL) tablet 650 mg   Oral MAR Hold Automatic Transfer Provider 01/21/2021 2042 HYDROmorphone (DILAUDID) injection 0 2 mg   Intravenous MAR Unhold [facility]     01/21/2021 1534 HYDROmorphone (DILAUDID) injection 0 2 mg   Intravenous MAR Hold Automatic Transfer Provider     01/21/2021 2042 HYDROmorphone (DILAUDID) injection 0 2 mg   Intravenous MAR Unhold [facility]     01/21/2021 1534 HYDROmorphone (DILAUDID) injection 0 2 mg   Intravenous MAR Hold Automatic Transfer Provider     01/21/2021 2042 oxyCODONE (ROXICODONE) IR tablet 2 5 mg   Oral MAR Unhold [facility]     01/21/2021 1534 oxyCODONE (ROXICODONE) IR tablet 2 5 mg   Oral MAR Hold Automatic Transfer Provider     01/22/2021 0535 oxyCODONE (ROXICODONE) IR tablet 5 mg 5 mg Oral Given [facility]     01/21/2021 2127 oxyCODONE (ROXICODONE) IR tablet 5 mg 5 mg Oral Given [facility]     01/21/2021 2042 oxyCODONE (ROXICODONE) IR tablet 5 mg   Oral MAR Unhold [facility]     01/21/2021 1534 oxyCODONE (ROXICODONE) IR tablet 5 mg   Oral MAR Hold Automatic Transfer Provider     01/22/2021 0802 metoprolol succinate (TOPROL-XL) 24 hr tablet 100 mg 100 mg Oral Given Tom Severino, GARRET     01/21/2021 2140 metoprolol succinate (TOPROL-XL) 24 hr tablet 100 mg 100 mg Oral Given [facility]     01/21/2021 2042 metoprolol succinate (TOPROL-XL) 24 hr tablet 100 mg   Oral MAR Unhold [facility]     01/21/2021 1534 metoprolol succinate (TOPROL-XL) 24 hr tablet 100 mg   Oral MAR Hold Automatic Transfer Provider     01/21/2021 0931 metoprolol succinate (TOPROL-XL) 24 hr tablet 100 mg 100 mg Oral Given Tete Lowry, GARRET     01/21/2021 2042 diltiazem (CARDIZEM) tablet 30 mg   [facility]     01/21/2021 1534 diltiazem (CARDIZEM) tablet 30 mg   Oral MAR Hold Automatic Transfer Provider     01/22/2021 0802 diltiazem (CARDIZEM CD) 24 hr capsule 180 mg 180 mg Oral Given Tom Severino RN     01/21/2021 2042 diltiazem (CARDIZEM CD) 24 hr capsule 180 mg   Oral MAR Yumiko Perez     01/21/2021 1534 diltiazem (CARDIZEM CD) 24 hr capsule 180 mg   Oral MAR Hold Automatic Transfer Provider     01/21/2021 0930 diltiazem (CARDIZEM CD) 24 hr capsule 180 mg 180 mg Oral Given Maggy Solorzano RN     01/22/2021 0363 lactated ringers infusion 75 mL/hr Intravenous New Bag Robyne Spine [facility]     01/21/2021 2043 lactated ringers infusion 75 mL/hr Intravenous Continue to Inpatient Floor Laquita Jose Antonio     01/21/2021 2022 lactated ringers infusion 75 mL/hr Intravenous Continue to Inpatient [address], [facility]     01/21/2021 1334 lactated ringers infusion 75 mL/hr Intravenous [address] [facility], [facility]     01/21/2021 2042 ceFAZolin (ANCEF) IVPB (premix in dextrose) 2,000 mg 50 mL   Intravenous MAR Unhold Trupti [facility]     01/21/2021 1534 ceFAZolin (ANCEF) IVPB (premix in dextrose) 2,000 mg 50 mL   Intravenous MAR Hold Automatic Transfer Provider     01/21/2021 2042 heparin (porcine) injection 6,400 Units   Intravenous FLAKITA Wilcox MD     01/21/2021 1534 heparin (porcine) injection 6,400 Units   Intravenous MAR Hold Automatic Transfer Provider     01/21/2021 2042 heparin (porcine) injection 3,200 Units   Intravenous FLAKITA Wilcox MD     01/21/2021 1534 heparin (porcine) injection 3,200 Units   Intravenous MAR Hold Automatic Transfer Provider     01/21/2021 1717 bupivacaine liposomal (EXPAREL) 1 3 % injection 20 mL 20 mL Infiltration Given La Herbert MD     01/21/2021 1920 lactated ringers infusion 75 mL/hr Intravenous Continued from [facility]     01/21/2021 1947 fentaNYL (SUBLIMAZE) injection 25 mcg 25 mcg Intravenous Given [address] [place] [address]     01/21/2021 1941 fentaNYL (SUBLIMAZE) injection 25 mcg 25 mcg Intravenous Given [address] [place] [address]     01/21/2021 1930 fentaNYL (SUBLIMAZE) injection 25 mcg 25 mcg Intravenous Given [address] El[address] Piña RN          Objective:     Vitals: Blood pressure 107/65, pulse 84, temperature (!) 97 3 °F (36 3 °C), resp   rate 20, height 5' 4" (1 626 m), weight 85 4 kg (188 lb 4 4 oz), SpO2 95 %  ,Body mass index is 32 32 kg/m²  Intake/Output Summary (Last 24 hours) at 1/22/2021 1189  Last data filed at 1/22/2021 0800  Gross per 24 hour   Intake 2471 25 ml   Output 988 ml   Net 1483 25 ml       Physical Exam:   General Appearance: Awake and alert, in no acute distress  Abdomen: Soft, non-tender, non-distended; bowel sounds normal; no masses or no organomegaly    Invasive Devices     Peripherally Inserted Central Catheter Line            PICC Line 01/05/21 Right Brachial 16 days          Drain            Chest Tube 1 Right Pleural 28 Fr  less than 1 day                Lab Results:  No results displayed because visit has over 200 results  Imaging Studies: I have personally reviewed pertinent imaging studies

## 2021-01-22 NOTE — QUICK NOTE
- 74 yr old female; s/p VATS pericardial window with biopsy of anterior mediastinal mass  - pain score: 3/10   - stable VS on 2L NC   - R CT in situ; 60 cc; serosang, no AL  - no palpable R chest wall crepitus

## 2021-01-22 NOTE — PROGRESS NOTES
Patient with low urine output today with LR running at 75ml/hr  Patient urinated 200ml this morning and then another 50ml this evening after being prompted to try to use the bathroom  Patient bladder scanned for only 17ml  Slim made aware

## 2021-01-22 NOTE — ASSESSMENT & PLAN NOTE
· Anterior mediastinal mass seen on CT scan with diffuse mediastinal adenopathy, Innumerable vague hypodensities throughout the liver are suspicious for metastases  · Thoracic surgery did right VATS, pericardial window with biopsy of the anterior mediastinal mass   Friable mass seen with thickened pericardium   Thoracic surgery recommends to keep chest tube in place to suction  They are okay with restarting heparin drip on 01/23  They anticipate chest tube removal as early as 1/24 no bleeding on heparin drip

## 2021-01-22 NOTE — ANESTHESIA POSTPROCEDURE EVALUATION
Post-Op Assessment Note    CV Status:  Stable  Pain Score: 0    Pain management: adequate     Mental Status:  Alert and awake   Hydration Status:  Euvolemic   PONV Controlled:  Controlled   Airway Patency:  Patent      Post Op Vitals Reviewed: Yes      Staff: CRNA         No complications documented      /74 (01/21/21 1916)    Temp      Pulse 102 (01/21/21 1916)   Resp (!) 33 (01/21/21 1916)    SpO2 91 % (01/21/21 1916)

## 2021-01-22 NOTE — PROGRESS NOTES
Progress Note - Mason Barry 1950, 65 y o  female MRN: [id number]    Unit/Bed#: 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  No primary seen on CT scan  Gastroenterology on board  Oncology on board  Recommended Oral tyrosine kinase inhibitor that can be started outpatient   EGD/colonoscopy was canceled as patient was undergoing procedure  EGD and colonoscopy planned Monday    Pulmonary embolus [facility])  Assessment & Plan  Discussed with radiology today  CT scan from 01/03/2021 and 01/16/2021 showed pulmonary infarct, posterior segment right upper lobe pulmonary embolus is visible on CT scan  True emboli versus tumor emboli given tumor invading the right atrium  Will order lower extremity venous Doppler  Discussed the finding with the patient   She is amenable to anticoagulation  Heparin drip held at midnight prior to procedure  Will consider restarting heparin drip 1/23 cleared by thoracic surgery post VATS with pericardial window/biopsy    Pericardial effusion without cardiac tamponade  Assessment & Plan  Evaluated by thoracic surgery  Underwent pericardial drain placement 01/04/2021  Still had significant drainage  Drain was removed by thoracic surgery and plan for VATS, pericardial window with biopsy of anterior mediastinal mass on 1/21/21  Cardiology will reassessed pericardial effusion with Echo on 1/22  Echocardiogram showed fibrous thickening pericardial space no free-flowing fluid, increased septal and lateral tissue velocity in diastole, possibly suggesting an effusive constrictive process, though in AFib/letters less specific  Thoracic surgery recommends to keep chest tube in place to suction  They are okay with restarting heparin drip on 01/23  They anticipate chest tube removal as early as 1/24 no bleeding on heparin drip    Mediastinal mass  Assessment & Plan  · Anterior mediastinal mass seen on CT scan with diffuse mediastinal adenopathy, Innumerable vague hypodensities throughout the liver are suspicious for metastases  · Thoracic surgery did right VATS, pericardial window with biopsy of the anterior mediastinal mass   Friable mass seen with thickened pericardium   Thoracic surgery recommends to keep chest tube in place to suction  They are okay with restarting heparin drip on 01/23  They anticipate chest tube removal as early as 1/24 no bleeding on heparin drip    * Atrial flutter ([address] )  Assessment & Plan  · Patient was brought to ED by has been due to coughing, lower extremity edema and dyspnea  · EKG showed atrial flutter, QTc prolongation  Her son passed away on 12/21/2020  · Patient was evaluated by Cardiology  · Cardiac echo with large pericardial effusion without tamponade, EF 65%  · Initially was treated with Cardizem drip, Currently off Cardizem drip,   · on p o  Metoprolol and diltiazem  · Has a PICC line due to difficult IV access  · Slight AFib with RVR overnight but relatively well controlled on beta-blocker and calcium channel blocker  · Continue metoprolol 100 mg b i d  And Cardizem 180 mg daily      Anemia  Assessment & Plan  Multifactorial  Patient with hematoma over extremity  Monitor drainage  Possibly component of anemia of chronic disease  Rule out occult GI blood loss  Serial hemoglobin hematocrit  Heme check stools  Drop in hemoglobin may be partially contributing to  Tachycardia    Currently hb 7 8 MCV 68, RDW elevated  Heme/onc recommends IV Venofer 1 or 2 doses  Iron panel suggestive of Anemia of chronic disease  Hb electrophoresis pending to r/o hemoglobinopathy      Leukocytosis  Assessment & Plan  Improving  Workup negative for obvious source of infection  Will observe off of antibiotic therapy  Potential etiology would be underlying tumor fever versus occult infection rule out GI process rule out pulmonary process  Procalcitonin is negative  Continue to trend white count and temperature curve  Pain and swelling of right upper extremity  Assessment & Plan  Right upper extremity swelling noted   Likely hematoma  US obtained that shows possible hematoma vs  Proximal biceps tendon repair  General surgery consult appreciated- recommends conservative managment and holding AC  Patient does not have motor strength loss  neurovascular intact  Pain is significantly improved since last night  Patient is able to extend her shoulder, slightly limited due to pain  At this point, patient will follow up outpatient  If persistent in 3 months, may obtain MRI  Swelling has decreased significantly     Essential hypertension  Assessment & Plan  Continue metoprolol and diltiazem  Hold lisinopril for now  Continue to monitor          VTE Pharmacologic Prophylaxis:   Pharmacologic: None due to bloody pericardial effusion   Mechanical VTE Prophylaxis in Place: Yes    Discussions with Specialists or Other Care Team Provider: Cardio    Education and Discussions with Family / Patient: Attempted to reach patients but was unable to will try again tomorrow    Current Length of Stay: 19 day(s)    Current Patient Status: Inpatient     Discharge Plan / Estimated Discharge Date: None    Code Status: Level 1 - Full Code      Subjective:   Patient states she is feeling fine  She has some pain in the site of the chest tube drain  Chest tube drained 200 mL of bloody fluid      Objective:     Vitals:   Temp (24hrs), Av 4 °F (36 3 °C), Min:96 7 °F (35 9 °C), Max:97 9 °F (36 6 °C)    Temp:  [96 7 °F (35 9 °C)-97 9 °F (36 6 °C)] 97 9 °F (36 6 °C)  HR:  [] 77  Resp:  [14-20] 14  BP: ()/(51-82) 107/65  SpO2: [90 %-100 %] 97 %  Body mass index is 32 32 kg/m²  Input and Output Summary (last 24 hours): Intake/Output Summary (Last 24 hours) at 1/22/2021 1845  Last data filed at 1/22/2021 1802  Gross per 24 hour   Intake 1477 5 ml   Output 1038 ml   Net 439 5 ml       Physical Exam:     Physical Exam  HENT:      Head: Normocephalic and atraumatic  Nose: Nose normal       Mouth/Throat:      Mouth: Mucous membranes are moist    Eyes:      Extraocular Movements: Extraocular movements intact  Pupils: Pupils are equal, round, and reactive to light  Neck:      Musculoskeletal: Normal range of motion  Cardiovascular:      Rate and Rhythm: Normal rate and regular rhythm  Pulses: Normal pulses  Pulmonary:      Comments: Fairly clear to ausculation  Chest tube in place on right side  200 ml of bloody fluid in drain  Abdominal:      General: Abdomen is flat  Bowel sounds are normal       Palpations: Abdomen is soft  Musculoskeletal: Normal range of motion  Skin:     General: Skin is warm  Neurological:      General: No focal deficit present  Mental Status: She is alert and oriented to person, place, and time  Mental status is at baseline  Additional Data:     Labs:    Results from last 7 days   Lab Units 01/22/21  1410 01/22/21  0525  01/17/21  0955   WBC Thousand/uL  --  16 87*   < > 16 70*   HEMOGLOBIN g/dL 7 8* 7 3*   < > 8 1*   HEMATOCRIT % 26 9* 25 4*   < > 27 1*   PLATELETS Thousands/uL  --  381   < > 333   NEUTROS PCT %  --   --   --  81*   LYMPHS PCT %  --   --   --  3*   LYMPHO PCT %  --  1*   < >  --    MONOS PCT %  --   --   --  9   MONO PCT %  --  3*   < >  --    EOS PCT %  --  0   < > 5    < > = values in this interval not displayed       Results from last 7 days   Lab Units 01/22/21  0525  01/19/21  1748   POTASSIUM mmol/L 4 4   < > 3 6   CHLORIDE mmol/L 110*   < > 109*   CO2 mmol/L 21   < > 26   BUN mg/dL 16   < > 8   CREATININE mg/dL 0 93   < > 0 77   CALCIUM mg/dL 8 3   < > 8 5   ALK PHOS U/L  --   --  71   ALT U/L  --   --  24   AST U/L  --   --  19    < > = values in this interval not displayed  Results from last 7 days   Lab Units 01/20/21  1338   INR  1 43*       * I Have Reviewed All Lab Data Listed Above  * Additional Pertinent Lab Tests Reviewed: Janell 64 Admission Reviewed    Imaging:    Imaging Reports Reviewed Today Include: CXR  Imaging Personally Reviewed by Myself Includes:  CXR    Recent Cultures (last 7 days):           Last 24 Hours Medication List:   Current Facility-Administered Medications   Medication Dose Route Frequency Provider Last Rate    acetaminophen  650 mg Oral Q6H PRN Sea Lester MD      aluminum-magnesium hydroxide-simethicone  30 mL Oral Q6H PRN Sea Lester MD      diltiazem  180 mg Oral Daily Sea Lester MD      diltiazem  30 mg Oral Q6H PRN Sea Lester MD      docusate sodium  100 mg Oral BID Sea Lester MD      HYDROmorphone  0 2 mg Intravenous Q4H PRN Eloy Lerner, MD Julieann Frankel HYDROmorphone  0 2 mg Intravenous Once Sea Lester MD      lactated ringers  75 mL/hr Intravenous Continuous Sea Lester MD 75 mL/hr (01/22/21 2136)    lactated ringers  75 mL/hr Intravenous Continuous Gill Herbert CRNA      metoprolol succinate  100 mg Oral Q12H Lynne Alcantar MD      ondansetron  4 mg Intravenous Q6H PRN Sea Lester MD      oxyCODONE  2 5 mg Oral Q4H PRN Sea Lester MD      oxyCODONE  5 mg Oral Q4H PRN Sea Lester MD      senna  1 tablet Oral Daily Sea Lester MD          Today, Patient Was Seen By: Niesha Jurado MD    ** Please Note: This note has been constructed using a voice recognition system   **

## 2021-01-22 NOTE — OP NOTE
OPERATIVE REPORT  PATIENT NAME: Meghana Jones    :  1950  MRN: [id number]  Pt Location: [facility] OR ROOM 10    SURGERY DATE: 2021    Surgeon(s) and Role:     * Bolivar Lombard, MD - Primary     * Christine Soliman MD - Assisting    Preop Diagnosis:  Pericardial effusion [I31 3]    Post-Op Diagnosis Codes:     * Pericardial effusion [I31 3]    Procedure(s) (LRB):  THORACOSCOPY VIDEO ASSISTED SURGERY (VATS)  PERICARDIAL WINDOW, BIOPSY ANTERIOR MEDIASTINAL MASS (Right)  BRONCHOSCOPY FLEXIBLE (N/A)  WINDOW PERICARDIAL (Right)  1  Right VATS pericardial window, biopsy of anterior mediastinal mass  2  Bronchoscopy  3  Right T3 through T10 intercostal nerve block Exparel     Specimen(s):  ID Type Source Tests Collected by Time Destination   1 : pericardial fluid Body Fluid Pericardial Fluid NON-GYNECOLOGIC CYTOLOGY Bolivar Lombard, MD 2021 1709    2 : intrapericardial tissue Tissue Pericardium TISSUE EXAM Bolivar Lombard, MD 2021 1746    3 : pericardial nodule Tissue Pericardium TISSUE EXAM Bolivar Lombard, MD 2021 1754    4 : pericardial nodule #2 Tissue Pericardium TISSUE EXAM Bolivar Lombard, MD 2021 1756        Estimated Blood Loss:   200 mL    Drains:  Chest Tube 1 Right Pleural 28 Fr  (Active)   Dressing Status Clean;Dry; Intact 21 1800   Number of days: 0       Anesthesia Type:   General    Operative Indications:  Pericardial effusion [I323]  57-year-old female with history of a large pericardial effusion with concern for tamponade status post 2021 subxiphoid pericardial window with high output for greater than 2 weeks  She was also found to have a large 6 cm anterior mediastinal mass and liver mass  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    Operative Findings:  Large firm anterior mass within the pericardium    Large greater than 6cm RML mass, Multiple masses/nodules in the anterior mediastinum    Complications:   None    Procedure and Technique:  After obtaining informed consent from the patient, they were transferred to the operating room and placed supine on the operating table  Bilateral SCDs were placed and turned on prior to induction of general anesthesia  General anesthesia was then induced and a double-lumen endotracheal tube was placed without incident   Positioning of the double-lumen tube was verified with a bronchoscope   The patient was then positioned in a left lateral decubitus position with the table fully flexed   The left arm was secured to an armboard and the right arm was positioned safely in a arm burroughs   Rolled blankets were used to hold the patient in position anteriorly and posteriorly  All bony prominences were padded and checked   Positioning of double-lumen endotracheal tube was verified at this time      Next a formal time-out was called verifying patient , date of birth, procedure, consent, antibiotics, beta-blocker as indicated, anticipated specimens with handling, SCD use and other special considerations       A bronchoscopy was performed through the double-lumen endotracheal tube  Eliezer Price were no endobronchial masses or obstructions identified   All secretions were suctioned out and the double lumen tube positioning was verified again  There was no suspicion or identified risk for TB or other air board infectious disease  This bronchoscopy was being performed for diagnostic purposes only       The right chest was then prepped and draped in the standard sterile fashion  Anesthesia clamped and placed suction to the right lobe at this time to help desufflate   Bony landmarks were identified and planned incisions were mapped out   A 20 mL 1/2% bupivicaine, 20mL saline and 20mL liposomal bupivicaine (Exparel) mixture was made  5mL of this solution was used to numb our initial incision and interspace  An 10 mm incision was made in the posterior axillary line 7th interspace for the camera port    We made this incision large enough to put our finger an and dissect the lung away from the chest wall  The camera was inserted verifying that we were in the thoracic cavity  A thorough thoracoscopy was carried out at this time  Here we found A large firm RML mass that was very central   Our anterior access incision was then placed in the 5th interspace   A 15 mm incision was performed and we entered into thoracic cavity bluntly  We then took down some slight adhesions to the chest wall      We then performed our intercostal nerve block with the Exparel mixture  5-10mL of this solution was percutaneously placed into rib spaces 2-10 under direct visualization       We then thoroughly examined the pericardium and anterior mediastinum  There is a firm mass anterior to the phrenic nerve  We tried to stay out of this mass and tried to open up the pericardium  The entire right side of the pericardium appeared to be a firm thickened pericardium  We selected an area the pericardium that appeared to have some fluid underneath  We aspirated this and there was some slightly bloody fluid  We started to carefully dissect in this area  The pericardium was very thickened  We were able to open up the pericardium in this area and there is a thick friable mass underneath  We drained some bloody pericardial fluid and sent this for cytology  We also sent portions of the friable mass for permanent pathology  We then widely opened up this portion of the pericardium approximately the size of 2 cm x 2 cm  There was bleeding from the friable mass  This was controlled with direct pressure  There was continued oozing in this area that we controlled with 2 applications of FloSeal and packing  We then thoroughly examined the rest of the chest   The abnormal right middle lobe mass could not be easily resected  This would require at least 2 large segmentectomy or right middle lobectomy    Based on the patient's other medical issues we felt this was not indicated at this time  There were some abnormal nodules on the pericardium itself  We took multiple biopsies from these areas and sent this for permanent pathology  The pericardial window in friable area that was packed was then re-examined  The packing was removed and there was no further bleeding  We then suctioned out the pleural space at this time      A 28 fr straight chest tube was placed through the posterior camera port and directed towards the apex  The lung was then reinsufflated under direct visualization  The soft tissue around the chest tube was closed with 2-0 vicryl  U sutures were placed around the chest tubes with an 0 prolene and the chest tubes were secured with another 0 prolone   The skin and soft tissue of the anterior and access incision was closed in layers with an 0 Vicryl deep, 3 0 Vicryl for soft tissue and 4 Monocryl for skin periods surgical glue was placed this incision       Dry sterile dressings were applied and the drapes were broken down  All instrument and needle counts were correct at this time   The patient was extubated and transported to the PACU in stable condition       I was scrubbed and was present for the entire procedure          I was present for the entire procedure    Patient Disposition:  PACU     SIGNATURE: Camilla Simmons MD  DATE: January 21, 2021  TIME: 7:03 PM

## 2021-01-22 NOTE — ASSESSMENT & PLAN NOTE
Evaluated by thoracic surgery  Underwent pericardial drain placement 01/04/2021  Still had significant drainage  Drain was removed by thoracic surgery and plan for VATS, pericardial window with biopsy of anterior mediastinal mass on 1/21/21  Cardiology will reassessed pericardial effusion with Echo on 1/22  Echocardiogram showed fibrous thickening pericardial space no free-flowing fluid, increased septal and lateral tissue velocity in diastole, possibly suggesting an effusive constrictive process, though in AFib/letters less specific  Thoracic surgery recommends to keep chest tube in place to suction  They are okay with restarting heparin drip on 01/23  They anticipate chest tube removal as early as 1/24 no bleeding on heparin drip

## 2021-01-22 NOTE — ASSESSMENT & PLAN NOTE
Discussed with radiology today  CT scan from 01/03/2021 and 01/16/2021 showed pulmonary infarct, posterior segment right upper lobe pulmonary embolus is visible on CT scan  True emboli versus tumor emboli given tumor invading the right atrium  Will order lower extremity venous Doppler  Discussed the finding with the patient   She is amenable to anticoagulation  Heparin drip held at midnight prior to procedure  Will consider restarting heparin drip 1/23 cleared by thoracic surgery post VATS with pericardial window/biopsy

## 2021-01-22 NOTE — PROGRESS NOTES
Progress Note -    Abhay Carnes 65 y o  female MRN: [id number]  Unit/Bed#: [place] 426-01 Encounter: [id number]    Assessment:  63yo  F with 6 cm anterior mediastinal mass and large pericardial effusion s/p subxiphoid pericardial window 1/4/21, with persistently high output from pericardial drain, s/p drain removal 1/19/21  Now s/p VATS pericardial window with biopsy of anterior mediastinal mass  Stable VS on 2L  R CT: 170 cc; serosanguinous/AL-  UO: 650 cc  CXR (PACU): no pneumothorax  PLAN:  - keep CT to suction   - PT/OT/IS  - follow path  Subjective:   - c/o: mild to moderate R chest wall pain (around tube insertion site)  Objective:     Vitals: Temp:  [96 7 °F (35 9 °C)-98 8 °F (37 1 °C)] 97 4 °F (36 3 °C)  HR:  [] 86  Resp:  [16-20] 18  BP: ()/(51-82) 91/51  Body mass index is 31 37 kg/m²  I/O       01/20 0701 - 01/21 0700 01/21 0701 - 01/22 0700    P  O  0 0    I V  (mL/kg) 117 4 (1 4) 2291 3 (27 6)    Total Intake(mL/kg) 117 4 (1 4) 2291 3 (27 6)    Urine (mL/kg/hr) 350 (0 2) 650 (0 3)    Other  200    Stool 0     Blood  200    Chest Tube  58    Total Output 350 1108    Net -232 6 +1183 3          Unmeasured Stool Occurrence 3 x           Physical Exam:  GEN: NAD  HEENT: atraumatic  CV: RRR  Lung: slightly increased effort; R CT in situ  Dressings dry  Ab: Soft, NT/ND  Extrem: No CCE  Neuro: A+Ox3    Lab, Imaging and other studies: I have personally reviewed pertinent reports      VTE Pharmacologic Prophylaxis: Sequential compression device (Venodyne)   VTE Mechanical Prophylaxis: sequential compression device

## 2021-01-23 ENCOUNTER — APPOINTMENT (INPATIENT)
Dept: RADIOLOGY | Facility: HOSPITAL | Age: 71
DRG: 270 | End: 2021-01-23
Payer: COMMERCIAL

## 2021-01-23 PROBLEM — N17.9 ACUTE KIDNEY INJURY (HCC): Status: ACTIVE | Noted: 2021-01-23

## 2021-01-23 LAB
ANION GAP SERPL CALCULATED.3IONS-SCNC: 7 MMOL/L (ref 4–13)
BACTERIA UR QL AUTO: ABNORMAL /HPF
BILIRUB UR QL STRIP: ABNORMAL
BUN SERPL-MCNC: 28 MG/DL (ref 5–25)
CALCIUM SERPL-MCNC: 8.1 MG/DL (ref 8.3–10.1)
CHLORIDE SERPL-SCNC: 103 MMOL/L (ref 100–108)
CLARITY UR: ABNORMAL
CO2 SERPL-SCNC: 23 MMOL/L (ref 21–32)
COLOR UR: ABNORMAL
CREAT SERPL-MCNC: 1.52 MG/DL (ref 0.6–1.3)
ERYTHROCYTE [DISTWIDTH] IN BLOOD BY AUTOMATED COUNT: 20.4 % (ref 11.6–15.1)
GFR SERPL CREATININE-BSD FRML MDRD: 39 ML/MIN/1.73SQ M
GLUCOSE SERPL-MCNC: 139 MG/DL (ref 65–140)
GLUCOSE UR STRIP-MCNC: NEGATIVE MG/DL
HCT VFR BLD AUTO: 25.3 % (ref 34.8–46.1)
HGB BLD-MCNC: 7.4 G/DL (ref 11.5–15.4)
HGB UR QL STRIP.AUTO: NEGATIVE
HYALINE CASTS #/AREA URNS LPF: ABNORMAL /LPF
KETONES UR STRIP-MCNC: ABNORMAL MG/DL
LEUKOCYTE ESTERASE UR QL STRIP: NEGATIVE
MCH RBC QN AUTO: 20.3 PG (ref 26.8–34.3)
MCHC RBC AUTO-ENTMCNC: 29.2 G/DL (ref 31.4–37.4)
MCV RBC AUTO: 69 FL (ref 82–98)
NITRITE UR QL STRIP: NEGATIVE
NON-SQ EPI CELLS URNS QL MICRO: ABNORMAL /HPF
NRBC BLD AUTO-RTO: 1 /100 WBCS
PH UR STRIP.AUTO: 5.5 [PH]
PLATELET # BLD AUTO: 408 THOUSANDS/UL (ref 149–390)
PMV BLD AUTO: 11.2 FL (ref 8.9–12.7)
POTASSIUM SERPL-SCNC: 5.1 MMOL/L (ref 3.5–5.3)
PROT UR STRIP-MCNC: NEGATIVE MG/DL
RBC # BLD AUTO: 3.65 MILLION/UL (ref 3.81–5.12)
RBC #/AREA URNS AUTO: ABNORMAL /HPF
SODIUM 24H UR-SCNC: <5 MOL/L
SODIUM SERPL-SCNC: 133 MMOL/L (ref 136–145)
SP GR UR STRIP.AUTO: 1.02 (ref 1–1.03)
UROBILINOGEN UR QL STRIP.AUTO: 2 E.U./DL
WBC # BLD AUTO: 25.65 THOUSAND/UL (ref 4.31–10.16)
WBC #/AREA URNS AUTO: ABNORMAL /HPF

## 2021-01-23 PROCEDURE — 85027 COMPLETE CBC AUTOMATED: CPT | Performed by: THORACIC SURGERY (CARDIOTHORACIC VASCULAR SURGERY)

## 2021-01-23 PROCEDURE — 80048 BASIC METABOLIC PNL TOTAL CA: CPT | Performed by: THORACIC SURGERY (CARDIOTHORACIC VASCULAR SURGERY)

## 2021-01-23 PROCEDURE — 84300 ASSAY OF URINE SODIUM: CPT | Performed by: INTERNAL MEDICINE

## 2021-01-23 PROCEDURE — 30233N1 TRANSFUSION OF NONAUTOLOGOUS RED BLOOD CELLS INTO PERIPHERAL VEIN, PERCUTANEOUS APPROACH: ICD-10-PCS | Performed by: INTERNAL MEDICINE

## 2021-01-23 PROCEDURE — 99232 SBSQ HOSP IP/OBS MODERATE 35: CPT | Performed by: INTERNAL MEDICINE

## 2021-01-23 PROCEDURE — 81001 URINALYSIS AUTO W/SCOPE: CPT | Performed by: INTERNAL MEDICINE

## 2021-01-23 PROCEDURE — 99024 POSTOP FOLLOW-UP VISIT: CPT | Performed by: THORACIC SURGERY (CARDIOTHORACIC VASCULAR SURGERY)

## 2021-01-23 PROCEDURE — P9016 RBC LEUKOCYTES REDUCED: HCPCS

## 2021-01-23 PROCEDURE — 76770 US EXAM ABDO BACK WALL COMP: CPT

## 2021-01-23 RX ORDER — FUROSEMIDE 10 MG/ML
20 INJECTION INTRAMUSCULAR; INTRAVENOUS ONCE
Status: COMPLETED | OUTPATIENT
Start: 2021-01-23 | End: 2021-01-23

## 2021-01-23 RX ORDER — ALBUMIN, HUMAN INJ 5% 5 %
12.5 SOLUTION INTRAVENOUS ONCE
Status: COMPLETED | OUTPATIENT
Start: 2021-01-23 | End: 2021-01-23

## 2021-01-23 RX ORDER — MIDODRINE HYDROCHLORIDE 5 MG/1
5 TABLET ORAL
Status: DISCONTINUED | OUTPATIENT
Start: 2021-01-23 | End: 2021-01-25

## 2021-01-23 RX ORDER — SODIUM CHLORIDE, SODIUM GLUCONATE, SODIUM ACETATE, POTASSIUM CHLORIDE, AND MAGNESIUM CHLORIDE 526; 502; 368; 37; 30 MG/100ML; MG/100ML; MG/100ML; MG/100ML; MG/100ML
50 INJECTION, SOLUTION INTRAVENOUS CONTINUOUS
Status: DISCONTINUED | OUTPATIENT
Start: 2021-01-23 | End: 2021-01-24

## 2021-01-23 RX ADMIN — MIDODRINE HYDROCHLORIDE 5 MG: 5 TABLET ORAL at 17:04

## 2021-01-23 RX ADMIN — DOCUSATE SODIUM 100 MG: 100 CAPSULE, LIQUID FILLED ORAL at 09:03

## 2021-01-23 RX ADMIN — METOPROLOL SUCCINATE 100 MG: 100 TABLET, EXTENDED RELEASE ORAL at 09:22

## 2021-01-23 RX ADMIN — DOCUSATE SODIUM 100 MG: 100 CAPSULE, LIQUID FILLED ORAL at 17:04

## 2021-01-23 RX ADMIN — ALBUMIN (HUMAN) 12.5 G: 12.5 INJECTION, SOLUTION INTRAVENOUS at 03:49

## 2021-01-23 RX ADMIN — OXYCODONE HYDROCHLORIDE 5 MG: 5 TABLET ORAL at 01:09

## 2021-01-23 RX ADMIN — SODIUM CHLORIDE, SODIUM GLUCONATE, SODIUM ACETATE, POTASSIUM CHLORIDE, AND MAGNESIUM CHLORIDE 50 ML/HR: 526; 502; 368; 37; 30 INJECTION, SOLUTION INTRAVENOUS at 11:54

## 2021-01-23 RX ADMIN — MIDODRINE HYDROCHLORIDE 5 MG: 5 TABLET ORAL at 11:54

## 2021-01-23 RX ADMIN — METOPROLOL SUCCINATE 100 MG: 100 TABLET, EXTENDED RELEASE ORAL at 22:11

## 2021-01-23 RX ADMIN — DILTIAZEM HYDROCHLORIDE 180 MG: 180 CAPSULE, COATED, EXTENDED RELEASE ORAL at 09:22

## 2021-01-23 RX ADMIN — FUROSEMIDE 20 MG: 10 INJECTION, SOLUTION INTRAMUSCULAR; INTRAVENOUS at 18:05

## 2021-01-23 RX ADMIN — STANDARDIZED SENNA CONCENTRATE 8.6 MG: 8.6 TABLET ORAL at 09:04

## 2021-01-23 NOTE — ASSESSMENT & PLAN NOTE
Multifactorial, post op, bloody pericardial effusion, chronic disease  Hemoglobin has been in the range of 8 for the past few days, yesterday hemoglobin drop to 7 3, 7 4, today 7 4  Will transfuse 1 unit of blood

## 2021-01-23 NOTE — ASSESSMENT & PLAN NOTE
· CT: Anterior mediastinal mass, extending into the right atrial lumen  · S/p right VATS, pericardial window with biopsy of the anterior mediastinal mass by thoracic surgery 1/21   · Friable mass seen under the pericardium with thickened pericardium   · Thoracic surgery recommends to keep chest tube in place to suction

## 2021-01-23 NOTE — ASSESSMENT & PLAN NOTE
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

## 2021-01-23 NOTE — ASSESSMENT & PLAN NOTE
Discussed with radiology 1/19  CT scan from 01/03/2021 and 01/16/2021 showed pulmonary infarct, posterior segment right upper lobe pulmonary embolus is visible on CT scan  True emboli versus tumor emboli given tumor invading the right atrium  Lower extremity venous Doppler negative for DVT  Discussed the risks versus benefits of anticoagulation with patient and her   They agreed to start anticoagulation and see how she does    Will give a unit of blood today, and start heparin drip tomorrow

## 2021-01-23 NOTE — PROGRESS NOTES
General Cardiology Progress Note   Rosemarie Ya 65 y o  female MRN: [id number]  Unit/Bed#: [facility] 426-01 Encounter: [id number]      Assessment:  Principal Problem:    Atrial flutter ([address] )  Active Problems:    Essential hypertension    Pericardial effusion without cardiac tamponade    Mediastinal mass    Pain and swelling of right upper extremity    Hypodense mass of liver    Leukocytosis    Anemia    Pulmonary embolus (HCC)      Impression:     History of breast cancer remotely   Mediastinal mass/tumor  o Status post biopsy, awaiting pathology   Large hemorrhagic pericardial effusion status post initial drainage, and subsequent additional pericardial window 1/21/2021 at the time of VATS  o Chest tube in place to suction   GIST metastatic, based upon liver biopsy   Atrial fibrillation/flutter-rate control strategy, heart rates acceptable, anticoagulation avoided due to high risk situation with hemorrhagic pericardial effusion  Patient understands the risks of being off anticoagulation, but no other option at this time   Hypertension-has been well controlled, but marginally hypotensive today   Preserved left ventricular function   Anemia    Plan:     Long discussion with the internal medicine attending, advised heparin per thoracic surgery due to the PE, but this is subacute, and she has not been on anticoagulation for more than a week, and considering multiple other comorbidities, hypotension today, anemia with hemoglobin below 8, all at significant risk to initiation of anticoagulation   Consider packed red blood cell x1, IV fluids, reassessment tomorrow for stability before initiating heparin without bolus    Subjective:   Patient seen and examined  She offers me no complaints today    Review of Systems   Constitution: Negative for diaphoresis, fever, malaise/fatigue and night sweats     Cardiovascular: Negative for chest pain, dyspnea on exertion, leg swelling, orthopnea, palpitations and syncope  Respiratory: Negative for cough, shortness of breath, sputum production and wheezing  Skin: Negative for itching and rash  Gastrointestinal: Negative for abdominal pain, change in bowel habit and diarrhea  Neurological: Negative for dizziness, focal weakness, light-headedness and weakness  Objective   Vitals: Blood pressure 119/52, pulse 74, temperature (!) 97 4 °F (36 3 °C), resp  rate 17, height 5' 4" (1 626 m), weight 87 5 kg (192 lb 14 4 oz), SpO2 94 %  , Body mass index is 33 11 kg/m² , I/O last 3 completed shifts: In: 1477 5 [P O :180; I V :1297 5]  Out: 903 [Urine:425; Chest Tube:478]  No intake/output data recorded  Wt Readings from Last 3 Encounters:   01/23/21 87 5 kg (192 lb 14 4 oz)   01/03/21 89 8 kg (198 lb)       Intake/Output Summary (Last 24 hours) at 1/23/2021 0915  Last data filed at 1/23/2021 0553  Gross per 24 hour   Intake    Output 665 ml   Net -665 ml     I/O last 3 completed shifts: In: 1477 5 [P O :180; I V :1297 5]  Out: 903 [Urine:425; Chest Tube:478]    AFib with rate control on telemetry    Physical Exam  Constitutional:       General: She is not in acute distress  Appearance: She is not diaphoretic  HENT:      Head: Normocephalic  Eyes:      Conjunctiva/sclera: Conjunctivae normal    Neck:      Musculoskeletal: Normal range of motion and neck supple  Vascular: No JVD  Cardiovascular:      Rate and Rhythm: Normal rate  Rhythm irregular  Heart sounds: Normal heart sounds  No murmur  No gallop  Pulmonary:      Effort: Pulmonary effort is normal  No respiratory distress  Breath sounds: Normal breath sounds  No wheezing or rales  Abdominal:      General: Bowel sounds are normal  There is no distension  Palpations: Abdomen is soft  Tenderness: There is no abdominal tenderness  Musculoskeletal: Normal range of motion  Right lower leg: No edema  Left lower leg: No edema  Skin:     General: Skin is warm and dry  Neurological:      Mental Status: She is alert and oriented to person, place, and time           Meds/Allergies   No Known Allergies    Current Facility-Administered Medications:     acetaminophen (TYLENOL) tablet 650 mg, 650 mg, Oral, Q6H PRN, Alex Persaud MD, 650 mg at 01/14/21 2020    aluminum-magnesium hydroxide-simethicone (MYLANTA) oral suspension 30 mL, 30 mL, Oral, Q6H PRN, Alex Persaud MD    diltiazem (CARDIZEM CD) 24 hr capsule 180 mg, 180 mg, Oral, Daily, Alex Persaud MD, 180 mg at 01/22/21 0802    diltiazem (CARDIZEM) tablet 30 mg, 30 mg, Oral, Q6H PRN, Alex Persaud MD    docusate sodium (COLACE) capsule 100 mg, 100 mg, Oral, BID, Alex Persaud MD, 100 mg at 01/23/21 2519    HYDROmorphone (DILAUDID) injection 0 2 mg, 0 2 mg, Intravenous, Q4H PRN, Alex Persaud MD, 0 2 mg at 01/09/21 0645    HYDROmorphone (DILAUDID) injection 0 2 mg, 0 2 mg, Intravenous, Once, Alex Persaud MD    metoprolol succinate (TOPROL-XL) 24 hr tablet 100 mg, 100 mg, Oral, Q12H [address], Alex Persaud MD, 100 mg at 01/22/21 2025    ondansetron (ZOFRAN) injection 4 mg, 4 mg, Intravenous, Q6H PRN, Alex Persaud MD    oxyCODONE (ROXICODONE) IR tablet 2 5 mg, 2 5 mg, Oral, Q4H PRN, Alex Persaud MD    oxyCODONE (ROXICODONE) IR tablet 5 mg, 5 mg, Oral, Q4H PRN, Alex Persaud MD, 5 mg at 01/23/21 0109    senna (SENOKOT) tablet 8 6 mg, 1 tablet, Oral, Daily, Alex Persaud MD, 8 6 mg at 01/23/21 6118    Laboratory Results:        CBC with diff:   Results from last 7 days   Lab Units 01/23/21  0349 01/22/21  1410 01/22/21  0525 01/21/21  0525 01/20/21  0501 01/19/21  1748 01/17/21  0955   WBC Thousand/uL 25 65*  --  16 87* 14 31* 15 24* 13 24* 16 70*   HEMOGLOBIN g/dL 7 4* 7 8* 7 3* 8 3* 8 5* 8 5* 8 1*   HEMATOCRIT % 25 3* 26 9* 25 4* 27 8* 29 3* 28 2* 27 1*   MCV fL 69*  --  70* 69* 70* 69* 68*   PLATELETS Thousands/uL 408*  --  381 365 371 354 333   MCH pg 20 3*  --  20 2* 20 6* 20 2* 20 7* 20 4*   MCHC g/dL 29 2*  --  28 7* 29 9* 29 0* 30 1* 29 9*   RDW % 20 4*  --  20 3* 20 3* 20 0* 20 1* 19 4*   MPV fL 11 2  --  11 7 12 0 11 4 11 3 11 4   NRBC AUTO /100 WBCs 1  --  0 0 1 0 0   NRBC /100 WBC  --   --   --   --   --  1  --        CMP:  Results from last 7 days   Lab Units 01/23/21 0349 01/22/21 0525 01/21/21 0525 01/20/21  0501 01/19/21  1748 01/17/21  0955 01/17/21  0642   POTASSIUM mmol/L 5 1 4 4 3 8 3 8 3 6 3 8 3 9   CHLORIDE mmol/L 103 110* 107 106 109* 109* 104   CO2 mmol/L 23 21 25 27 26 27 33*   BUN mg/dL 28* 16 12 10 8 10 25   CREATININE mg/dL 1 52* 0 93 0 86 0 90 0 77 0 74 1 05   CALCIUM mg/dL 8 1* 8 3 8 4 8 5 8 5 8 4 8 6   AST U/L  --   --   --   --  19  --   --    ALT U/L  --   --   --   --  24  --   --    ALK PHOS U/L  --   --   --   --  71  --   --    EGFR ml/min/1 73sq m 39 71 78 74 89 94 61       BMP:  Results from last 7 days   Lab Units 01/23/21 0349 01/22/21 0525 01/21/21 0525 01/20/21 0501 01/19/21 1748 01/17/21  0955 01/17/21  0642   POTASSIUM mmol/L 5 1 4 4 3 8 3 8 3 6 3 8 3 9   CHLORIDE mmol/L 103 110* 107 106 109* 109* 104   CO2 mmol/L 23 21 25 27 26 27 33*   BUN mg/dL 28* 16 12 10 8 10 25   CREATININE mg/dL 1 52* 0 93 0 86 0 90 0 77 0 74 1 05   CALCIUM mg/dL 8 1* 8 3 8 4 8 5 8 5 8 4 8 6       NT-proBNP: No results for input(s): NTBNP in the last 72 hours       Magnesium:       Coags:   Results from last 7 days   Lab Units 01/20/21  1946 01/20/21  1338   PTT seconds 67* 24   INR   --  1 43*       TSH:        Hemoglobin A1C )      Lipid Profile:   No results found for: CHOL  No results found for: HDL  No results found for: LDLCALC  No results found for: LDLDIRECT  No results found for: TRIG    Cardiac testing:   EKG personally reviewed by Melecio Zee MD      Results for orders placed during the hospital encounter of 01/03/21   Echo complete with contrast if indicated    [address]  [place], [place] [address]  [phone]    Transthoracic Echocardiogram  2D, M-mode, Doppler, and Color Doppler    Study date:  2021    Patient: Rico De La Cruz  MR number: [id number]  Account number: [de-identified]  : 1950  Age: 65 years  Gender: Female  Status: Inpatient  Location: Emergency room  Height: 64 in  Weight: 197 6 lb  BP: 117/ 67 mmHg    Indications: Atrial flutter  Diagnoses: I48 1 - Atrial flutter    Sonographer:  Lew Gary RDCS  Primary Physician:  Antoni Acosta MD  Referring Physician:  Milena Webb MD  Group:  [facility]  Interpreting Physician:  Tabitha Hamilton MD    SUMMARY    LEFT VENTRICLE:  The cavity was small  Systolic function was normal  Ejection fraction was estimated to be 65 %  There were no regional wall motion abnormalities  Wall thickness was at the upper limits of normal     RIGHT VENTRICLE:  The size was normal   Systolic function was normal     LEFT ATRIUM:  The atrium was mildly dilated  MITRAL VALVE:  There was mild to moderate regurgitation  AORTIC VALVE:  There was trace regurgitation  TRICUSPID VALVE:  There was mild regurgitation  PERICARDIUM:  A large pericardial effusion was identified circumferential to the heart  There was no evidence of hemodynamic compromise  HISTORY: PRIOR HISTORY: Hypertension  PROCEDURE: The procedure was performed in the emergency room  This was a routine study  The transthoracic approach was used  The study included complete 2D imaging, M-mode, complete spectral Doppler, and color Doppler  The heart rate was  100 bpm, at the start of the study  Echocardiographic views were limited due to poor acoustic window availability  This was a technically difficult study  LEFT VENTRICLE: The cavity was small  Systolic function was normal  Ejection fraction was estimated to be 65 %  There were no regional wall motion abnormalities   Wall thickness was at the upper limits of normal     RIGHT VENTRICLE: The size was normal  Systolic function was normal  Wall thickness was normal     LEFT ATRIUM: The atrium was mildly dilated  RIGHT ATRIUM: Size was normal     MITRAL VALVE: Valve structure was normal  There was normal leaflet separation  DOPPLER: The transmitral velocity was within the normal range  There was no evidence for stenosis  There was mild to moderate regurgitation  The regurgitant jet  was eccentric and directed posteriorly  AORTIC VALVE: The valve was trileaflet  Leaflets exhibited normal thickness and normal cuspal separation  DOPPLER: Transaortic velocity was within the normal range  There was no evidence for stenosis  There was trace regurgitation  TRICUSPID VALVE: The valve structure was normal  There was normal leaflet separation  DOPPLER: The transtricuspid velocity was within the normal range  There was no evidence for stenosis  There was mild regurgitation  PULMONIC VALVE: Leaflets exhibited normal thickness, no calcification, and normal cuspal separation  DOPPLER: The transpulmonic velocity was within the normal range  There was no regurgitation  PERICARDIUM: A large pericardial effusion was identified circumferential to the heart  There was no evidence of hemodynamic compromise  The pericardium was normal in appearance  AORTA: The root exhibited normal size  SYSTEMIC VEINS: IVC: The inferior vena cava was normal in size and course   Respirophasic changes were normal     SYSTEM MEASUREMENT TABLES    2D  %FS: 26 02 %  Ao Diam: 3 2 cm  EDV(Teich): 66 92 ml  EF(Teich): 51 72 %  ESV(Teich): 32 31 ml  IVSd: 1 01 cm  LA Area: 21 05 cm2  LA Diam: 3 39 cm  LVEDV MOD A4C: 28 28 ml  LVEF MOD A4C: 86 2 %  LVESV MOD A4C: 3 9 ml  LVIDd: 3 92 cm  LVIDs: 2 9 cm  LVLd A4C: 6 cm  LVLs A4C: 5 29 cm  LVPWd: 1 17 cm  RA Area: 12 36 cm2  RVIDd: 2 64 cm  SV MOD A4C: 24 38 ml  SV(Teich): 34 61 ml    CW  TR Vmax: 2 17 m/s  TR maxP 81 mmHg    [facility] Accredited [facility]ch[facility]    Prepared and electronically signed by    Cesilia Quiles MD  Signed 03-Jan-2021 16:28:40       No results found for this or any previous visit  No results found for this or any previous visit  No results found for this or any previous visit  No results found for this or any previous visit  No results found for this or any previous visit  Trista Rai MD    Portions of the record may have been created with voice recognition software  Occasional wrong word or "sound a like" substitutions may have occurred due to the inherent limitations of voice recognition software  Read the chart carefully and recognize, using context, where substitutions have occurred

## 2021-01-23 NOTE — ASSESSMENT & PLAN NOTE
Patient developed acute kidney injury today  Gentle hydration with Plasma-Lyte 50 cc/hour  Due to soft blood pressure will start midodrine 5 mg t i d    Patient will receive a unit of blood, will give Lasix 20 mg IV after that  Avoid nephrotoxins

## 2021-01-23 NOTE — PROGRESS NOTES
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) 107/43, pulse 74, temperature (!) 97 4 °F (36 3 °C), resp  rate 17, height 5' 4" (1 626 m), weight 87 5 kg (192 lb 14 4 oz), SpO2 94 %  ,Body mass index is 33 11 kg/m²        Intake/Output Summary (Last 24 hours) at 1/23/2021 5595  Last data filed at 1/23/2021 0553  Gross per 24 hour   Intake 180 ml   Output 665 ml   Net -485 ml       Invasive Devices     Peripherally Inserted Central Catheter Line            PICC Line 01/05/21 Right Brachial 17 days          Drain            Chest Tube 1 Right Pleural 28 Fr  1 day                Physical Exam:   NAD, alert and oriented x3  Normocephalic, atraumatic  MMM, EOMI, PERRLA  Norm resp effort on RA  R CT (-20, -AL) with serosanguinous drainage   R chest wall incisions c/d/i   RRR  Abd soft, NT/ND  No calf tenderness or peripheral edema  Motor/sensation intact in distal extremities  CN grossly intact  -rash/lesions      Lab, Imaging and other studies:  CBC:   Lab Results   Component Value Date    WBC 25 65 (H) 01/23/2021    HGB 7 4 (L) 01/23/2021    HCT 25 3 (L) 01/23/2021    MCV 69 (L) 01/23/2021     (H) 01/23/2021    MCH 20 3 (L) 01/23/2021    MCHC 29 2 (L) 01/23/2021    RDW 20 4 (H) 01/23/2021    MPV 11 2 01/23/2021    NRBC 1 01/23/2021   , CMP:   Lab Results   Component Value Date    SODIUM 133 (L) 01/23/2021    K 5 1 01/23/2021     01/23/2021    CO2 23 01/23/2021    BUN 28 (H) 01/23/2021    CREATININE 1 52 (H) 01/23/2021    CALCIUM 8 1 (L) 01/23/2021    EGFR 39 01/23/2021   , Coagulation: No results found for: PT, INR, APTT  VTE Pharmacologic Prophylaxis: Sequential compression device (Venodyne)   VTE Mechanical Prophylaxis: sequential compression device

## 2021-01-23 NOTE — ASSESSMENT & PLAN NOTE
· Patient was brought to ED  due to coughing, lower extremity edema and dyspnea  · EKG showed atrial flutter, QTc prolongation  Her son passed away on 12/21/2020  · Patient was evaluated by Cardiology  · Cardiac echo with large pericardial effusion without tamponade, EF 65%  · Initially was treated with Cardizem drip, Currently off Cardizem drip,   · on p o  Metoprolol and diltiazem  · Has a PICC line due to difficult IV access  · Continue metoprolol 100 mg b i d   And Cardizem 180 mg daily

## 2021-01-23 NOTE — PROGRESS NOTES
Progress Note - Conchetta Frame 1950, 65 y o  female MRN: [id number]    Unit/Bed#: [facility] 426-01 Encounter: [id number]    Primary Care Provider: Claudia Sotelo MD   Date and time admitted to hospital: 1/3/2021  8:26 AM        Pericardial effusion without cardiac tamponade  Assessment & Plan  Evaluated by thoracic surgery  Underwent pericardial drain placement 01/04/2021, continue to have significant drainage  Status post right VATS, pericardial window with biopsy of the anterior mediastinal mass on 01/21/2021  According to op note there was a friable mass under the pericardium  Echo from 01/22 showed trace pericardial effusion, no evidence of hemodynamic compromise      Mediastinal mass  Assessment & Plan  · CT: Anterior mediastinal mass, extending into the right atrial lumen  · S/p right VATS, pericardial window with biopsy of the anterior mediastinal mass by thoracic surgery 1/21   · Friable mass seen under the pericardium with thickened pericardium   · Thoracic surgery recommends to keep chest tube in place to suction      Metastatic GIST  Assessment & Plan  CT on admission shows: Innumerable vague hypodensities throughout the liver suspicious for metastases  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  No primary seen on CT scan  Gastroenterology on board  Oncology on board  Recommended Oral tyrosine kinase inhibitor that can be started outpatient   EGD/colonoscopy was canceled as patient was undergoing VATS/pericardial window  Possible EGD and colonoscopy next week versus outpatient    Anemia  Assessment & Plan  Multifactorial, post op, bloody pericardial effusion, chronic disease  Hemoglobin has been in the range of 8 for the past few days, yesterday hemoglobin drop to 7 3, 7 4, today 7 4  Will transfuse 1 unit of blood      Pulmonary embolus [facility])  Assessment & Plan  Discussed with radiology 1/19    CT scan from 01/03/2021 and 01/16/2021 showed pulmonary infarct, posterior segment right upper lobe pulmonary embolus is visible on CT scan  True emboli versus tumor emboli given tumor invading the right atrium  Lower extremity venous Doppler negative for DVT  Discussed the risks versus benefits of anticoagulation with patient and her   They agreed to start anticoagulation and see how she does  Will give a unit of blood today, and start heparin drip tomorrow    * Atrial flutter ([address] )  Assessment & Plan  · Patient was brought to ED  due to coughing, lower extremity edema and dyspnea  · EKG showed atrial flutter, QTc prolongation  Her son passed away on 12/21/2020  · Patient was evaluated by Cardiology  · Cardiac echo with large pericardial effusion without tamponade, EF 65%  · Initially was treated with Cardizem drip, Currently off Cardizem drip,   · on p o  Metoprolol and diltiazem  · Has a PICC line due to difficult IV access  · Continue metoprolol 100 mg b i d  And Cardizem 180 mg daily      Acute kidney injury [facility])  Assessment & Plan  Patient developed acute kidney injury today  Gentle hydration with Plasma-Lyte 50 cc/hour  Due to soft blood pressure will start midodrine 5 mg t i d  Patient will receive a unit of blood, will give Lasix 20 mg IV after that  Avoid nephrotoxins    Leukocytosis  Assessment & Plan  Suspect reactive leukocytosis  Continue to monitor      Essential hypertension  Assessment & Plan  Blood pressure borderline low  Will give midodrine 5 mg t i d  Today        VTE Pharmacologic Prophylaxis:   Pharmacologic: Hold today  Mechanical VTE Prophylaxis in Place: Yes    Patient Centered Rounds: I have performed bedside rounds with nursing staff today  Discussions with Specialists or Other Care Team Provider:  Cardiology, thoracic surgery    Education and Discussions with Family / Patient:  Patient,  and her older sister    Time Spent for Care: 30 minutes    More than 50% of total time spent on counseling and coordination of care as described above  Current Length of Stay: 20 day(s)    Current Patient Status: Inpatient   Certification Statement: The patient will continue to require additional inpatient hospital stay due to Above    Discharge Plan: To be determined    Code Status: Level 1 - Full Code      Subjective:   Patient was seen and evaluated bedside, she is feeling well, she ambulated today the hallway  She denies chest pain palpitation or shortness of breath  Objective:     Vitals:   Temp (24hrs), Av 5 °F (36 4 °C), Min:97 3 °F (36 3 °C), Max:97 9 °F (36 6 °C)    Temp:  [97 3 °F (36 3 °C)-97 9 °F (36 6 °C)] 97 5 °F (36 4 °C)  HR:  [41-79] 41  Resp:  [14-18] 18  BP: (104-119)/(43-67) 114/53  SpO2:  [94 %-98 %] 96 %  Body mass index is 33 11 kg/m²  Input and Output Summary (last 24 hours): Intake/Output Summary (Last 24 hours) at 2021 1418  Last data filed at 2021 0553  Gross per 24 hour   Intake    Output 315 ml   Net -315 ml       Physical Exam:     Physical Exam  Constitutional:       General: She is not in acute distress  HENT:      Head: Atraumatic  Neck:      Musculoskeletal: Neck supple  Cardiovascular:      Rate and Rhythm: Normal rate and regular rhythm  Heart sounds: No murmur  No friction rub  No gallop  Pulmonary:      Effort: Pulmonary effort is normal  No respiratory distress  Breath sounds: Normal breath sounds  No wheezing  Comments: Chest tube  Abdominal:      General: Bowel sounds are normal  There is no distension  Palpations: Abdomen is soft  Musculoskeletal:         General: Swelling present  Skin:     General: Skin is warm and dry  Neurological:      General: No focal deficit present  Mental Status: She is alert     Psychiatric:         Mood and Affect: Mood normal          Additional Data:     Labs:    Results from last 7 days   Lab Units 21  0349  21  0525  21  1748  21  0955   WBC Thousand/uL 25 65*  --  16 87*   < > 13 24*  --  16 70*   HEMOGLOBIN g/dL 7 4*   < > 7 3*   < > 8 5*  --  8 1*   HEMATOCRIT % 25 3*   < > 25 4*   < > 28 2*  --  27 1*   PLATELETS Thousands/uL 408*  --  381   < > 354  --  333   BANDS PCT %  --   --   --   --  1  --   --    NEUTROS PCT %  --   --   --   --   --   --  81*   LYMPHS PCT %  --   --   --   --   --   --  3*   LYMPHO PCT %  --   --  1*  --  6*   < >  --    MONOS PCT %  --   --   --   --   --   --  9   MONO PCT %  --   --  3*  --  3*   < >  --    EOS PCT %  --   --  0  --  8*   < > 5    < > = values in this interval not displayed  Results from last 7 days   Lab Units 01/23/21  0349  01/19/21  1748   SODIUM mmol/L 133*   < > 140   POTASSIUM mmol/L 5 1   < > 3 6   CHLORIDE mmol/L 103   < > 109*   CO2 mmol/L 23   < > 26   BUN mg/dL 28*   < > 8   CREATININE mg/dL 1 52*   < > 0 77   ANION GAP mmol/L 7   < > 5   CALCIUM mg/dL 8 1*   < > 8 5   ALBUMIN g/dL  --   --  2 4*   TOTAL BILIRUBIN mg/dL  --   --  0 99   ALK PHOS U/L  --   --  71   ALT U/L  --   --  24   AST U/L  --   --  19   GLUCOSE RANDOM mg/dL 139   < > 145*    < > = values in this interval not displayed  Results from last 7 days   Lab Units 01/20/21  1338   INR  1 43*             Results from last 7 days   Lab Units 01/17/21  0754   PROCALCITONIN ng/ml 0 06           * I Have Reviewed All Lab Data Listed Above  * Additional Pertinent Lab Tests Reviewed:  Janell Mike Admission Reviewed    Imaging:    Imaging Reports Reviewed Today Include: all  Imaging Personally Reviewed by Myself Includes:      Recent Cultures (last 7 days):           Last 24 Hours Medication List:   Current Facility-Administered Medications   Medication Dose Route Frequency Provider Last Rate    acetaminophen  650 mg Oral Q6H PRN Mason Cotto MD      aluminum-magnesium hydroxide-simethicone  30 mL Oral Q6H PRN Mason Cotto MD      diltiazem  180 mg Oral Daily Mason Cotto MD      diltiazem  30 mg Oral Q6H PRN Deidra Patel MD      docusate sodium  100 mg Oral BID Deidra Patel MD      electrolyte-148  50 mL/hr Intravenous Continuous Vic Damon MD 50 mL/hr (01/23/21 1154)    furosemide  20 mg Intravenous Once Vic Damon MD      HYDROmorphone  0 2 mg Intravenous Q4H PRN MD Nkechi Sullivan HYDROmorphone  0 2 mg Intravenous Once Deidra Patel MD      metoprolol succinate  100 mg Oral Q12H Demetrius Swenson MD      midodrine  5 mg Oral TID [facility] Vic Damno MD      ondansetron  4 mg Intravenous Q6H PRN MD Nkechi Sullivan oxyCODONE  2 5 mg Oral Q4H PRN MD Nkechi Sullivan oxyCODONE  5 mg Oral Q4H PRN Deidra Patel MD      senna  1 tablet Oral Daily Deidra Patel MD          Today, Patient Was Seen By: Vic Damon MD    ** Please Note: Dictation voice to text software may have been used in the creation of this document   **

## 2021-01-23 NOTE — ASSESSMENT & PLAN NOTE
Evaluated by thoracic surgery  Underwent pericardial drain placement 01/04/2021, continue to have significant drainage  Status post right VATS, pericardial window with biopsy of the anterior mediastinal mass on 01/21/2021  According to op note there was a friable mass under the pericardium  Echo from 01/22 showed trace pericardial effusion, no evidence of hemodynamic compromise

## 2021-01-23 NOTE — UTILIZATION REVIEW
Continued Stay Review    Date:   1/23                       Current Patient Class: IP   Level of Care:   1/03 - 1/07  Level 2 stepdown  1/07 - current   medsurg     HPI:66 y o  female initially  Transferred to [facility] on  1/03   In Atrial fib/flutter  W/RVR,  Treated w/Cardizem gtt, transitioned to po BB at increasing dosages to maintain control of VR  Radiology tests revealed pericardial and pleural effusions w/6 cm anterior mediastinal mass around the SVC  She underwent subxiphoid pericardial window on 01/04/2021 with continued high output from her drain  Cytology from the pericardial fluid showed atypical cellular changes  Another CT scan of the chest with contrast on 01/11/2021 and showed metastatic mediastinal mass measuring 5 5 cm with innumerable hepatic metastasis  IR guided biopsy from 1 of the liver lesions was done on 01/13/2021 the pathology is still pending      1/14  Atrial fibrillation/flutter:  Paroxysmal episodes in the hospital,  heart rates are now controlled on metoprolol 100 b i d  On diltiazem 30 q  6h (rates are better), will change to diltiazem CD 1 20 mg daily from tomorrow    1/17  Ongoing   RVR -  Will give 2 doses of short-acting Cardizem -30 mg today and change to 180 mg daily from tomorrow  Developing mild anasarca, weight overall has come down during her stay     1/18   Metastatic gastrointestinal stromal tumor-gist-case was discussed with GI   may need scope to eval for source of metastatic CA after VATS procedure     1/21   1  Right VATS pericardial window, biopsy of anterior mediastinal mass  2  Bronchoscopy  3  Right T3 through T10 intercostal nerve block Exparel  4  BIOPSY of mediastinal mass    1/23   Keep chest tube to suction today, restart heparin gtt today,  Trend serial HGB  Tolerating room air      Possible ct removal on 1/24           Pertinent Labs/Diagnostic Results:       Results from last 7 days   Lab Units 01/23/21  0349 01/22/21  1410 01/22/21  0525 01/21/21  0525 01/20/21  0501 01/19/21  1748 01/17/21  0955   WBC Thousand/uL 25 65*  --  16 87* 14 31* 15 24* 13 24* 16 70*   HEMOGLOBIN g/dL 7 4* 7 8* 7 3* 8 3* 8 5* 8 5* 8 1*   HEMATOCRIT % 25 3* 26 9* 25 4* 27 8* 29 3* 28 2* 27 1*   PLATELETS Thousands/uL 408*  --  381 365 371 354 333   NEUTROS ABS Thousands/µL  --   --   --   --   --   --  13 48*   BANDS PCT %  --   --   --   --   --  1  --          Results from last 7 days   Lab Units 01/23/21  0349 01/22/21  0525 01/21/21  0525 01/20/21  0501 01/19/21  1748   SODIUM mmol/L 133* 139 139 137 140   POTASSIUM mmol/L 5 1 4 4 3 8 3 8 3 6   CHLORIDE mmol/L 103 110* 107 106 109*   CO2 mmol/L 23 21 25 27 26   ANION GAP mmol/L 7 8 7 4 5   BUN mg/dL 28* 16 12 10 8   CREATININE mg/dL 1 52* 0 93 0 86 0 90 0 77   EGFR ml/min/1 73sq m 39 71 78 74 89   CALCIUM mg/dL 8 1* 8 3 8 4 8 5 8 5     Results from last 7 days   Lab Units 01/19/21  1748   AST U/L 19   ALT U/L 24   ALK PHOS U/L 71   TOTAL PROTEIN g/dL 5 9*   ALBUMIN g/dL 2 4*   TOTAL BILIRUBIN mg/dL 0 99   BILIRUBIN DIRECT mg/dL 0 47*         Results from last 7 days   Lab Units 01/23/21  0349 01/22/21  0525 01/21/21  0525 01/20/21  0501 01/19/21  1748 01/17/21  0955 01/17/21  0642   GLUCOSE RANDOM mg/dL 139 207* 112 100 145* 129 101     Results from last 7 days   Lab Units 01/20/21  1946 01/20/21  1338   PROTIME seconds  --  17 4*   INR   --  1 43*   PTT seconds 67* 24         Results from last 7 days   Lab Units 01/17/21  0754   PROCALCITONIN ng/ml 0 06     Results from last 7 days   Lab Units 01/23/21  0553   CLARITY UA  Cloudy   COLOR UA  Dk Yellow   SPEC GRAV UA  1 020   PH UA  5 5   GLUCOSE UA mg/dl Negative   KETONES UA mg/dl Trace*   BLOOD UA  Negative   PROTEIN UA mg/dl Negative   NITRITE UA  Negative   BILIRUBIN UA  Interference- unable to analyze*   UROBILINOGEN UA E U /dl 2 0*   LEUKOCYTES UA  Negative   WBC UA /hpf None Seen   RBC UA /hpf None Seen   BACTERIA UA /hpf Occasional   EPITHELIAL CELLS WET PREP /hpf Innumerable*   SODIUM UR  <5     Vital Signs:   01/23/21 15:31:44  98 2 °F (36 8 °C)  68  16  122/69  87    96 %           01/23/21 1530  98 2 °F (36 8 °C)  67    122/69      94 %                 01/21 0701   01/22 0700 01/22 0701   01/23 0700 01/23 0701   01/24 0700   P  O  0 180 240   I V  (mL/kg) 2291 3 (26 8)     Total Intake(mL/kg) 2291 3 (26 8) 180 (2 1) 240 (2 7)   Urine (mL/kg/hr) 650 (0 3) 425 (0 2) 100 (0 1)   Other 200     Stool      Blood 200     Chest Tube 238 240 100   Total Output 1288 665 200   Net +1003 3 -485 +40       Medications:   Scheduled Medications:  diltiazem, 180 mg, Oral, Daily  docusate sodium, 100 mg, Oral, BID  furosemide, 20 mg, Intravenous, Once  HYDROmorphone, 0 2 mg, Intravenous, Once  metoprolol succinate, 100 mg, Oral, Q12H STEPHANIE  midodrine, 5 mg, Oral, TID AC  senna, 1 tablet, Oral, Daily      Continuous IV Infusions:  electrolyte-148, 50 mL/hr, Intravenous, Continuous      PRN Meds:  acetaminophen, 650 mg, Oral, Q6H PRN  aluminum-magnesium hydroxide-simethicone, 30 mL, Oral, Q6H PRN  diltiazem, 30 mg, Oral, Q6H PRN  HYDROmorphone, 0 2 mg, Intravenous, Q4H PRN  ondansetron, 4 mg, Intravenous, Q6H PRN  oxyCODONE, 2 5 mg, Oral, Q4H PRN  oxyCODONE, 5 mg, Oral, Q4H PRN        Discharge Plan: [facility]     Network Utilization Review Department  ATTENTION: Please call with any questions or concerns to [phone] and carefully listen to the prompts so that you are directed to the right person  All voicemails are confidential   [address] all requests for admission clinical reviews, approved or denied determinations and any other requests to dedicated fax number below belonging to the campus where the patient is receiving treatment   List of dedicated fax numbers for the Facilities:  [address] DENIALS (Administrative/Medical Necessity) [phone]   [address]  (Maternity/NICU/Pediatrics) [phone] [address] [facility] [address] Huang Ramos [address] ([address]) [address] [place] [address] [address]sh[address] [place]) [facility] [phone]

## 2021-01-24 ENCOUNTER — APPOINTMENT (INPATIENT)
Dept: RADIOLOGY | Facility: HOSPITAL | Age: 71
DRG: 270 | End: 2021-01-24
Payer: COMMERCIAL

## 2021-01-24 LAB
ABO GROUP BLD BPU: NORMAL
ANION GAP SERPL CALCULATED.3IONS-SCNC: 10 MMOL/L (ref 4–13)
APTT PPP: 25 SECONDS (ref 23–37)
APTT PPP: 96 SECONDS (ref 23–37)
BPU ID: NORMAL
BUN SERPL-MCNC: 33 MG/DL (ref 5–25)
CALCIUM SERPL-MCNC: 7.8 MG/DL (ref 8.3–10.1)
CHLORIDE SERPL-SCNC: 102 MMOL/L (ref 100–108)
CO2 SERPL-SCNC: 22 MMOL/L (ref 21–32)
CREAT SERPL-MCNC: 1.52 MG/DL (ref 0.6–1.3)
CROSSMATCH: NORMAL
ERYTHROCYTE [DISTWIDTH] IN BLOOD BY AUTOMATED COUNT: 21.3 % (ref 11.6–15.1)
GFR SERPL CREATININE-BSD FRML MDRD: 39 ML/MIN/1.73SQ M
GLUCOSE SERPL-MCNC: 111 MG/DL (ref 65–140)
HCT VFR BLD AUTO: 26.7 % (ref 34.8–46.1)
HGB BLD-MCNC: 8.3 G/DL (ref 11.5–15.4)
INR PPP: 1.62 (ref 0.84–1.19)
MCH RBC QN AUTO: 22 PG (ref 26.8–34.3)
MCHC RBC AUTO-ENTMCNC: 31.1 G/DL (ref 31.4–37.4)
MCV RBC AUTO: 71 FL (ref 82–98)
NRBC BLD AUTO-RTO: 4 /100 WBCS
PLATELET # BLD AUTO: 393 THOUSANDS/UL (ref 149–390)
PMV BLD AUTO: 11.6 FL (ref 8.9–12.7)
POTASSIUM SERPL-SCNC: 4.4 MMOL/L (ref 3.5–5.3)
PROTHROMBIN TIME: 19.1 SECONDS (ref 11.6–14.5)
RBC # BLD AUTO: 3.77 MILLION/UL (ref 3.81–5.12)
SODIUM SERPL-SCNC: 134 MMOL/L (ref 136–145)
UNIT DISPENSE STATUS: NORMAL
UNIT PRODUCT CODE: NORMAL
UNIT RH: NORMAL
WBC # BLD AUTO: 19.11 THOUSAND/UL (ref 4.31–10.16)

## 2021-01-24 PROCEDURE — 71046 X-RAY EXAM CHEST 2 VIEWS: CPT

## 2021-01-24 PROCEDURE — 99232 SBSQ HOSP IP/OBS MODERATE 35: CPT | Performed by: INTERNAL MEDICINE

## 2021-01-24 PROCEDURE — 85610 PROTHROMBIN TIME: CPT | Performed by: INTERNAL MEDICINE

## 2021-01-24 PROCEDURE — 85730 THROMBOPLASTIN TIME PARTIAL: CPT | Performed by: INTERNAL MEDICINE

## 2021-01-24 PROCEDURE — 80048 BASIC METABOLIC PNL TOTAL CA: CPT | Performed by: INTERNAL MEDICINE

## 2021-01-24 PROCEDURE — 85027 COMPLETE CBC AUTOMATED: CPT | Performed by: INTERNAL MEDICINE

## 2021-01-24 PROCEDURE — 99024 POSTOP FOLLOW-UP VISIT: CPT | Performed by: THORACIC SURGERY (CARDIOTHORACIC VASCULAR SURGERY)

## 2021-01-24 RX ORDER — FUROSEMIDE 10 MG/ML
20 INJECTION INTRAMUSCULAR; INTRAVENOUS ONCE
Status: COMPLETED | OUTPATIENT
Start: 2021-01-24 | End: 2021-01-24

## 2021-01-24 RX ORDER — HEPARIN SODIUM 1000 [USP'U]/ML
3400 INJECTION, SOLUTION INTRAVENOUS; SUBCUTANEOUS
Status: DISCONTINUED | OUTPATIENT
Start: 2021-01-24 | End: 2021-01-25

## 2021-01-24 RX ORDER — SODIUM CHLORIDE, SODIUM GLUCONATE, SODIUM ACETATE, POTASSIUM CHLORIDE, AND MAGNESIUM CHLORIDE 526; 502; 368; 37; 30 MG/100ML; MG/100ML; MG/100ML; MG/100ML; MG/100ML
50 INJECTION, SOLUTION INTRAVENOUS CONTINUOUS
Status: DISCONTINUED | OUTPATIENT
Start: 2021-01-24 | End: 2021-01-24

## 2021-01-24 RX ORDER — SODIUM CHLORIDE, SODIUM GLUCONATE, SODIUM ACETATE, POTASSIUM CHLORIDE, MAGNESIUM CHLORIDE, SODIUM PHOSPHATE, DIBASIC, AND POTASSIUM PHOSPHATE .53; .5; .37; .037; .03; .012; .00082 G/100ML; G/100ML; G/100ML; G/100ML; G/100ML; G/100ML; G/100ML
50 INJECTION, SOLUTION INTRAVENOUS CONTINUOUS
Status: DISCONTINUED | OUTPATIENT
Start: 2021-01-24 | End: 2021-01-24

## 2021-01-24 RX ORDER — HEPARIN SODIUM 1000 [USP'U]/ML
6800 INJECTION, SOLUTION INTRAVENOUS; SUBCUTANEOUS
Status: DISCONTINUED | OUTPATIENT
Start: 2021-01-24 | End: 2021-01-25

## 2021-01-24 RX ORDER — HEPARIN SODIUM 10000 [USP'U]/100ML
3-30 INJECTION, SOLUTION INTRAVENOUS
Status: DISCONTINUED | OUTPATIENT
Start: 2021-01-24 | End: 2021-01-25

## 2021-01-24 RX ADMIN — MIDODRINE HYDROCHLORIDE 5 MG: 5 TABLET ORAL at 11:19

## 2021-01-24 RX ADMIN — STANDARDIZED SENNA CONCENTRATE 8.6 MG: 8.6 TABLET ORAL at 09:26

## 2021-01-24 RX ADMIN — DILTIAZEM HYDROCHLORIDE 180 MG: 180 CAPSULE, COATED, EXTENDED RELEASE ORAL at 09:26

## 2021-01-24 RX ADMIN — SODIUM CHLORIDE, SODIUM GLUCONATE, SODIUM ACETATE, POTASSIUM CHLORIDE, MAGNESIUM CHLORIDE, SODIUM PHOSPHATE, DIBASIC, AND POTASSIUM PHOSPHATE 50 ML/HR: .53; .5; .37; .037; .03; .012; .00082 INJECTION, SOLUTION INTRAVENOUS at 11:31

## 2021-01-24 RX ADMIN — DOCUSATE SODIUM 100 MG: 100 CAPSULE, LIQUID FILLED ORAL at 09:26

## 2021-01-24 RX ADMIN — DOCUSATE SODIUM 100 MG: 100 CAPSULE, LIQUID FILLED ORAL at 17:11

## 2021-01-24 RX ADMIN — BISACODYL 20 MG: 5 TABLET, COATED ORAL at 17:11

## 2021-01-24 RX ADMIN — MIDODRINE HYDROCHLORIDE 5 MG: 5 TABLET ORAL at 17:11

## 2021-01-24 RX ADMIN — METOPROLOL SUCCINATE 100 MG: 100 TABLET, EXTENDED RELEASE ORAL at 09:26

## 2021-01-24 RX ADMIN — FUROSEMIDE 20 MG: 10 INJECTION, SOLUTION INTRAMUSCULAR; INTRAVENOUS at 14:42

## 2021-01-24 RX ADMIN — POLYETHYLENE GLYCOL 3350, SODIUM SULFATE ANHYDROUS, SODIUM BICARBONATE, SODIUM CHLORIDE, POTASSIUM CHLORIDE 4000 ML: 236; 22.74; 6.74; 5.86; 2.97 POWDER, FOR SOLUTION ORAL at 17:36

## 2021-01-24 RX ADMIN — HEPARIN SODIUM 18 UNITS/KG/HR: 10000 INJECTION, SOLUTION INTRAVENOUS at 11:19

## 2021-01-24 RX ADMIN — MIDODRINE HYDROCHLORIDE 5 MG: 5 TABLET ORAL at 06:04

## 2021-01-24 RX ADMIN — METOPROLOL SUCCINATE 100 MG: 100 TABLET, EXTENDED RELEASE ORAL at 20:04

## 2021-01-24 NOTE — PROGRESS NOTES
Progress Note - Jud Cr 1950, 65 y o  female MRN: [id number]    Unit/Bed#: [facility] 426-01 Encounter: [id number]    Primary Care Provider: Salena Escobar MD   Date and time admitted to hospital: 1/3/2021  8:26 AM        Pericardial effusion without cardiac tamponade  Assessment & Plan  Evaluated by thoracic surgery  Underwent pericardial drain placement 01/04/2021, continued to have significant drainage  Status post right VATS, pericardial window with biopsy of the anterior mediastinal mass on 01/21/2021  According to op note there was a friable mass under the pericardium  Echo from 01/22 showed trace pericardial effusion, no evidence of hemodynamic compromise  CT pulled today      Mediastinal mass  Assessment & Plan  · CT: Anterior mediastinal mass, extending into the right atrial lumen  · S/p right VATS, pericardial window with biopsy of the anterior mediastinal mass by thoracic surgery 1/21   · Friable mass seen under the pericardium with thickened pericardium   · CT pulled today      Metastatic GIST  Assessment & Plan  CT on admission shows: Innumerable vague hypodensities throughout the liver suspicious for metastases  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)  Assessment & Plan  Discussed with radiology 1/19  CT scan from 01/03/2021 and 01/16/2021 showed pulmonary infarct, posterior segment right upper lobe pulmonary embolus is visible on CT scan   True emboli versus tumor emboli given tumor invading the right atrium  Lower extremity venous Doppler negative for DVT  Discussed the risks versus benefits of anticoagulation with patient and her   They agreed to start anticoagulation and see how she does  Discussed with cardiology and thoracic surgery  Will start heparin drip today without a bolus    * Atrial flutter ([address] )  Assessment & Plan  · Patient was brought to ED  due to coughing, lower extremity edema and dyspnea  · EKG showed atrial flutter, QTc prolongation  Her son passed away on 12/21/2020  · Patient was evaluated by Cardiology  · Cardiac echo with large pericardial effusion without tamponade, EF 65%  · Initially was treated with Cardizem drip, Currently off Cardizem drip,   · on p o  Metoprolol and diltiazem  · Has a PICC line due to difficult IV access  · Continue metoprolol 100 mg b i d  And Cardizem 180 mg daily      Acute kidney injury [facility])  Assessment & Plan  Patient developed acute kidney injury 1/23 with minimal urine output  She has been on LR 75 cc for 24 hours  Plan on 1/23: Gentle hydration with Plasma-Lyte 50 cc/hour, 20 mg Lasix IV given after unit of blood  Due to soft blood pressure started midodrine 5 mg t i d   Today creatinine the same 1 52, urine output improved > 1L in 24 hours  Continue Plasma-Lyte until 6:00 p m , will give additional dose of Lasix 20 mg IV at 2:00 p m   Kidney ultrasound showed no hydronephrosis  Avoid nephrotoxins    Leukocytosis  Assessment & Plan  Suspect reactive leukocytosis, improving  Continue to monitor      Essential hypertension  Assessment & Plan  Blood pressure borderline low  Started midodrine 5 mg t i d  yesterday, blood pressure improved, continue today      VTE Pharmacologic Prophylaxis:   Pharmacologic: Heparin Drip  Mechanical VTE Prophylaxis in Place: Yes    Patient Centered Rounds: I have performed bedside rounds with nursing staff today      Discussions with Specialists or Other Care Team Provider:     Education and Discussions with Family / Patient:     Time Spent for Care: 30 minutes  More than 50% of total time spent on counseling and coordination of care as described above  Current Length of Stay: 21 day(s)    Current Patient Status: Inpatient   Certification Statement: The patient will continue to require additional inpatient hospital stay due to Above    Discharge Plan: To be determined    Code Status: Level 1 - Full Code      Subjective:   Patient was seen and evaluated bedside  She is feeling well  Waiting for physical therapy to take her for a walk today  Denies chest pain palpitation shortness of breath  Objective:     Vitals:   Temp (24hrs), Av °F (36 7 °C), Min:97 3 °F (36 3 °C), Max:98 5 °F (36 9 °C)    Temp:  [97 3 °F (36 3 °C)-98 5 °F (36 9 °C)] 98 1 °F (36 7 °C)  HR:  [41-84] 78  Resp:  [15-20] 18  BP: (108-134)/(53-90) 123/64  SpO2:  [92 %-96 %] 95 %  Body mass index is 32 17 kg/m²  Input and Output Summary (last 24 hours): Intake/Output Summary (Last 24 hours) at 2021 1126  Last data filed at 2021 0931  Gross per 24 hour   Intake 790 ml   Output 1770 ml   Net -980 ml       Physical Exam:     Physical Exam  Constitutional:       General: She is not in acute distress  HENT:      Head: Atraumatic  Neck:      Musculoskeletal: Neck supple  Cardiovascular:      Rate and Rhythm: Normal rate and regular rhythm  Heart sounds: No murmur  No friction rub  No gallop  Pulmonary:      Effort: Pulmonary effort is normal  No respiratory distress  Breath sounds: Normal breath sounds  No wheezing  Abdominal:      General: Bowel sounds are normal  There is no distension  Palpations: Abdomen is soft  Tenderness: There is no abdominal tenderness  Musculoskeletal:         General: Swelling present  Skin:     General: Skin is warm and dry  Neurological:      General: No focal deficit present        Mental Status: She is alert    Psychiatric:         Mood and Affect: Mood normal          Additional Data:     Labs:    Results from last 7 days   Lab Units 01/24/21  0455  01/22/21  0525  01/19/21  1748   WBC Thousand/uL 19 11*   < > 16 87*   < > 13 24*   HEMOGLOBIN g/dL 8 3*   < > 7 3*   < > 8 5*   HEMATOCRIT % 26 7*   < > 25 4*   < > 28 2*   PLATELETS Thousands/uL 393*   < > 381   < > 354   BANDS PCT %  --   --   --   --  1   LYMPHO PCT %  --   --  1*  --  6*   MONO PCT %  --   --  3*  --  3*   EOS PCT %  --   --  0  --  8*    < > = values in this interval not displayed  Results from last 7 days   Lab Units 01/24/21  0455  01/19/21  1748   SODIUM mmol/L 134*   < > 140   POTASSIUM mmol/L 4 4   < > 3 6   CHLORIDE mmol/L 102   < > 109*   CO2 mmol/L 22   < > 26   BUN mg/dL 33*   < > 8   CREATININE mg/dL 1 52*   < > 0 77   ANION GAP mmol/L 10   < > 5   CALCIUM mg/dL 7 8*   < > 8 5   ALBUMIN g/dL  --   --  2 4*   TOTAL BILIRUBIN mg/dL  --   --  0 99   ALK PHOS U/L  --   --  71   ALT U/L  --   --  24   AST U/L  --   --  19   GLUCOSE RANDOM mg/dL 111   < > 145*    < > = values in this interval not displayed  Results from last 7 days   Lab Units 01/20/21  1338   INR  1 43*                       * I Have Reviewed All Lab Data Listed Above  * Additional Pertinent Lab Tests Reviewed:  [address] 64 Admission Reviewed    Imaging:    Imaging Reports Reviewed Today Include: all  Imaging Personally Reviewed by Myself Includes:      Recent Cultures (last 7 days):           Last 24 Hours Medication List:   Current Facility-Administered Medications   Medication Dose Route Frequency Provider Last Rate    acetaminophen  650 mg Oral Q6H PRN Enoch Nunn MD      aluminum-magnesium hydroxide-simethicone  30 mL Oral Q6H PRN Enoch Nunn MD      diltiazem  180 mg Oral Daily Enoch Nunn MD      diltiazem  30 mg Oral Q6H PRN Enoch Nunn MD      docusate sodium  100 mg Oral BID Enoch Nunn MD     [facility]-148  50 mL/hr Intravenous Continuous Amando Maharaj MD      furosemide  20 mg Intravenous Once Amando Maharaj MD      heparin (porcine)  3-30 Units/kg/hr (Order-Specific) Intravenous Titrated Amando Maharaj MD 18 Units/kg/hr (01/24/21 1119)    heparin (porcine)  3,400 Units Intravenous Q1H PRN Amando Maharaj MD      heparin (porcine)  6,800 Units Intravenous Q1H PRN Amando Maharaj MD      HYDROmorphone  0 2 mg Intravenous Q4H PRN Mallory Lindsey MD      HYDROmorphone  0 2 mg Intravenous Once Mallory Lindsey MD      metoprolol succinate  100 mg Oral Q12H [address] Mallory Lindsey MD      midodrine  5 mg Oral TID AC Amando Maharaj MD      ondansetron  4 mg Intravenous Q6H PRN MD Jose Francisco Jarquin oxyCODONE  2 5 mg Oral Q4H PRN MD Jose Francisco Jarquin oxyCODONE  5 mg Oral Q4H PRN Mallory Lindsey MD      senna  1 tablet Oral Daily Mallory Lindsey MD          Today, Patient Was Seen By: Amando Maharaj MD    ** Please Note: Dictation voice to text software may have been used in the creation of this document   **

## 2021-01-24 NOTE — QUICK NOTE
R CT pulled in standard fashion  Patient tolerated well without complications  Dressing applied  Will obtain post pull PA/Lateral CXR

## 2021-01-24 NOTE — ASSESSMENT & PLAN NOTE
Patient developed acute kidney injury 1/23 with minimal urine output  She has been on LR 75 cc for 24 hours  Plan on 1/23: Gentle hydration with Plasma-Lyte 50 cc/hour, 20 mg Lasix IV given after unit of blood   Due to soft blood pressure started midodrine 5 mg t i d   Today creatinine the same 1 52, urine output improved > 1L in 24 hours  Continue Plasma-Lyte until 6:00 p m , will give additional dose of Lasix 20 mg IV at 2:00 p m   Kidney ultrasound showed no hydronephrosis  Avoid nephrotoxins

## 2021-01-24 NOTE — PROGRESS NOTES
Progress Note- Karen Eric 65 y o  female MRN: [id number]    Unit/Bed#: [facility] 426-01 Encounter: [id number]      Assessment and Plan:      77-year-old with liver Mets diagnosed as metastatic GIST with unknown location of primary lesion, pericardial effusion status post pericardiocentesis, anterior mediastinal mass status post VATS on 01/21/2021  Chest tube pulled out earlier today  Patient asymptomatic and breathing at room air  Continues to be on heparin drip  1  Metastatic GIST  Plan to do EGD and colonoscopy tomorrow morning to workup for primary lesion  Procedure in prep order placed  Patient is aware  2  Pulmonary embolism   On heparin drip  Placed nursing order to stop heparin drip tomorrow morning for the procedures  Reena Barajas MD  Gastroenterology Fellow  [address]  Date: January 24, 2021      ______________________________________________________________________    Subjective:     Patient doing well  No complaints overnight      Medication Administration - last 24 hours from 01/23/2021 1624 to 01/24/2021 1624       Date/Time Order Dose Route Action Action by     01/24/2021 0926 docusate sodium (COLACE) capsule 100 mg 100 mg Oral Given Shamika Littel RN     01/23/2021 1704 docusate sodium (COLACE) capsule 100 mg 100 mg Oral Given Jenaro Clark RN     01/24/2021 4300 senna (SENOKOT) tablet 8 6 mg 8 6 mg Oral Given Shamika Little RN     01/24/2021 1520 metoprolol succinate (TOPROL-XL) 24 hr tablet 100 mg 100 mg Oral Given Shamika Little RN     01/23/2021 2211 metoprolol succinate (TOPROL-XL) 24 hr tablet 100 mg 100 mg Oral Given Olimpia Robin RN     01/24/2021 4269 diltiazem (CARDIZEM CD) 24 hr capsule 180 mg 180 mg Oral Given Shamika Little RN     01/24/2021 1121 electrolyte-148 (PLASMALYTE) solution 0 mL/hr Intravenous Stopped Shamika Little RN     01/23/2021 1800 electrolyte-148 (PLASMALYTE) solution 50 mL/hr Intravenous Restarted Jenaro Clark RN 01/23/2021 1805 furosemide (LASIX) injection 20 mg 20 mg Intravenous Given Clydelaurence Bobby, GARRET     01/24/2021 1119 midodrine (PROAMATINE) tablet 5 mg 5 mg Oral Given Keysha Reddy RN     01/24/2021 0604 midodrine (PROAMATINE) tablet 5 mg 5 mg Oral Given Nat Schirmer, RN     01/23/2021 1704 midodrine (PROAMATINE) tablet 5 mg 5 mg Oral Given Clyde Kanu, GARRET     01/24/2021 1119 heparin (porcine) 25,000 units in 0 45% NaCl 250 mL infusion (premix) 18 Units/kg/hr Intravenous [facility] [address] Keysha Reddy, [facility]     01/24/2021 1442 furosemide (LASIX) injection 20 mg 20 mg Intravenous Given Keysha Reddy RN     01/24/2021 1131 multi-electrolyte (PLASMALYTE-A/ISOLYTE-S PH 7 4) IV solution 50 mL/hr Intravenous New Bag Keysha Reddy RN          Objective:     Vitals: Blood pressure 113/65, pulse 82, temperature 98 °F (36 7 °C), resp  rate 17, height 5' 4" (1 626 m), weight 85 kg (187 lb 6 3 oz), SpO2 94 %  ,Body mass index is 32 17 kg/m²  Intake/Output Summary (Last 24 hours) at 1/24/2021 1624  Last data filed at 1/24/2021 1601  Gross per 24 hour   Intake 606 11 ml   Output 3020 ml   Net -2413 89 ml       Physical Exam:   General Appearance:   Alert, cooperative, no distress   HEENT:   Normocephalic, atraumatic, anicteric  Neck:  Supple, symmetrical, trachea midline   Lungs:   Clear to auscultation bilaterally; no rales, rhonchi or wheezing; respirations unlabored    Heart[de-identified]   Regular rate and rhythm; no murmur, rub, or gallop     Abdomen:   Soft, non-tender, non-distended; normal bowel sounds; no masses, no organomegaly    Genitalia:   Deferred    Rectal:   Deferred    Extremities:  No cyanosis, clubbing or edema    Pulses:  2+ and symmetric all extremities    Skin:  No jaundice, rashes, or lesions    Lymph nodes:  No palpable cervical lymphadenopathy          Invasive Devices     Peripherally Inserted Central Catheter Line            PICC Line 01/05/21 Right Brachial 19 days                Lab Results:  No results displayed because visit has over 200 results  Imaging Studies: I have personally reviewed pertinent imaging studies

## 2021-01-24 NOTE — PROGRESS NOTES
Progress Note - Thoracic Surgery   Fab Green 65 y o  female MRN: [id number]  Unit/Bed#: [facility] 426-01 Encounter: [id number]    Assessment:  65 y o  F with distant history of breast cancer and a large pericardial effusion and 6 cm anterior mediastinal density without evidence of cardiac tamponade, s/p subxiphoid pericardial window  Now s/p R VATS pericardial window and biopsy of anterior mediastinal mass 1/21    Pathology from liver biopsy came back as metastatic GIST    R CT (-20, -AL) with 220 (from 240) cc SS drainage over the last 24 hours      Afebrile  VSS on room air  Hgb 8 3 this morning from 7 4 after 1uPRBCs    Plan:  Diet as tolerated   Will likely hold off on pulling the chest tube today given drop in Hgb, requiring transfusion   Monitor air leak and output from chest tube  Aggressive pulmonary toilet  F/u intra-operative biopsy results - still pending   OOB/Ambulate  Consider restarting heparin gtt today given appropriate response to transfusion yesterday  DVT ppx    Subjective/Objective     Subjective: No acute events overnight  Pain is controlled on prn analgesia  Denies SOB  Tolerating diet without nausea/vomiting  No fevers, chills  She has been ambulating and using her IS  Objective:     Blood pressure 108/59, pulse 74, temperature (!) 97 3 °F (36 3 °C), resp  rate 16, height 5' 4" (1 626 m), weight 87 5 kg (192 lb 14 4 oz), SpO2 95 %  ,Body mass index is 33 11 kg/m²        Intake/Output Summary (Last 24 hours) at 1/24/2021 0631  Last data filed at 1/24/2021 0543  Gross per 24 hour   Intake 590 ml   Output 1270 ml   Net -680 ml       Invasive Devices     Peripherally Inserted Central Catheter Line            PICC Line 01/05/21 Right Brachial 18 days          Drain            Chest Tube 1 Right Pleural 28 Fr  2 days                Physical Exam:   NAD, alert and oriented x3  Normocephalic, atraumatic  MMM, EOMI, PERRLA  Norm resp effort on RA  R CT (-20, -AL) with serosanguinous drainage   R chest wall incisions c/d/i   RRR  Abd soft, NT/ND  No calf tenderness or peripheral edema  Motor/sensation intact in distal extremities  CN grossly intact  -rash/lesions      Lab, Imaging and other studies:  CBC:   Lab Results   Component Value Date    WBC 19 11 (H) 01/24/2021    HGB 8 3 (L) 01/24/2021    HCT 26 7 (L) 01/24/2021    MCV 71 (L) 01/24/2021     (H) 01/24/2021    MCH 22 0 (L) 01/24/2021    MCHC 31 1 (L) 01/24/2021    RDW 21 3 (H) 01/24/2021    MPV 11 6 01/24/2021    NRBC 4 01/24/2021   , CMP:   Lab Results   Component Value Date    SODIUM 134 (L) 01/24/2021    K 4 4 01/24/2021     01/24/2021    CO2 22 01/24/2021    BUN 33 (H) 01/24/2021    CREATININE 1 52 (H) 01/24/2021    CALCIUM 7 8 (L) 01/24/2021    EGFR 39 01/24/2021   , Coagulation: No results found for: PT, INR, APTT  VTE Pharmacologic Prophylaxis: Sequential compression device (Venodyne)   VTE Mechanical Prophylaxis: sequential compression device

## 2021-01-24 NOTE — ASSESSMENT & PLAN NOTE
Discussed with radiology 1/19  CT scan from 01/03/2021 and 01/16/2021 showed pulmonary infarct, posterior segment right upper lobe pulmonary embolus is visible on CT scan  True emboli versus tumor emboli given tumor invading the right atrium  Lower extremity venous Doppler negative for DVT  Discussed the risks versus benefits of anticoagulation with patient and her   They agreed to start anticoagulation and see how she does  Discussed with cardiology and thoracic surgery    Will start heparin drip today without a bolus

## 2021-01-24 NOTE — ASSESSMENT & PLAN NOTE
Multifactorial, post op, bloody pericardial effusion, chronic disease  S/p 1 unit of blood 1/23 with appropriate response  Hemoglobin 8 3 today

## 2021-01-24 NOTE — ASSESSMENT & PLAN NOTE
· CT: Anterior mediastinal mass, extending into the right atrial lumen  · S/p right VATS, pericardial window with biopsy of the anterior mediastinal mass by thoracic surgery 1/21   · Friable mass seen under the pericardium with thickened pericardium   · CT pulled today

## 2021-01-24 NOTE — ASSESSMENT & PLAN NOTE
Blood pressure borderline low  Started midodrine 5 mg t i d  yesterday, blood pressure improved, continue today

## 2021-01-24 NOTE — ASSESSMENT & PLAN NOTE
Evaluated by thoracic surgery  Underwent pericardial drain placement 01/04/2021, continued to have significant drainage  Status post right VATS, pericardial window with biopsy of the anterior mediastinal mass on 01/21/2021  According to op note there was a friable mass under the pericardium  Echo from 01/22 showed trace pericardial effusion, no evidence of hemodynamic compromise  CT pulled today

## 2021-01-25 ENCOUNTER — APPOINTMENT (INPATIENT)
Dept: NON INVASIVE DIAGNOSTICS | Facility: HOSPITAL | Age: 71
DRG: 270 | End: 2021-01-25
Payer: COMMERCIAL

## 2021-01-25 ENCOUNTER — APPOINTMENT (INPATIENT)
Dept: RADIOLOGY | Facility: HOSPITAL | Age: 71
DRG: 270 | End: 2021-01-25
Attending: INTERNAL MEDICINE
Payer: COMMERCIAL

## 2021-01-25 ENCOUNTER — APPOINTMENT (INPATIENT)
Dept: GASTROENTEROLOGY | Facility: HOSPITAL | Age: 71
DRG: 270 | End: 2021-01-25
Payer: COMMERCIAL

## 2021-01-25 ENCOUNTER — ANESTHESIA (INPATIENT)
Dept: GASTROENTEROLOGY | Facility: HOSPITAL | Age: 71
DRG: 270 | End: 2021-01-25
Payer: COMMERCIAL

## 2021-01-25 ENCOUNTER — ANESTHESIA EVENT (INPATIENT)
Dept: GASTROENTEROLOGY | Facility: HOSPITAL | Age: 71
DRG: 270 | End: 2021-01-25
Payer: COMMERCIAL

## 2021-01-25 VITALS — HEART RATE: 81 BPM

## 2021-01-25 PROBLEM — N17.9 ACUTE KIDNEY INJURY (HCC): Status: RESOLVED | Noted: 2021-01-23 | Resolved: 2021-01-25

## 2021-01-25 LAB
ABO GROUP BLD: NORMAL
ACANTHOCYTES BLD QL SMEAR: PRESENT
ALBUMIN SERPL BCP-MCNC: 2.4 G/DL (ref 3.5–5)
ALP SERPL-CCNC: 75 U/L (ref 46–116)
ALT SERPL W P-5'-P-CCNC: 18 U/L (ref 12–78)
ANION GAP SERPL CALCULATED.3IONS-SCNC: 7 MMOL/L (ref 4–13)
ANISOCYTOSIS BLD QL SMEAR: PRESENT
APTT PPP: 24 SECONDS (ref 23–37)
APTT PPP: 74 SECONDS (ref 23–37)
AST SERPL W P-5'-P-CCNC: 25 U/L (ref 5–45)
BASOPHILS # BLD MANUAL: 0 THOUSAND/UL (ref 0–0.1)
BASOPHILS NFR BLD AUTO: 0 % (ref 0–1)
BASOPHILS NFR MAR MANUAL: 0 % (ref 0–1)
BILIRUB DIRECT SERPL-MCNC: 0.57 MG/DL (ref 0–0.2)
BILIRUB SERPL-MCNC: 1.09 MG/DL (ref 0.2–1)
BLD GP AB SCN SERPL QL: NEGATIVE
BUN SERPL-MCNC: 25 MG/DL (ref 5–25)
CALCIUM SERPL-MCNC: 7.6 MG/DL (ref 8.3–10.1)
CHLORIDE SERPL-SCNC: 115 MMOL/L (ref 100–108)
CO2 SERPL-SCNC: 22 MMOL/L (ref 21–32)
CREAT SERPL-MCNC: 0.88 MG/DL (ref 0.6–1.3)
EOSINOPHIL # BLD MANUAL: 0.6 THOUSAND/UL (ref 0–0.4)
EOSINOPHIL NFR BLD AUTO: 2 % (ref 0–6)
EOSINOPHIL NFR BLD MANUAL: 4 % (ref 0–6)
ERYTHROCYTE [DISTWIDTH] IN BLOOD BY AUTOMATED COUNT: 21.7 % (ref 11.6–15.1)
ERYTHROCYTE [DISTWIDTH] IN BLOOD BY AUTOMATED COUNT: 22.3 % (ref 11.6–15.1)
GFR SERPL CREATININE-BSD FRML MDRD: 76 ML/MIN/1.73SQ M
GLUCOSE SERPL-MCNC: 112 MG/DL (ref 65–140)
GLUCOSE SERPL-MCNC: 99 MG/DL (ref 65–140)
HCT VFR BLD AUTO: 20.8 % (ref 34.8–46.1)
HCT VFR BLD AUTO: 28.6 % (ref 34.8–46.1)
HCT VFR BLD AUTO: 28.9 % (ref 34.8–46.1)
HGB BLD-MCNC: 6.2 G/DL (ref 11.5–15.4)
HGB BLD-MCNC: 8.7 G/DL (ref 11.5–15.4)
HGB BLD-MCNC: 8.7 G/DL (ref 11.5–15.4)
IMM GRANULOCYTES NFR BLD AUTO: 7 % (ref 0–2)
INR PPP: 1.55 (ref 0.84–1.19)
LYMPHOCYTES # BLD AUTO: 0.45 THOUSAND/UL (ref 0.6–4.47)
LYMPHOCYTES # BLD AUTO: 3 % (ref 14–44)
LYMPHOCYTES NFR BLD AUTO: 6 % (ref 14–44)
MCH RBC QN AUTO: 22 PG (ref 26.8–34.3)
MCH RBC QN AUTO: 22.1 PG (ref 26.8–34.3)
MCHC RBC AUTO-ENTMCNC: 29.8 G/DL (ref 31.4–37.4)
MCHC RBC AUTO-ENTMCNC: 30.4 G/DL (ref 31.4–37.4)
MCV RBC AUTO: 72 FL (ref 82–98)
MCV RBC AUTO: 74 FL (ref 82–98)
METAMYELOCYTES NFR BLD MANUAL: 1 % (ref 0–1)
MONOCYTES # BLD AUTO: 0.3 THOUSAND/UL (ref 0–1.22)
MONOCYTES NFR BLD AUTO: 9 % (ref 4–12)
MONOCYTES NFR BLD: 2 % (ref 4–12)
NEUTROPHILS # BLD MANUAL: 13.55 THOUSAND/UL (ref 1.85–7.62)
NEUTS BAND NFR BLD MANUAL: 3 % (ref 0–8)
NEUTS SEG NFR BLD AUTO: 76 % (ref 43–75)
NEUTS SEG NFR BLD AUTO: 87 % (ref 43–75)
NRBC BLD AUTO-RTO: 6 /100 WBCS
NRBC BLD AUTO-RTO: 7 /100 WBCS
NRBC BLD AUTO-RTO: 8 /100 WBC (ref 0–2)
OVALOCYTES BLD QL SMEAR: PRESENT
PLATELET # BLD AUTO: 309 THOUSANDS/UL (ref 149–390)
PLATELET # BLD AUTO: 424 THOUSANDS/UL (ref 149–390)
PLATELET BLD QL SMEAR: ABNORMAL
PMV BLD AUTO: 10.9 FL (ref 8.9–12.7)
PMV BLD AUTO: 11.5 FL (ref 8.9–12.7)
POIKILOCYTOSIS BLD QL SMEAR: PRESENT
POLYCHROMASIA BLD QL SMEAR: PRESENT
POTASSIUM SERPL-SCNC: 3.3 MMOL/L (ref 3.5–5.3)
PROT SERPL-MCNC: 5 G/DL (ref 6.4–8.2)
PROTHROMBIN TIME: 18.5 SECONDS (ref 11.6–14.5)
RBC # BLD AUTO: 2.81 MILLION/UL (ref 3.81–5.12)
RBC # BLD AUTO: 3.95 MILLION/UL (ref 3.81–5.12)
RBC MORPH BLD: PRESENT
RH BLD: POSITIVE
SODIUM SERPL-SCNC: 144 MMOL/L (ref 136–145)
SPECIMEN EXPIRATION DATE: NORMAL
TARGETS BLD QL SMEAR: PRESENT
WBC # BLD AUTO: 12.7 THOUSAND/UL (ref 4.31–10.16)
WBC # BLD AUTO: 15.06 THOUSAND/UL (ref 4.31–10.16)

## 2021-01-25 PROCEDURE — 85610 PROTHROMBIN TIME: CPT | Performed by: PHYSICIAN ASSISTANT

## 2021-01-25 PROCEDURE — 88305 TISSUE EXAM BY PATHOLOGIST: CPT | Performed by: PATHOLOGY

## 2021-01-25 PROCEDURE — 88313 SPECIAL STAINS GROUP 2: CPT | Performed by: PATHOLOGY

## 2021-01-25 PROCEDURE — 0DB68ZX EXCISION OF STOMACH, VIA NATURAL OR ARTIFICIAL OPENING ENDOSCOPIC, DIAGNOSTIC: ICD-10-PCS | Performed by: INTERNAL MEDICINE

## 2021-01-25 PROCEDURE — 85027 COMPLETE CBC AUTOMATED: CPT | Performed by: INTERNAL MEDICINE

## 2021-01-25 PROCEDURE — 86850 RBC ANTIBODY SCREEN: CPT | Performed by: INTERNAL MEDICINE

## 2021-01-25 PROCEDURE — 85014 HEMATOCRIT: CPT | Performed by: INTERNAL MEDICINE

## 2021-01-25 PROCEDURE — 88342 IMHCHEM/IMCYTCHM 1ST ANTB: CPT | Performed by: PATHOLOGY

## 2021-01-25 PROCEDURE — 82948 REAGENT STRIP/BLOOD GLUCOSE: CPT

## 2021-01-25 PROCEDURE — 85007 BL SMEAR W/DIFF WBC COUNT: CPT | Performed by: INTERNAL MEDICINE

## 2021-01-25 PROCEDURE — 93321 DOPPLER ECHO F-UP/LMTD STD: CPT | Performed by: INTERNAL MEDICINE

## 2021-01-25 PROCEDURE — 80048 BASIC METABOLIC PNL TOTAL CA: CPT | Performed by: INTERNAL MEDICINE

## 2021-01-25 PROCEDURE — 86900 BLOOD TYPING SEROLOGIC ABO: CPT | Performed by: INTERNAL MEDICINE

## 2021-01-25 PROCEDURE — 93308 TTE F-UP OR LMTD: CPT | Performed by: INTERNAL MEDICINE

## 2021-01-25 PROCEDURE — 44361 SMALL BOWEL ENDOSCOPY/BIOPSY: CPT | Performed by: INTERNAL MEDICINE

## 2021-01-25 PROCEDURE — 93308 TTE F-UP OR LMTD: CPT

## 2021-01-25 PROCEDURE — 71045 X-RAY EXAM CHEST 1 VIEW: CPT

## 2021-01-25 PROCEDURE — 80076 HEPATIC FUNCTION PANEL: CPT | Performed by: INTERNAL MEDICINE

## 2021-01-25 PROCEDURE — 93325 DOPPLER ECHO COLOR FLOW MAPG: CPT | Performed by: INTERNAL MEDICINE

## 2021-01-25 PROCEDURE — 85730 THROMBOPLASTIN TIME PARTIAL: CPT | Performed by: INTERNAL MEDICINE

## 2021-01-25 PROCEDURE — NC001 PR NO CHARGE: Performed by: INTERNAL MEDICINE

## 2021-01-25 PROCEDURE — 86901 BLOOD TYPING SEROLOGIC RH(D): CPT | Performed by: INTERNAL MEDICINE

## 2021-01-25 PROCEDURE — 99232 SBSQ HOSP IP/OBS MODERATE 35: CPT | Performed by: INTERNAL MEDICINE

## 2021-01-25 PROCEDURE — 85018 HEMOGLOBIN: CPT | Performed by: INTERNAL MEDICINE

## 2021-01-25 PROCEDURE — 85730 THROMBOPLASTIN TIME PARTIAL: CPT | Performed by: PHYSICIAN ASSISTANT

## 2021-01-25 PROCEDURE — 45378 DIAGNOSTIC COLONOSCOPY: CPT | Performed by: INTERNAL MEDICINE

## 2021-01-25 PROCEDURE — 0DJD8ZZ INSPECTION OF LOWER INTESTINAL TRACT, VIA NATURAL OR ARTIFICIAL OPENING ENDOSCOPIC: ICD-10-PCS | Performed by: INTERNAL MEDICINE

## 2021-01-25 RX ORDER — DILTIAZEM HYDROCHLORIDE 180 MG/1
180 CAPSULE, COATED, EXTENDED RELEASE ORAL DAILY
Status: DISCONTINUED | OUTPATIENT
Start: 2021-01-26 | End: 2021-01-27 | Stop reason: HOSPADM

## 2021-01-25 RX ORDER — LIDOCAINE HYDROCHLORIDE 10 MG/ML
0.5 INJECTION, SOLUTION EPIDURAL; INFILTRATION; INTRACAUDAL; PERINEURAL ONCE AS NEEDED
Status: DISCONTINUED | OUTPATIENT
Start: 2021-01-25 | End: 2021-01-27 | Stop reason: HOSPADM

## 2021-01-25 RX ORDER — SODIUM CHLORIDE 9 MG/ML
INJECTION, SOLUTION INTRAVENOUS CONTINUOUS PRN
Status: DISCONTINUED | OUTPATIENT
Start: 2021-01-25 | End: 2021-01-25

## 2021-01-25 RX ORDER — PROPOFOL 10 MG/ML
INJECTION, EMULSION INTRAVENOUS CONTINUOUS PRN
Status: DISCONTINUED | OUTPATIENT
Start: 2021-01-25 | End: 2021-01-25

## 2021-01-25 RX ORDER — POTASSIUM CHLORIDE 20 MEQ/1
40 TABLET, EXTENDED RELEASE ORAL ONCE
Status: COMPLETED | OUTPATIENT
Start: 2021-01-25 | End: 2021-01-25

## 2021-01-25 RX ORDER — HEPARIN SODIUM 10000 [USP'U]/100ML
3-30 INJECTION, SOLUTION INTRAVENOUS
Status: DISCONTINUED | OUTPATIENT
Start: 2021-01-25 | End: 2021-01-27

## 2021-01-25 RX ORDER — FUROSEMIDE 10 MG/ML
20 INJECTION INTRAMUSCULAR; INTRAVENOUS ONCE
Status: COMPLETED | OUTPATIENT
Start: 2021-01-25 | End: 2021-01-25

## 2021-01-25 RX ORDER — PROPOFOL 10 MG/ML
INJECTION, EMULSION INTRAVENOUS AS NEEDED
Status: DISCONTINUED | OUTPATIENT
Start: 2021-01-25 | End: 2021-01-25

## 2021-01-25 RX ORDER — PANTOPRAZOLE SODIUM 40 MG/1
40 TABLET, DELAYED RELEASE ORAL
Status: DISCONTINUED | OUTPATIENT
Start: 2021-01-25 | End: 2021-01-27 | Stop reason: HOSPADM

## 2021-01-25 RX ORDER — LIDOCAINE HYDROCHLORIDE 10 MG/ML
INJECTION, SOLUTION EPIDURAL; INFILTRATION; INTRACAUDAL; PERINEURAL AS NEEDED
Status: DISCONTINUED | OUTPATIENT
Start: 2021-01-25 | End: 2021-01-25

## 2021-01-25 RX ADMIN — PHENYLEPHRINE HYDROCHLORIDE 30 MCG/MIN: 10 INJECTION INTRAVENOUS at 15:24

## 2021-01-25 RX ADMIN — MIDODRINE HYDROCHLORIDE 5 MG: 5 TABLET ORAL at 11:01

## 2021-01-25 RX ADMIN — PROPOFOL 50 MG: 10 INJECTION, EMULSION INTRAVENOUS at 15:17

## 2021-01-25 RX ADMIN — SODIUM CHLORIDE: 9 INJECTION, SOLUTION INTRAVENOUS at 15:11

## 2021-01-25 RX ADMIN — PROPOFOL 50 MG: 10 INJECTION, EMULSION INTRAVENOUS at 15:20

## 2021-01-25 RX ADMIN — PANTOPRAZOLE SODIUM 40 MG: 40 TABLET, DELAYED RELEASE ORAL at 20:06

## 2021-01-25 RX ADMIN — FUROSEMIDE 20 MG: 10 INJECTION, SOLUTION INTRAMUSCULAR; INTRAVENOUS at 20:06

## 2021-01-25 RX ADMIN — POTASSIUM CHLORIDE 40 MEQ: 1500 TABLET, EXTENDED RELEASE ORAL at 08:27

## 2021-01-25 RX ADMIN — DOCUSATE SODIUM 100 MG: 100 CAPSULE, LIQUID FILLED ORAL at 18:33

## 2021-01-25 RX ADMIN — HEPARIN SODIUM 18 UNITS/KG/HR: 10000 INJECTION, SOLUTION INTRAVENOUS at 21:45

## 2021-01-25 RX ADMIN — METOPROLOL SUCCINATE 100 MG: 100 TABLET, EXTENDED RELEASE ORAL at 08:28

## 2021-01-25 RX ADMIN — PHENYLEPHRINE HYDROCHLORIDE 100 MCG: 10 INJECTION INTRAVENOUS at 15:22

## 2021-01-25 RX ADMIN — LIDOCAINE HYDROCHLORIDE 100 MG: 10 INJECTION, SOLUTION EPIDURAL; INFILTRATION; INTRACAUDAL; PERINEURAL at 15:15

## 2021-01-25 RX ADMIN — DILTIAZEM HYDROCHLORIDE 180 MG: 180 CAPSULE, COATED, EXTENDED RELEASE ORAL at 08:28

## 2021-01-25 RX ADMIN — METOPROLOL SUCCINATE 100 MG: 100 TABLET, EXTENDED RELEASE ORAL at 20:06

## 2021-01-25 RX ADMIN — MIDODRINE HYDROCHLORIDE 5 MG: 5 TABLET ORAL at 06:31

## 2021-01-25 RX ADMIN — PHENYLEPHRINE HYDROCHLORIDE 100 MCG: 10 INJECTION INTRAVENOUS at 15:26

## 2021-01-25 RX ADMIN — POTASSIUM CHLORIDE 40 MEQ: 1500 TABLET, EXTENDED RELEASE ORAL at 11:01

## 2021-01-25 RX ADMIN — PROPOFOL 20 MG: 10 INJECTION, EMULSION INTRAVENOUS at 15:23

## 2021-01-25 RX ADMIN — PROPOFOL 70 MCG/KG/MIN: 10 INJECTION, EMULSION INTRAVENOUS at 15:23

## 2021-01-25 NOTE — ASSESSMENT & PLAN NOTE
Case was Discussed with radiology on 1/19  CT scan from 01/03/2021 and 01/16/2021 showed pulmonary infarct, as per note on chest x-ray of 1/16- "posterior segment right upper lobe pulmonary embolus is visible on CT scan  True emboli versus tumor emboli given tumor invading the right atrium "  Given these findings, PE was likely present on admission  Lower extremity venous Doppler negative for DVT  Discussed the risks versus benefits of anticoagulation with patient and her   They agreed to start anticoagulation and see how she does  Discussed with cardiology and thoracic surgery    Continue heparin drip  1/26-hemoglobin remains stable on heparin drip; monitor overnight and consider discharge on NOAC if remains stable

## 2021-01-25 NOTE — PROGRESS NOTES
Cardiology Team 2 Progress Note - Zhou Garza 65 y o  female MRN: [id number]    Unit/Bed#: [facility] 426-01 Encounter: [id number]          Subjective:   Patient seen and examined  No significant telemetry events overnight, she remains in rate controlled atrial flutter with heart rate ranging 70 to 80s  She is currently symptom free from a respiratory standpoint but reports lower extremity tightness and increased swelling  She denies any dyspnea on exertion though limited motility, orthopnea, PND, nausea, early satiety, chest pain, diaphoresis, palpitations or any other cardiac symptoms  She was transiently on a heparin infusion until 7:00 a m  this morning  She was actively undergoing transthoracic echocardiogram     Objective:     Vitals: Blood pressure 134/75, pulse 85, temperature 98 3 °F (36 8 °C), resp  rate 18, height 5' 4" (1 626 m), weight 86 2 kg (190 lb), SpO2 98 %  , Body mass index is 32 61 kg/m² ,   Orthostatic Blood Pressures      Most Recent Value   Blood Pressure  134/75 filed at 01/25/2021 1449   Patient Position - Orthostatic VS  Sitting filed at 01/20/2021 9140            Intake/Output Summary (Last 24 hours) at 1/25/2021 0941  Last data filed at 1/25/2021 0801  Gross per 24 hour   Intake 264 66 ml   Output 2050 ml   Net -1785 34 ml         Physical Exam:    GEN: Zhou Garza appears well, alert and oriented x 3, pleasant and cooperative   HEENT:  Normocephalic, atraumatic, anicteric, moist mucous membranes  NECK: +JVD to the mid neck; no carotid bruits   HEART:  Irregular rhythm, normal rate, normal S1 and S2, no murmurs, clicks, gallops or rubs; well approximated pericardial window scar   LUNGS:  Fine crackles bibasilarly; no wheezes or rhonchi; respiration nonlabored on room air/laying flat for echocardiogram; intact right flank chest tube site post removal  ABDOMEN:  Normoactive bowel sounds, soft, no tenderness, no distention  EXTREMITIES: peripheral pulses palpable; 2+ edema to the knees bilaterally  NEURO: no gross focal findings; cranial nerves grossly intact   SKIN:  Dry, intact, warm to touch      Current Facility-Administered Medications:     acetaminophen (TYLENOL) tablet 650 mg, 650 mg, Oral, Q6H PRN, Deidra Patel MD, 650 mg at 01/14/21 2020    aluminum-magnesium hydroxide-simethicone (MYLANTA) oral suspension 30 mL, 30 mL, Oral, Q6H PRN, Deidra Patel MD    diltiazem (CARDIZEM CD) 24 hr capsule 180 mg, 180 mg, Oral, Daily, Deidra Patel MD, 180 mg at 01/25/21 9575    diltiazem (CARDIZEM) tablet 30 mg, 30 mg, Oral, Q6H PRN, Deidra Patel MD    docusate sodium (COLACE) capsule 100 mg, 100 mg, Oral, BID, Deidra Patel MD, 100 mg at 01/24/21 1711    heparin (porcine) 25,000 units in 0 45% NaCl 250 mL infusion (premix), 3-30 Units/kg/hr (Order-Specific), Intravenous, Titrated, Vic Damon MD, Stopped at 01/25/21 0653    heparin (porcine) injection 3,400 Units, 3,400 Units, Intravenous, Q1H PRN, Vic Damon MD    heparin (porcine) injection 6,800 Units, 6,800 Units, Intravenous, Q1H PRN, Vic Damon MD    HYDROmorphone (DILAUDID) injection 0 2 mg, 0 2 mg, Intravenous, Q4H PRN, Deidra Patel MD, 0 2 mg at 01/09/21 0645    HYDROmorphone (DILAUDID) injection 0 2 mg, 0 2 mg, Intravenous, Once, Deidra Patel MD    metoprolol succinate (TOPROL-XL) 24 hr tablet 100 mg, 100 mg, Oral, Q12H [facility], Deidra Patel MD, 100 mg at 01/25/21 0828    midodrine (PROAMATINE) tablet 5 mg, 5 mg, Oral, TID AC, Vic Damon MD, 5 mg at 01/25/21 0631    ondansetron (ZOFRAN) injection 4 mg, 4 mg, Intravenous, Q6H PRN, Deidra Patel MD    oxyCODONE (ROXICODONE) IR tablet 2 5 mg, 2 5 mg, Oral, Q4H PRN, Deidra Patel MD    oxyCODONE (ROXICODONE) IR tablet 5 mg, 5 mg, Oral, Q4H PRN, Deidra Patel MD, 5 mg at 01/23/21 0109    senna (SENOKOT) tablet 8 6 mg, 1 tablet, Oral, Daily, Deidra Patel MD, 8 6 mg at 01/24/21 4857    Labs & Results:    Lab Results   Component Value Date    TROPONINI <0 02 01/03/2021    TROPONINI <0 02 01/03/2021    TROPONINI <0 03 01/03/2021       Lab Results   Component Value Date    CALCIUM 7 6 (L) 01/25/2021    K 3 3 (L) 01/25/2021    CO2 22 01/25/2021     (H) 01/25/2021    BUN 25 01/25/2021    CREATININE 0 88 01/25/2021       Lab Results   Component Value Date    WBC 12 70 (H) 01/25/2021    HGB 8 7 (L) 01/25/2021    HCT 28 9 (L) 01/25/2021    MCV 74 (L) 01/25/2021     01/25/2021     Results from last 7 days   Lab Units 01/24/21  1136   INR  1 62*       No results found for: CHOL  No results found for: HDL  No results found for: LDLCALC  No results found for: TRIG    Lab Results   Component Value Date    ALT 24 01/19/2021    AST 19 01/19/2021       Telemetry:   Personally reviewed by Amarilis Koch MD:  Rate controlled atrial flutter with heart rate ranging 70 to 80s with rare PVCs  Imaging:  Undergoing echocardiogram at bedside      VTE Prophylaxis: Sequential compression device Merary Mas)       Assessment:  Principal Problem:    Atrial flutter (HCC)  Active Problems:    Essential hypertension    Pericardial effusion without cardiac tamponade    Mediastinal mass    Pain and swelling of right upper extremity    Metastatic GIST    Leukocytosis    Anemia    Pulmonary embolus (HCC)    Acute kidney injury ([address] )        65 y o  female who presented with cough and shortness of breath  Cardiology was initially consulted for atrial flutter/fibrillation and diuresis  Course was complicated by newly diagnosed large pericardial effusion and mediastinal mass  Patient underwent pericardial window with bulb placement on 1/4  On initial cardiology contact, patient was started on rate control strategy and converted to sinus mechanism  However, she returned to atrial flutter which has since been rate controlled   She is status post liver biopsy 1/13 which revealed GIST tumor on pathology   S/p VATS assisted right-sided pericardial window with mediastinal mass biopsy on 01/21  1  Pericardial effusion  -1/3 TTE:  Large with no tamponade   Resolved s/p pericardial window with bloody drainage and drain/bulb placement on 01/04 per echo on the 7th  -per primary team and thoracic surgery there was concern for malignant etiology stemming from thoracic mass near SVC  -1/21 VATS: pericardial window into the pleural space, small bloody effusion upon pericardial incision emanating from friable mass appreciated underneath the pericardium which was biopsied  -1/22 limited TTE:  Thickened pericardium with trace apical effusion, difficult to assess restrictive/constrictive physiology due to flutter; no septal interdependence  -cytology negative x2, pending 1/21 biopsy pathology     2  Atrial fibrillation/flutter  -[id number] 3, HAS-BLED 2  -rate control  -metoprolol succinate 100 mg b i d , diltiazem 180 q d   -transient anticoagulation with heparin     3  Essential hypertension  -controlled while inpatient on metoprolol and diltiazem, current BP  134/75  -home lisinopril currently held in setting of previous hypotension spells  -1/3 TTE:  LVEF 65%, no RWMA, normal RV size/function, mild LA dilatio, n mild-mod TR     4  Other active issues  -GIST tumor on liver biopsy, pending EGD/colonoscopy today  -negative cytology x2  -mediastinal mass s/p biopsy on 01/21    Plan:  1  Patient is significantly more volume overloaded as compared to last evaluation by myself on Friday  She had transient oliguria on Friday and Saturday requiring furosemide doses for both day and eventually appropriate response on Sunday  Creatinine was transiently elevated as a result  Recommend additional dose of diuretic postprocedure today  2  Chest tube was removed over the weekend  Patient was transiently on a heparin drip for close to 12 hours    Limited echocardiogram being obtained at the bedside today did not reveal any reaccumulation but does show persistently thickened pericardium  Would recommend resuming anticoagulation with heparin again if deemed safe from thoracic surgery  3  Follow-up official echocardiogram read  4  Cardiology to continue follow along  Case discussed and reviewed with Dr Ashleigh Carter who agrees with my assessment and plan  Thank you for involving us in the care of your patient  [facility]/ [facility] Everywhere records reviewed: yes    ** Please Note: Fluency DirectDictation voice to text software may have been used in the creation of this document   **

## 2021-01-25 NOTE — PROGRESS NOTES
Internal Medicine Progress Note      PATIENT INFORMATION      Patient: Zhou Garza 65 y o  female   MRN: [id number]  PCP: Armando Samuels MD  Unit/Bed#: 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  No primary seen on CT scan  Gastroenterology on board  Oncology on board  Recommended Oral tyrosine kinase inhibitor that can be started outpatient   Patient had biopsy-proven GIST liver lesion, positive atypical cells from the pericardial fluid  And noted mediastinal mass  Patient seems to have high burden of GIST  Unknown primary at this point  For EGD/colonoscopy today  To be discussed with Oncology after endoscopy results    Pulmonary embolus [facility])  Assessment & Plan  Discussed with radiology 1/19  CT scan from 01/03/2021 and 01/16/2021 showed pulmonary infarct, posterior segment right upper lobe pulmonary embolus is visible on CT scan  True emboli versus tumor emboli given tumor invading the right atrium  Lower extremity venous Doppler negative for DVT  Discussed the risks versus benefits of anticoagulation with patient and her   They agreed to start anticoagulation and see how she does  Discussed with cardiology and thoracic surgery    Continue heparin drip    Anemia  Assessment & Plan  Multifactorial, post op, bloody pericardial effusion, chronic disease  S/p 1 unit of blood 1/23 with appropriate response  Results for Taisha Delgadillo (MRN [id number]) as of 1/25/2021 14:56   Ref  Range 1/20/2021 05:01 1/21/2021 05:25 1/22/2021 05:25 1/22/2021 14:10 1/23/2021 03:49 1/24/2021 04:55 1/25/2021 05:19 1/25/2021 08:22   Hemoglobin Latest Ref Range: 11 5 - 15 4 g/dL 8 5 (L) 8 3 (L) 7 3 (L) 7 8 (L) 7 4 (L) 8 3 (L) 6 2 (LL) 8 7 (L)     HGB this  morning 6 2-repeat of 8 7-like Lab error  Continue monitoring H&H  Transfuse for hemoglobin less than 7  Plan for EGD, colonoscopy today  Leukocytosis  Assessment & Plan  Suspect reactive leukocytosis, improving  Continue to monitor      Pain and swelling of right upper extremity  Assessment & Plan  Right upper extremity swelling noted   US obtained that shows possible hematoma vs  Proximal biceps tendon repair  General surgery consult appreciated- recommends conservative managment and holding AC  Patient does not have motor strength loss  neurovascular intact  Pain is significantly improved since last night  Patient is able to extend her shoulder, slightly limited due to pain  At this point, patient will follow up outpatient   If persistent in 3 months, may obtain MRI  Swelling has decreased significantly     Mediastinal mass  Assessment & Plan  · CT: Anterior mediastinal mass, extending into the right atrial lumen  · S/p right VATS, pericardial window with biopsy of the anterior mediastinal mass by thoracic surgery 1/21   · Friable mass seen under the pericardium with thickened pericardium   · CT pulled 1/24      Pericardial effusion without cardiac tamponade  Assessment & Plan  Evaluated by thoracic surgery  Underwent pericardial drain placement 01/04/2021, continued to have significant drainage  Status post right VATS, pericardial window with biopsy of the anterior mediastinal mass on 01/21/2021  According to op note there was a friable mass under the pericardium  Echo from 01/22 showed trace pericardial effusion, no evidence of hemodynamic compromise  CT out 21  Pending repeat Echo Today  Pending Bx from       Essential hypertension  Assessment & Plan    Systolic pressure 773 is-DC Midodrin    * Atrial flutter ([address] )  Assessment & Plan  · Patient was brought to ED  due to coughing, lower extremity edema and dyspnea  · EKG showed atrial flutter, QTc prolongation  Her son passed away on 2020  · Patient was evaluated by Cardiology  · Cardiac echo with large pericardial effusion without tamponade, EF 65%  · Initially was treated with Cardizem drip, Currently off Cardizem drip,   · on p o  Metoprolol and diltiazem  · Has a PICC line due to difficult IV access  · Continue metoprolol 100 mg b i d  And Cardizem 180 mg daily  · Started on Cardizem 30 mg p o  Q 6 hours p r n  HR> 110 By cardiology          VTE Pharmacologic Prophylaxis: Heparin   VTE Mechanical Prophylaxis: SCD's        SUBJECTIVE     Lying comfortably in the bed-no new complaint  NPO midnight  OBJECTIVE     Vitals:   Temp (24hrs), Av 1 °F (36 7 °C), Min:97 4 °F (36 3 °C), Max:98 9 °F (37 2 °C)    Temp:  [97 4 °F (36 3 °C)-98 9 °F (37 2 °C)] 98 9 °F (37 2 °C)  HR:  [80-97] 92  Resp:  [17-18] 18  BP: (102-140)/(62-75) 140/65  SpO2:  [91 %-98 %] 95 %  Body mass index is 32 61 kg/m²  Input and Output Summary (last 24 hours): Intake/Output Summary (Last 24 hours) at 2021 1515  Last data filed at 2021 1148  Gross per 24 hour   Intake 208 55 ml   Output 1550 ml   Net -1341 45 ml       Physical Exam:   GENERAL: NAD  HEENT:  NC/AT, PERRL, EOMI, MMM, no scleral icterus  CARDIAC:  RRR, +S1/S2, no S3/S4 heard, no m/g/r  PULMONARY:  CTA B/L, no wheezing/rales/rhonci, non-labored breathing  ABDOMEN:  Soft, NT/ND, +BS, no rebound/guarding/rigidity  Extremities:  2+ Pulses in DP/PT  No edema, cyanosis, or clubbing  NEUROLOGIC:  Alert/oriented x3   No motor or sensory deficits  SKIN:  No rashes or erythema      ADDITIONAL DATA     Labs & Recent Cultures:     Results from last 7 days   Lab Units 01/25/21  0822 01/25/21  0519   WBC Thousand/uL  --  12 70*   HEMOGLOBIN g/dL 8 7* 6 2*   HEMATOCRIT % 28 9* 20 8*   PLATELETS Thousands/uL  --  309   NEUTROS PCT %  --  76*   LYMPHS PCT %  --  6*   MONOS PCT %  --  9   EOS PCT %  --  2     Results from last 7 days   Lab Units 01/25/21  0519   POTASSIUM mmol/L 3 3*   CHLORIDE mmol/L 115*   CO2 mmol/L 22   BUN mg/dL 25   CREATININE mg/dL 0 88   CALCIUM mg/dL 7 6*   ALK PHOS U/L 75   ALT U/L 18   AST U/L 25     Results from last 7 days   Lab Units 01/24/21  1136   INR  1 62*                 Nutrition:  Diet NPO  Diet NPO  Radiology Results:   XR chest portable   Final Result by Melva Batista MD (01/25 9335)      No acute cardiopulmonary abnormality  Workstation performed: [id number]         XR chest pa & lateral   Final Result by Jatin Gee MD (01/24 1605)      No pneumothorax identified  Workstation performed: [id number] kidney and bladder   Final Result by Jonathan Coles MD (01/24 0140)      1  No hydronephrosis  2   Urinary bladder is suboptimally distended, limiting its evaluation  3   Incidentally noted small volume ascites in the right upper quadrant  Workstation performed: [id number]         XR chest portable   Final Result by Niall De La Vega MD (01/22 8381)      No evidence of pneumothorax  Trace right pleural fluid                  Workstation performed: [id number]         VAS lower limb venous duplex study, complete bilateral   Final Result by Debbi Mariee MD (01/20 1004)      XR chest portable   Final Result by Radha Shine MD (01/19 1204)      Right PICC in mid SVC  Improving right middle lobe opacity  Its appearance and evolution on CT from 1/3/2021 to 1/16/2021 is compatible with a pulmonary infarct    A posterior segment right upper lobe pulmonary embolus is visible in retrospect on the chest CT from 1/3/2021    with increasing right pulmonary emboli on the chest CT from 1/16/2021 (most conspicuous on the coronal series)  It cannot be determined if they are true emboli or tumor emboli given tumor invading the right atrium  I personally discussed this study with Shalinitimmy Fine on 1/19/2021 at 12:01 PM                      Workstation performed: [id number]         CT chest abdomen pelvis w contrast   Final Result by Jass Marte MD (01/16 3289)      1  Stable peripheral middle lobe opacity, consistent with pneumonia   2  Stable cardiac/mediastinal mass extending into the right atrial lumen, consistent with malignancy   3  No pericardial effusion status post drain   4  Small pleural effusions   5  Hepatic masses consistent with malignancy   6  Dense material noted adjacent to the liver and in the paracolic gutter, likely represents hemorrhage from recent biopsy            Workstation performed: [id number]         XR chest portable   Final Result by Phil Lopez DO (01/15 1051)   Stable appearance of the chest             Workstation performed: [id number]         IR biopsy liver mass   Final Result by Tim Chavez MD (01/13 1733)   Impression:       Ultrasound-guided biopsy of a hypoechoic liver mass  Imaging appearance is concerning for a partially necrotic mass  Workstation performed: [id number]         CT chest w contrast   Final Result by Thomas Marie MD (01/11 1549)      Findings suspicious for metastatic mediastinal mass measuring 4 5 x 5 5 cm anterior to the right atrium, likely the cause of the pericardial effusion  Innumerable hepatic metastases  Right upper lobe consolidation suspicious for pneumonia with right-sided pleural effusion  Workstation performed: [id number]         [address] [facility] MSK limited   Final Result by Jass Marte MD (01/08 9465)      Previously noted collection is contiguous with the bicipital groove, and the long head biceps tendon is not visualized within the bicipital groove    Findings most likely represent proximal tendon tear with distal retraction  If collection does not    resolve spontaneously (within 1-3 months), recommend right humeral MRI with contrast as hemorrhagic neoplasm is within the differential       The study was marked in EPIC for significant notification  Workstation performed: [id number]         US extremity soft tissue   Final Result by Edgar Hernandez MD (01/08 1212)      Complex fluid collection in the medial right upper arm with hematocrit level in keeping with hematoma  Given the location, the possibility of associated underlying biceps tendon rupture is not excluded  This can be evaluated with MSK shoulder    ultrasound if clinically indicated  Recommend follow-up ultrasound in 1 month to confirm resolution  The study was marked in [facility] for immediate notification  Workstation performed: [id number]         XR chest portable   Final Result by Natalia Burks MD (01/05 1333)      Pericardial drain placement with slight decrease in size of the cardiac silhouette  Small effusions and bibasilar atelectasis  Workstation performed: [id number]         CTA chest pe study   Final Result by Yovany Karimi MD (01/03 1718)      Large pericardial effusion measuring approximately 3 3 cm area of bulging of the right pericardium or a potential adjacent right mediastinal mass measuring approximately 6 6 cm #2/100  The pericardial effusion may be on a malignant basis  Prominent diffuse mediastinal adenopathy         Interlobular septal thickening and small pleural effusions in keeping with pulmonary edema  Posterior right upper lobe consolidation #3/55 in a wedge-shaped appearance with a apex extending towards the right hilum may represent pneumonia or a postobstructive pneumonitis from a central mass  A discrete mass is not identified but could be    obscured         Innumerable vague hypodensities throughout the liver are suspicious for metastases               I personally discussed this study with Debra Hurtado on 1/3/2021 at 5:18 PM                Workstation performed: [id number]           Scheduled Medications:  [START ON 1/26/2021] diltiazem, 180 mg, Daily  docusate sodium, 100 mg, BID  HYDROmorphone, 0 2 mg, Once  metoprolol succinate, 100 mg, Q12H STEPHANIE  senna, 1 tablet, Daily        PRN MEDS:  acetaminophen, 650 mg, Q6H PRN  aluminum-magnesium hydroxide-simethicone, 30 mL, Q6H PRN  diltiazem, 30 mg, Q6H PRN  heparin (porcine), 3,400 Units, Q1H PRN  heparin (porcine), 6,800 Units, Q1H PRN  HYDROmorphone, 0 2 mg, Q4H PRN  lidocaine (PF), 0 5 mL, Once PRN  ondansetron, 4 mg, Q6H PRN  oxyCODONE, 2 5 mg, Q4H PRN  oxyCODONE, 5 mg, Q4H PRN        Last 24 Hours Medication List:   Current Facility-Administered Medications   Medication Dose Route Frequency Provider Last Rate    acetaminophen  650 mg Oral Q6H PRN Klaus Mccall MD      aluminum-magnesium hydroxide-simethicone  30 mL Oral Q6H PRN MD Alice Polanco [place] ON 1/26/2021] diltiazem  180 mg Oral Daily Jo Hodges MD      diltiazem  30 mg Oral Q6H PRN Klaus Mccall MD      docusate sodium  100 mg Oral BID Klaus Mccall MD      heparin (porcine)  3-30 Units/kg/hr (Order-Specific) Intravenous Titrated Layla Landry MD Stopped (01/25/21 7202)    heparin (porcine)  3,400 Units Intravenous Q1H PRN Layla Landry MD      heparin (porcine)  6,800 Units Intravenous Q1H PRN Layla Landry MD      HYDROmorphone  0 2 mg Intravenous Q4H PRN Larimore Stefany, MD     [place] HYDROmorphone  0 2 mg Intravenous Once Klaus Mccall MD      lidocaine (PF)  0 5 mL Infiltration Once PRN Chris Walker MD      metoprolol succinate  100 mg Oral Q12H [facility] Stefany, MD      ondansetron  4 mg Intravenous Q6H PRN Klaus Mccall MD      oxyCODONE  2 5 mg Oral Q4H PRN Klaus Mccall MD      oxyCODONE  5 mg Oral Q4H PRN MD Joceline Polanco  1 tablet Oral Daily Kayode Zambrano MD            Time Spent for Care: 30 mins spent in total   More than 50% of total time spent on counseling and coordination of care as described above  Current Length of Stay: 22 day(s)      Code Status: Level 1 - Full Code          ** Please Note: This note is constructed using a voice recognition dictation system   **

## 2021-01-25 NOTE — ASSESSMENT & PLAN NOTE
Right upper extremity swelling noted   US obtained that shows possible hematoma vs  Proximal biceps tendon repair  General surgery consult appreciated- recommends conservative managment and holding AC  Patient does not have motor strength loss  neurovascular intact  Pain is significantly improved since last night  Patient is able to extend her shoulder, slightly limited due to pain  At this point, patient will follow up outpatient   If persistent in 3 months, may obtain MRI  Swelling has decreased significantly

## 2021-01-25 NOTE — ASSESSMENT & PLAN NOTE
· CT: Anterior mediastinal mass, extending into the right atrial lumen  · S/p right VATS, pericardial window with biopsy of the anterior mediastinal mass by thoracic surgery 1/21   · Friable mass seen under the pericardium with thickened pericardium   · CT pulled 1/24

## 2021-01-25 NOTE — ASSESSMENT & PLAN NOTE
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

## 2021-01-25 NOTE — ASSESSMENT & PLAN NOTE
Evaluated by thoracic surgery  Underwent pericardial drain placement 01/04/2021, continued to have significant drainage  Status post right VATS, pericardial window with biopsy of the anterior mediastinal mass on 01/21/2021  According to op note there was a friable mass under the pericardium  Echo from 01/22 showed trace pericardial effusion, no evidence of hemodynamic compromise  CT out 1/24/21  Repeat echo 1/25 showed: "There was mild to moderate multi-loculated pockets of pericardial effusions around the heart   There was no evidence of hemodynamic compromise "

## 2021-01-25 NOTE — ASSESSMENT & PLAN NOTE
Patient noted to have hemoglobin as low as 6 2 since admission; yesterday hemoglobin was 8 7; repeat hemoglobin this morning was 8 5-stable  Will continue anticoagulation and monitor again tomorrow; anticipate discharge if hemoglobin remains stable with a c

## 2021-01-25 NOTE — ANESTHESIA PREPROCEDURE EVALUATION
Procedure:  EGD  COLONOSCOPY    Relevant Problems   CARDIO   (+) Atrial flutter (HCC)   (+) Essential hypertension      /RENAL   (+) Acute kidney injury ([address] )      HEMATOLOGY   (+) Anemia      Transthoracic Echocardiogram  Limited 2D, Doppler, and Color Doppler     Study date:  2021     Patient: Gely Coughlin  MR number: [id number]  Account number: [de-identified]  : 1950  Age: 65 years  Gender: Female  Status: Inpatient  Location: Bedside  Height: 64 in  Weight: 189 6 lb  BP: 107/ 65 mmHg     Indications: Pericarditis-PE     Diagnoses: I31 3 - Pericardial effusion (noninflammatory), I32  - Pericarditis in diseases classified elsewhere     Sonographer:  Ilah Bernheim, RCS  Primary Physician:  Sumi Palacio MD  Referring Physician:  Mariela Bingham MD  Group:  [facility]  Cardiology Fellow: Basilio Mcintyre MD  Interpreting Physician:  Niecy Montemayor MD     SUMMARY     LEFT VENTRICLE:  Systolic function was normal  Ejection fraction was estimated to be 65 %  There were no regional wall motion abnormalities  Wall thickness was at the upper limits of normal      LEFT ATRIUM:  The atrium was mildly dilated      MITRAL VALVE:  There was mild annular calcification  There was mild regurgitation      AORTIC VALVE:  There was mild regurgitation      TRICUSPID VALVE:  There was mild regurgitation      PERICARDIUM:  There was moderate thickening  There was mild to moderate multi-loculated pockets of pericardial effusions around the heart  There was no evidence of hemodynamic compromise      HISTORY: PRIOR HISTORY: Breast CA;A flutter;HTN      PROCEDURE: The procedure was performed at the bedside  This was a routine study  The transthoracic approach was used  The study included limited 2D imaging, limited spectral Doppler, and color Doppler  The heart rate was 91 bpm, at the  start of the study   This was a technically difficult study      LEFT VENTRICLE: Size was normal  Systolic function was normal  Ejection fraction was estimated to be 65 %  There were no regional wall motion abnormalities  Wall thickness was at the upper limits of normal  There was no evidence of  concentric hypertrophy  DOPPLER: Transmitral flow pattern: atrial flutter  The study was not technically sufficient to allow evaluation of LV diastolic function      RIGHT VENTRICLE: The size was normal  Systolic function was normal  Wall thickness was normal      LEFT ATRIUM: The atrium was mildly dilated      RIGHT ATRIUM: Size was normal      MITRAL VALVE: There was mild annular calcification  Valve structure was normal  There was normal leaflet separation  DOPPLER: The transmitral velocity was within the normal range  There was no evidence for stenosis  There was mild  regurgitation      AORTIC VALVE: The valve was trileaflet  Leaflets exhibited mildly increased thickness, mild calcification, and normal cuspal separation  DOPPLER: Transaortic velocity was within the normal range  There was no evidence for stenosis  There  was mild regurgitation      TRICUSPID VALVE: The valve structure was normal  There was normal leaflet separation  DOPPLER: The transtricuspid velocity was within the normal range  There was no evidence for stenosis  There was mild regurgitation  Pulmonary artery  systolic pressure was at the upper limits of normal  Estimated peak PA pressure was 36 mmHg      PULMONIC VALVE: DOPPLER: The transpulmonic velocity was within the normal range  There was no significant regurgitation      PERICARDIUM: There was moderate thickening  There was mild to moderate multi-loculated pockets of pericardial effusions around the heart  There was no evidence of hemodynamic compromise      AORTA: The root exhibited normal size      SYSTEMIC VEINS: IVC: The inferior vena cava was normal in size   Respirophasic changes were normal      Recent labs personally reviewed:  Lab Results   Component Value Date    WBC 12 70 (H) 01/25/2021    HGB 8 7 (L) 01/25/2021     01/25/2021     Lab Results   Component Value Date    K 3 3 (L) 01/25/2021    BUN 25 01/25/2021    CREATININE 0 88 01/25/2021     Lab Results   Component Value Date    PTT 74 (H) 01/25/2021      Lab Results   Component Value Date    INR 1 62 (H) 01/24/2021       Blood type O    Lab Results   Component Value Date    HGBA1C 6 9 (H) 01/03/2021     Physical Exam    Airway    Mallampati score: II  TM Distance: >3 FB  Neck ROM: full     Dental       Cardiovascular  Rate: abnormal, Murmur,     Pulmonary  Decreased breath sounds,     Other Findings        Anesthesia Plan  ASA Score- 4     Anesthesia Type- IV sedation with anesthesia with ASA Monitors  Additional Monitors:   Airway Plan:           Plan Factors-Exercise tolerance (METS): <4 METS  Chart reviewed  EKG reviewed  Existing labs reviewed  Patient summary reviewed  Induction- intravenous  Postoperative Plan-     Informed Consent- Anesthetic plan and risks discussed with patient  I personally reviewed this patient with the CRNA  Discussed and agreed on the Anesthesia Plan with the CRNA  Shane Garcia

## 2021-01-26 ENCOUNTER — APPOINTMENT (INPATIENT)
Dept: GASTROENTEROLOGY | Facility: HOSPITAL | Age: 71
DRG: 270 | End: 2021-01-26
Payer: COMMERCIAL

## 2021-01-26 LAB
ANION GAP SERPL CALCULATED.3IONS-SCNC: 5 MMOL/L (ref 4–13)
APTT PPP: 138 SECONDS (ref 23–37)
APTT PPP: 39 SECONDS (ref 23–37)
APTT PPP: 75 SECONDS (ref 23–37)
BUN SERPL-MCNC: 21 MG/DL (ref 5–25)
CALCIUM SERPL-MCNC: 8.3 MG/DL (ref 8.3–10.1)
CHLORIDE SERPL-SCNC: 113 MMOL/L (ref 100–108)
CO2 SERPL-SCNC: 27 MMOL/L (ref 21–32)
CREAT SERPL-MCNC: 0.95 MG/DL (ref 0.6–1.3)
ERYTHROCYTE [DISTWIDTH] IN BLOOD BY AUTOMATED COUNT: 22.5 % (ref 11.6–15.1)
GFR SERPL CREATININE-BSD FRML MDRD: 69 ML/MIN/1.73SQ M
GLUCOSE SERPL-MCNC: 108 MG/DL (ref 65–140)
HCT VFR BLD AUTO: 27.9 % (ref 34.8–46.1)
HGB BLD-MCNC: 8.5 G/DL (ref 11.5–15.4)
MCH RBC QN AUTO: 22 PG (ref 26.8–34.3)
MCHC RBC AUTO-ENTMCNC: 30.5 G/DL (ref 31.4–37.4)
MCV RBC AUTO: 72 FL (ref 82–98)
NRBC BLD AUTO-RTO: 4 /100 WBCS
PLATELET # BLD AUTO: 399 THOUSANDS/UL (ref 149–390)
PMV BLD AUTO: 10.6 FL (ref 8.9–12.7)
POTASSIUM SERPL-SCNC: 4 MMOL/L (ref 3.5–5.3)
RBC # BLD AUTO: 3.87 MILLION/UL (ref 3.81–5.12)
SODIUM SERPL-SCNC: 145 MMOL/L (ref 136–145)
WBC # BLD AUTO: 15.7 THOUSAND/UL (ref 4.31–10.16)

## 2021-01-26 PROCEDURE — 85027 COMPLETE CBC AUTOMATED: CPT | Performed by: INTERNAL MEDICINE

## 2021-01-26 PROCEDURE — 99232 SBSQ HOSP IP/OBS MODERATE 35: CPT | Performed by: INTERNAL MEDICINE

## 2021-01-26 PROCEDURE — 0DJ07ZZ INSPECTION OF UPPER INTESTINAL TRACT, VIA NATURAL OR ARTIFICIAL OPENING: ICD-10-PCS | Performed by: INTERNAL MEDICINE

## 2021-01-26 PROCEDURE — 85730 THROMBOPLASTIN TIME PARTIAL: CPT | Performed by: INTERNAL MEDICINE

## 2021-01-26 PROCEDURE — 91110 GI TRC IMG INTRAL ESOPH-ILE: CPT

## 2021-01-26 PROCEDURE — 85730 THROMBOPLASTIN TIME PARTIAL: CPT | Performed by: PHYSICIAN ASSISTANT

## 2021-01-26 PROCEDURE — 80048 BASIC METABOLIC PNL TOTAL CA: CPT | Performed by: INTERNAL MEDICINE

## 2021-01-26 RX ADMIN — STANDARDIZED SENNA CONCENTRATE 8.6 MG: 8.6 TABLET ORAL at 10:02

## 2021-01-26 RX ADMIN — DOCUSATE SODIUM 100 MG: 100 CAPSULE, LIQUID FILLED ORAL at 17:17

## 2021-01-26 RX ADMIN — DOCUSATE SODIUM 100 MG: 100 CAPSULE, LIQUID FILLED ORAL at 10:02

## 2021-01-26 RX ADMIN — PANTOPRAZOLE SODIUM 40 MG: 40 TABLET, DELAYED RELEASE ORAL at 06:06

## 2021-01-26 RX ADMIN — METOPROLOL SUCCINATE 100 MG: 100 TABLET, EXTENDED RELEASE ORAL at 10:02

## 2021-01-26 RX ADMIN — METOPROLOL SUCCINATE 100 MG: 100 TABLET, EXTENDED RELEASE ORAL at 20:03

## 2021-01-26 RX ADMIN — HEPARIN SODIUM 22 UNITS/KG/HR: 10000 INJECTION, SOLUTION INTRAVENOUS at 12:14

## 2021-01-26 RX ADMIN — PANTOPRAZOLE SODIUM 40 MG: 40 TABLET, DELAYED RELEASE ORAL at 17:17

## 2021-01-26 RX ADMIN — DILTIAZEM HYDROCHLORIDE 180 MG: 180 CAPSULE, COATED, EXTENDED RELEASE ORAL at 10:02

## 2021-01-26 NOTE — PERIOPERATIVE NURSING NOTE
Pt swallowed capsule yesterday post procedure at [facility]  Experienced no difficulty  Visualized in stomach  Removed this morning at 0630

## 2021-01-26 NOTE — PROGRESS NOTES
Cardiology Team 2 Progress Note - Catrachita Crandall 65 y o  female MRN: [id number]    Unit/Bed#: [facility] 426-01 Encounter: [id number]          Subjective:   Patient seen and examined  No significant events overnight  Patient still in atrial flutter / fibrillation on telemetry  Patient states she feels tired and has some swelling in her feet, but denies lightheadedness, dizziness, syncope, headache, vision changes, diaphoresis, chest pain, palpitations, shortness of breath, PND, orthopnea, nausea, vomiting, abdominal pain  Objective:     Vitals: Blood pressure 115/55, pulse 100, temperature 98 3 °F (36 8 °C), temperature source Axillary, resp   rate 17, height 5' 4" (1 626 m), weight 85 5 kg (188 lb 7 9 oz), SpO2 91 % , Body mass index is 32 35 kg/m² ,   Orthostatic Blood Pressures      Most Recent Value   Blood Pressure  115/55 filed at 01/26/2021 0700   Patient Position - Orthostatic VS  Sitting filed at 01/26/2021 0700            Intake/Output Summary (Last 24 hours) at 1/26/2021 0853  Last data filed at 1/26/2021 0000  Gross per 24 hour   Intake 434 42 ml   Output 400 ml   Net 34 42 ml         Physical Exam:    GEN: Catrachita Crandall appears well, alert and oriented x 3, pleasant and cooperative   HEENT:  Normocephalic, atraumatic, anicteric, moist mucous membranes  NECK: No JVD or carotid bruits   HEART: irregular rhythm, normal S1 and S2, no murmurs, clicks, gallops or rubs   LUNGS: Clear to auscultation bilaterally; no wheezes, rales, or rhonchi; respiration nonlabored   ABDOMEN:  Normoactive bowel sounds, soft, no tenderness, no distention  EXTREMITIES: peripheral pulses palpable; no edema  NEURO: no gross focal findings; cranial nerves grossly intact   SKIN:  Dry, intact, warm to touch      Current Facility-Administered Medications:     acetaminophen (TYLENOL) tablet 650 mg, 650 mg, Oral, Q6H PRN, Charlies Goltz, MD, 650 mg at 01/14/21 2020    aluminum-magnesium hydroxide-simethicone (MYLANTA) oral suspension 30 mL, 30 mL, Oral, Q6H PRN, Zheng Gardner MD    diltiazem (CARDIZEM CD) 24 hr capsule 180 mg, 180 mg, Oral, Daily, Aj Farfan MD    diltiazem (CARDIZEM) tablet 30 mg, 30 mg, Oral, Q6H PRN, Zheng Gardner MD    docusate sodium (COLACE) capsule 100 mg, 100 mg, Oral, BID, Zheng Gardner MD, 100 mg at 01/25/21 1833    heparin (porcine) 25,000 units in 0 45% NaCl 250 mL infusion (premix), 3-30 Units/kg/hr (Order-Specific), Intravenous, Titrated, Elias Galvez PA-C, Last Rate: 18 7 mL/hr at 01/26/21 0637, 22 Units/kg/hr at 01/26/21 0637    HYDROmorphone (DILAUDID) injection 0 2 mg, 0 2 mg, Intravenous, Q4H PRN, Zheng Gardner MD, 0 2 mg at 01/09/21 0645    HYDROmorphone (DILAUDID) injection 0 2 mg, 0 2 mg, Intravenous, Once, Zheng Gardner MD    lidocaine (PF) (XYLOCAINE-MPF) 1 % injection 0 5 mL, 0 5 mL, Infiltration, Once PRN, Toribio Evangelista MD    metoprolol succinate (TOPROL-XL) 24 hr tablet 100 mg, 100 mg, Oral, Q12H [address], Zheng Gardner MD, 100 mg at 01/25/21 2006    [facility]etron [facility]) injection 4 mg, 4 mg, Intravenous, Q6H PRN, Zheng Gardner MD    oxyCODONE (ROXICODONE) IR tablet 2 5 mg, 2 5 mg, Oral, Q4H PRN, Zheng Gardner MD    oxyCODONE (ROXICODONE) IR tablet 5 mg, 5 mg, Oral, Q4H PRN, Zheng Gardner MD, 5 mg at 01/23/21 0109    pantoprazole (PROTONIX) EC tablet 40 mg, 40 mg, Oral, BID Jennifer SORENSEN DO, 40 mg at 01/26/21 0606    senna (SENOKOT) tablet 8 6 mg, 1 tablet, Oral, Daily, Zheng Gardner MD, 8 6 mg at 01/24/21 0926    Labs & Results:    Lab Results   Component Value Date    TROPONINI <0 02 01/03/2021    TROPONINI <0 02 01/03/2021    TROPONINI <0 03 01/03/2021       Lab Results   Component Value Date    CALCIUM 8 3 01/26/2021    K 4 0 01/26/2021    CO2 27 01/26/2021     (H) 01/26/2021    BUN 21 01/26/2021    CREATININE 0 95 01/26/2021       Lab Results   Component Value Date    WBC 15 70 (H) 01/26/2021    HGB 8 5 (L) 01/26/2021    HCT 27 9 (L) 01/26/2021    MCV 72 (L) 01/26/2021     (H) 01/26/2021     Results from last 7 days   Lab Units 01/25/21  2144   INR  1 55*       No results found for: CHOL  No results found for: HDL  No results found for: LDLCALC  No results found for: TRIG    Lab Results   Component Value Date    ALT 18 01/25/2021    AST 25 01/25/2021         Telemetry:   Personally reviewed by Donya Wilkerson: Patient in atrial flutter on tele    Imaging:       VTE Prophylaxis: Heparin      Assessment:  Principal Problem:    Atrial flutter ([address] )  Active Problems:    Essential hypertension    Pericardial effusion without cardiac tamponade    Mediastinal mass    Pain and swelling of right upper extremity    Metastatic GIST    Leukocytosis    Anemia    Pulmonary embolus (HCC)        1  Pericardial effusion  a  Echo 1/25: pericardium has moderate thickening with mild to moderate multi-loculated pockets of pericardial effusions around heart, without evidence of hemodynamic compromise  b  S/p pericadial window with bulb placement on 1/4  c  S/p VATS assisted right sided pericardial window with mediastinal mass bx 1/21  2  Atrial flutter / fibrillation  a  Patient on tele, remains in atrial flutter / fibrillation on rate control  b  HR   3  Essential Hypertension  a  Patient normotensive: 112-167/53-81  4  Metastatic GIST Tumor with unknown primary  a  S/p EGD / colonoscopy 1/25  b  Patient with mediastinal mass, s/p biopsy on 1/21      Plan:  1  Continue rate control for afib with Cardizem 180 mg QD and Metoprolol 100 mg Q12  2  Continue heparin drip for anticoagulation  3  Cardiology will continue to follow      Case discussed and reviewed with Dr Rafael Pop who agrees with my assessment and plan  Thank you for involving us in the care of your patient  [employer]/ [email or web address]/Care Everywhere records reviewed: Yes    ** Please Note: Fluency DirectDictation voice to text software may have been used in the creation of this document  **

## 2021-01-26 NOTE — PROGRESS NOTES
Progress Note - Werner Auguste 1950, 65 y o  female MRN: [id number]    Unit/Bed#: [facility] 426-01 Encounter: [id number]    Primary Care Provider: Hayley Acosta MD   Date and time admitted to hospital: 1/3/2021  8:26 AM        Pulmonary embolus [facility])  Assessment & Plan  Case was Discussed with radiology on 1/19  CT scan from 01/03/2021 and 01/16/2021 showed pulmonary infarct, as per note on chest x-ray of 1/16- "posterior segment right upper lobe pulmonary embolus is visible on CT scan  True emboli versus tumor emboli given tumor invading the right atrium "  Given these findings, PE was likely present on admission  Lower extremity venous Doppler negative for DVT  Discussed the risks versus benefits of anticoagulation with patient and her   They agreed to start anticoagulation and see how she does  Discussed with cardiology and thoracic surgery  Continue heparin drip  1/26-hemoglobin remains stable on heparin drip; monitor overnight and consider discharge on NOAC if remains stable    Anemia  Assessment & Plan  Patient noted to have hemoglobin as low as 6 2 since admission; yesterday hemoglobin was 8 7; repeat hemoglobin this morning was 8 5-stable  Will continue anticoagulation and monitor again tomorrow; anticipate discharge if hemoglobin remains stable with a c     Leukocytosis  Assessment & Plan  Suspect reactive leukocytosis, improving  Continue to monitor      Metastatic GIST  Assessment & Plan  CT on admission shows: Innumerable vague hypodensities throughout the liver suspicious for metastases  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  No primary seen on CT scan  Gastroenterology on board  Oncology on board  Recommended Oral tyrosine kinase inhibitor that can be started outpatient   Patient had biopsy-proven GIST liver lesion, positive atypical cells from the pericardial fluid  And noted mediastinal mass    Patient seems to have high burden of GIST  Unknown primary at this point  EGD/colonoscopy yesterday negative; pill endoscopy pending  Patient to follow with Oncology in the outpatient setting    Pain and swelling of right upper extremity  Assessment & Plan  Right upper extremity swelling noted   US obtained that shows possible hematoma vs  Proximal biceps tendon repair  General surgery consult appreciated- recommends conservative managment and holding AC  Patient does not have motor strength loss  neurovascular intact  Pain is significantly improved since last night  Patient is able to extend her shoulder, slightly limited due to pain  At this point, patient will follow up outpatient  If persistent in 3 months, may obtain MRI  Swelling has decreased significantly     Mediastinal mass  Assessment & Plan  · CT: Anterior mediastinal mass, extending into the right atrial lumen  · S/p right VATS, pericardial window with biopsy of the anterior mediastinal mass by thoracic surgery 1/21   · Friable mass seen under the pericardium with thickened pericardium   · CT pulled 1/24      Pericardial effusion without cardiac tamponade  Assessment & Plan  Evaluated by thoracic surgery  Underwent pericardial drain placement 01/04/2021, continued to have significant drainage  Status post right VATS, pericardial window with biopsy of the anterior mediastinal mass on 01/21/2021  According to op note there was a friable mass under the pericardium  Echo from 01/22 showed trace pericardial effusion, no evidence of hemodynamic compromise  CT out 1/24/21  Repeat echo 1/25 showed: "There was mild to moderate multi-loculated pockets of pericardial effusions around the heart   There was no evidence of hemodynamic compromise "      Essential hypertension  Assessment & Plan  Blood pressure well controlled; continue present therapy    * Atrial flutter [facility])  Assessment & Plan  · Patient was brought to ED  due to coughing, lower extremity edema and dyspnea  · EKG showed atrial flutter, QTc prolongation  Her son passed away on 12/21/2020  · Patient was evaluated by Cardiology  · Cardiac echo with large pericardial effusion without tamponade, EF 65%  · Initially was treated with Cardizem drip, Currently off Cardizem drip,   · on p o  Metoprolol and diltiazem  · Has a PICC line due to difficult IV access  · Continue metoprolol 100 mg b i d  And Cardizem 180 mg daily        VTE Pharmacologic Prophylaxis:   Pharmacologic: Heparin Drip  Mechanical VTE Prophylaxis in Place: Yes    Patient Centered Rounds: I have performed bedside rounds with nursing staff today  Discussions with Specialists or Other Care Team Provider:     Education and Discussions with Family / Patient: Care plan discussed with patient who voiced understanding and agrees with recommendations  Time Spent for Care: 30 minutes  More than 50% of total time spent on counseling and coordination of care as described above  Current Length of Stay: 23 day(s)    Current Patient Status: Inpatient   Certification Statement: The patient will continue to require additional inpatient hospital stay due to Treatment of PE and monitoring of hemoglobin    Discharge Plan: To be determined    Code Status: Level 1 - Full Code      Subjective:   Patient seen examined bedside, no acute distress or discomfort noted  Vital signs stable and hemoglobin is 8 5; remains low but stable  Patient currently on heparin drip for acute PE  Has been worked up in found to have [facility]; to follow-up with oncology in the outpatient setting  EGD and colonoscopy negative; tiger text received from GI showing that patient's pill endoscopy was also unrevealing of any source of her GI ST  To have additional GI follow-up in the outpatient setting  Monitor in hemoglobin and consider transitioning to NOAC tomorrow as long as her hip reduced therapeutic in her hemoglobin is stable      Objective:     Vitals:   Temp (24hrs), Av 7 °F (37 1 °C), Min:98 °F (36 7 °C), Max:100 5 °F (38 1 °C)    Temp:  [98 °F (36 7 °C)-100 5 °F (38 1 °C)] 98 3 °F (36 8 °C)  HR:  [] 90  Resp:  [16-20] 18  BP: (110-167)/(53-81) 110/62  SpO2:  [89 %-96 %] 96 %  Body mass index is 32 35 kg/m²  Input and Output Summary (last 24 hours): Intake/Output Summary (Last 24 hours) at 2021 1333  Last data filed at 2021 1236  Gross per 24 hour   Intake 934 42 ml   Output    Net 934 42 ml       Physical Exam:     Physical Exam  Vitals signs and nursing note reviewed  Constitutional:       General: She is not in acute distress  Appearance: She is well-developed  HENT:      Head: Normocephalic and atraumatic  Eyes:      Conjunctiva/sclera: Conjunctivae normal    Neck:      Musculoskeletal: Neck supple  Cardiovascular:      Rate and Rhythm: Normal rate and regular rhythm  Heart sounds: No murmur  Pulmonary:      Effort: Pulmonary effort is normal  No respiratory distress  Comments: Reduced breath sounds bilaterally  Abdominal:      Palpations: Abdomen is soft  Tenderness: There is no abdominal tenderness  Skin:     General: Skin is warm and dry  Neurological:      Mental Status: She is alert and oriented to person, place, and time  Psychiatric:         Mood and Affect: Mood normal          Behavior: Behavior normal          Judgment: Judgment normal            Additional Data:     Labs:    Results from last 7 days   Lab Units 21  0436 21  1713  21  0519   WBC Thousand/uL 15 70* 15 06*  --  12 70*   HEMOGLOBIN g/dL 8 5* 8 7*   < > 6 2*   HEMATOCRIT % 27 9* 28 6*   < > 20 8*   PLATELETS Thousands/uL 399* 424*  --  309   BANDS PCT %  --  3  --   --    NEUTROS PCT %  --   --   --  76*   LYMPHS PCT %  --   --   --  6*   LYMPHO PCT %  --  3*  --   --    MONOS PCT %  --   --   --  9   MONO PCT %  --  2*  --   --    EOS PCT %  --  4  --  2    < > = values in this interval not displayed       Results from last 7 days   Lab Units 01/26/21  0522 01/25/21  0519   SODIUM mmol/L 145 144   POTASSIUM mmol/L 4 0 3 3*   CHLORIDE mmol/L 113* 115*   CO2 mmol/L 27 22   BUN mg/dL 21 25   CREATININE mg/dL 0 95 0 88   ANION GAP mmol/L 5 7   CALCIUM mg/dL 8 3 7 6*   ALBUMIN g/dL  --  2 4*   TOTAL BILIRUBIN mg/dL  --  1 09*   ALK PHOS U/L  --  75   ALT U/L  --  18   AST U/L  --  25   GLUCOSE RANDOM mg/dL 108 99     Results from last 7 days   Lab Units 01/25/21  2144   INR  1 55*     Results from last 7 days   Lab Units 01/25/21  1741   POC GLUCOSE mg/dl 112                   * I Have Reviewed All Lab Data Listed Above  * Additional Pertinent Lab Tests Reviewed:  All Labs Within Last 24 Hours Reviewed    Imaging:    Imaging Reports Reviewed Today Include:  EGD/C scope; CT chest  Imaging Personally Reviewed by Myself Includes:      Recent Cultures (last 7 days):           Last 24 Hours Medication List:   Current Facility-Administered Medications   Medication Dose Route Frequency Provider Last Rate    acetaminophen  650 mg Oral Q6H PRN Alissa Escobedo MD      aluminum-magnesium hydroxide-simethicone  30 mL Oral Q6H PRN Alissa Escobedo MD      diltiazem  180 mg Oral Daily Gabriella Sylvester MD      diltiazem  30 mg Oral Q6H PRN Alissa Escobedo MD      docusate sodium  100 mg Oral BID Alissa Escobedo MD      heparin (porcine)  3-30 Units/kg/hr (Order-Specific) Intravenous Titrated Davis Mcginnis PA-C 22 Units/kg/hr (01/26/21 1214)    HYDROmorphone  0 2 mg Intravenous Q4H PRN Alissa Escobedo MD      HYDROmorphone  0 2 mg Intravenous Once Alissa Escobedo MD      lidocaine (PF)  0 5 mL Infiltration Once PRN Kathleen Izquierdo MD      metoprolol succinate  100 mg Oral Q12H Jose Antonio Gipson MD      ondansetron  4 mg Intravenous Q6H PRN Alissa Escobedo MD      oxyCODONE  2 5 mg Oral Q4H PRN Alissa Escobedo MD      oxyCODONE  5 mg Oral Q4H PRN Alissa Escobedo MD      pantoprazole  40 mg Oral BID AC DO Joceline Scott 1 tablet Oral Daily Michell Figueroa MD          Today, Patient Was Seen By: Teodora Arredondo MD    ** Please Note: Dictation voice to text software may have been used in the creation of this document   **

## 2021-01-26 NOTE — QUICK NOTE
GI Quick Note:    Capsule endoscopy reviewed and findings are as detailed below:  · Duodenal ulcers as was noted on EGD, mild duodenitis  · Few small bowel xanthomas  · Few small, non-bleeding AVMs in the proximal-mid small bowel  · No distinct mass lesions suggestive of GIST seen in the small bowel    Recommend outpatient FDG-PET to further evaluate GIST of unknown primary source  Primary team informed of capsule endoscopy findings and recommendations  DANIEL Banda  Gastroenterology Fellow  [facility]  Available on [email or web address]

## 2021-01-27 VITALS
BODY MASS INDEX: 31.62 KG/M2 | RESPIRATION RATE: 18 BRPM | WEIGHT: 185.19 LBS | SYSTOLIC BLOOD PRESSURE: 114 MMHG | DIASTOLIC BLOOD PRESSURE: 75 MMHG | OXYGEN SATURATION: 95 % | TEMPERATURE: 98.6 F | HEART RATE: 92 BPM | HEIGHT: 64 IN

## 2021-01-27 LAB
ANION GAP SERPL CALCULATED.3IONS-SCNC: 7 MMOL/L (ref 4–13)
APTT PPP: 121 SECONDS (ref 23–37)
BUN SERPL-MCNC: 16 MG/DL (ref 5–25)
CALCIUM SERPL-MCNC: 8.6 MG/DL (ref 8.3–10.1)
CHLORIDE SERPL-SCNC: 111 MMOL/L (ref 100–108)
CO2 SERPL-SCNC: 24 MMOL/L (ref 21–32)
CREAT SERPL-MCNC: 0.94 MG/DL (ref 0.6–1.3)
ERYTHROCYTE [DISTWIDTH] IN BLOOD BY AUTOMATED COUNT: 23.5 % (ref 11.6–15.1)
GFR SERPL CREATININE-BSD FRML MDRD: 70 ML/MIN/1.73SQ M
GLUCOSE SERPL-MCNC: 138 MG/DL (ref 65–140)
HCT VFR BLD AUTO: 29.3 % (ref 34.8–46.1)
HGB BLD-MCNC: 8.8 G/DL (ref 11.5–15.4)
MCH RBC QN AUTO: 21.8 PG (ref 26.8–34.3)
MCHC RBC AUTO-ENTMCNC: 30 G/DL (ref 31.4–37.4)
MCV RBC AUTO: 73 FL (ref 82–98)
NRBC BLD AUTO-RTO: 2 /100 WBCS
PLATELET # BLD AUTO: 457 THOUSANDS/UL (ref 149–390)
PMV BLD AUTO: 11.1 FL (ref 8.9–12.7)
POTASSIUM SERPL-SCNC: 4.1 MMOL/L (ref 3.5–5.3)
RBC # BLD AUTO: 4.04 MILLION/UL (ref 3.81–5.12)
SODIUM SERPL-SCNC: 142 MMOL/L (ref 136–145)
WBC # BLD AUTO: 14.24 THOUSAND/UL (ref 4.31–10.16)

## 2021-01-27 PROCEDURE — 99232 SBSQ HOSP IP/OBS MODERATE 35: CPT | Performed by: INTERNAL MEDICINE

## 2021-01-27 PROCEDURE — NC001 PR NO CHARGE: Performed by: INTERNAL MEDICINE

## 2021-01-27 PROCEDURE — 97166 OT EVAL MOD COMPLEX 45 MIN: CPT

## 2021-01-27 PROCEDURE — 80048 BASIC METABOLIC PNL TOTAL CA: CPT | Performed by: INTERNAL MEDICINE

## 2021-01-27 PROCEDURE — 85730 THROMBOPLASTIN TIME PARTIAL: CPT | Performed by: INTERNAL MEDICINE

## 2021-01-27 PROCEDURE — 99239 HOSP IP/OBS DSCHRG MGMT >30: CPT | Performed by: INTERNAL MEDICINE

## 2021-01-27 PROCEDURE — 85027 COMPLETE CBC AUTOMATED: CPT | Performed by: INTERNAL MEDICINE

## 2021-01-27 PROCEDURE — 97163 PT EVAL HIGH COMPLEX 45 MIN: CPT

## 2021-01-27 RX ORDER — METOPROLOL SUCCINATE 100 MG/1
100 TABLET, EXTENDED RELEASE ORAL EVERY 12 HOURS SCHEDULED
Qty: 60 TABLET | Refills: 1 | Status: SHIPPED | OUTPATIENT
Start: 2021-01-27 | End: 2021-01-28

## 2021-01-27 RX ORDER — DILTIAZEM HYDROCHLORIDE 180 MG/1
180 CAPSULE, COATED, EXTENDED RELEASE ORAL DAILY
Qty: 30 CAPSULE | Refills: 1 | Status: SHIPPED | OUTPATIENT
Start: 2021-01-28 | End: 2021-01-28

## 2021-01-27 RX ADMIN — RIVAROXABAN 15 MG: 15 TABLET, FILM COATED ORAL at 10:41

## 2021-01-27 RX ADMIN — HEPARIN SODIUM 16 UNITS/KG/HR: 10000 INJECTION, SOLUTION INTRAVENOUS at 07:30

## 2021-01-27 RX ADMIN — METOPROLOL SUCCINATE 100 MG: 100 TABLET, EXTENDED RELEASE ORAL at 10:29

## 2021-01-27 RX ADMIN — DOCUSATE SODIUM 100 MG: 100 CAPSULE, LIQUID FILLED ORAL at 10:29

## 2021-01-27 RX ADMIN — PANTOPRAZOLE SODIUM 40 MG: 40 TABLET, DELAYED RELEASE ORAL at 06:42

## 2021-01-27 RX ADMIN — DILTIAZEM HYDROCHLORIDE 180 MG: 180 CAPSULE, COATED, EXTENDED RELEASE ORAL at 10:29

## 2021-01-27 RX ADMIN — STANDARDIZED SENNA CONCENTRATE 8.6 MG: 8.6 TABLET ORAL at 10:29

## 2021-01-27 NOTE — DISCHARGE INSTRUCTIONS
Remember to take medications as directed on your discharge summary and follow up with your PCP within 1-2 weeks  A referral has been sent to Oncology and Gastroenterology; if you do not hear from them in the next few days; please call and arrange follow-up  Please remember your appointment with cardiothoracic surgery on 02/10; details are attached to discharge summary  Percutaneous Liver Biopsy   WHAT YOU NEED TO KNOW:   A PLB is a procedure to remove a sample of tissue from your liver  The sample can be sent to a lab and tested for liver disease, cancer, or infection  After the procedure you may have pain and bruising at the biopsy site  You may also have pain in your right shoulder  These symptoms should get better in 48 to 72 hours  DISCHARGE INSTRUCTIONS:     [address] and Larry Presley patients,  Contact Interventional Radiology at [phone] PATIENTS: Contact Interventional Radiology at [phone]   [facility] PATIENTS: Contact Interventional Radiology at [phone] if:    · Fever greater than 101 or chills  · You have severe pain in your abdomen  · Your abdomen is larger than usual and feels hard  · Your neck is more swollen and you have trouble swallowing  · You feel weak or dizzy  · Your heart is beating faster than usual    · Your pain does not get better after you take pain medicine  · Your wound is red, swollen, or draining pus  · You have nausea or are vomiting  · Your skin is itchy, swollen, or you have a rash  · You have questions or concerns about your condition or care  Medicines:   · Acetaminophen decreases pain and fever  It is available without a doctor's order  Acetaminophen can cause liver damage if not taken correctly  · Take your home medicine as directed  · Resume your normal diet  Small sips of flat soda will help with mild nausea  Self-care:   · Rest as directed   Do not play sports, exercise, or lift anything heavier than 5 pounds for up to 1 week  · Apply firm, steady pressure if bleeding occurs  A small amount of bleeding from your wound is possible  Apply pressure with a clean gauze or towel for 5 to 10 minutes  Call 911 if bleeding becomes heavy or does not stop  · Ask your healthcare provider when to take your blood thinner or antiplatelet medicine  You may need to wait 24 to 72 hours to take your medicine  This will prevent bleeding  Follow up with your healthcare provider as directed: Write down your questions so you remember to ask them during your visits

## 2021-01-27 NOTE — ASSESSMENT & PLAN NOTE
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

## 2021-01-27 NOTE — OCCUPATIONAL THERAPY NOTE
Occupational Therapy Evaluation     Patient Name: Brooke MAURO Date: 1/27/2021  Problem List  Principal Problem:    Atrial flutter ([address] )  Active Problems:    Essential hypertension    Pericardial effusion without cardiac tamponade    Mediastinal mass    Pain and swelling of right upper extremity    Metastatic GIST    Leukocytosis    Anemia    Pulmonary embolus [facility])    Past Medical History  Past Medical History:   Diagnosis Date    Arthritis     Hypertension      Past Surgical History  Past Surgical History:   Procedure Laterality Date    IR BIOPSY LIVER MASS  1/13/2021    MASTECTOMY      left     PERICARDIAL WINDOW N/A 1/4/2021    Procedure: WINDOW PERICARDIAL, subxiphoid ;  Surgeon: Carlo Alegria MD;  [facility];  Service: Thoracic    PERICARDIAL WINDOW Right 1/21/2021    Procedure: WINDOW PERICARDIAL;  Surgeon: Carlo Alegria MD;  [facility];  Service: Thoracic    CT BRONCHOSCOPY,DIAGNOSTIC N/A 1/21/2021    Procedure: BRONCHOSCOPY FLEXIBLE;  Surgeon: Carlo Alegria MD;  [facility];  Service: Thoracic    THORACOSCOPY VIDEO ASSISTED SURGERY (VATS) Right 1/21/2021    Procedure: THORACOSCOPY VIDEO ASSISTED SURGERY (VATS)   PERICARDIAL WINDOW, BIOPSY ANTERIOR MEDIASTINAL MASS;  Surgeon: Carlo Alegria MD;  [facility];  Service: Thoracic         01/27/21 1116   OT Last Visit   OT Visit Date 01/27/21   Note Type   Note type Evaluation   Restrictions/Precautions   Weight Bearing Precautions Per Order No   Other Precautions Fall Risk   Pain Assessment   Pain Assessment Tool Pain Assessment not indicated - pt denies pain   Pain Score No Pain   Home Living   Type of Home Apartment   Home Layout One level   Bathroom Shower/Tub Tub/shower unit   Bathroom Toilet Standard   Home Equipment Other (Comment)  (rollator at home)   Prior Function   Level of [place] Independent with ADLs and functional mobility   Lives With Jimi Help From Family   ADL Assistance Independent   IADLs Independent   Falls in the last 6 months 0   Vocational Retired   Lifestyle   Autonomy pta pt reports I in ADls/IADLs/functional mobility (reports spouse can assist w/ LB ADLS PRN)   Reciprocal Relationships supportive sposue   Service to Others retired- worked in [address] [place] [address] enjoys spending time w/ family   Psychosocial   Psychosocial (WDL) WDL   Subjective   Subjective "My  can help me as needed"   ADL   Where Assessed Chair   Eating Assistance 5  Supervision/Setup   Grooming Assistance 5  Supervision/Setup   [address] 5  Supervision/Setup   LB [address] 5  Supervision/Setup   [address] 5  Supervision/Setup    [address]  5  Supervision/Setup   Transfers   Sit to [address]   Additional items Increased time required   Stand to [address]   Additional items Increased time required   Functional Mobility   Functional Mobility 5  Supervision   Additional items Rolling walker   Balance   Static Sitting Good   Dynamic Sitting Fair +   [place]   Activity Tolerance   Activity Tolerance Patient tolerated treatment well   Medical Staff Made Aware  [facility] okay to see per RN   RUE Assessment   RUE Assessment WFL   LUE Assessment   LUE Assessment WFL   Hand Function   Gross Motor Coordination Functional   Fine Motor Coordination Functional   Cognition   Overall Cognitive Status WFL   Arousal/Participation Cooperative   Attention Within functional limits   Orientation Level Oriented X4   Memory Within functional limits   Following Commands Follows all commands and directions without difficulty   Comments pt pleasant and cooperative   Assessment   Limitation Decreased endurance;Decreased high-level ADLs   Prognosis Good   Assessment Pt is a 65 y o  YO  female admitted [facility] on 1/3/2021 w/ cough and LE edema  Pt diagnosed w/ pericardial effusion w/o cardiac tamponade s/p pericardial drain (1/4/21) and s/p VATS, pericardial window w/ biopsy of anterior mediastinal mass (1/21/21)  Pt w/ suspected metastatic GIST  Pt  has a past medical history of Arthritis and Hypertension  Pt with active OT orders and activity as tolerated orders    Pt resides in a apt with spouse  Pt reports spouse is very supportive and can assist w/ LB ADLs PRN  Pt was I w/  ADLS and IADLS, (+) drove, & required occasional use of rollator PTA  Currently pt is supervision for ADLs/IADLs/functional mobility  Pt is limited at this time 2*: endurance, activity tolerance, functional mobility and decreased I w/ ADLS/IADLS  The following Occupational Performance Areas to address include: dressing, functional mobility, community mobility and household maintenance  Based on the aforementioned OT evaluation, functional performance deficits, and assessments, pt has been identified as a moderate complexity evaluation  From OT standpoint, anticipate d/c home with family support  Recommend continued participation in ADLs and functional mobility w/ staff  No further acute OT needs, d/c OT  Please re-consult if necessary  The patient's raw score on the AM-PAC Daily Activity inpatient short form is  , standardized score is 47 1, greater than 39 4  Patients at this level are likely to benefit from DC to home  Please refer to the recommendation of the Occupational Therapist for safe DC planning  Goals   Patient Goals go home   Recommendation   OT Discharge Recommendation Return to previous environment with social support   OT - OK to Discharge Yes   AM-PAC Daily Activity Inpatient   Lower Body Dressing 3   Bathing 3   Toileting 4   Upper Body Dressing 4   Grooming 4   Eating 4   Daily Activity Raw Score 22   Daily Activity Standardized Score (Calc for Raw Score >=11) 47  [address] [facility] Following a Speech/Presentation 4   Understanding Ordinary Conversation 4   Taking Medications 4   Remembering Where Things Are Placed or Put Away 4   Remembering List of 4-5 Errands 3   Taking Care of Complicated Tasks 3   Applied Cognition Raw Score 22   Applied Cognition Standardized Score 47 83   Modified Vilas Scale   Modified Kathie Scale 2     Amisha Norwood MS, OTR/L

## 2021-01-27 NOTE — PHYSICAL THERAPY NOTE
Physical Therapy Evaluation    Patient's Name: Fab Green    Admitting Diagnosis  Cardiomegaly [I51 7]  Shortness of breath [R06 02]  Essential hypertension [I10]  SOB (shortness of breath) [R06 02]    Problem List  Patient Active Problem List   Diagnosis    Atrial flutter ([address] )    Essential hypertension    Cardiomegaly    Pericardial effusion without cardiac tamponade    Mediastinal mass    Pain and swelling of right upper extremity    Metastatic GIST    Leukocytosis    Anemia    Fever    Carcinoma of central portion of female breast, left ([address] )    Pulmonary embolus ([address] )       Past Medical History  Past Medical History:   Diagnosis Date    Arthritis     Hypertension        Past Surgical History  Past Surgical History:   Procedure Laterality Date    IR BIOPSY LIVER MASS  1/13/2021    MASTECTOMY      left     PERICARDIAL WINDOW N/A 1/4/2021    Procedure: WINDOW PERICARDIAL, subxiphoid ;  Surgeon: Anup Ernandez MD;  [facility];  Service: Thoracic    PERICARDIAL WINDOW Right 1/21/2021    Procedure: WINDOW PERICARDIAL;  Surgeon: Anup Ernandez MD;  [facility];  Service: Thoracic    [address] N/A 1/21/2021    Procedure: BRONCHOSCOPY FLEXIBLE;  Surgeon: Anup Ernandez MD;  [facility];  Service: Thoracic    THORACOSCOPY VIDEO ASSISTED SURGERY (VATS) Right 1/21/2021    Procedure: THORACOSCOPY VIDEO ASSISTED SURGERY (VATS)   PERICARDIAL WINDOW, BIOPSY ANTERIOR MEDIASTINAL MASS;  Surgeon: Anup Ernandez MD;  [facility];  Service: Thoracic        01/27/21 1115   PT Last Visit   PT Visit Date 01/27/21   Note Type   Note type Evaluation   Pain Assessment   Pain Assessment Tool 0-10   Pain Score 2   Pain Location/Orientation Location: Chest   Patient's Stated Pain Goal No pain   Hospital Pain Intervention(s) Ambulation/increased activity   Home Living   Type of Home Apartment   Additional Comments Resides with , family and apartment, 2 steps to enter  Normally fully independent, ambulates without device but does own roller walker  Prior Function   Level of [place] Independent with ADLs and functional mobility   Falls in the last 6 months 0   Restrictions/Precautions   Weight Bearing Precautions Per Order No   Other Precautions Pain; Fall Risk   General   Family/Caregiver Present No   Cognition   Overall Cognitive Status WFL   Arousal/Participation Responsive   Attention Within functional limits   Orientation Level Oriented X4   Memory Unable to assess   Following Commands Follows one step commands without difficulty   RLE Assessment   RLE Assessment   (Strength grossly 4/5)   LLE Assessment   LLE Assessment   (Strength grossly 4/5)   Transfers   Sit to Stand 5  Supervision   Stand to Sit 5  Supervision   Ambulation/Elevation   Gait pattern   (Slow, antalgic)   Gait Assistance 5  Supervision   Additional items Assist x 1   Assistive Device Rolling walker   Distance Ambulated 120 ft x 1 with roller walker and supervision   Balance   Static Sitting Normal   Dynamic Sitting Good   Static Standing Fair +   Dynamic Standing Fair +   Ambulatory Fair +   Endurance Deficit   Endurance Deficit Yes   Endurance Deficit Description Mild fatigue, weakness, pain   Activity Tolerance   Activity Tolerance Patient limited by fatigue;Patient limited by pain   Nurse Made Aware Yes   Assessment   Prognosis Good   Assessment Patient seen for high complexity physical therapy evaluation  Patient is a 56-year-old female with history/comorbidities of arthritis, hypertension, who is now admitted with cough, lower extremity edema  Found to have pericardial effusion, and patient status post pericardial drain 01/04/2021 as well as VATS, pericardial window with biopsy of anterior mediastinal mass 01/21/2021  Due to acute medical issues, acute surgeries, pain, fall risk, note unstable clinical picture    PT consulted to assess postop mobility, DC needs in preparation for DC home  At present time, patient functioning at supervision level in regards to mobility  Plan is for patient to DC home today  Agree with same, recommend patient use roller walker initially on discharge     Goals   Patient Goals To go home today   PT Treatment Day 0   Plan   PT Frequency   (DC PT-as patient being DC home today)   Recommendation   PT Discharge Recommendation Return to previous environment with social support   Equipment Recommended Walker  (Recommended to patient to continue to use RW on d/c)   PT - OK to Discharge Yes  (When stable, see above for Recs)   AM-PAC Basic Mobility Inpatient   Turning in Bed Without Bedrails 3   Lying on Back to Sitting on Edge of Flat Bed 3   Moving Bed to Chair 4   Standing Up From Chair 4   Walk in Room 4   Climb 3-5 Stairs 3   Basic Mobility Inpatient Raw Score 21   Basic Mobility Standardized Score 45 55           Tiffanie Blanc PT, DPT, CSRS

## 2021-01-27 NOTE — PROGRESS NOTES
Progress Note - Selvin Correa 1950, 65 y o  female MRN: [id number]    Unit/Bed#: [facility] 426-01 Encounter: [id number]    Primary Care Provider: Mary Parrish MD   Date and time admitted to hospital: 1/3/2021  8:26 AM        * Atrial flutter ([address] )  Assessment & Plan  · Patient was brought to ED  due to coughing, lower extremity edema and dyspnea  · EKG showed atrial flutter, QTc prolongation  Her son passed away on 12/21/2020  · Patient was evaluated by Cardiology  · Cardiac echo with large pericardial effusion without tamponade, EF 65%  · Initially was treated with Cardizem drip, Currently off Cardizem drip,   · on p o  Metoprolol and diltiazem  · Has a PICC line due to difficult IV access  · Continue metoprolol 100 mg b i d  And Cardizem 180 mg daily  · Anticipate discharge on Xarelto      Pulmonary embolus [facility])  Assessment & Plan  Case was Discussed with radiology on 1/19  CT scan from 01/03/2021 and 01/16/2021 showed pulmonary infarct, as per note on chest x-ray of 1/16- "posterior segment right upper lobe pulmonary embolus is visible on CT scan  True emboli versus tumor emboli given tumor invading the right atrium "  Given these findings, PE was likely present on admission  Lower extremity venous Doppler negative for DVT  Discussed the risks versus benefits of anticoagulation with patient and her   They agreed to start anticoagulation and see how she does  Discussed with cardiology and thoracic surgery    Continue heparin drip  1/26-hemoglobin remains stable on heparin drip; monitor overnight and consider discharge on NOAC if remains stable    Anemia  Assessment & Plan  Patient noted to have hemoglobin as low as 6 2 since admission; yesterday hemoglobin was 8 7; repeat hemoglobin this morning was 8 5-stable  Will continue anticoagulation and monitor again tomorrow; anticipate discharge if hemoglobin remains stable with a c     Leukocytosis  Assessment & Plan  Suspect reactive leukocytosis, improving  Continue to monitor      Metastatic GIST  Assessment & Plan  CT on admission shows: Innumerable vague hypodensities throughout the liver suspicious for metastases  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  No primary seen on CT scan  Gastroenterology on board  Oncology on board  Recommended Oral tyrosine kinase inhibitor that can be started outpatient   Patient had biopsy-proven GIST liver lesion, positive atypical cells from the pericardial fluid  And noted mediastinal mass  Patient seems to have high burden of GIST  Unknown primary at this point  EGD/colonoscopy yesterday negative; pill endoscopy also negative  Patient to follow with Oncology in the outpatient setting; plan for FDG-PET as per GI in the outpatient setting    Pain and swelling of right upper extremity  Assessment & Plan  Right upper extremity swelling noted   US obtained that shows possible hematoma vs  Proximal biceps tendon repair  General surgery consult appreciated- recommends conservative managment and holding AC  Patient does not have motor strength loss  neurovascular intact  Pain is significantly improved since last night  Patient is able to extend her shoulder, slightly limited due to pain  At this point, patient will follow up outpatient   If persistent in 3 months, may obtain MRI  Swelling has decreased significantly     Mediastinal mass  Assessment & Plan  · CT: Anterior mediastinal mass, extending into the right atrial lumen  · S/p right VATS, pericardial window with biopsy of the anterior mediastinal mass by thoracic surgery 1/21   · Friable mass seen under the pericardium with thickened pericardium   · CT pulled 1/24      Pericardial effusion without cardiac tamponade  Assessment & Plan  Evaluated by thoracic surgery  Underwent pericardial drain placement 01/04/2021, continued to have significant drainage  Status post right VATS, pericardial window with biopsy of the anterior mediastinal mass on 2021  According to op note there was a friable mass under the pericardium  Echo from  showed trace pericardial effusion, no evidence of hemodynamic compromise  CT out 21  Repeat echo  showed: "There was mild to moderate multi-loculated pockets of pericardial effusions around the heart  There was no evidence of hemodynamic compromise "      Essential hypertension  Assessment & Plan  Blood pressure well controlled; continue present therapy      VTE Pharmacologic Prophylaxis:   Pharmacologic: Rivaroxaban (Xarelto)  Mechanical VTE Prophylaxis in Place: Yes    Patient Centered Rounds: I have performed bedside rounds with nursing staff today  Discussions with Specialists or Other Care Team Provider:  Oncology    Education and Discussions with Family / Patient: Care plan discussed with patient who voiced understanding and agrees with recommendations  Time Spent for Care: 30 minutes  More than 50% of total time spent on counseling and coordination of care as described above  Current Length of Stay: 24 day(s)    Current Patient Status: Inpatient   Certification Statement: The patient will continue to require additional inpatient hospital stay due to Treatment of GIST tumor    Discharge Plan: To be determined, likely today    Code Status: Level 1 - Full Code      Subjective:   Patient seen examined bedside, no acute distress or discomfort noted  Discussed with Cardiology who report report the patient is stable for discharge from their and  Hemoglobin stable overnight, started on Xarelto for treatment of PE and atrial flutter  Patient evaluated by PT/OT; no recommendations  Have discussed with GI and will arrange GI and oncologic follow-up      Objective:     Vitals:   Temp (24hrs), Av 3 °F (36 8 °C), Min:97 9 °F (36 6 °C), Max:98 6 °F (37 °C)    Temp:  [97 9 °F (36 6 °C)-98 6 °F (37 °C)] 98 6 °F (37 °C)  HR:  [92-93] 92  Resp:  [18] 18  BP: (108-126)/(57-75) 114/75  SpO2:  [92 %-95 %] 95 %  Body mass index is 31 79 kg/m²  Input and Output Summary (last 24 hours): Intake/Output Summary (Last 24 hours) at 1/27/2021 1210  Last data filed at 1/27/2021 0900  Gross per 24 hour   Intake 1743 75 ml   Output 400 ml   Net 1343 75 ml       Physical Exam:     Physical Exam  Vitals signs and nursing note reviewed  Constitutional:       General: She is not in acute distress  Appearance: She is well-developed  HENT:      Head: Normocephalic and atraumatic  Eyes:      Conjunctiva/sclera: Conjunctivae normal    Neck:      Musculoskeletal: Neck supple  Cardiovascular:      Rate and Rhythm: Normal rate and regular rhythm  Heart sounds: No murmur  Pulmonary:      Effort: Pulmonary effort is normal  No respiratory distress  Comments: Reduced breath sounds bilaterally  Abdominal:      Palpations: Abdomen is soft  Tenderness: There is no abdominal tenderness  Skin:     General: Skin is warm and dry  Neurological:      Mental Status: She is alert and oriented to person, place, and time  Psychiatric:         Mood and Affect: Mood normal          Behavior: Behavior normal          Judgment: Judgment normal            Additional Data:     Labs:    Results from last 7 days   Lab Units 01/27/21 0451 01/25/21  1713  01/25/21  0519   WBC Thousand/uL 14 24*   < > 15 06*  --  12 70*   HEMOGLOBIN g/dL 8 8*   < > 8 7*   < > 6 2*   HEMATOCRIT % 29 3*   < > 28 6*   < > 20 8*   PLATELETS Thousands/uL 457*   < > 424*  --  309   BANDS PCT %  --   --  3  --   --    NEUTROS PCT %  --   --   --   --  76*   LYMPHS PCT %  --   --   --   --  6*   LYMPHO PCT %  --   --  3*  --   --    MONOS PCT %  --   --   --   --  9   MONO PCT %  --   --  2*  --   --    EOS PCT %  --   --  4  --  2    < > = values in this interval not displayed       Results from last 7 days   Lab Units 01/27/21 0451 01/25/21  0519   SODIUM mmol/L 142   < > 144   POTASSIUM mmol/L 4 1   < > 3 3*   CHLORIDE mmol/L 111*   < > 115*   CO2 mmol/L 24   < > 22   BUN mg/dL 16   < > 25   CREATININE mg/dL 0 94   < > 0 88   ANION GAP mmol/L 7   < > 7   CALCIUM mg/dL 8 6   < > 7 6*   ALBUMIN g/dL  --   --  2 4*   TOTAL BILIRUBIN mg/dL  --   --  1 09*   ALK PHOS U/L  --   --  75   ALT U/L  --   --  18   AST U/L  --   --  25   GLUCOSE RANDOM mg/dL 138   < > 99    < > = values in this interval not displayed  Results from last 7 days   Lab Units 01/25/21  2144   INR  1 55*     Results from last 7 days   Lab Units 01/25/21  1741   POC GLUCOSE mg/dl 112                   * I Have Reviewed All Lab Data Listed Above  * Additional Pertinent Lab Tests Reviewed:  All Labs Within Last 24 Hours Reviewed    Imaging:    Imaging Reports Reviewed Today Include:   Imaging Personally Reviewed by Myself Includes:      Recent Cultures (last 7 days):           Last 24 Hours Medication List:   Current Facility-Administered Medications   Medication Dose Route Frequency Provider Last Rate    acetaminophen  650 mg Oral Q6H PRN Sea Lester MD      aluminum-magnesium hydroxide-simethicone  30 mL Oral Q6H PRN Sea Lester MD      diltiazem  180 mg Oral Daily Gregor Lanes, MD      diltiazem  30 mg Oral Q6H PRN Sea Lester MD      docusate sodium  100 mg Oral BID Sea Lester MD      HYDROmorphone  0 2 mg Intravenous Q4H PRN Sea Lester MD      HYDROmorphone  0 2 mg Intravenous Once Sea Lester MD      lidocaine (PF)  0 5 mL Infiltration Once PRN Juancarlos Lim MD      metoprolol succinate  100 mg Oral Q12H Lynne Alcantar MD      ondansetron  4 mg Intravenous Q6H PRN Sea Lester MD      oxyCODONE  2 5 mg Oral Q4H PRN Sea Lester MD      oxyCODONE  5 mg Oral Q4H PRN Sea Lester MD      pantoprazole  40 mg Oral BID AC Monica Jones DO      rivaroxaban  15 mg Oral BID With Meals Yolanda Roberts MD      Followed by   Gustavo Liang ON 2/17/2021] rivaroxaban  20 mg Oral Daily With Breakfast Viktoriya Navarro MD      senna  1 tablet Oral Daily Laith Al MD          Today, Patient Was Seen By: Shyam Ramirez MD    ** Please Note: Dictation voice to text software may have been used in the creation of this document   **

## 2021-01-27 NOTE — ASSESSMENT & PLAN NOTE
· Patient was brought to ED  due to coughing, lower extremity edema and dyspnea  · EKG showed atrial flutter, QTc prolongation  Her son passed away on 12/21/2020  · Patient was evaluated by Cardiology  · Cardiac echo with large pericardial effusion without tamponade, EF 65%  · Initially was treated with Cardizem drip, Currently off Cardizem drip,   · on p o  Metoprolol and diltiazem  · Has a PICC line due to difficult IV access  · Continue metoprolol 100 mg b i d   And Cardizem 180 mg daily  · Anticipate discharge on Xarelto

## 2021-01-27 NOTE — PROGRESS NOTES
Cardiology Team 2 Progress Note - Brooke Nichole 65 y o  female MRN: [id number]    Unit/Bed#: [facility] 426-01 Encounter: [id number]          Subjective:   Patient seen and examined  No significant events overnight  Patient states she is feeling okay  Denies lightheadedness, dizziness, syncope, headache, vision changes, diaphoresis, chest pain, palpitations, shortness of breath, PND, orthopnea, nausea, vomiting, abdominal pain or lower extremity edema  Objective:     Vitals: Blood pressure 114/75, pulse 92, temperature 98 6 °F (37 °C), resp  rate 18, height 5' 4" (1 626 m), weight 84 kg (185 lb 3 oz), SpO2 95 %  , Body mass index is 31 79 kg/m² ,   Orthostatic Blood Pressures      Most Recent Value   Blood Pressure  114/75 filed at 01/27/2021 0706   Patient Position - Orthostatic VS  Sitting filed at 01/26/2021 1053            Intake/Output Summary (Last 24 hours) at 1/27/2021 9878  Last data filed at 1/27/2021 0800  Gross per 24 hour   Intake 1993 75 ml   Output 200 ml   Net 1793 75 ml         Physical Exam:    GEN: Brooke Nichole appears well, alert and oriented x 3, pleasant and cooperative   HEENT:  Normocephalic, atraumatic, anicteric, moist mucous membranes  NECK: No JVD or carotid bruits   HEART: Regular rhythm, normal S1 and S2, no murmurs, clicks, gallops or rubs   LUNGS: Decreased breath sounds b/l; no wheezes, rales, or rhonchi; respiration nonlabored   ABDOMEN:  Normoactive bowel sounds, soft, no tenderness, no distention  EXTREMITIES: peripheral pulses palpable; b/l 2+ pitting edema to knees  NEURO: no gross focal findings; cranial nerves grossly intact   SKIN:  Dry, intact, warm to touch      Current Facility-Administered Medications:     acetaminophen (TYLENOL) tablet 650 mg, 650 mg, Oral, Q6H PRN, Nima Romero MD, 650 mg at 01/14/21 2020    aluminum-magnesium hydroxide-simethicone (MYLANTA) oral suspension 30 mL, 30 mL, Oral, Q6H PRN, Nima Romero MD    diltiazem (CARDIZEM CD) 24 hr capsule 180 mg, 180 mg, Oral, Daily, Dedrick Lorenz MD, 180 mg at 01/26/21 1002    diltiazem (CARDIZEM) tablet 30 mg, 30 mg, Oral, Q6H PRN, Saturnino Galvan MD    docusate sodium (COLACE) capsule 100 mg, 100 mg, Oral, BID, Saturnino Galvan MD, 100 mg at 01/26/21 1717    heparin (porcine) 25,000 units in 0 45% NaCl 250 mL infusion (premix), 3-30 Units/kg/hr (Order-Specific), Intravenous, Titrated, Hamilton Orosco PA-C, Stopped at 01/27/21 0731    HYDROmorphone (DILAUDID) injection 0 2 mg, 0 2 mg, Intravenous, Q4H PRN, Saturnino Galvan MD, 0 2 mg at 01/09/21 0645    HYDROmorphone (DILAUDID) injection 0 2 mg, 0 2 mg, Intravenous, Once, Saturnino Galvan MD    lidocaine (PF) (XYLOCAINE-MPF) 1 % injection 0 5 mL, 0 5 mL, Infiltration, Once PRN, Steve Sullivan MD    metoprolol succinate (TOPROL-XL) 24 hr tablet 100 mg, 100 mg, Oral, Q12H [address], [place] Galvan MD, 100 mg at 01/26/21 2003    ondansetron [facility]) injection 4 mg, 4 mg, Intravenous, Q6H PRN, Saturnino Galvan MD    oxyCODONE (ROXICODONE) IR tablet 2 5 mg, 2 5 mg, Oral, Q4H PRN, Saturnino Daily, MD    oxyCODONE (ROXICODONE) IR tablet 5 mg, 5 mg, Oral, Q4H PRN, Saturnino Galvan, MD, 5 mg at 01/23/21 0109    pantoprazole (PROTONIX) EC tablet 40 mg, 40 mg, Oral, BID Alisha SORENSEN DO, 40 mg at 01/27/21 9158    senna (SENOKOT) tablet 8 6 mg, 1 tablet, Oral, Daily, Saturnino Galvan MD, 8 6 mg at 01/26/21 1002    Labs & Results:    Lab Results   Component Value Date    TROPONINI <0 02 01/03/2021    TROPONINI <0 02 01/03/2021    TROPONINI <0 03 01/03/2021       Lab Results   Component Value Date    CALCIUM 8 6 01/27/2021    K 4 1 01/27/2021    CO2 24 01/27/2021     (H) 01/27/2021    BUN 16 01/27/2021    CREATININE 0 94 01/27/2021       Lab Results   Component Value Date    WBC 14 24 (H) 01/27/2021    HGB 8 8 (L) 01/27/2021    HCT 29 3 (L) 01/27/2021    MCV 73 (L) 01/27/2021     (H) 01/27/2021     Results from last 7 days   Lab Units 01/25/21 2144   INR  1 55*       No results found for: CHOL  No results found for: HDL  No results found for: LDLCALC  No results found for: TRIG    Lab Results   Component Value Date    ALT 18 01/25/2021    AST 25 01/25/2021         Telemetry:   Personally reviewed by Brenda Rome: Patient not on tele    Imaging:       VTE Prophylaxis: Heparin      Assessment:  Principal Problem:    Atrial flutter ([address] )  Active Problems:    Essential hypertension    Pericardial effusion without cardiac tamponade    Mediastinal mass    Pain and swelling of right upper extremity    Metastatic GIST    Leukocytosis    Anemia    Pulmonary embolus (HCC)        1  Pericardial Effusion  a  S/p VATS pericardial window  b  Effusion likely secondary to malignancy  2  Atrial Flutter / Fibrillation  a  HR:   b  On anticoagulation with Heparin  3  Essential Hypertension  a  Patient normotensive 108-126/53-75  4  Metastatic GIST Tumor with Unknown Primary  a  GI following  b  Undergoing workup for source of tumor  c  Capsule study 1/26: negative for source of GIST  d  EGD 1/25: small superficial round ulcers in duodenum  e  Colonoscopy 1/25: two polyps < 5 mm in ascending colon; one sessile poly < 5 mm in proximal transverse colon      Plan:  1  Continue rate control for Atrial Flutter / Fibrillation with Metoprolol 100 mg BID and Cardizem 180 mg QD  2  Patient on Heparin gtt for anticoagulation and tolerating well  Consider switching to oral anticoagulation      Case discussed and reviewed with Dr Kortney Hunter who agrees with my assessment and plan  Thank you for involving us in the care of your patient  [employer] Everywhere records reviewed: Yes    ** Please Note: Fluency DirectDictation voice to text software may have been used in the creation of this document   **

## 2021-01-28 RX ORDER — METOPROLOL SUCCINATE 100 MG/1
100 TABLET, EXTENDED RELEASE ORAL EVERY 12 HOURS SCHEDULED
Qty: 60 TABLET | Refills: 0 | Status: SHIPPED | OUTPATIENT
Start: 2021-01-28

## 2021-01-28 RX ORDER — DILTIAZEM HYDROCHLORIDE 180 MG/1
180 CAPSULE, COATED, EXTENDED RELEASE ORAL DAILY
Qty: 30 CAPSULE | Refills: 0 | Status: SHIPPED | OUTPATIENT
Start: 2021-01-28

## 2021-01-29 ENCOUNTER — APPOINTMENT (EMERGENCY)
Dept: RADIOLOGY | Facility: HOSPITAL | Age: 71
DRG: 314 | End: 2021-01-29
Payer: COMMERCIAL

## 2021-01-29 ENCOUNTER — APPOINTMENT (EMERGENCY)
Dept: NON INVASIVE DIAGNOSTICS | Facility: HOSPITAL | Age: 71
DRG: 314 | End: 2021-01-29
Payer: COMMERCIAL

## 2021-01-29 ENCOUNTER — APPOINTMENT (INPATIENT)
Dept: RADIOLOGY | Facility: HOSPITAL | Age: 71
DRG: 314 | End: 2021-01-29
Payer: COMMERCIAL

## 2021-01-29 ENCOUNTER — HOSPITAL ENCOUNTER (INPATIENT)
Facility: HOSPITAL | Age: 71
LOS: 1 days | DRG: 314 | End: 2021-01-30
Attending: EMERGENCY MEDICINE | Admitting: ANESTHESIOLOGY
Payer: COMMERCIAL

## 2021-01-29 DIAGNOSIS — I31.3 PERICARDIAL EFFUSION WITHOUT CARDIAC TAMPONADE: ICD-10-CM

## 2021-01-29 DIAGNOSIS — N17.9 ACUTE RENAL FAILURE (ARF) (HCC): ICD-10-CM

## 2021-01-29 DIAGNOSIS — E87.5 HYPERKALEMIA: ICD-10-CM

## 2021-01-29 DIAGNOSIS — I46.9 CARDIOPULMONARY ARREST WITH SUCCESSFUL RESUSCITATION (HCC): Primary | ICD-10-CM

## 2021-01-29 DIAGNOSIS — J98.59 MEDIASTINAL MASS: ICD-10-CM

## 2021-01-29 DIAGNOSIS — E87.2 LACTIC ACIDOSIS: ICD-10-CM

## 2021-01-29 DIAGNOSIS — C50.112 CARCINOMA OF CENTRAL PORTION OF FEMALE BREAST, LEFT (HCC): ICD-10-CM

## 2021-01-29 LAB
ABO GROUP BLD: NORMAL
ALBUMIN SERPL BCP-MCNC: 3.4 G/DL (ref 3.5–5)
ALP SERPL-CCNC: 91 U/L (ref 46–116)
ALT SERPL W P-5'-P-CCNC: 71 U/L (ref 12–78)
ANION GAP SERPL CALCULATED.3IONS-SCNC: 19 MMOL/L (ref 4–13)
ANION GAP SERPL CALCULATED.3IONS-SCNC: 24 MMOL/L (ref 4–13)
ANION GAP SERPL CALCULATED.3IONS-SCNC: 29 MMOL/L (ref 4–13)
ANISOCYTOSIS BLD QL SMEAR: PRESENT
ARTERIAL PATENCY WRIST A: YES
AST SERPL W P-5'-P-CCNC: 163 U/L (ref 5–45)
ATRIAL RATE: 153 BPM
BASE EX.OXY STD BLDV CALC-SCNC: 39.5 % (ref 60–80)
BASE EX.OXY STD BLDV CALC-SCNC: 67.4 % (ref 60–80)
BASE EXCESS BLDA CALC-SCNC: -13 MMOL/L (ref -2–3)
BASE EXCESS BLDA CALC-SCNC: -20.3 MMOL/L
BASE EXCESS BLDA CALC-SCNC: -21 MMOL/L (ref -2–3)
BASE EXCESS BLDA CALC-SCNC: -21.3 MMOL/L
BASE EXCESS BLDV CALC-SCNC: -22.3 MMOL/L
BASE EXCESS BLDV CALC-SCNC: -23.3 MMOL/L
BASOPHILS # BLD MANUAL: 0 THOUSAND/UL (ref 0–0.1)
BASOPHILS NFR MAR MANUAL: 0 % (ref 0–1)
BILIRUB SERPL-MCNC: 3.01 MG/DL (ref 0.2–1)
BLD GP AB SCN SERPL QL: NEGATIVE
BUN SERPL-MCNC: 31 MG/DL (ref 5–25)
BUN SERPL-MCNC: 32 MG/DL (ref 5–25)
BUN SERPL-MCNC: 34 MG/DL (ref 5–25)
CALCIUM ALBUM COR SERPL-MCNC: 10.4 MG/DL (ref 8.3–10.1)
CALCIUM SERPL-MCNC: 9 MG/DL (ref 8.3–10.1)
CALCIUM SERPL-MCNC: 9.6 MG/DL (ref 8.3–10.1)
CALCIUM SERPL-MCNC: 9.9 MG/DL (ref 8.3–10.1)
CHLORIDE SERPL-SCNC: 108 MMOL/L (ref 100–108)
CHLORIDE SERPL-SCNC: 110 MMOL/L (ref 100–108)
CHLORIDE SERPL-SCNC: 111 MMOL/L (ref 100–108)
CO2 SERPL-SCNC: 10 MMOL/L (ref 21–32)
CO2 SERPL-SCNC: 10 MMOL/L (ref 21–32)
CO2 SERPL-SCNC: 8 MMOL/L (ref 21–32)
CREAT SERPL-MCNC: 2.71 MG/DL (ref 0.6–1.3)
CREAT SERPL-MCNC: 2.75 MG/DL (ref 0.6–1.3)
CREAT SERPL-MCNC: 2.81 MG/DL (ref 0.6–1.3)
EOSINOPHIL # BLD MANUAL: 0 THOUSAND/UL (ref 0–0.4)
EOSINOPHIL NFR BLD MANUAL: 0 % (ref 0–6)
ERYTHROCYTE [DISTWIDTH] IN BLOOD BY AUTOMATED COUNT: 24.8 % (ref 11.6–15.1)
ERYTHROCYTE [DISTWIDTH] IN BLOOD BY AUTOMATED COUNT: 24.9 % (ref 11.6–15.1)
GFR SERPL CREATININE-BSD FRML MDRD: 19 ML/MIN/1.73SQ M
GLUCOSE SERPL-MCNC: 144 MG/DL (ref 65–140)
GLUCOSE SERPL-MCNC: 232 MG/DL (ref 65–140)
GLUCOSE SERPL-MCNC: 29 MG/DL (ref 65–140)
GLUCOSE SERPL-MCNC: 51 MG/DL (ref 65–140)
GLUCOSE SERPL-MCNC: 85 MG/DL (ref 65–140)
GLUCOSE SERPL-MCNC: 85 MG/DL (ref 65–140)
GLUCOSE SERPL-MCNC: 98 MG/DL (ref 65–140)
GLUCOSE SERPL-MCNC: >600 MG/DL (ref 65–140)
HCO3 BLDA-SCNC: 16.6 MMOL/L (ref 24–30)
HCO3 BLDA-SCNC: 7 MMOL/L (ref 22–28)
HCO3 BLDA-SCNC: 7.5 MMOL/L (ref 22–28)
HCO3 BLDA-SCNC: 9.6 MMOL/L (ref 24–30)
HCO3 BLDV-SCNC: 7 MMOL/L (ref 24–30)
HCO3 BLDV-SCNC: 7.2 MMOL/L (ref 24–30)
HCT VFR BLD AUTO: 33.2 % (ref 34.8–46.1)
HCT VFR BLD AUTO: 38.2 % (ref 34.8–46.1)
HCT VFR BLD CALC: 23 % (ref 34.8–46.1)
HCT VFR BLD CALC: 26 % (ref 34.8–46.1)
HGB BLD-MCNC: 10.6 G/DL (ref 11.5–15.4)
HGB BLD-MCNC: 9.5 G/DL (ref 11.5–15.4)
HGB BLDA-MCNC: 7.8 G/DL (ref 11.5–15.4)
HGB BLDA-MCNC: 8.8 G/DL (ref 11.5–15.4)
HOROWITZ INDEX BLDA+IHG-RTO: 100 MM[HG]
HOROWITZ INDEX BLDA+IHG-RTO: 100 MM[HG]
HOROWITZ INDEX BLDA+IHG-RTO: 70 MM[HG]
LACTATE SERPL-SCNC: 16.2 MMOL/L (ref 0.5–2)
LACTATE SERPL-SCNC: 17.8 MMOL/L (ref 0.5–2)
LACTATE SERPL-SCNC: 22.1 MMOL/L (ref 0.5–2)
LYMPHOCYTES # BLD AUTO: 17 % (ref 14–44)
LYMPHOCYTES # BLD AUTO: 3.85 THOUSAND/UL (ref 0.6–4.47)
MAGNESIUM SERPL-MCNC: 3.5 MG/DL (ref 1.6–2.6)
MCH RBC QN AUTO: 21.7 PG (ref 26.8–34.3)
MCH RBC QN AUTO: 23.5 PG (ref 26.8–34.3)
MCHC RBC AUTO-ENTMCNC: 27.7 G/DL (ref 31.4–37.4)
MCHC RBC AUTO-ENTMCNC: 28.6 G/DL (ref 31.4–37.4)
MCV RBC AUTO: 78 FL (ref 82–98)
MCV RBC AUTO: 82 FL (ref 82–98)
METAMYELOCYTES NFR BLD MANUAL: 1 % (ref 0–1)
MONOCYTES # BLD AUTO: 1.36 THOUSAND/UL (ref 0–1.22)
MONOCYTES NFR BLD: 6 % (ref 4–12)
MYELOCYTES NFR BLD MANUAL: 3 % (ref 0–1)
NEUTROPHILS # BLD MANUAL: 15.64 THOUSAND/UL (ref 1.85–7.62)
NEUTS BAND NFR BLD MANUAL: 5 % (ref 0–8)
NEUTS SEG NFR BLD AUTO: 64 % (ref 43–75)
NRBC BLD AUTO-RTO: 6 /100 WBCS
NRBC BLD AUTO-RTO: 8 /100 WBC (ref 0–2)
NRBC BLD AUTO-RTO: 9 /100 WBCS
NT-PROBNP SERPL-MCNC: ABNORMAL PG/ML
O2 CT BLDA-SCNC: 11 ML/DL (ref 16–23)
O2 CT BLDA-SCNC: 14.9 ML/DL (ref 16–23)
O2 CT BLDV-SCNC: 10.1 ML/DL
O2 CT BLDV-SCNC: 5.4 ML/DL
OXYHGB MFR BLDA: 96.3 % (ref 94–97)
OXYHGB MFR BLDA: 98.4 % (ref 94–97)
PCO2 BLD: 11 MMOL/L (ref 21–32)
PCO2 BLD: 18 MMOL/L (ref 21–32)
PCO2 BLD: 41.6 MM HG (ref 42–50)
PCO2 BLD: 56.7 MM HG (ref 42–50)
PCO2 BLDA: 21.1 MM HG (ref 36–44)
PCO2 BLDA: 27.5 MM HG (ref 36–44)
PCO2 BLDV: 26.8 MM HG (ref 42–50)
PCO2 BLDV: 32.1 MM HG (ref 42–50)
PEEP RESPIRATORY: 6 CM[H2O]
PEEP RESPIRATORY: 8 CM[H2O]
PEEP RESPIRATORY: 8 CM[H2O]
PH BLD: 6.97 [PH] (ref 7.3–7.4)
PH BLD: 7.07 [PH] (ref 7.3–7.4)
PH BLDA: 7.05 [PH] (ref 7.35–7.45)
PH BLDA: 7.14 [PH] (ref 7.35–7.45)
PH BLDV: 6.97 [PH] (ref 7.3–7.4)
PH BLDV: 7.04 [PH] (ref 7.3–7.4)
PHOSPHATE SERPL-MCNC: 8.6 MG/DL (ref 2.3–4.1)
PLATELET # BLD AUTO: 186 THOUSANDS/UL (ref 149–390)
PLATELET # BLD AUTO: 479 THOUSANDS/UL (ref 149–390)
PLATELET BLD QL SMEAR: ABNORMAL
PMV BLD AUTO: 10.8 FL (ref 8.9–12.7)
PMV BLD AUTO: 12.2 FL (ref 8.9–12.7)
PO2 BLD: 29 MM HG (ref 35–45)
PO2 BLD: 40 MM HG (ref 35–45)
PO2 BLDA: 138.5 MM HG (ref 75–129)
PO2 BLDA: 204.9 MM HG (ref 75–129)
PO2 BLDV: 36.2 MM HG (ref 35–45)
PO2 BLDV: 48.5 MM HG (ref 35–45)
POIKILOCYTOSIS BLD QL SMEAR: PRESENT
POTASSIUM BLD-SCNC: 5.5 MMOL/L (ref 3.5–5.3)
POTASSIUM BLD-SCNC: 5.8 MMOL/L (ref 3.5–5.3)
POTASSIUM SERPL-SCNC: 5.1 MMOL/L (ref 3.5–5.3)
POTASSIUM SERPL-SCNC: 5.8 MMOL/L (ref 3.5–5.3)
POTASSIUM SERPL-SCNC: 6.3 MMOL/L (ref 3.5–5.3)
PROT SERPL-MCNC: 7.3 G/DL (ref 6.4–8.2)
QRS AXIS: 51 DEGREES
QRSD INTERVAL: 82 MS
QT INTERVAL: 314 MS
QTC INTERVAL: 484 MS
RBC # BLD AUTO: 4.05 MILLION/UL (ref 3.81–5.12)
RBC # BLD AUTO: 4.89 MILLION/UL (ref 3.81–5.12)
RH BLD: POSITIVE
SAO2 % BLD FROM PO2: 34 % (ref 60–85)
SAO2 % BLD FROM PO2: 49 % (ref 60–85)
SODIUM BLD-SCNC: 136 MMOL/L (ref 136–145)
SODIUM BLD-SCNC: 143 MMOL/L (ref 136–145)
SODIUM SERPL-SCNC: 139 MMOL/L (ref 136–145)
SODIUM SERPL-SCNC: 142 MMOL/L (ref 136–145)
SODIUM SERPL-SCNC: 148 MMOL/L (ref 136–145)
SPECIMEN EXPIRATION DATE: NORMAL
SPECIMEN SOURCE: ABNORMAL
SPECIMEN SOURCE: NORMAL
T WAVE AXIS: 136 DEGREES
TOTAL CELLS COUNTED SPEC: 100
TROPONIN I BLD-MCNC: 0.02 NG/ML (ref 0–0.08)
TROPONIN I SERPL-MCNC: <0.02 NG/ML
TSH SERPL DL<=0.05 MIU/L-ACNC: 2.15 UIU/ML (ref 0.36–3.74)
VARIANT LYMPHS # BLD AUTO: 4 %
VENT AC: 22
VENT AC: 28
VENT AC: 28
VENT- AC: AC
VENTRICULAR RATE: 143 BPM
VT SETTING VENT: 450 ML
WBC # BLD AUTO: 22.66 THOUSAND/UL (ref 4.31–10.16)
WBC # BLD AUTO: 29.51 THOUSAND/UL (ref 4.31–10.16)

## 2021-01-29 PROCEDURE — 94002 VENT MGMT INPAT INIT DAY: CPT

## 2021-01-29 PROCEDURE — 71045 X-RAY EXAM CHEST 1 VIEW: CPT

## 2021-01-29 PROCEDURE — 85014 HEMATOCRIT: CPT

## 2021-01-29 PROCEDURE — 82803 BLOOD GASES ANY COMBINATION: CPT

## 2021-01-29 PROCEDURE — 82805 BLOOD GASES W/O2 SATURATION: CPT | Performed by: EMERGENCY MEDICINE

## 2021-01-29 PROCEDURE — 85007 BL SMEAR W/DIFF WBC COUNT: CPT | Performed by: EMERGENCY MEDICINE

## 2021-01-29 PROCEDURE — 96365 THER/PROPH/DIAG IV INF INIT: CPT

## 2021-01-29 PROCEDURE — 96375 TX/PRO/DX INJ NEW DRUG ADDON: CPT

## 2021-01-29 PROCEDURE — 86923 COMPATIBILITY TEST ELECTRIC: CPT

## 2021-01-29 PROCEDURE — 84295 ASSAY OF SERUM SODIUM: CPT

## 2021-01-29 PROCEDURE — 36600 WITHDRAWAL OF ARTERIAL BLOOD: CPT

## 2021-01-29 PROCEDURE — 94760 N-INVAS EAR/PLS OXIMETRY 1: CPT

## 2021-01-29 PROCEDURE — 99223 1ST HOSP IP/OBS HIGH 75: CPT | Performed by: INTERNAL MEDICINE

## 2021-01-29 PROCEDURE — 85027 COMPLETE CBC AUTOMATED: CPT | Performed by: EMERGENCY MEDICINE

## 2021-01-29 PROCEDURE — 99292 CRITICAL CARE ADDL 30 MIN: CPT | Performed by: EMERGENCY MEDICINE

## 2021-01-29 PROCEDURE — 31500 INSERT EMERGENCY AIRWAY: CPT | Performed by: EMERGENCY MEDICINE

## 2021-01-29 PROCEDURE — 80048 BASIC METABOLIC PNL TOTAL CA: CPT | Performed by: EMERGENCY MEDICINE

## 2021-01-29 PROCEDURE — 99222 1ST HOSP IP/OBS MODERATE 55: CPT | Performed by: INTERNAL MEDICINE

## 2021-01-29 PROCEDURE — 83880 ASSAY OF NATRIURETIC PEPTIDE: CPT | Performed by: EMERGENCY MEDICINE

## 2021-01-29 PROCEDURE — 99291 CRITICAL CARE FIRST HOUR: CPT

## 2021-01-29 PROCEDURE — 93308 TTE F-UP OR LMTD: CPT

## 2021-01-29 PROCEDURE — 93325 DOPPLER ECHO COLOR FLOW MAPG: CPT | Performed by: INTERNAL MEDICINE

## 2021-01-29 PROCEDURE — 84100 ASSAY OF PHOSPHORUS: CPT | Performed by: INTERNAL MEDICINE

## 2021-01-29 PROCEDURE — 96368 THER/DIAG CONCURRENT INF: CPT

## 2021-01-29 PROCEDURE — 0BH17EZ INSERTION OF ENDOTRACHEAL AIRWAY INTO TRACHEA, VIA NATURAL OR ARTIFICIAL OPENING: ICD-10-PCS | Performed by: INTERNAL MEDICINE

## 2021-01-29 PROCEDURE — 5A12012 PERFORMANCE OF CARDIAC OUTPUT, SINGLE, MANUAL: ICD-10-PCS | Performed by: INTERNAL MEDICINE

## 2021-01-29 PROCEDURE — 84132 ASSAY OF SERUM POTASSIUM: CPT

## 2021-01-29 PROCEDURE — 84484 ASSAY OF TROPONIN QUANT: CPT

## 2021-01-29 PROCEDURE — 86900 BLOOD TYPING SEROLOGIC ABO: CPT | Performed by: EMERGENCY MEDICINE

## 2021-01-29 PROCEDURE — P9016 RBC LEUKOCYTES REDUCED: HCPCS

## 2021-01-29 PROCEDURE — 02HV33Z INSERTION OF INFUSION DEVICE INTO SUPERIOR VENA CAVA, PERCUTANEOUS APPROACH: ICD-10-PCS | Performed by: INTERNAL MEDICINE

## 2021-01-29 PROCEDURE — 86850 RBC ANTIBODY SCREEN: CPT | Performed by: EMERGENCY MEDICINE

## 2021-01-29 PROCEDURE — 84484 ASSAY OF TROPONIN QUANT: CPT | Performed by: EMERGENCY MEDICINE

## 2021-01-29 PROCEDURE — 93321 DOPPLER ECHO F-UP/LMTD STD: CPT | Performed by: INTERNAL MEDICINE

## 2021-01-29 PROCEDURE — 99291 CRITICAL CARE FIRST HOUR: CPT | Performed by: EMERGENCY MEDICINE

## 2021-01-29 PROCEDURE — 99291 CRITICAL CARE FIRST HOUR: CPT | Performed by: INTERNAL MEDICINE

## 2021-01-29 PROCEDURE — G1004 CDSM NDSC: HCPCS

## 2021-01-29 PROCEDURE — 71250 CT THORAX DX C-: CPT

## 2021-01-29 PROCEDURE — 5A1935Z RESPIRATORY VENTILATION, LESS THAN 24 CONSECUTIVE HOURS: ICD-10-PCS | Performed by: INTERNAL MEDICINE

## 2021-01-29 PROCEDURE — 03HY32Z INSERTION OF MONITORING DEVICE INTO UPPER ARTERY, PERCUTANEOUS APPROACH: ICD-10-PCS | Performed by: INTERNAL MEDICINE

## 2021-01-29 PROCEDURE — 93308 TTE F-UP OR LMTD: CPT | Performed by: EMERGENCY MEDICINE

## 2021-01-29 PROCEDURE — 96367 TX/PROPH/DG ADDL SEQ IV INF: CPT

## 2021-01-29 PROCEDURE — 70450 CT HEAD/BRAIN W/O DYE: CPT

## 2021-01-29 PROCEDURE — 36556 INSERT NON-TUNNEL CV CATH: CPT | Performed by: EMERGENCY MEDICINE

## 2021-01-29 PROCEDURE — 4A133J1 MONITORING OF ARTERIAL PULSE, PERIPHERAL, PERCUTANEOUS APPROACH: ICD-10-PCS | Performed by: INTERNAL MEDICINE

## 2021-01-29 PROCEDURE — 92950 HEART/LUNG RESUSCITATION CPR: CPT | Performed by: EMERGENCY MEDICINE

## 2021-01-29 PROCEDURE — 82947 ASSAY GLUCOSE BLOOD QUANT: CPT

## 2021-01-29 PROCEDURE — 99223 1ST HOSP IP/OBS HIGH 75: CPT | Performed by: THORACIC SURGERY (CARDIOTHORACIC VASCULAR SURGERY)

## 2021-01-29 PROCEDURE — 4A133B1 MONITORING OF ARTERIAL PRESSURE, PERIPHERAL, PERCUTANEOUS APPROACH: ICD-10-PCS | Performed by: INTERNAL MEDICINE

## 2021-01-29 PROCEDURE — 86901 BLOOD TYPING SEROLOGIC RH(D): CPT | Performed by: EMERGENCY MEDICINE

## 2021-01-29 PROCEDURE — 85025 COMPLETE CBC W/AUTO DIFF WBC: CPT | Performed by: EMERGENCY MEDICINE

## 2021-01-29 PROCEDURE — 83735 ASSAY OF MAGNESIUM: CPT | Performed by: INTERNAL MEDICINE

## 2021-01-29 PROCEDURE — 36415 COLL VENOUS BLD VENIPUNCTURE: CPT | Performed by: EMERGENCY MEDICINE

## 2021-01-29 PROCEDURE — 80053 COMPREHEN METABOLIC PANEL: CPT | Performed by: EMERGENCY MEDICINE

## 2021-01-29 PROCEDURE — 93010 ELECTROCARDIOGRAM REPORT: CPT | Performed by: INTERNAL MEDICINE

## 2021-01-29 PROCEDURE — 74176 CT ABD & PELVIS W/O CONTRAST: CPT

## 2021-01-29 PROCEDURE — 82948 REAGENT STRIP/BLOOD GLUCOSE: CPT

## 2021-01-29 PROCEDURE — 96361 HYDRATE IV INFUSION ADD-ON: CPT

## 2021-01-29 PROCEDURE — 84443 ASSAY THYROID STIM HORMONE: CPT

## 2021-01-29 PROCEDURE — 93005 ELECTROCARDIOGRAM TRACING: CPT

## 2021-01-29 PROCEDURE — 82805 BLOOD GASES W/O2 SATURATION: CPT | Performed by: INTERNAL MEDICINE

## 2021-01-29 PROCEDURE — 83605 ASSAY OF LACTIC ACID: CPT | Performed by: EMERGENCY MEDICINE

## 2021-01-29 PROCEDURE — 87040 BLOOD CULTURE FOR BACTERIA: CPT | Performed by: EMERGENCY MEDICINE

## 2021-01-29 PROCEDURE — 93308 TTE F-UP OR LMTD: CPT | Performed by: INTERNAL MEDICINE

## 2021-01-29 RX ORDER — SODIUM CHLORIDE, SODIUM GLUCONATE, SODIUM ACETATE, POTASSIUM CHLORIDE, MAGNESIUM CHLORIDE, SODIUM PHOSPHATE, DIBASIC, AND POTASSIUM PHOSPHATE .53; .5; .37; .037; .03; .012; .00082 G/100ML; G/100ML; G/100ML; G/100ML; G/100ML; G/100ML; G/100ML
1000 INJECTION, SOLUTION INTRAVENOUS ONCE
Status: COMPLETED | OUTPATIENT
Start: 2021-01-29 | End: 2021-01-29

## 2021-01-29 RX ORDER — SODIUM BICARBONATE 84 MG/ML
INJECTION, SOLUTION INTRAVENOUS CODE/TRAUMA/SEDATION MEDICATION
Status: COMPLETED | OUTPATIENT
Start: 2021-01-29 | End: 2021-01-29

## 2021-01-29 RX ORDER — FENTANYL CITRATE-0.9 % NACL/PF 10 MCG/ML
25 PLASTIC BAG, INJECTION (ML) INTRAVENOUS CONTINUOUS
Status: DISCONTINUED | OUTPATIENT
Start: 2021-01-29 | End: 2021-01-30 | Stop reason: HOSPADM

## 2021-01-29 RX ORDER — EPINEPHRINE 0.1 MG/ML
SYRINGE (ML) INJECTION CODE/TRAUMA/SEDATION MEDICATION
Status: COMPLETED | OUTPATIENT
Start: 2021-01-29 | End: 2021-01-29

## 2021-01-29 RX ORDER — MIDAZOLAM HYDROCHLORIDE 2 MG/2ML
4 INJECTION, SOLUTION INTRAMUSCULAR; INTRAVENOUS ONCE
Status: DISCONTINUED | OUTPATIENT
Start: 2021-01-29 | End: 2021-01-29

## 2021-01-29 RX ORDER — CALCIUM CHLORIDE 100 MG/ML
SYRINGE (ML) INTRAVENOUS CODE/TRAUMA/SEDATION MEDICATION
Status: COMPLETED | OUTPATIENT
Start: 2021-01-29 | End: 2021-01-29

## 2021-01-29 RX ORDER — ETOMIDATE 2 MG/ML
20 INJECTION INTRAVENOUS ONCE
Status: COMPLETED | OUTPATIENT
Start: 2021-01-29 | End: 2021-01-29

## 2021-01-29 RX ORDER — EPINEPHRINE 0.1 MG/ML
SYRINGE (ML) INJECTION
Status: DISPENSED
Start: 2021-01-29 | End: 2021-01-30

## 2021-01-29 RX ORDER — MILRINONE LACTATE 0.2 MG/ML
0.25 INJECTION, SOLUTION INTRAVENOUS CONTINUOUS
Status: DISCONTINUED | OUTPATIENT
Start: 2021-01-29 | End: 2021-01-30

## 2021-01-29 RX ORDER — SODIUM CHLORIDE, SODIUM GLUCONATE, SODIUM ACETATE, POTASSIUM CHLORIDE, MAGNESIUM CHLORIDE, SODIUM PHOSPHATE, DIBASIC, AND POTASSIUM PHOSPHATE .53; .5; .37; .037; .03; .012; .00082 G/100ML; G/100ML; G/100ML; G/100ML; G/100ML; G/100ML; G/100ML
INJECTION, SOLUTION INTRAVENOUS
Status: COMPLETED | OUTPATIENT
Start: 2021-01-29 | End: 2021-01-29

## 2021-01-29 RX ORDER — DEXTROSE 10 % IN WATER 10 %
250 INTRAVENOUS SOLUTION INTRAVENOUS ONCE
Status: COMPLETED | OUTPATIENT
Start: 2021-01-29 | End: 2021-01-29

## 2021-01-29 RX ORDER — MAGNESIUM SULFATE HEPTAHYDRATE 40 MG/ML
2 INJECTION, SOLUTION INTRAVENOUS ONCE
Status: COMPLETED | OUTPATIENT
Start: 2021-01-29 | End: 2021-01-29

## 2021-01-29 RX ORDER — DEXTROSE MONOHYDRATE 25 G/50ML
INJECTION, SOLUTION INTRAVENOUS
Status: DISPENSED
Start: 2021-01-29 | End: 2021-01-30

## 2021-01-29 RX ORDER — VECURONIUM BROMIDE 1 MG/ML
10 INJECTION, POWDER, LYOPHILIZED, FOR SOLUTION INTRAVENOUS ONCE
Status: COMPLETED | OUTPATIENT
Start: 2021-01-29 | End: 2021-01-29

## 2021-01-29 RX ORDER — FENTANYL CITRATE 50 UG/ML
100 INJECTION, SOLUTION INTRAMUSCULAR; INTRAVENOUS ONCE
Status: DISCONTINUED | OUTPATIENT
Start: 2021-01-29 | End: 2021-01-29

## 2021-01-29 RX ORDER — CHLORHEXIDINE GLUCONATE 0.12 MG/ML
15 RINSE ORAL EVERY 12 HOURS SCHEDULED
Status: DISCONTINUED | OUTPATIENT
Start: 2021-01-29 | End: 2021-01-30 | Stop reason: HOSPADM

## 2021-01-29 RX ORDER — NOREPINEPHRINE BITARTRATE 1 MG/ML
INJECTION, SOLUTION INTRAVENOUS
Status: DISPENSED
Start: 2021-01-29 | End: 2021-01-30

## 2021-01-29 RX ORDER — MAGNESIUM SULFATE 500 MG/ML
VIAL (ML) INJECTION CODE/TRAUMA/SEDATION MEDICATION
Status: COMPLETED | OUTPATIENT
Start: 2021-01-29 | End: 2021-01-29

## 2021-01-29 RX ORDER — DEXTROSE 50 % IN WATER 50 %
SYRINGE (ML) INTRAVENOUS CODE/TRAUMA/SEDATION MEDICATION
Status: COMPLETED | OUTPATIENT
Start: 2021-01-29 | End: 2021-01-29

## 2021-01-29 RX ADMIN — MAGNESIUM SULFATE HEPTAHYDRATE 2 G: 500 INJECTION, SOLUTION INTRAMUSCULAR; INTRAVENOUS at 18:15

## 2021-01-29 RX ADMIN — CALCIUM CHLORIDE 1 G: 100 INJECTION PARENTERAL at 19:16

## 2021-01-29 RX ADMIN — SODIUM BICARBONATE 50 MEQ: 84 INJECTION, SOLUTION INTRAVENOUS at 18:09

## 2021-01-29 RX ADMIN — METRONIDAZOLE 500 MG: 500 INJECTION, SOLUTION INTRAVENOUS at 23:28

## 2021-01-29 RX ADMIN — SODIUM CHLORIDE, SODIUM GLUCONATE, SODIUM ACETATE, POTASSIUM CHLORIDE, MAGNESIUM CHLORIDE, SODIUM PHOSPHATE, DIBASIC, AND POTASSIUM PHOSPHATE 1000 ML: .53; .5; .37; .037; .03; .012; .00082 INJECTION, SOLUTION INTRAVENOUS at 18:27

## 2021-01-29 RX ADMIN — SODIUM CHLORIDE, SODIUM GLUCONATE, SODIUM ACETATE, POTASSIUM CHLORIDE, MAGNESIUM CHLORIDE, SODIUM PHOSPHATE, DIBASIC, AND POTASSIUM PHOSPHATE 1000 ML: .53; .5; .37; .037; .03; .012; .00082 INJECTION, SOLUTION INTRAVENOUS at 18:23

## 2021-01-29 RX ADMIN — SODIUM CHLORIDE 50 ML/HR: 234 INJECTION, SOLUTION INTRAVENOUS at 22:03

## 2021-01-29 RX ADMIN — SODIUM BICARBONATE 50 MEQ: 84 INJECTION, SOLUTION INTRAVENOUS at 19:16

## 2021-01-29 RX ADMIN — NOREPINEPHRINE BITARTRATE 20 MCG/MIN: 1 INJECTION, SOLUTION, CONCENTRATE INTRAVENOUS at 20:00

## 2021-01-29 RX ADMIN — NOREPINEPHRINE BITARTRATE 10 MCG/MIN: 1 INJECTION, SOLUTION, CONCENTRATE INTRAVENOUS at 18:20

## 2021-01-29 RX ADMIN — DEXTROSE 250 ML: 10 SOLUTION INTRAVENOUS at 16:00

## 2021-01-29 RX ADMIN — CALCIUM CHLORIDE 1 G: 100 INJECTION PARENTERAL at 18:08

## 2021-01-29 RX ADMIN — ETOMIDATE 20 MG: 20 INJECTION, SOLUTION INTRAVENOUS at 17:47

## 2021-01-29 RX ADMIN — CEFTRIAXONE SODIUM 1000 MG: 10 INJECTION, POWDER, FOR SOLUTION INTRAVENOUS at 17:57

## 2021-01-29 RX ADMIN — EPINEPHRINE 1 MG: 0.1 INJECTION, SOLUTION ENDOTRACHEAL; INTRACARDIAC; INTRAVENOUS at 18:08

## 2021-01-29 RX ADMIN — MIDAZOLAM 1 MG/HR: 5 INJECTION INTRAMUSCULAR; INTRAVENOUS at 18:38

## 2021-01-29 RX ADMIN — VASOPRESSIN 0.04 UNITS/MIN: 20 INJECTION INTRAVENOUS at 23:08

## 2021-01-29 RX ADMIN — SODIUM BICARBONATE 125 ML/HR: 84 INJECTION, SOLUTION INTRAVENOUS at 19:37

## 2021-01-29 RX ADMIN — MAGNESIUM SULFATE HEPTAHYDRATE 2 G: 40 INJECTION, SOLUTION INTRAVENOUS at 18:15

## 2021-01-29 RX ADMIN — SODIUM CHLORIDE 500 ML: 0.9 INJECTION, SOLUTION INTRAVENOUS at 14:56

## 2021-01-29 RX ADMIN — VECURONIUM BROMIDE 10 MG: 1 INJECTION, POWDER, LYOPHILIZED, FOR SOLUTION INTRAVENOUS at 17:47

## 2021-01-29 RX ADMIN — Medication 25 MCG/HR: at 18:22

## 2021-01-29 RX ADMIN — EPINEPHRINE 2 MCG/MIN: 1 INJECTION, SOLUTION, CONCENTRATE INTRAVENOUS at 22:31

## 2021-01-29 RX ADMIN — NOREPINEPHRINE BITARTRATE 30 MCG/MIN: 1 INJECTION, SOLUTION, CONCENTRATE INTRAVENOUS at 22:14

## 2021-01-29 RX ADMIN — DEXTROSE MONOHYDRATE 25 G: 500 INJECTION PARENTERAL at 19:25

## 2021-01-29 RX ADMIN — EPINEPHRINE 1 MG: 0.1 INJECTION, SOLUTION ENDOTRACHEAL; INTRACARDIAC; INTRAVENOUS at 19:15

## 2021-01-29 RX ADMIN — SODIUM BICARBONATE 50 MEQ: 84 INJECTION, SOLUTION INTRAVENOUS at 19:19

## 2021-01-29 NOTE — ED PROVIDER NOTES
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 as well as pericardial effusion that presents the ED 2 days after discharge with increasing shortness of breath  Patient said that since discharge she has had shortness of breath that is getting worse in nature  She also describes bilateral lower leg edema  Patient says she feels better when she is sitting forward  During her hospitalization she had a pericardial drain in place at 1st however due to the increased drainage she was taken with thoracic surgery for VATS and pericardial window on the 21st   The drain from that was then pulled on the 24th  She still has sutures in place and denies any fevers or chills at home  She also denies any chest pain  Through out the duration of the interview, patient speaking in small and short sentences  Prior to Admission Medications   Prescriptions Last Dose Informant Patient Reported?  Taking?   diltiazem (CARDIZEM CD) 180 mg 24 hr capsule   No Yes   Sig: Take 1 capsule (180 mg total) by mouth daily   metoprolol succinate (TOPROL-XL) 100 mg 24 hr tablet   No Yes   Sig: Take 1 tablet (100 mg total) by mouth every 12 (twelve) hours   rivaroxaban (XARELTO) 15 mg tablet   No Yes   Sig: Take 1 tablet (15 mg total) by mouth 2 (two) times a day with meals for 41 doses   rivaroxaban (XARELTO) 20 mg tablet   No Yes   Sig: Take 1 tablet (20 mg total) by mouth daily with breakfast      Facility-Administered Medications: None       Past Medical History:   Diagnosis Date    Arthritis     Hypertension        Past Surgical History:   Procedure Laterality Date    IR BIOPSY LIVER MASS  1/13/2021    MASTECTOMY      left     PERICARDIAL WINDOW N/A 1/4/2021    Procedure: WINDOW PERICARDIAL, subxiphoid ;  Surgeon: Rinku Henderson MD;  [facility];  Service: Thoracic    PERICARDIAL WINDOW Right 1/21/2021    Procedure: WINDOW PERICARDIAL;  Surgeon: Rinku Henderson MD;  [facility];  Service: Thoracic    [address] N/A 1/21/2021    Procedure: Genny Landers;  Surgeon: Rinku Henderson MD;  [facility];  Service: Thoracic    THORACOSCOPY VIDEO ASSISTED SURGERY (VATS) Right 1/21/2021    Procedure: THORACOSCOPY VIDEO ASSISTED SURGERY (VATS)  PERICARDIAL WINDOW, BIOPSY ANTERIOR MEDIASTINAL MASS;  Surgeon: Rinku Henderson MD;  [facility];  Service: Thoracic       History reviewed  No pertinent family history  I have reviewed and agree with the history as documented  E-Cigarette/Vaping    E-Cigarette Use Never User      E-Cigarette/Vaping Substances     Social History     Tobacco Use    Smoking status: Never Smoker    Smokeless tobacco: Never Used   Substance Use Topics    Alcohol use: Yes     Frequency: Monthly or less     Drinks per session: 1 or 2     Binge frequency: Never    Drug use: Never        Review of Systems   Unable to perform ROS: Acuity of condition   Respiratory: Positive for shortness of breath  Cardiovascular: Positive for leg swelling  Genitourinary: Positive for difficulty urinating         Physical Exam  ED Triage Vitals   Temperature Pulse Respirations Blood Pressure SpO2   01/29/21 1447 01/29/21 1355 01/29/21 1355 01/29/21 1402 01/29/21 1355   97 7 °F (36 5 °C) (!) 140 (!) 30 125/74 98 %      Temp Source Heart Rate Source Patient Position - Orthostatic VS BP Location FiO2 (%)   01/29/21 1447 01/29/21 1355 01/29/21 1355 01/29/21 1355 01/29/21 1750   Rectal Monitor Lying Right arm 70      Pain Score       01/29/21 1730       No Pain             Orthostatic Vital Signs  Vitals:    01/29/21 1845 01/29/21 1925 01/29/21 1945 01/29/21 1950   BP: (!) 88/41 141/80  93/63   Pulse: (!) 114 (!) 130 (!) 130 (!) 126 Patient Position - Orthostatic VS: Lying Lying         Physical Exam  Vitals signs and nursing note reviewed  Constitutional:       General: She is not in acute distress  Appearance: Normal appearance  She is well-developed  She is not ill-appearing  HENT:      Head: Normocephalic and atraumatic  Right Ear: External ear normal       Left Ear: External ear normal       Nose: Nose normal    Eyes:      Extraocular Movements: Extraocular movements intact  Pupils: Pupils are equal, round, and reactive to light  Neck:      Musculoskeletal: Normal range of motion and neck supple  Cardiovascular:      Rate and Rhythm: Regular rhythm  Tachycardia present  Heart sounds: No murmur  No friction rub  No gallop  Pulmonary:      Effort: Respiratory distress present  Breath sounds: Normal breath sounds  No stridor  No wheezing  Comments: Patient taking shallow breaths, however breaths sounds are clear  Chest:      Comments: Patient has sutures in the anterior chest over the sternum and right chest at the mid axillary line  Wounds look clean dry and intact  No evidence of crepitus on exam   Abdominal:      General: Bowel sounds are normal  There is no distension  Palpations: Abdomen is soft  Tenderness: There is no abdominal tenderness  Musculoskeletal: Normal range of motion  Comments: 3+ pitting edema in b/l lower extremity   Skin:     General: Skin is warm and dry  Comments: Extremities cold   Neurological:      Mental Status: She is alert and oriented to person, place, and time     Psychiatric:         Mood and Affect: Mood normal          Behavior: Behavior normal          ED Medications  Medications   norepinephrine (LEVOPHED) 1 mg/mL injection **ADS Override Pull** (has no administration in time range)   midazolam (VERSED) 0 5 mg/mL (STANDARD CONCENTRATION) 100 mL infusion (0 mg/hr Intravenous Stopped 1/29/21 2056)   fentaNYL 1000 mcg in sodium chloride 0 9% 100mL infusion (25 mcg/hr Intravenous New Bag 1/29/21 1822)   sodium bicarbonate 150 mEq in dextrose 5 % 1,000 mL infusion (125 mL/hr Intravenous New Bag 1/29/21 1937)   dextrose 10 % and normal saline infusion (has no administration in time range)   chlorhexidine (PERIDEX) 0 12 % oral rinse 15 mL (has no administration in time range)   sodium bicarbonate 8 4 % injection **ADS Override Pull** (has no administration in time range)   dextrose 50 % IV solution **ADS Override Pull** (has no administration in time range)   norepinephrine (LEVOPHED) 4 mg (STANDARD CONCENTRATION) IV in sodium chloride 0 9% 250 mL (30 mcg/min Intravenous Rate/Dose Change 1/29/21 2115)   EPINEPHrine 3000 mcg (STANDARD CONCENTRATION) IV in sodium chloride 0 9% 250 mL (5 mcg/min Intravenous Rate/Dose Change 1/29/21 2117)   sodium chloride 0 9 % bolus 500 mL (0 mL Intravenous Stopped 1/29/21 1556)   dextrose infusion 10 % bolus (0 mL Intravenous Stopped 1/29/21 1642)   ceftriaxone (ROCEPHIN) 1 g/50 mL in dextrose IVPB (0 mg Intravenous Stopped 1/29/21 1827)   multi-electrolyte (ISOLYTE-S PH 7 4) bolus 1,000 mL (0 mL Intravenous Stopped 1/29/21 1852)   etomidate (AMIDATE) 2 mg/mL injection 20 mg (20 mg Intravenous Given 1/29/21 1747)   vecuronium (NORCURON) injection 10 mg (10 mg Intravenous Given 1/29/21 1747)   EPINEPHrine (ADRENALIN) injection SOSY (1 mg Intravenous Given 1/29/21 1808)   calcium chloride 1 g/10 mL injection (1 g Intravenous Given 1/29/21 1808)   sodium bicarbonate 50 mEq/50 mL injection (50 mEq Intravenous Given 1/29/21 1809)   magnesium sulfate 2 g/50 mL IVPB (premix) 2 g (0 g Intravenous Stopped 1/29/21 1939)   magnesium sulfate 1 g/2 mL injection (2 g Intravenous Given 1/29/21 1815)   multi-electrolyte (ISOLYTE-S PH 7 4) bolus (1,000 mL Intravenous New Bag 1/29/21 1823)   norepinephrine (LEVOPHED) 4 mg in sodium chloride 0 9 % 250 mL infusion (0 mcg/min  Stopped 1/29/21 2000)   EPINEPHrine (ADRENALIN) injection SOSY (1 mg Intravenous Given 1/29/21 1915)   calcium chloride 1 g/10 mL injection (1 g Intravenous Given 1/29/21 1916)   sodium bicarbonate 50 mEq/50 mL injection (50 mEq Intravenous Given 1/29/21 1919)   dextrose 25 g/50 mL IV solution (25 g Intravenous Given 1/29/21 1925)       Diagnostic Studies  Results Reviewed     Procedure Component Value Units Date/Time    POCT Blood Gas (CG8+) [[id number]]  (Abnormal) Collected: 01/29/21 1926    Lab Status: Final result Specimen: Venous Updated: 01/29/21 1929     ph, Porfirio ISTAT 7 074     pCO2, Porfirio i-STAT 56 7 mm HG      pO2, Porfirio i-STAT 29 0 mm HG      BE, i-STAT -13 mmol/L      HCO3, Porfirio i-STAT 16 6 mmol/L      CO2, i-STAT 18 mmol/L      O2 Sat, i-STAT 34 %      SODIUM, I-STAT 143 mmol/l      Potassium, i-STAT 5 5 mmol/L      Hct, i-STAT 23 %      Hgb, i-STAT 7 8 g/dl      Glucose, i-STAT 144 mg/dl      Specimen Type VENOUS    Lactic acid [[id number]]  (Abnormal) Collected: 01/29/21 1835    Lab Status: Final result Specimen: Blood from Central Venous Line Updated: 01/29/21 1923     LACTIC ACID 17 8 mmol/L     Narrative:      Result may be elevated if tourniquet was used during collection  Lactic acid 2 Hours [[id number]]     Lab Status: No result Specimen: Blood     Path Slide Review [[id number]] Collected: 01/29/21 1813    Lab Status: In process Specimen: Blood from Arm, Right Updated: 01/29/21 1912    Manual Differential (Non Wam) [[id number]] Collected: 01/29/21 1813    Lab Status:  In process Specimen: Blood from Arm, Right Updated: 01/29/21 1910    Blood gas, arterial [[id number]]  (Abnormal) Collected: 01/29/21 1823    Lab Status: Final result Specimen: Blood, Arterial from Radial, Right Updated: 01/29/21 1846     pH, Arterial 7 053     pCO2, Arterial 27 5 mm Hg      pO2, Arterial 138 5 mm Hg      HCO3, Arterial 7 5 mmol/L      Base Excess, Arterial -21 3 mmol/L      O2 Content, Arterial 11 0 mL/dL      O2 HGB,Arterial  96 3 %      SOURCE Radial, Right     MILAGROS TEST Yes     Vent Type- AC AC     AC Rate 22     Tidal Volume 450 ml      Inspired Air (FIO2) 70     PEEP 6    CBC and differential [[id number]]  (Abnormal) Collected: 01/29/21 1813    Lab Status: Preliminary result Specimen: Blood from Arm, Right Updated: 01/29/21 1831     WBC 25 03 Thousand/uL      RBC 3 23 Million/uL      Hemoglobin 7 0 g/dL      Hematocrit 26 2 %      MCV 81 fL      MCH 21 7 pg      MCHC 26 7 g/dL      RDW 23 9 %      MPV 11 9 fL      Platelets 137 Thousands/uL      nRBC 11 /100 WBCs     POCT troponin [[id number]] Collected: 01/29/21 1818    Lab Status: Final result Specimen: Venous Updated: 01/29/21 1830     POC Troponin I 0 02 ng/ml      Specimen Type VENOUS    Narrative:      [employer] i-Stat handheld analyzer 99% cutoff is > 0 08ng/mL in network Emergency Departments    o cTnI 99% cutoff is useful only when applied to patients in the clinical setting of myocardial ischemia  o cTnI 99% cutoff should be interpreted in the context of clinical history, ECG findings and possibly cardiac imaging to establish correct diagnosis  o cTnI 99% cutoff may be suggestive but clearly not indicative of a coronary event without the clinical setting of myocardial ischemia        POCT Blood Gas (CG8+) [[id number]]  (Abnormal) Collected: 01/29/21 1818    Lab Status: Final result Specimen: Venous Updated: 01/29/21 1823     ph, Porfirio ISTAT 6 971     pCO2, Porfirio i-STAT 41 6 mm HG      pO2, Porfirio i-STAT 40 0 mm HG      BE, i-STAT -21 mmol/L      HCO3, Porfirio i-STAT 9 6 mmol/L      CO2, i-STAT 11 mmol/L      O2 Sat, i-STAT 49 %      SODIUM, I-STAT 136 mmol/l      Potassium, i-STAT 5 8 mmol/L      Hct, i-STAT 26 %      Hgb, i-STAT 8 8 g/dl      Glucose, i-STAT >600 mg/dl      Specimen Type VENOUS    Fingerstick Glucose (POCT) [[id number]]  (Normal) Collected: 01/29/21 1812    Lab Status: Final result Updated: 01/29/21 1822     POC Glucose 85 mg/dl     TSH, 3rd generation with Free T4 reflex [[id number]]  (Normal) Collected: 01/29/21 1556    Lab Status: Final result Specimen: Blood from Line, Venous Updated: 01/29/21 1807     TSH 3RD GENERATON 2 150 uIU/mL     Narrative:      Patients undergoing fluorescein dye angiography may retain small amounts of fluorescein in the body for 48-72 hours post procedure  Samples containing fluorescein can produce falsely depressed TSH values  If the patient had this procedure,a specimen should be resubmitted post fluorescein clearance  Blood culture #1 [[id number]] Collected: 01/29/21 1752    Lab Status: In process Specimen: Blood from Arm, Right Updated: 01/29/21 1757    Blood culture #2 [[id number]] Collected: 01/29/21 1752    Lab Status: In process Specimen: Blood from Arm, Left Updated: 01/29/21 1757    Lactic acid, plasma [[id number]]  (Abnormal) Collected: 01/29/21 1642    Lab Status: Final result Specimen: Blood from Arm, Right Updated: 01/29/21 1731     LACTIC ACID 16 2 mmol/L     Narrative:      Result may be elevated if tourniquet was used during collection      Lactic acid 2 Hours [[id number]]     Lab Status: No result Specimen: Blood     UA w Reflex to Microscopic w Reflex to Culture [[id number]]     Lab Status: No result Specimen: Urine, Indwelling Hendrix Catheter     Fingerstick Glucose (POCT) [[id number]]  (Normal) Collected: 01/29/21 1721    Lab Status: Final result Updated: 01/29/21 1721     POC Glucose 85 mg/dl     Basic metabolic panel [[id number]]  (Abnormal) Collected: 01/29/21 1556    Lab Status: Final result Specimen: Blood from Line, Venous Updated: 01/29/21 1717     Sodium 142 mmol/L      Potassium 5 8 mmol/L      Chloride 108 mmol/L      CO2 10 mmol/L      ANION GAP 24 mmol/L      BUN 32 mg/dL      Creatinine 2 81 mg/dL      Glucose 29 mg/dL      Calcium 9 0 mg/dL      eGFR 19 ml/min/1 73sq m     Narrative:      [place] guidelines for Chronic Kidney Disease (CKD):     Stage 1 with normal or high GFR (GFR > 90 mL/min/1 73 square meters)    Stage 2 Mild CKD (GFR = 60-89 mL/min/1 73 square meters)    Stage 3A Moderate CKD (GFR = 45-59 mL/min/1 73 square meters)    Stage 3B Moderate CKD (GFR = 30-44 mL/min/1 73 square meters)    Stage 4 Severe CKD (GFR = 15-29 mL/min/1 73 square meters)    Stage 5 End Stage CKD (GFR <15 mL/min/1 73 square meters)  Note: GFR calculation is accurate only with a steady state creatinine    Blood gas, venous [[id number]]  (Abnormal) Collected: 01/29/21 1642    Lab Status: Final result Specimen: Blood from Arm, Right Updated: 01/29/21 1709     pH, Porfirio 6 967     pCO2, Porfirio 32 1 mm Hg      pO2, Porfirio 36 2 mm Hg      HCO3, Porfirio 7 2 mmol/L      Base Excess, Porfirio -23 3 mmol/L      O2 Content, Porfirio 5 4 ml/dL      O2 HGB, VENOUS 39 5 %     Fingerstick Glucose (POCT) [[id number]]  (Normal) Collected: 01/29/21 1648    Lab Status: Final result Updated: 01/29/21 1649     POC Glucose 98 mg/dl     Comprehensive metabolic panel [[id number]]  (Abnormal) Collected: 01/29/21 1400    Lab Status: Final result Specimen: Blood from Arm, Right Updated: 01/29/21 1542     Sodium 139 mmol/L      Potassium 6 3 mmol/L      Chloride 110 mmol/L      CO2 10 mmol/L      ANION GAP 19 mmol/L      BUN 31 mg/dL      Creatinine 2 71 mg/dL      Glucose 51 mg/dL      Calcium 9 9 mg/dL      Corrected Calcium 10 4 mg/dL       U/L      ALT 71 U/L      Alkaline Phosphatase 91 U/L      Total Protein 7 3 g/dL      Albumin 3 4 g/dL      Total Bilirubin 3 01 mg/dL      eGFR 19 ml/min/1 73sq m     Narrative:      [place] guidelines for Chronic Kidney Disease (CKD):     Stage 1 with normal or high GFR (GFR > 90 mL/min/1 73 square meters)    Stage 2 Mild CKD (GFR = 60-89 mL/min/1 73 square meters)    Stage 3A Moderate CKD (GFR = 45-59 mL/min/1 73 square meters)    Stage 3B Moderate CKD (GFR = 30-44 mL/min/1 73 square meters)    Stage 4 Severe CKD (GFR = 15-29 mL/min/1 73 square meters)    Stage 5 End Stage CKD (GFR <15 mL/min/1 73 square meters)  Note: GFR calculation is accurate only with a steady state creatinine    NT-BNP PRO [[id number]]  (Abnormal) Collected: 01/29/21 1400    Lab Status: Final result Specimen: Blood from Arm, Right Updated: 01/29/21 1530     NT-proBNP 14,676 pg/mL     CBC and differential [[id number]]  (Abnormal) Collected: 01/29/21 1400    Lab Status: Final result Specimen: Blood from Arm, Right Updated: 01/29/21 1458     WBC 22 66 Thousand/uL      RBC 4 89 Million/uL      Hemoglobin 10 6 g/dL      Hematocrit 38 2 %      MCV 78 fL      MCH 21 7 pg      MCHC 27 7 g/dL      RDW 24 9 %      MPV 12 2 fL      Platelets 438 Thousands/uL      nRBC 6 /100 WBCs     Manual Differential(PHLEBS Do Not Order) [[id number]]  (Abnormal) Collected: 01/29/21 1400    Lab Status: Final result Specimen: Blood from Arm, Right Updated: 01/29/21 1458     Segmented % 64 %      Bands % 5 %      Lymphocytes % 17 %      Monocytes % 6 %      Eosinophils, % 0 %      Basophils % 0 %      Metamyelocytes% 1 %      Myelocytes % 3 %      Atypical Lymphocytes % 4 %      Absolute Neutrophils 15 64 Thousand/uL      Lymphocytes Absolute 3 85 Thousand/uL      Monocytes Absolute 1 36 Thousand/uL      Eosinophils Absolute 0 00 Thousand/uL      Basophils Absolute 0 00 Thousand/uL      Total Counted 100     nRBC 8 /100 WBC      Anisocytosis Present     Poikilocytes Present     Platelet Estimate Increased    Troponin I [[id number]]  (Normal) Collected: 01/29/21 1400    Lab Status: Final result Specimen: Blood from Arm, Right Updated: 01/29/21 1433     Troponin I <0 02 ng/mL                  CT chest abdomen pelvis wo contrast   Final Result by John Garvin MD (01/29 2120)         1  No airspace opacities at the bilateral lung bases may represent mild aspiration pneumonitis  Previously demonstrated chest findings are stable  2   Mild ascites                    Workstation performed: [id number]         XR chest 1 view portable   Final Result by Petr Ashraf DO (01/29 1814)      Patchy opacity right lung base laterally may represent atelectasis or developing pneumonia  Interval endotracheal intubation and nasogastric tube placement  Workstation performed: [id number]         CT head without contrast   Final Result by Bronwyn Hannah DO (01/29 1717)   41   No acute intracranial abnormality  Workstation performed: [id number]         CT chest wo contrast   Final Result by Lynsey Orantes MD (01/29 1719)      Recurrent large loculated malignant pericardial effusion with the largest component inferior to the heart  Redemonstration of intrapericardial mass abutting the right atrium with adjacent gas from recent biopsy  Resolving right middle lobe pulmonary infarct  Slight increase in small loculated right pleural effusion  I personally discussed this study with Dr Tamia Mcdonald on 1/29/2021 at 4:22 PM                        Workstation performed: [id number]         XR chest 1 view portable   Final Result by Padmini Barros DO (01/29 1440)      Stable chest   Stable cardiomegaly noted with stable right basilar nonspecific lung opacity potentially atelectasis or pneumonia  Workstation performed: [id number]               Procedures  ECG 12 Lead Documentation Only    Date/Time: 1/29/2021 2:33 PM  Performed by: Ling Lemos MD  Authorized by: Ling Lemos MD     Indications / Diagnosis:  Shortness of breath  Patient location:  ED and bedside  Interpretation:     Interpretation: abnormal    Rate:     ECG rate:  140    ECG rate assessment: tachycardic    Rhythm:     Rhythm: atrial flutter      Rhythm comment:  Aflutter 2:1 block  Ectopy:     Ectopy: none    QRS:     QRS axis:  Normal    QRS intervals:  Normal  Conduction:     Conduction: normal    ST segments:     ST segments:  Normal  T waves:     T waves: normal      Intubation    Date/Time: 1/29/2021 7:58 PM  Performed by: Ling Lemos MD  Authorized by:  Ling Lemos MD     Patient location:  ED and bedside  Other Assisting Provider: Yes (comment) (Dr Ferrel Hodgkin and Dr Kinjal Ramirez)    Consent:     Consent obtained:  Emergent situation    Consent given by: sister and patient prior to altered mental status  Risks discussed:  Death, hypoxia, aspiration, bleeding, brain injury, dental trauma and laryngeal injury    Alternatives discussed:  No treatment  Universal protocol:     Patient identity confirmed:  Arm band  Pre-procedure details:     Patient status:  Altered mental status    Pretreatment medications:  None    Paralytics:  Rocuronium  Procedure details:     Preoxygenation:  Bag valve mask    CPR in progress: no      Intubation method:  Oral    Oral intubation technique:  Direct    Laryngoscope blade: Mac 4    Tube size (mm):  7 5    Tube type:  Cuffed    Number of attempts:  1    Cricoid pressure: yes      Tube visualized through cords: yes    Placement assessment:     ETT to lip:  24    Tube secured with:  ETT burroughs    Breath sounds:  Equal and absent over the epigastrium    Placement verification: chest rise, condensation, CXR verification, equal breath sounds, ETCO2 detector and tube exhalation      CXR findings:  ETT in proper place    Ventilator settings:  AC/VC, rate 22, 450 volume, fio2 100, peep 6  Post-procedure details:     Patient tolerance of procedure: Tolerated well, no immediate complications          ED Course               Identification of Seniors at Risk      Most Recent Value   (ISAR) Identification of Seniors at Risk   Before the illness or injury that brought you to the Emergency, did you need someone to help you on a regular basis? 1 Filed at: 01/29/2021 1355   In the last 24 hours, have you needed more help than usual?  1 Filed at: 01/29/2021 1355   Have you been hospitalized for one or more nights during the past 6 months?   1 Filed at: 01/29/2021 1355   In general, do you see well?  0 Filed at: 01/29/2021 1355   In general, do you have serious problems with your memory? 0 Filed at: 01/29/2021 3797   Do you take more than three different medications every day? 1 Filed at: 01/29/2021 1359   ISAR Score  4 Filed at: 01/29/2021 1355                Initial Sepsis Screening     Row Name 01/29/21 1730                Is the patient's history suggestive of a new or worsening infection? (!) Yes (Proceed)  -FG        Suspected source of infection  suspect infection, source unknown  -FG        Are two or more of the following signs & symptoms of infection both present and new to the patient? (!) Yes (Proceed)  -FG        Indicate SIRS criteria  Hypothermia < 36C (96 8F); Altered mental status; Tachycardia > 90 bpm  -FG        If the answer is yes to both questions, suspicion of sepsis is present          If severe sepsis is present AND tissue hypoperfusion perists in the hour after fluid resuscitation or lactate > 4, the patient meets criteria for SEPTIC SHOCK          Are any of the following organ dysfunction criteria present within 6 hours of suspected infection and SIRS criteria that are NOT considered to be chronic conditions? (!) Yes  -FG        Organ dysfunction  Lactate >/equal 4 0 mmol/L  -FG        Date of presentation of severe sepsis  01/29/21  -FG        Time of presentation of severe sepsis  1730  -FG        Tissue hypoperfusion persists in the hour after crystalloid fluid administration, evidenced, by either:  * OR * Lactate level is greater to or equal 4 mmol/dL ( ___ mmol/dL in comment field)  -FG        Was hypotension present within one hour of the conclusion of crystalloid fluid administration?   No  -FG        Date of presentation of septic shock          Time of presentation of septic shock            User Key  (r) = Recorded By, (t) = Taken By, (c) = Cosigned By    [address] Name Provider Type    Dee Fowler MD Resident          SBIRT 20yo+      Most Recent Value   SBIRT (23 yo +)   In order to provide better care to our patients, we are screening all of our patients for alcohol and drug use  Would it be okay to ask you these screening questions? Unable to answer at this time Filed at: 01/29/2021 1403                MDM  Number of Diagnoses or Management Options  Acute renal failure (ARF) [facility]):   Carcinoma of central portion of female breast, left [facility]):   Pericardial effusion without cardiac tamponade:   Diagnosis management comments: 43-year-old female presents the ED for shortness of breath  Differential at this time includes pericardial effusion, PE, ACS, postop issue  On bedside POC cardiac ultrasound, there is evidence of pericardial effusion  Will order a formal ECHO as well as consult thoracic surgery for possible draining of the pericardial effusion  Will also give the patient a 500 cc normal saline bolus as will want to make sure she has the intravascular volume necessary to overcome the pericardial effusion  Will also order a CBC, CMP, Troponin, Chest x-ray to start initial evaluation  Throughout the course of her stay in the ED, Patient was found to be hypoglycemic and so she was treated with d10 250 cc bolus  Further on in her stay, the patient had an episode where she had seizure like activity described as staring off into space  She was unresponsive to our attempts at sternal rub  This episode lasted for a few minutes  She was then taken to CT for stat CT head which was unremarkable  After a second episode of similar seizure like activity, the decision was made to intubate her due to altered mental status and risk for airway compromise  At roughly 1800 patient went into Asystole and compressions as well as resuscitation was started with successful ROSC  Critical care was consulted for admission  While being evaluated by critical care team, patient had a second code with successful ROSC again       I discussed the case with the sister and  in our family waiting room and discussed with the severity and the ill prognosis of her condition  They expressed to me that they wanted everything possible done for her  I relayed that message to our critical care team        Disposition  Final diagnoses:   Pericardial effusion without cardiac tamponade   [facility]   Carcinoma of central portion of female breast, left ([facility] )   Hyperkalemia   Lactic acidosis   Cardiopulmonary arrest with successful resuscitation [facility])   Mediastinal mass     Time reflects when diagnosis was documented in both MDM as applicable and the Disposition within this note     Time User Action Codes Description Comment    1/29/2021  7:09 PM Alroy Ahumada Add [I31 3] Pericardial effusion without cardiac tamponade     1/29/2021  7:09 PM Alroy Ahumada Modify [I31 3] Pericardial effusion without cardiac tamponade     1/29/2021  7:10 PM Alroy Ahumada Add [N17 9] Acute renal failure (ARF) ([facility] )     1/29/2021  7:11 PM Alroy Ahumada Add [C50 112] Carcinoma of central portion of female breast, left ([facility] )     1/29/2021  9:40 PM Janeice Axe Add [E87 5] Hyperkalemia     1/29/2021  9:40 PM Janeice Axe Add [E87 2] Lactic acidosis     1/29/2021  9:40 PM Janeice Axe Add [I46 9] Cardiopulmonary arrest with successful resuscitation ([facility] )     1/29/2021  9:40 PM Janeice Axe Modify [I31 3] Pericardial effusion without cardiac tamponade     1/29/2021  9:40 PM Catalina Knee, [address] [I46 9] Cardiopulmonary arrest with successful resuscitation ([facility] )     1/29/2021  9:40 PM Janeice Axe Add [J98 59] Mediastinal mass       ED Disposition     ED Disposition Condition Date/Time Comment    Admit Stable Fri Jan 29, 2021  7:29 PM Case was discussed with Dr Khoa Reynolds and the patient's admission status was agreed to be Admission Status: inpatient status to the service of Dr Khoa Reynolds           Follow-up Information    None         Current Discharge Medication List      CONTINUE these medications which have NOT CHANGED    Details   diltiazem (CARDIZEM CD) 180 mg 24 hr capsule Take 1 capsule (180 mg total) by mouth daily  Qty: 30 capsule, Refills: 0    Associated Diagnoses: Gastrointestinal stromal tumor (GIST) (HCC)      metoprolol succinate (TOPROL-XL) 100 mg 24 hr tablet Take 1 tablet (100 mg total) by mouth every 12 (twelve) hours  Qty: 60 tablet, Refills: 0    Associated Diagnoses: Atrial flutter ([address] ); Gastrointestinal stromal tumor (GIST) ([address] )      ! ! rivaroxaban (XARELTO) 15 mg tablet Take 1 tablet (15 mg total) by mouth 2 (two) times a day with meals for 41 doses  Qty: 41 tablet, Refills: 0    Associated Diagnoses: Atrial flutter ([address] ); Gastrointestinal stromal tumor (GIST) ([address] )      ! ! rivaroxaban (XARELTO) 20 mg tablet Take 1 tablet (20 mg total) by mouth daily with breakfast  Qty: 30 tablet, Refills: 0    Associated Diagnoses: Atrial flutter ([address] ); Gastrointestinal stromal tumor (GIST) ([address] )       ! ! - Potential duplicate medications found  Please discuss with provider  No discharge procedures on file  PDMP Review     None           ED Provider  Attending physically available and evaluated Zi Georges I managed the patient along with the ED Attending      Electronically Signed by         Carmen Alcantar MD  01/29/21 0748

## 2021-01-29 NOTE — ED PROCEDURE NOTE
PROCEDURE  POC Cardiac US    Date/Time: 1/29/2021 2:49 PM  Performed by: Stanton Mandel MD  Authorized by: Stanton Mandel MD     Patient location:  ED  Procedure details:     Exam Type:  Diagnostic    Indications: dyspnea      Assessment / Evaluation for: pericardial effusion      Exam Type: initial exam      Image quality: diagnostic      Image availability:  Images available in PACS  Patient Details:     Cardiac Rhythm:  Regular  Cardiac findings:     Pericardial effusion: large      Evidence of tamponade: Insufficient diagnostic for tamponade physiology        Wall motion: hyperdynamic      LV systolic function: normal    Interpretation:      Cardiac tamponade         Stanton Mandel MD  01/29/21 1453

## 2021-01-29 NOTE — ED PROCEDURE NOTE
PROCEDURE  US Guided Peripheral IV    Date/Time: 1/29/2021 2:54 PM  Performed by: Angelika Pryor MD  Authorized by: Angelika Pryor MD     Patient location:  ED  Performed by: Attending  Indications:     Indications: difficulty obtaining IV access      Image availability:  Images available in PACS  Procedure details:     Location:  Right arm    Catheter size:  18 gauge    Number of attempts:  1    Successful placement: yes    Post-procedure details:     Post-procedure:  Dressing applied    Assessment: free fluid flow and no signs of infiltration      Patient tolerance of procedure:   Tolerated well, no immediate complications         Angelika Pryor MD  01/29/21 1459

## 2021-01-29 NOTE — CONSULTS
Consultation - General Cardiology Team 2  Geovanni Ramirez 65 y o  female MRN: [id number]  Unit/Bed#: ED 27 Encounter: [id number]      Consults      History of Present Illness   Physician Requesting Consult: Olya Waldrop MD  Reason for Consult / Principal Problem: Recurrent pericardial effusion    HPI: Geovanni Ramirez is a 65y o  year old female with a history of recent diagnosis of atrial flutter, recent admission for large pericardial effusion s/p subxiphoid window + eventual right-sided [id number] space VATS assisted window, which were all to be found in the setting of metastatic GIST tumor who presented to [facility] 2 day post discharge with shortness of breath, tachypnea in lower extremity edema  On presentation, patient was tachycardic 140, normotensive 125/74, tachypneic 30 but saturating 98% on room air  ECG revealed narrow complex supraventricular tachycardia likely atrial flutter with rapid ventricular rate  Presenting lab revealed acute renal failure accompanying metabolic acidosis, hyperkalemia 6 3, creatinine 2 7, hyperbilirubinemia, significant leukocytosis with neutrophil predominance 22 7, microcytosis and mild transaminitis  A transthoracic echocardiogram was obtained and revealed preserved LV and RV function but reaccumulation of a moderate-sized pericardial effusion with no hemodynamic compromise  CXR revealed no acute changes from previous study but did show an enlarged cardiac silhouette which is consistent with pericardial effusion  Review of Systems  ROS as noted above         Historical Information   Past Medical History:   Diagnosis Date    Arthritis     Hypertension      Past Surgical History:   Procedure Laterality Date    IR BIOPSY LIVER MASS  1/13/2021    MASTECTOMY      left     PERICARDIAL WINDOW N/A 1/4/2021    Procedure: WINDOW PERICARDIAL, subxiphoid ;  Surgeon: Sabina Stanley MD;  [facility]: [facility];  Service: Thoracic  PERICARDIAL WINDOW Right 1/21/2021    Procedure: WINDOW PERICARDIAL;  Surgeon: Ina Solorio MD;  [facility];  Service: Thoracic    [place] [address] N/A 1/21/2021    Procedure: Evelin Petey;  Surgeon: Ina Solorio MD;  [facility];  Service: Thoracic    THORACOSCOPY VIDEO ASSISTED SURGERY (VATS) Right 1/21/2021    Procedure: THORACOSCOPY VIDEO ASSISTED SURGERY (VATS)  PERICARDIAL WINDOW, BIOPSY ANTERIOR MEDIASTINAL MASS;  Surgeon: Ina Solorio MD;  [facility];  Service: Thoracic     Social History     Substance and Sexual Activity   Alcohol Use Yes    Frequency: Monthly or less    Drinks per session: 1 or 2    Binge frequency: Never     Social History     Substance and Sexual Activity   Drug Use Never     Social History     Tobacco Use   Smoking Status Never Smoker   Smokeless Tobacco Never Used     Family History: History reviewed  No pertinent family history  Meds/Allergies   Hospital Medications:   No current facility-administered medications for this encounter  Home Medications: (Not in a hospital admission)      No Known Allergies    Objective   Vitals: Blood pressure 110/57, pulse (!) 128, temperature 97 7 °F (36 5 °C), temperature source Rectal, resp  rate (!) 24, height 5' 4" (1 626 m), weight 84 kg (185 lb 3 oz), SpO2 95 %    Orthostatic Blood Pressures      Most Recent Value   Blood Pressure  110/57 filed at 01/29/2021 1600   Patient Position - Orthostatic VS  Lying filed at 01/29/2021 1600            Invasive Devices     Peripheral Intravenous Line            Peripheral IV 01/29/21 Right Antecubital less than 1 day                Physical Exam    GEN: Conchetta Frame appears in moderate distress, alert and oriented x 3, cooperative   HEENT:  Normocephalic, atraumatic, anicteric, moist mucous membranes  NECK: + JVD to the angle of the jaw; no carotid bruits   HEART:  Regular rhythm, tachycardic rate, normal S1 and S2, no murmurs, clicks, gallops or rubs   LUNGS:  Bibasilar crackles with right-sided rales; tachypneic and saturating 96% on room air  ABDOMEN:  Normoactive bowel sounds  EXTREMITIES:  3+ edema to the lower thighs  NEURO: no gross focal findings with purposeful movement a limited exam  SKIN:  Dry, intact, warm to touch    Lab Results: I have personally reviewed pertinent lab results  Lab Results   Component Value Date    TROPONINI <0 02 2021    TROPONINI <0 02 2021    TROPONINI <0 02 2021       Lab Results   Component Value Date    CALCIUM 9 9 2021    K 6 3 (HH) 2021    CO2 10 (L) 2021     (H) 2021    BUN 31 (H) 2021    CREATININE 2 71 (H) 2021       Lab Results   Component Value Date    WBC 22 66 (H) 2021    HGB 10 6 (L) 2021    HCT 38 2 2021    MCV 78 (L) 2021     (H) 2021     Results from last 7 days   Lab Units 21  2144   INR  1 55*       No results found for: CHOL  No results found for: HDL  No results found for: LDLCALC  No results found for: TRIG    Lab Results   Component Value Date    ALT 71 2021     (H) 2021       Imaging: Transthoracic echocardiogram reviewed    Telemetry:   Atrial flutter with heart rate ranging 100 to 140s    ECHO:   Results for orders placed during the hospital encounter of 21   Echo complete with contrast if indicated    Narrative [address]  [address]  [phone]    Transthoracic Echocardiogram  2D, M-mode, Doppler, and Color Doppler    Study date:  2021    Patient: Petra Shea  MR number: [id number]  Account number: [de-identified]  : 1950  Age: 65 years  Gender: Female  Status: Inpatient  Location: Emergency room  Height: 64 in  Weight: 197 6 lb  BP: 117/ 67 mmHg    Indications: Atrial flutter      Diagnoses: I48 1 - Atrial flutter    Sonographer:  Genesis Harris RDCS  Primary Physician:  Emilee Waldron MD  Referring Physician:  Brendon Mccord MD  [facility]  [facility]  Interpreting Physician:  Amanda Loja MD    SUMMARY    LEFT VENTRICLE:  The cavity was small  Systolic function was normal  Ejection fraction was estimated to be 65 %  There were no regional wall motion abnormalities  Wall thickness was at the upper limits of normal     RIGHT VENTRICLE:  The size was normal   Systolic function was normal     LEFT ATRIUM:  The atrium was mildly dilated  MITRAL VALVE:  There was mild to moderate regurgitation  AORTIC VALVE:  There was trace regurgitation  TRICUSPID VALVE:  There was mild regurgitation  PERICARDIUM:  A large pericardial effusion was identified circumferential to the heart  There was no evidence of hemodynamic compromise  HISTORY: PRIOR HISTORY: Hypertension  PROCEDURE: The procedure was performed in the emergency room  This was a routine study  The transthoracic approach was used  The study included complete 2D imaging, M-mode, complete spectral Doppler, and color Doppler  The heart rate was  100 bpm, at the start of the study  Echocardiographic views were limited due to poor acoustic window availability  This was a technically difficult study  LEFT VENTRICLE: The cavity was small  Systolic function was normal  Ejection fraction was estimated to be 65 %  There were no regional wall motion abnormalities  Wall thickness was at the upper limits of normal     RIGHT VENTRICLE: The size was normal  Systolic function was normal  Wall thickness was normal     LEFT ATRIUM: The atrium was mildly dilated  RIGHT ATRIUM: Size was normal     MITRAL VALVE: Valve structure was normal  There was normal leaflet separation  DOPPLER: The transmitral velocity was within the normal range  There was no evidence for stenosis  There was mild to moderate regurgitation  The regurgitant jet  was eccentric and directed posteriorly      AORTIC VALVE: The valve was trileaflet  Leaflets exhibited normal thickness and normal cuspal separation  DOPPLER: Transaortic velocity was within the normal range  There was no evidence for stenosis  There was trace regurgitation  TRICUSPID VALVE: The valve structure was normal  There was normal leaflet separation  DOPPLER: The transtricuspid velocity was within the normal range  There was no evidence for stenosis  There was mild regurgitation  PULMONIC VALVE: Leaflets exhibited normal thickness, no calcification, and normal cuspal separation  DOPPLER: The transpulmonic velocity was within the normal range  There was no regurgitation  PERICARDIUM: A large pericardial effusion was identified circumferential to the heart  There was no evidence of hemodynamic compromise  The pericardium was normal in appearance  AORTA: The root exhibited normal size  SYSTEMIC VEINS: IVC: The inferior vena cava was normal in size and course   Respirophasic changes were normal     SYSTEM MEASUREMENT TABLES    2D  %FS: 26 02 %  Ao Diam: 3 2 cm  EDV(Teich): 66 92 ml  EF(Teich): 51 72 %  ESV(Teich): 32 31 ml  IVSd: 1 01 cm  LA Area: 21 05 cm2  LA Diam: 3 39 cm  LVEDV MOD A4C: 28 28 ml  LVEF MOD A4C: 86 2 %  LVESV MOD A4C: 3 9 ml  LVIDd: 3 92 cm  LVIDs: 2 9 cm  LVLd A4C: 6 cm  LVLs A4C: 5 29 cm  LVPWd: 1 17 cm  RA Area: 12 36 cm2  RVIDd: 2 64 cm  SV MOD A4C: 24 38 ml  SV(Teich): 34 61 ml    CW  TR Vmax: 2 17 m/s  TR maxP 81 mmHg    [facility] Accredited Echocardiography Laboratory    Prepared and electronically signed by    Maritza Guerrero MD  Signed 2021 16:28:40         CATH/STRESS TEST:   None available to be reviewed    EKG:   Date:  2021          Assessment/Plan     Assessment:    1  Recurrent pericardial effusion  -1/3 TTE:  Large with no tamponade   Resolved s/p pericardial window with bloody drainage and drain/bulb placement on  per echo on the   -per primary team and thoracic surgery there was concern for malignant etiology stemming from thoracic mass near [place] [facility]  -1/21 VATS: pericardial window into the pleural space, small bloody effusion upon pericardial incision emanating from friable mass appreciated underneath the pericardium which was biopsied  -1/22 limited TTE:  Thickened pericardium with trace apical effusion, difficult to assess restrictive/constrictive physiology due to flutter; no septal interdependence  -cytology negative x2, pending 1/21 biopsy pathology finalization  -1/25 EGD biopsy with no cancer cells    2  Atrial fibrillation/flutter  -[id number] 3, HAS-BLED 2  -recently diagnosed on metoprolol succinate and diltiazem with apixaban for anticoagulation    3  Other active issues  -GIST tumor on liver biopsy  -negative cytology x2  -mediastinal mass s/p biopsy on 01/21, pending pathology    Plan:  1  Unfortunately, she presents again with reaccumulation of pericardial effusion which is of moderate size with no evidence of hemodynamic compromise on the transthoracic echo  However, patient is clinically unwell  Discussed with Interventional Cardiology about the possibility of palliative pericardiocentesis; however, there was not enough fluids to approach percutaneously and not risk ventricular chamber damage  2  Though she has crackles, JVD and significant edema, there is still the possibility that she still likely significantly intravascularly depleted and needs to be resuscitated  3  Her prognosis at this time remains guarded  Do not resume apixaban as there is a possibility of hemorrhagic reaccumulation  Though she is tachycardic, she likely needs compensatory chronotropic response and should not be placed back on metoprolol or Cardizem  4   Cardiology to continue following along  Case discussed and reviewed with Dr Abbie Pugh who agrees with my assessment and plan  Thank you for involving us in the care of your patient        Edvin Gabriel MD  Cardiology Fellow PGY-5    ==========================================================================================    Counseling / Coordination of Care  Total floor / unit time spent today 30 minutes minutes  Greater than 50% of total time was spent with the patient and / or family counseling and / or coordination of care  A description of the counseling / coordination of care:  Consultation  Epic/ Allscripts/Care Everywhere records reviewed:  Yes    ** Please Note: Fluency DirectDictation voice to text software may have been used in the creation of this document   **

## 2021-01-29 NOTE — ED RE-EVALUATION NOTE
Pt  Had second episode of being unresponsive  Intubated for airway protection  First pass success  Tolerated well  When getting the post intubation CXR, pt  Became asystolic  See code sheet       Low Larson MD  01/29/21 3013

## 2021-01-29 NOTE — ED NOTES
Dr Lulla Seip at bedside for patient evaluation      Aleisha Kay, RN  01/29/21 848 Everton Antonio RN  01/29/21 0729

## 2021-01-29 NOTE — ED NOTES
Patient having another unresponsive episode at this time; ED Resident and Attending aware and at bedside for evaluation; preparing for intubation for concern of airway compromise      Myles Mg RN  01/29/21 3628

## 2021-01-29 NOTE — CONSULTS
Consultation - Thoracic Surgery   Eliza Lazaro 65 y o  female MRN: [id number]  Unit/Bed#: ED 27 Encounter: [id number]      Assessment/Plan     Assessment:  65yo F with h/o anterior mediastinal mass and recent malignant pericardial effusion s/p subxiphoid pericardial window 1/4/21 and R VATS pericardial window 1/21/21, who returns with worsening SOB and concern for recurrent malignant pericardial effusion with possible tamponade physiology  Plan:  · Recommend medical oncology consult -- patient was supposed to begin gleevec as outpatient, but may benefit from starting this sooner rather than later  · Will discuss with cardiology regarding formal echo findings  · May require pericardiocentesis with drain placement, as she is unable to undergo repeat subxiphoid window and is unlikely to tolerate VATS procedure given her current clinical status  · Would also recommend obtaining stat CT chest as long patient is stable enough to lay flat      History of Present Illness   Reason for Consult / Principal Problem: recurrent malignant pericardial effusion    HPI: Eliza Lazaro is a 65y o  year old female known to thoracic service with a history of large anterior mediastinal mass / mediastinal adenopathy and innumerable liver metastases, with recent malignant pericardial effusion, s/p subxiphoid pericardial window 1/4/21 and subsequent R VATS pericardial window 1/21/21, who presents with worsening SOB  She was just discharged from [facility] 1/27/21 with plans to obtain 2 week follow-up echocardiogram  Her previous R chest tube was removed 1/24/21  She was also found to have acute pulmonary emboli during her previous hospitalization  She states she had felt mildly short of breath even after leaving the hospital; however, her SOB felt much worse today  She also endorses worsening of her BLE pitting edema since discharge   CXR performed in ED demonstrates relatively stable appearance of cardiomegaly as well as minimally increased R pleural effusion  Inpatient consult to Thoracic Surgery  Consult performed by: Toña Loo MD  Consult ordered by: David Grey MD          Review of Systems   Respiratory: Positive for shortness of breath  Cardiovascular: Positive for leg swelling  All other systems reviewed and are negative  Historical Information   Past Medical History:   Diagnosis Date    Arthritis     Hypertension      Past Surgical History:   Procedure Laterality Date    IR BIOPSY LIVER MASS  1/13/2021    MASTECTOMY      left     PERICARDIAL WINDOW N/A 1/4/2021    Procedure: WINDOW PERICARDIAL, subxiphoid ;  Surgeon: Yvette Jimenez MD;  [facility];  Service: Thoracic    PERICARDIAL WINDOW Right 1/21/2021    Procedure: WINDOW PERICARDIAL;  Surgeon: Yvette Jimenez MD;  [facility];  Service: Thoracic    [address] N/A 1/21/2021    Procedure: BRONCHOSCOPY FLEXIBLE;  Surgeon: Yvette Jimenez MD;  [facility];  Service: Thoracic    THORACOSCOPY VIDEO ASSISTED SURGERY (VATS) Right 1/21/2021    Procedure: THORACOSCOPY VIDEO ASSISTED SURGERY (VATS)  PERICARDIAL WINDOW, BIOPSY ANTERIOR MEDIASTINAL MASS;  Surgeon: Yvette Jimenez MD;  [facility];  Service: Thoracic     Social History     Substance and Sexual Activity   Alcohol Use Yes    Frequency: Monthly or less    Drinks per session: 1 or 2    Binge frequency: Never     Social History     Substance and Sexual Activity   Drug Use Never     E-Cigarette/Vaping    E-Cigarette Use Never User      E-Cigarette/Vaping Substances     Social History     Tobacco Use   Smoking Status Never Smoker   Smokeless Tobacco Never Used     Family History: History reviewed  No pertinent family history      Meds/Allergies   all current active meds have been reviewed, current meds:   Current Facility-Administered Medications   Medication Dose Route Frequency    sodium chloride 0 9 % bolus 500 mL 500 mL Intravenous Once    and PTA meds:   Prior to Admission Medications   Prescriptions Last Dose Informant Patient Reported? Taking?   diltiazem (CARDIZEM CD) 180 mg 24 hr capsule   No Yes   Sig: Take 1 capsule (180 mg total) by mouth daily   metoprolol succinate (TOPROL-XL) 100 mg 24 hr tablet   No Yes   Sig: Take 1 tablet (100 mg total) by mouth every 12 (twelve) hours   rivaroxaban (XARELTO) 15 mg tablet   No Yes   Sig: Take 1 tablet (15 mg total) by mouth 2 (two) times a day with meals for 41 doses   rivaroxaban (XARELTO) 20 mg tablet   No Yes   Sig: Take 1 tablet (20 mg total) by mouth daily with breakfast      Facility-Administered Medications: None     No Known Allergies    Objective   Vitals: Blood pressure 128/76, pulse (!) 134, temperature 97 7 °F (36 5 °C), temperature source Rectal, resp  rate (!) 24, height 5' 4" (1 626 m), weight 84 kg (185 lb 3 oz), SpO2 95 %  Invasive Devices     Peripheral Intravenous Line            Peripheral IV 01/29/21 Right Antecubital less than 1 day                Physical Exam   Gen:  Distressed  HENT: MMM  CV:  Tachycardic, regular rhythm  Lungs: Tachypneic with increased WOB   SpO2 mid-90s on RA  Abd:  Soft, NT/ND  Ext:  BLE 3+ pitting edema  Skin:  Cool/dry  Neuro: A&Ox3    Lab Results:   I have personally reviewed pertinent reports  , CBC:   Lab Results   Component Value Date    WBC 22 66 (H) 01/29/2021    HGB 10 6 (L) 01/29/2021    HCT 38 2 01/29/2021    MCV 78 (L) 01/29/2021     (H) 01/29/2021    MCH 21 7 (L) 01/29/2021    MCHC 27 7 (L) 01/29/2021    RDW 24 9 (H) 01/29/2021    MPV 12 2 01/29/2021    NRBC 6 01/29/2021    NRBC 8 (H) 01/29/2021   , CMP: No results found for: SODIUM, CL, CO2, ANIONGAP, BUN, CREATININE, GLUCOSE, CALCIUM, AST, ALT, ALKPHOS, PROT, BILITOT, EGFR  Imaging Studies: I have personally reviewed pertinent reports     and I have personally reviewed pertinent films in PACS  EKG, Pathology, and Other Studies: I have personally reviewed pertinent reports          Code Status: Prior  Advance Directive and Living Will:      Power of :    POLST:

## 2021-01-29 NOTE — RESPIRATORY THERAPY NOTE
RT Ventilator Management Note  Julia Dominguez 65 y o  female MRN: [id number]  Unit/Bed#: ED 27 Encounter: [id number]      Daily Screen     No data found in the last 10 encounters              Physical Exam:   Assessment Type: (P) Assess only  General Appearance: (P) Sedated  Respiratory Pattern: (P) Assisted  Chest Assessment: (P) Chest expansion symmetrical  Bilateral Breath Sounds: (P) Diminished, Clear  O2 Device: (P) vent      Resp Comments: (P) pt intubated by Dr Lulla Seip without incident after first attempt pt sedated plced on the ordered vent settings pt diana well will cont to monitor

## 2021-01-29 NOTE — ED ATTENDING ATTESTATION
1/29/2021  IUrsula MD, saw and evaluated the patient  I have discussed the patient with the resident/non-physician practitioner and agree with the resident's/non-physician practitioner's findings, Plan of Care, and MDM as documented in the resident's/non-physician practitioner's note, except where noted  All available labs and Radiology studies were reviewed  I was present for key portions of any procedure(s) performed by the resident/non-physician practitioner and I was immediately available to provide assistance  At this point I agree with the current assessment done in the Emergency Department  I have conducted an independent evaluation of this patient a history and physical is as follows:    ED Course     75-year-old female, history of atrial flutter, recent admission to the hospital with diagnosis of PE, treated with Xarelto, as well as new diagnosis of anterior mediastinal mass and pericardial effusion status post VATS drainage  During the hospitalization the patient had a 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  Current pathology from the biopsies of the pericardium and the anterior mediastinal mass are pending  Patient was discharged from the hospital 2 days ago, is presenting to the emergency department with gradually progressive dyspnea  Patient also reports decreased urination  Patient denies any chest pain or cough  No fever  No abdominal pain, nausea, vomiting  Ten systems reviewed and negative except as noted  On examination the patient is noted to be markedly tachypneic  Head is normocephalic and atraumatic  Mucous membranes are dry  The patient has some perioral pallor  Neck is supple  The patient has JVD  No stridor  Lungs are diminished bilateral bases  No wheezing rales or rhonchi  Heart is tachycardic and regular  Abdomen is nondistended, soft and nontender    Extremities are significant for 3+ bilateral lower extremity pitting edema which is symmetric  GCS is 15  Patient is grossly motor intact  Assessment and plan:  75-year-old female presenting with dyspnea  Patient had emergent bedside ultrasound performed demonstrating large pericardial effusion  Patient's was difficult IV access and had physician ultrasound-guided peripheral IV placed  Patient was discussed with thoracic surgery, and emergent echocardiogram was ordered  Presumed diagnosis is pericardial effusion demonstrating near tamponade physiology  Patient was given IV fluid for resuscitation  Labs Reviewed   CBC AND DIFFERENTIAL - Abnormal       Result Value Ref Range Status    WBC 22 66 (*) 4 31 - 10 16 Thousand/uL Final    RBC 4 89  3 81 - 5 12 Million/uL Final    Hemoglobin 10 6 (*) 11 5 - 15 4 g/dL Final    Hematocrit 38 2  34 8 - 46 1 % Final    MCV 78 (*) 82 - 98 fL Final    MCH 21 7 (*) 26 8 - 34 3 pg Final    MCHC 27 7 (*) 31 4 - 37 4 g/dL Final    RDW 24 9 (*) 11 6 - 15 1 % Final    MPV 12 2  8 9 - 12 7 fL Final    Platelets 104 (*) 078 - 390 Thousands/uL Final    nRBC 6  /100 WBCs Final   COMPREHENSIVE METABOLIC PANEL - Abnormal    Sodium 139  136 - 145 mmol/L Final    Potassium 6 3 (*) 3 5 - 5 3 mmol/L Final    Comment: Slightly Hemolyzed; Results May be Affected    VERIFIED BY REPEAT ANALYSIS    Chloride 110 (*) 100 - 108 mmol/L Final    CO2 10 (*) 21 - 32 mmol/L Final    ANION GAP 19 (*) 4 - 13 mmol/L Final    BUN 31 (*) 5 - 25 mg/dL Final    Creatinine 2 71 (*) 0 60 - 1 30 mg/dL Final    Comment: Standardized to IDMS reference method    Glucose 51 (*) 65 - 140 mg/dL Final    Comment: If the patient is fasting, the ADA then defines impaired fasting glucose as > 100 mg/dL and diabetes as > or equal to 123 mg/dL  Specimen collection should occur prior to Sulfasalazine administration due to the potential for falsely depressed results  Specimen collection should occur prior to Sulfapyridine administration due to the potential for falsely elevated results  Calcium 9 9  8 3 - 10 1 mg/dL Final    Corrected Calcium 10 4 (*) 8 3 - 10 1 mg/dL Final     (*) 5 - 45 U/L Final    Comment: Slightly Hemolyzed; Results May be Affected&XA&Slightly Hemolyzed; Results May be Affected&XA&Slightly Hemolyzed; Results May be Affected&XA&Slightly Hemolyzed; Results May be Affected&XA&Slightly Hemolyzed; Results May be Affected  Specimen collection should occur prior to Sulfasalazine administration due to the potential for falsely depressed results  ALT 71  12 - 78 U/L Final    Comment: Specimen collection should occur prior to Sulfasalazine and/or Sulfapyridine administration due to the potential for falsely depressed results  Alkaline Phosphatase 91  46 - 116 U/L Final    Total Protein 7 3  6 4 - 8 2 g/dL Final    Albumin 3 4 (*) 3 5 - 5 0 g/dL Final    Total Bilirubin 3 01 (*) 0 20 - 1 00 mg/dL Final    Comment: Use of this assay is not recommended for patients undergoing treatment with eltrombopag due to the potential for falsely elevated results      eGFR 19  ml/min/1 73sq m Final    Narrative:     [place] guidelines for Chronic Kidney Disease (CKD):     Stage 1 with normal or high GFR (GFR > 90 mL/min/1 73 square meters)    Stage 2 Mild CKD (GFR = 60-89 mL/min/1 73 square meters)    Stage 3A Moderate CKD (GFR = 45-59 mL/min/1 73 square meters)    Stage 3B Moderate CKD (GFR = 30-44 mL/min/1 73 square meters)    Stage 4 Severe CKD (GFR = 15-29 mL/min/1 73 square meters)    Stage 5 End Stage CKD (GFR <15 mL/min/1 73 square meters)  Note: GFR calculation is accurate only with a steady state creatinine   NT-BNP PRO (BRAIN NATRIURETIC PEPTIDE) - Abnormal    NT-proBNP 14,676 (*) <125 pg/mL Final   BASIC METABOLIC PANEL - Abnormal    Sodium 142  136 - 145 mmol/L Final    Potassium 5 8 (*) 3 5 - 5 3 mmol/L Final    Chloride 108  100 - 108 mmol/L Final    CO2 10 (*) 21 - 32 mmol/L Final    ANION GAP 24 (*) 4 - 13 mmol/L Final    BUN 32 (*) 5 - 25 mg/dL Final    Creatinine 2 81 (*) 0 60 - 1 30 mg/dL Final    Comment: Standardized to IDMS reference method    Glucose 29 (*) 65 - 140 mg/dL Final    Comment: If the patient is fasting, the ADA then defines impaired fasting glucose as > 100 mg/dL and diabetes as > or equal to 123 mg/dL  Specimen collection should occur prior to Sulfasalazine administration due to the potential for falsely depressed results  Specimen collection should occur prior to Sulfapyridine administration due to the potential for falsely elevated results  VERIFIED BY REPEAT ANALYSIS    Calcium 9 0  8 3 - 10 1 mg/dL Final    eGFR 19  ml/min/1 73sq m Final    Narrative:     [place] guidelines for Chronic Kidney Disease (CKD):     Stage 1 with normal or high GFR (GFR > 90 mL/min/1 73 square meters)    Stage 2 Mild CKD (GFR = 60-89 mL/min/1 73 square meters)    Stage 3A Moderate CKD (GFR = 45-59 mL/min/1 73 square meters)    Stage 3B Moderate CKD (GFR = 30-44 mL/min/1 73 square meters)    Stage 4 Severe CKD (GFR = 15-29 mL/min/1 73 square meters)    Stage 5 End Stage CKD (GFR <15 mL/min/1 73 square meters)  Note: GFR calculation is accurate only with a steady state creatinine   LACTIC ACID, PLASMA - Abnormal    LACTIC ACID 16 2 (*) 0 5 - 2 0 mmol/L Final    Narrative:     Result may be elevated if tourniquet was used during collection  BLOOD GAS, VENOUS - Abnormal    pH, Por[place]irio 6 967 (*) 7 300 - 7 400 Final    pCO2, Porfirio 32 1 (*) 42 0 - 50 0 mm Hg Final    pO2, Porfirio 36 2  35 0 - 45 0 mm Hg Final    HCO3, Porfirio 7 2 (*) 24 - 30 mmol/L Final    Base Excess, Porfirio -23 3  mmol/L Final    O2 Content, Porfirio 5 4  ml/dL Final    O2 HGB, VENOUS 39 5 (*) 60 0 - 80 0 % Final   LACTIC ACID, PLASMA - Abnormal    LACTIC ACID 17 8 (*) 0 5 - 2 0 mmol/L Final    Narrative:     Result may be elevated if tourniquet was used during collection     BLOOD GAS, ARTERIAL - Abnormal    pH, Arterial 7 053 (*) 7 350 - 7 450 Final    pCO2, Arterial 27 5 (*) 36 0 - 44 0 mm Hg Final    pO2, Arterial 138 5 (*) 75 0 - 129 0 mm Hg Final    HCO3, Arterial 7 5 (*) 22 0 - 28 0 mmol/L Final    Base Excess, Arterial -21 3  mmol/L Final    O2 Content, Arterial 11 0 (*) 16 0 - 23 0 mL/dL Final    O2 HGB,Arterial  96 3  94 0 - 97 0 % Final    SOURCE Radial, Right   Final    MILAGROS TEST Yes   Final    Vent Type- AC AC   Final    AC Rate 22   Final    Tidal Volume 450  ml Final    Inspired Air (FIO2) 70   Final    PEEP 6   Final   CBC AND DIFFERENTIAL - Abnormal    WBC 25 03 (*) 4 31 - 10 16 Thousand/uL Preliminary    RBC 3 23 (*) 3 81 - 5 12 Million/uL Preliminary    Hemoglobin 7 0 (*) 11 5 - 15 4 g/dL Preliminary    Hematocrit 26 2 (*) 34 8 - 46 1 % Preliminary    MCV 81 (*) 82 - 98 fL Preliminary    MCH 21 7 (*) 26 8 - 34 3 pg Preliminary    MCHC 26 7 (*) 31 4 - 37 4 g/dL Preliminary    RDW 23 9 (*) 11 6 - 15 1 % Preliminary    MPV 11 9  8 9 - 12 7 fL Preliminary    Platelets 970  760 - 390 Thousands/uL Preliminary    nRBC 11  /100 WBCs Preliminary   POCT BLOOD GAS (CG8+) - Abnormal    ph, Porfirio ISTAT 6 971 (*) 7 300 - 7 400 Final    pCO2, Porfirio i-STAT 41 6 (*) 42 0 - 50 0 mm HG Final    pO2, Porfirio i-STAT 40 0  35 0 - 45 0 mm HG Final    BE, i-STAT -21 (*) -2 - 3 mmol/L Final    HCO3, Porfirio i-STAT 9 6 (*) 24 0 - 30 0 mmol/L Final    CO2, i-STAT 11 (*) 21 - 32 mmol/L Final    O2 Sat, i-STAT 49 (*) 60 - 85 % Final    SODIUM, I-STAT 136  136 - 145 mmol/l Final    Potassium, i-STAT 5 8 (*) 3 5 - 5 3 mmol/L Final    Hct, i-STAT 26 (*) 34 8 - 46 1 % Final    Hgb, i-STAT 8 8 (*) 11 5 - 15 4 g/dl Final    Glucose, i-STAT >600 (*) 65 - 140 mg/dl Final    Specimen Type VENOUS   Final   POCT BLOOD GAS (CG8+) - Abnormal    ph, Porfirio ISTAT 7 074 (*) 7 300 - 7 400 Final    pCO2, Porfirio i-STAT 56 7 (*) 42 0 - 50 0 mm HG Final    pO2, Porfirio i-STAT 29 0 (*) 35 0 - 45 0 mm HG Final    BE, i-STAT -13 (*) -2 - 3 mmol/L Final    HCO3, Porfirio i-STAT 16 6 (*) 24 0 - 30 0 mmol/L Final    CO2, i-STAT 18 (*) 21 - 32 mmol/L Final    O2 Sat, i-STAT 34 (*) 60 - 85 % Final    SODIUM, I-STAT 143  136 - 145 mmol/l Final    Potassium, i-STAT 5 5 (*) 3 5 - 5 3 mmol/L Final    Hct, i-STAT 23 (*) 34 8 - 46 1 % Final    Hgb, i-STAT 7 8 (*) 11 5 - 15 4 g/dl Final    Glucose, i-STAT 144 (*) 65 - 140 mg/dl Final    Specimen Type VENOUS   Final   MANUAL DIFFERENTIAL(PHLEBS DO NOT ORDER) - Abnormal    Segmented % 64  43 - 75 % Final    Bands % 5  0 - 8 % Final    Lymphocytes % 17  14 - 44 % Final    Monocytes % 6  4 - 12 % Final    Eosinophils, % 0  0 - 6 % Final    Basophils % 0  0 - 1 % Final    Metamyelocytes% 1  0 - 1 % Final    Myelocytes % 3 (*) 0 - 1 % Final    Atypical Lymphocytes % 4 (*) <=0 % Final    Absolute Neutrophils 15 64 (*) 1 85 - 7 62 Thousand/uL Final    Lymphocytes Absolute 3 85  0 60 - 4 47 Thousand/uL Final    Monocytes Absolute 1 36 (*) 0 00 - 1 22 Thousand/uL Final    Eosinophils Absolute 0 00  0 00 - 0 40 Thousand/uL Final    Basophils Absolute 0 00  0 00 - 0 10 Thousand/uL Final    Total Counted 100   Final    nRBC 8 (*) 0 - 2 /100 WBC Final    Anisocytosis Present   Final    Poikilocytes Present   Final    Platelet Estimate Increased (*) Adequate Final   TROPONIN I - Normal    Troponin I <0 02  <=0 04 ng/mL Final    Comment: Siemens Chemistry analyzer 99% cutoff is > 0 04 ng/mL in network labs     o cTnI 99% cutoff is useful only when applied to patients in the clinical setting of myocardial ischemia   o cTnI 99% cutoff should be interpreted in the context of clinical history, ECG findings and possibly cardiac imaging to establish correct diagnosis  o cTnI 99% cutoff may be suggestive but clearly not indicative of a coronary event without the clinical setting of myocardial ischemia       TSH, 3RD GENERATION WITH FREE T4 REFLEX - Normal    TSH 3RD GENERATON 2 150  0 358 - 3 740 uIU/mL Final    Comment: The recommended reference ranges for TSH during pregnancy are as follows:   First trimester 0 1 to 2 5 uIU/mL   Second trimester  0 2 to 3 0 uIU/mL   Third trimester 0 3 to 3 0 uIU/m    Note: Normal ranges may not apply to patients who are transgender, non-binary, or whose legal sex, sex at birth, and gender identity differ  Narrative:     Patients undergoing fluorescein dye angiography may retain small amounts of fluorescein in the body for 48-72 hours post procedure  Samples containing fluorescein can produce falsely depressed TSH values  If the patient had this procedure,a specimen should be resubmitted post fluorescein clearance  POCT GLUCOSE - Normal    POC Glucose 98  65 - 140 mg/dl Final   POCT GLUCOSE - Normal    POC Glucose 85  65 - 140 mg/dl Final   POCT GLUCOSE - Normal    POC Glucose 85  65 - 140 mg/dl Final   BLOOD CULTURE   BLOOD CULTURE   UA W REFLEX TO MICROSCOPIC WITH REFLEX TO CULTURE   LACTIC ACID 2 HOUR   PATH SLIDE REVIEW   LACTIC ACID 2 HOUR   TYPE AND SCREEN    ABO Grouping O   Final    Rh Factor Positive   Final    Antibody Screen Negative   Final    Specimen Expiration Date [id number]   Final   PREPARE LEUKOREDUCED RBC    Unit Product Code T4812U96       Unit Number [id number]       Unit ABO O       Unit [facility] POS       Crossmatch Compatible   Final    Unit Dispense Status Issued       Unit Product Code K0217V39   Final    Unit Number [id number]   Final    Unit ABO O   Final    Unit [facility] POS   Final    Crossmatch Compatible   Final    Unit Dispense Status Crossmatched   Final   POCT TROPONIN    POC Troponin I 0 02  0 00 - 0 08 ng/ml Final    Specimen Type VENOUS   Final    Narrative:     [employer] i-Stat handheld analyzer 99% cutoff is > 0 08ng/mL in [facility] Emergency Departments    o cTnI 99% cutoff is useful only when applied to patients in the clinical setting of myocardial ischemia  o cTnI 99% cutoff should be interpreted in the context of clinical history, ECG findings and possibly cardiac imaging to establish correct diagnosis    o cTnI 99% cutoff may be suggestive but clearly not indicative of a coronary event without the clinical setting of myocardial ischemia  MANUAL DIFF (NON WAM)       CT chest abdomen pelvis wo contrast   Final Result         1  No airspace opacities at the bilateral lung bases may represent mild aspiration pneumonitis  Previously demonstrated chest findings are stable  2   Mild ascites  Workstation performed: [id number]         XR chest 1 view portable   Final Result      Patchy opacity right lung base laterally may represent atelectasis or developing pneumonia  Interval endotracheal intubation and nasogastric tube placement  Workstation performed: [id number]         CT head without contrast   Final Result   41   No acute intracranial abnormality  Workstation performed: [id number]         CT chest wo contrast   Final Result      Recurrent large loculated malignant pericardial effusion with the largest component inferior to the heart  Redemonstration of intrapericardial mass abutting the right atrium with adjacent gas from recent biopsy  Resolving right middle lobe pulmonary infarct  Slight increase in small loculated right pleural effusion  I personally discussed this study with Dr Roselyn Black on 1/29/2021 at 4:22 PM                        Workstation performed: [id number]         XR chest 1 view portable   Final Result      Stable chest   Stable cardiomegaly noted with stable right basilar nonspecific lung opacity potentially atelectasis or pneumonia                    Workstation performed: [id number]                 Critical Care Time  CriticalCare Time  Performed by: Crispin Potts MD  Authorized by: Crispin Potts MD     Critical care provider statement:     Critical care time (minutes):  96    Critical care time was exclusive of:  Separately billable procedures and treating other patients and teaching time    Critical care was necessary to treat or prevent imminent or life-threatening deterioration of the following conditions:  Circulatory failure    Critical care was time spent personally by me on the following activities:  Obtaining history from patient or surrogate, development of treatment plan with patient or surrogate, discussions with consultants, examination of patient, evaluation of patient's response to treatment, ordering and performing treatments and interventions, ordering and review of laboratory studies, ordering and review of radiographic studies and review of old charts        Patient initially thought to be tachycardic and short of breath secondary to pericardial effusion and tamponade physiology  Echocardiogram demonstrated no evidence of tamponade physiology but confirmed large pericardial effusion that was seen on bedside ultrasound  Patient acidotic with significant lactic acidosis of unclear etiology, patient in an uric renal failure, patient hyperkalemic, patient intermittently hypoglycemic of unclear etiology given that she was not administered insulin  Patient was intubated and had brief period asystolic cardiac arrest   Patient is very sick with poor prognosis

## 2021-01-29 NOTE — ED PROCEDURE NOTE
Procedure  Central Line    Date/Time: 1/29/2021 6:30 PM  Performed by: Leslie Contreras DO  Authorized by: Leslie Contreras DO     Patient location:  ED  Other Assisting Provider: Yes (comment) (Dr Rebeca Mcrae)    Consent:     Consent obtained:  Emergent situation  Universal protocol:     Required blood products, implants, devices, and special equipment available: yes      Immediately prior to procedure, a time out was called: yes    Pre-procedure details:     Hand hygiene: Hand hygiene performed prior to insertion      Skin preparation:  ChloraPrep    Skin preparation agent: Skin preparation agent completely dried prior to procedure    Indications:     Central line indications: medications requiring central line    Anesthesia (see MAR for exact dosages):      Anesthesia method:  None  Procedure details:     Location:  Left femoral    Vessel type: vein      Laterality:  Left    Approach: percutaneous technique used      Patient position:  Flat    Catheter type:  Triple lumen    Catheter size:  7 Fr    Landmarks identified: yes      Ultrasound guidance: no      Number of attempts:  3    Successful placement: yes    Post-procedure details:     Post-procedure:  Dressing applied and line sutured    Assessment:  Blood return through all ports and free fluid flow    Post-procedure complications: none

## 2021-01-30 ENCOUNTER — APPOINTMENT (INPATIENT)
Dept: RADIOLOGY | Facility: HOSPITAL | Age: 71
DRG: 314 | End: 2021-01-30
Payer: COMMERCIAL

## 2021-01-30 ENCOUNTER — APPOINTMENT (INPATIENT)
Dept: NEUROLOGY | Facility: CLINIC | Age: 71
DRG: 314 | End: 2021-01-30
Payer: COMMERCIAL

## 2021-01-30 VITALS
RESPIRATION RATE: 28 BRPM | OXYGEN SATURATION: 94 % | SYSTOLIC BLOOD PRESSURE: 79 MMHG | BODY MASS INDEX: 31.62 KG/M2 | HEIGHT: 64 IN | TEMPERATURE: 92.1 F | DIASTOLIC BLOOD PRESSURE: 44 MMHG | WEIGHT: 185.19 LBS

## 2021-01-30 LAB
ABO GROUP BLD BPU: NORMAL
ANION GAP SERPL CALCULATED.3IONS-SCNC: 32 MMOL/L (ref 4–13)
ANION GAP SERPL CALCULATED.3IONS-SCNC: 33 MMOL/L (ref 4–13)
APAP SERPL-MCNC: 3 UG/ML (ref 10–20)
ATRIAL RATE: 132 BPM
BASE EXCESS BLDA CALC-SCNC: -24.5 MMOL/L
BASE EXCESS BLDA CALC-SCNC: -25.2 MMOL/L
BASE EXCESS BLDA CALC-SCNC: -5 MMOL/L (ref -2–3)
BPU ID: NORMAL
BUN SERPL-MCNC: 32 MG/DL (ref 5–25)
BUN SERPL-MCNC: 35 MG/DL (ref 5–25)
CA-I BLD-SCNC: 1.01 MMOL/L (ref 1.12–1.32)
CA-I BLD-SCNC: 1.1 MMOL/L (ref 1.12–1.32)
CALCIUM SERPL-MCNC: 8.8 MG/DL (ref 8.3–10.1)
CALCIUM SERPL-MCNC: 8.9 MG/DL (ref 8.3–10.1)
CHLORIDE SERPL-SCNC: 108 MMOL/L (ref 100–108)
CHLORIDE SERPL-SCNC: 113 MMOL/L (ref 100–108)
CO2 SERPL-SCNC: 6 MMOL/L (ref 21–32)
CO2 SERPL-SCNC: 7 MMOL/L (ref 21–32)
CREAT SERPL-MCNC: 2.78 MG/DL (ref 0.6–1.3)
CREAT SERPL-MCNC: 2.88 MG/DL (ref 0.6–1.3)
CROSSMATCH: NORMAL
ERYTHROCYTE [DISTWIDTH] IN BLOOD BY AUTOMATED COUNT: 23.5 % (ref 11.6–15.1)
ERYTHROCYTE [DISTWIDTH] IN BLOOD BY AUTOMATED COUNT: 23.8 % (ref 11.6–15.1)
ETHANOL SERPL-MCNC: <3 MG/DL (ref 0–3)
GFR SERPL CREATININE-BSD FRML MDRD: 18 ML/MIN/1.73SQ M
GFR SERPL CREATININE-BSD FRML MDRD: 19 ML/MIN/1.73SQ M
GLUCOSE SERPL-MCNC: 190 MG/DL (ref 65–140)
GLUCOSE SERPL-MCNC: 217 MG/DL (ref 65–140)
GLUCOSE SERPL-MCNC: 230 MG/DL (ref 65–140)
HCO3 BLDA-SCNC: 19.6 MMOL/L (ref 22–28)
HCO3 BLDA-SCNC: 4.6 MMOL/L (ref 22–28)
HCO3 BLDA-SCNC: 4.8 MMOL/L (ref 22–28)
HCT VFR BLD AUTO: 23.2 % (ref 34.8–46.1)
HCT VFR BLD AUTO: 33.1 % (ref 34.8–46.1)
HCT VFR BLD CALC: 16 % (ref 34.8–46.1)
HGB BLD-MCNC: 6.7 G/DL (ref 11.5–15.4)
HGB BLD-MCNC: 9.6 G/DL (ref 11.5–15.4)
HGB BLDA-MCNC: 5.4 G/DL (ref 11.5–15.4)
HOROWITZ INDEX BLDA+IHG-RTO: 100 MM[HG]
HOROWITZ INDEX BLDA+IHG-RTO: 100 MM[HG]
MAGNESIUM SERPL-MCNC: 2.9 MG/DL (ref 1.6–2.6)
MCH RBC QN AUTO: 23.8 PG (ref 26.8–34.3)
MCH RBC QN AUTO: 25.2 PG (ref 26.8–34.3)
MCHC RBC AUTO-ENTMCNC: 28.9 G/DL (ref 31.4–37.4)
MCHC RBC AUTO-ENTMCNC: 29 G/DL (ref 31.4–37.4)
MCV RBC AUTO: 82 FL (ref 82–98)
MCV RBC AUTO: 87 FL (ref 82–98)
NRBC BLD AUTO-RTO: 14 /100 WBCS
NRBC BLD AUTO-RTO: 22 /100 WBCS
O2 CT BLDA-SCNC: 14.8 ML/DL (ref 16–23)
O2 CT BLDA-SCNC: 15.1 ML/DL (ref 16–23)
OXYHGB MFR BLDA: 98.1 % (ref 94–97)
OXYHGB MFR BLDA: 98.5 % (ref 94–97)
P AXIS: 137 DEGREES
PCO2 BLD: 21 MMOL/L (ref 21–32)
PCO2 BLD: 31.3 MM HG (ref 36–44)
PCO2 BLDA: 19.2 MM HG (ref 36–44)
PCO2 BLDA: 19.5 MM HG (ref 36–44)
PEEP RESPIRATORY: 6 CM[H2O]
PEEP RESPIRATORY: 8 CM[H2O]
PH BLD: 7.41 [PH] (ref 7.35–7.45)
PH BLDA: 6.99 [PH] (ref 7.35–7.45)
PH BLDA: 7.01 [PH] (ref 7.35–7.45)
PHOSPHATE SERPL-MCNC: 8.8 MG/DL (ref 2.3–4.1)
PLATELET # BLD AUTO: 67 THOUSANDS/UL (ref 149–390)
PLATELET # BLD AUTO: 71 THOUSANDS/UL (ref 149–390)
PMV BLD AUTO: 11.7 FL (ref 8.9–12.7)
PO2 BLD: 207 MM HG (ref 75–129)
PO2 BLDA: 182.3 MM HG (ref 75–129)
PO2 BLDA: 227.2 MM HG (ref 75–129)
POTASSIUM BLD-SCNC: 4 MMOL/L (ref 3.5–5.3)
POTASSIUM SERPL-SCNC: 5 MMOL/L (ref 3.5–5.3)
POTASSIUM SERPL-SCNC: 5.3 MMOL/L (ref 3.5–5.3)
PR INTERVAL: 175 MS
QRS AXIS: 72 DEGREES
QRSD INTERVAL: 75 MS
QT INTERVAL: 296 MS
QTC INTERVAL: 439 MS
RBC # BLD AUTO: 2.82 MILLION/UL (ref 3.81–5.12)
RBC # BLD AUTO: 3.81 MILLION/UL (ref 3.81–5.12)
SALICYLATES SERPL-MCNC: <3 MG/DL (ref 3–20)
SAO2 % BLD FROM PO2: 100 % (ref 60–85)
SODIUM BLD-SCNC: 152 MMOL/L (ref 136–145)
SODIUM SERPL-SCNC: 147 MMOL/L (ref 136–145)
SODIUM SERPL-SCNC: 152 MMOL/L (ref 136–145)
SPECIMEN SOURCE: ABNORMAL
T WAVE AXIS: 147 DEGREES
UNIT DISPENSE STATUS: NORMAL
UNIT PRODUCT CODE: NORMAL
UNIT RH: NORMAL
VENT AC: 28
VENT AC: 28
VENT- AC: AC
VENT- AC: AC
VENTRICULAR RATE: 132 BPM
VT SETTING VENT: 450 ML
VT SETTING VENT: 450 ML
WBC # BLD AUTO: 17.3 THOUSAND/UL (ref 4.31–10.16)
WBC # BLD AUTO: 29.28 THOUSAND/UL (ref 4.31–10.16)

## 2021-01-30 PROCEDURE — 84132 ASSAY OF SERUM POTASSIUM: CPT

## 2021-01-30 PROCEDURE — 82330 ASSAY OF CALCIUM: CPT | Performed by: INTERNAL MEDICINE

## 2021-01-30 PROCEDURE — NC001 PR NO CHARGE: Performed by: INTERNAL MEDICINE

## 2021-01-30 PROCEDURE — 94760 N-INVAS EAR/PLS OXIMETRY 1: CPT

## 2021-01-30 PROCEDURE — 82077 ASSAY SPEC XCP UR&BREATH IA: CPT | Performed by: EMERGENCY MEDICINE

## 2021-01-30 PROCEDURE — 82947 ASSAY GLUCOSE BLOOD QUANT: CPT

## 2021-01-30 PROCEDURE — 82803 BLOOD GASES ANY COMBINATION: CPT

## 2021-01-30 PROCEDURE — 85014 HEMATOCRIT: CPT

## 2021-01-30 PROCEDURE — G1004 CDSM NDSC: HCPCS

## 2021-01-30 PROCEDURE — 83735 ASSAY OF MAGNESIUM: CPT | Performed by: INTERNAL MEDICINE

## 2021-01-30 PROCEDURE — 80143 DRUG ASSAY ACETAMINOPHEN: CPT | Performed by: EMERGENCY MEDICINE

## 2021-01-30 PROCEDURE — 93010 ELECTROCARDIOGRAM REPORT: CPT | Performed by: INTERNAL MEDICINE

## 2021-01-30 PROCEDURE — 36556 INSERT NON-TUNNEL CV CATH: CPT | Performed by: INTERNAL MEDICINE

## 2021-01-30 PROCEDURE — 94003 VENT MGMT INPAT SUBQ DAY: CPT

## 2021-01-30 PROCEDURE — 84100 ASSAY OF PHOSPHORUS: CPT | Performed by: INTERNAL MEDICINE

## 2021-01-30 PROCEDURE — P9016 RBC LEUKOCYTES REDUCED: HCPCS

## 2021-01-30 PROCEDURE — 80048 BASIC METABOLIC PNL TOTAL CA: CPT | Performed by: INTERNAL MEDICINE

## 2021-01-30 PROCEDURE — 85027 COMPLETE CBC AUTOMATED: CPT | Performed by: EMERGENCY MEDICINE

## 2021-01-30 PROCEDURE — 93005 ELECTROCARDIOGRAM TRACING: CPT

## 2021-01-30 PROCEDURE — 80179 DRUG ASSAY SALICYLATE: CPT | Performed by: EMERGENCY MEDICINE

## 2021-01-30 PROCEDURE — 82805 BLOOD GASES W/O2 SATURATION: CPT | Performed by: EMERGENCY MEDICINE

## 2021-01-30 PROCEDURE — 70450 CT HEAD/BRAIN W/O DYE: CPT

## 2021-01-30 PROCEDURE — 36620 INSERTION CATHETER ARTERY: CPT | Performed by: INTERNAL MEDICINE

## 2021-01-30 PROCEDURE — 76937 US GUIDE VASCULAR ACCESS: CPT | Performed by: INTERNAL MEDICINE

## 2021-01-30 PROCEDURE — 84295 ASSAY OF SERUM SODIUM: CPT

## 2021-01-30 PROCEDURE — 99238 HOSP IP/OBS DSCHRG MGMT 30/<: CPT | Performed by: INTERNAL MEDICINE

## 2021-01-30 RX ORDER — POLYETHYLENE GLYCOL 3350 17 G/17G
17 POWDER, FOR SOLUTION ORAL DAILY PRN
Status: DISCONTINUED | OUTPATIENT
Start: 2021-01-30 | End: 2021-01-30 | Stop reason: HOSPADM

## 2021-01-30 RX ORDER — PANTOPRAZOLE SODIUM 40 MG/1
40 INJECTION, POWDER, FOR SOLUTION INTRAVENOUS
Status: DISCONTINUED | OUTPATIENT
Start: 2021-01-30 | End: 2021-01-30 | Stop reason: HOSPADM

## 2021-01-30 RX ORDER — CALCIUM CHLORIDE 100 MG/ML
SYRINGE (ML) INTRAVENOUS CODE/TRAUMA/SEDATION MEDICATION
Status: COMPLETED | OUTPATIENT
Start: 2021-01-30 | End: 2021-01-30

## 2021-01-30 RX ORDER — EPINEPHRINE 0.1 MG/ML
SYRINGE (ML) INJECTION CODE/TRAUMA/SEDATION MEDICATION
Status: COMPLETED | OUTPATIENT
Start: 2021-01-30 | End: 2021-01-30

## 2021-01-30 RX ADMIN — SODIUM BICARBONATE 50 MEQ: 84 INJECTION INTRAVENOUS at 02:37

## 2021-01-30 RX ADMIN — NOREPINEPHRINE BITARTRATE 30 MCG/MIN: 1 INJECTION, SOLUTION, CONCENTRATE INTRAVENOUS at 05:24

## 2021-01-30 RX ADMIN — VANCOMYCIN HYDROCHLORIDE 750 MG: 750 INJECTION, SOLUTION INTRAVENOUS at 00:20

## 2021-01-30 RX ADMIN — VASOPRESSIN 0.04 UNITS/MIN: 20 INJECTION INTRAVENOUS at 05:13

## 2021-01-30 RX ADMIN — NOREPINEPHRINE BITARTRATE 30 MCG/MIN: 1 INJECTION, SOLUTION, CONCENTRATE INTRAVENOUS at 03:12

## 2021-01-30 RX ADMIN — NOREPINEPHRINE BITARTRATE 30 MCG/MIN: 1 INJECTION, SOLUTION, CONCENTRATE INTRAVENOUS at 00:49

## 2021-01-30 RX ADMIN — EPINEPHRINE 1 MG: 0.1 INJECTION INTRACARDIAC; INTRAVENOUS at 02:31

## 2021-01-30 RX ADMIN — CALCIUM CHLORIDE 1 G: 100 INJECTION PARENTERAL at 02:33

## 2021-01-30 RX ADMIN — Medication 20000 ML: at 02:23

## 2021-01-30 RX ADMIN — CHLORHEXIDINE GLUCONATE 0.12% ORAL RINSE 15 ML: 1.2 LIQUID ORAL at 00:33

## 2021-01-30 RX ADMIN — EPINEPHRINE 10 MCG/MIN: 1 INJECTION, SOLUTION, CONCENTRATE INTRAVENOUS at 05:11

## 2021-01-30 RX ADMIN — SODIUM BICARBONATE 250 ML/HR: 84 INJECTION, SOLUTION INTRAVENOUS at 03:24

## 2021-01-30 RX ADMIN — HYDROCORTISONE SODIUM SUCCINATE 50 MG: 100 INJECTION, POWDER, FOR SOLUTION INTRAMUSCULAR; INTRAVENOUS at 00:33

## 2021-01-30 RX ADMIN — HYDROCORTISONE SODIUM SUCCINATE 50 MG: 100 INJECTION, POWDER, FOR SOLUTION INTRAMUSCULAR; INTRAVENOUS at 05:29

## 2021-01-30 NOTE — CONSULTS
Consultation - Nephrology   Rosemarie Ya 65 y o  female MRN: [id number]  Unit/Bed#: MICU 14 Encounter: [id number]      Assessment/Plan:  1  Acute kidney injury, likely secondary to ATN given hypotension/shock  Patient remains oliguric  2  Hyperkalemia secondary to failure, slightly improved with sodium bicarbonate infusion  3  Severe metabolic acidosis with severe lactic acidosis, pH of 7 0  4  Lactic acidosis, anion gap 24, lactate of 17 8  5  Recurrent malignant pericardial effusion with recent history of pericardial window/VATS, repeat echocardiogram is currently pending  6  Pericardial mass   7  Metastatic cancer, GIST, to start oral tyrosine kinase inhibitor as an outpatient  8  Recent pulmonary embolism on anticoagulation  9  Anemia of chronic disease from recent hemoglobin 10 6    Plan:  · Given severe acidosis, hyperkalemia as well as renal failure, plan for initiating renal replacement therapy  · Discussed at length with , he wants absolutely everything done consent obtained, placed on chart  · Discussed with critical care at bedside  · Awaiting official echocardiogram  · May require repeat paracentesis    History of Present Illness   Physician Requesting Consult: Erminia Collet, DO  Reason for Consult / Principal Problem:  Acute renal failure  HPI: Rosemarie Ya is a 65y o  year old female who presents with dyspnea  Patient is a 75-year-old female with a past medical history of atrial flutter fluids recently diagnosed with metastatic GIST, noted anterior mediastinal mass as well as pericardial effusion  Underwent pericardial window as well as VATS during that hospitalization  Patient was also diagnosed with pulmonary embolism was placed on anticoagulation  Patient was discharged on 01/27/2021  Unfortunately patient re-presented with progressive dyspnea  Bedside echocardiogram showed large pericardial effusion which was confirmed with repeat CT imaging    Bedside echocardiogram is currently pending  Currently patient is sedated/intubated  Markedly hypotensive currently on Levophed drip  Markedly tachycardic in the 150s  Receiving sodium bicarbonate infusion as well as IV fluids given hypotension  History obtained from chart review and unobtainable from patient due to mental status    Review of Systems   Unable to perform ROS: Mental status change       Pertinent findings of a 10 point review of systems noted above otherwise all others negative    Historical Information   Patient Active Problem List   Diagnosis    Atrial flutter ([address] )    Essential hypertension    Cardiomegaly    Pericardial effusion without cardiac tamponade    Mediastinal mass    Pain and swelling of right upper extremity    Metastatic GIST    Leukocytosis    Anemia    Fever    Carcinoma of central portion of female breast, left ([address] )    Pulmonary embolus ([address] )     Past Medical History:   Diagnosis Date    Arthritis     Hypertension      Past Surgical History:   Procedure Laterality Date    IR BIOPSY LIVER MASS  1/13/2021    MASTECTOMY      left     PERICARDIAL WINDOW N/A 1/4/2021    Procedure: WINDOW PERICARDIAL, subxiphoid ;  Surgeon: Angela Antunez MD;  [facility];  Service: Thoracic    PERICARDIAL WINDOW Right 1/21/2021    Procedure: WINDOW PERICARDIAL;  Surgeon: Angela Antunez MD;  [facility];  Service: Thoracic    CA [address] N/A 1/21/2021    Procedure: BRONCHOSCOPY FLEXIBLE;  Surgeon: Angela Antunez MD;  [facility];  Service: Thoracic    THORACOSCOPY VIDEO ASSISTED SURGERY (VATS) Right 1/21/2021    Procedure: THORACOSCOPY VIDEO ASSISTED SURGERY (VATS)   PERICARDIAL WINDOW, BIOPSY ANTERIOR MEDIASTINAL MASS;  Surgeon: Angela Antunez MD;  [facility];  Service: Thoracic     Social History   Social History     Substance and Sexual Activity   Alcohol Use Yes    Frequency: Monthly or less    Drinks per session: 1 or 2    Binge frequency: Never     Social History     Substance and Sexual Activity   Drug Use Never     Social History     Tobacco Use   Smoking Status Never Smoker   Smokeless Tobacco Never Used     History reviewed  No pertinent family history  Meds/Allergies   current meds:   Current Facility-Administered Medications   Medication Dose Route Frequency    chlorhexidine (PERIDEX) 0 12 % oral rinse 15 mL  15 mL Swish & Spit Q12H [facility]    dextrose 10 % and normal saline infusion  50 mL/hr Intravenous Continuous    dextrose 50 % IV solution **ADS Override Pull**        EPINEPHrine 3000 mcg (STANDARD CONCENTRATION) IV in sodium chloride 0 9% 250 mL  1-10 mcg/min Intravenous Titrated    fentaNYL 1000 mcg in sodium chloride 0 9% 100mL infusion  25 mcg/hr Intravenous Continuous    midazolam (VERSED) 0 5 mg/mL (STANDARD CONCENTRATION) 100 mL infusion  1 mg/hr Intravenous Continuous    norepinephrine (LEVOPHED) 1 mg/mL injection **ADS Override Pull**        norepinephrine (LEVOPHED) 4 mg (STANDARD CONCENTRATION) IV in sodium chloride 0 9% 250 mL  1-30 mcg/min Intravenous Titrated    NxStage K 2/Ca 3 dialysis solution (RFP-400) 20,000 mL  20,000 mL Dialysis Continuous    sodium bicarbonate 150 mEq in dextrose 5 % 1,000 mL infusion  125 mL/hr Intravenous Continuous    sodium bicarbonate 8 4 % injection **ADS Override Pull**           No Known Allergies      Objective   BP 93/63   Pulse (!) 126   Temp (!) 92 5 °F (33 6 °C)   Resp 16   Ht 5' 4" (1 626 m)   Wt 84 kg (185 lb 3 oz)   SpO2 100%   BMI 31 79 kg/m²     Intake/Output Summary (Last 24 hours) at 1/29/2021 2132  Last data filed at 1/29/2021 1939  Gross per 24 hour   Intake 1850 ml   Output    Net 1850 ml       Current Weight: Weight - Scale: 84 kg (185 lb 3 oz)    Physical Exam  Constitutional:       Interventions: She is sedated and intubated  HENT:      Mouth/Throat:      Comments: ET tube in place  Eyes:      General: No scleral icterus    Neck: Musculoskeletal: Neck supple  Comments: No obvious neck mass  Cardiovascular:      Rate and Rhythm: Tachycardia present  Rhythm irregular  Pulmonary:      Effort: She is intubated  Breath sounds: Examination of the right-lower field reveals decreased breath sounds  Examination of the left-lower field reveals decreased breath sounds  Decreased breath sounds present  Abdominal:      General: There is distension  Palpations: Abdomen is soft  Tenderness: There is no guarding  Musculoskeletal:         General: No deformity  Right lower leg: Edema present  Left lower leg: Edema present  Skin:     General: Skin is dry  Findings: No rash  Comments: Cool bilaterally           Lab Results:    Results from last 7 days   Lab Units 01/29/21 1926 01/29/21  1813   WBC Thousand/uL  --   --  25 03*   HEMOGLOBIN g/dL  --   --  7 0*   I STAT HEMOGLOBIN g/dl 7 8*   < >  --    HEMATOCRIT %  --   --  26 2*   HEMATOCRIT, ISTAT % 23*   < >  --    PLATELETS Thousands/uL  --   --  267    < > = values in this interval not displayed  Results from last 7 days   Lab Units 01/29/21 1926 01/29/21  1556   POTASSIUM mmol/L  --   --  5 8*   CHLORIDE mmol/L  --   --  108   CO2 mmol/L  --   --  10*   CO2, I-STAT mmol/L 18*   < >  --    BUN mg/dL  --   --  32*   CREATININE mg/dL  --   --  2 81*   GLUCOSE, ISTAT mg/dl 144*   < >  --    CALCIUM mg/dL  --   --  9 0    < > = values in this interval not displayed

## 2021-01-30 NOTE — ACP (ADVANCE CARE PLANNING)
Discussed with  Derek Osorio  Updated him on the fact she just had another cardiac arrest on attempt resumption of CRRT  We discussed our management efforts thus far  He does not want any further CPR or defibs  "when she dies- just let me know"  I offered him an option of coming in and visiting her  He however declined the offer  "I want to remember her the way she was before all this"    I took the liberty of changing her code status to DNR/Level 2

## 2021-01-30 NOTE — CODE DOCUMENTATION
RN watching patient's rhythm on monitor; patient's HR and O2 went down and then into Asystole; compressions started

## 2021-01-30 NOTE — SEPSIS NOTE
Sepsis Note   Meghana Jones 65 y o  female MRN: [id number]  Unit/Bed#: BIRDIE Encounter: [id number]      qSOFA     Row Name 01/29/21 1945 01/29/21 19:25:44 01/29/21 1845 01/29/21 18:41:42 01/29/21 18:36:14    Altered mental status GCS < 15      1        Respiratory Rate > / =22  0  0  0  0  0    Systolic BP < / =180    0  1  0  1    Q Sofa Score  1  1  2  0  1    Row Name 01/29/21 1827 01/29/21 18:24:14 01/29/21 1818 01/29/21 18:17:31 01/29/21 18:15:55    Altered mental status GCS < 15              Respiratory Rate > / =22  0  0  0  0  0    Systolic BP < / =067  1  0  0  0  0    Q Sofa Score  1  0  0  0  0    Row Name 01/29/21 1811 01/29/21 1800 01/29/21 1745 01/29/21 1730 01/29/21 1600    Altered mental status GCS < 15              Respiratory Rate > / =22  0  0  0  0  1    Systolic BP < / =815        0  0    Q Sofa Score  0  0  0  0  1    Row Name 01/29/21 1500 01/29/21 1402 01/29/21 1355          Altered mental status GCS < 15            Respiratory Rate > / =22  1    1      Systolic BP < / =610  0  0        Q Sofa Score  1  1            Initial Sepsis Screening     Row Name 01/29/21 1730                Is the patient's history suggestive of a new or worsening infection? (!) Yes (Proceed)  -FG        Suspected source of infection  suspect infection, source unknown  -FG        Are two or more of the following signs & symptoms of infection both present and new to the patient? (!) Yes (Proceed)  -FG        Indicate SIRS criteria  Hypothermia < 36C (96 8F); Altered mental status; Tachycardia > 90 bpm  -FG        If the answer is yes to both questions, suspicion of sepsis is present          If severe sepsis is present AND tissue hypoperfusion perists in the hour after fluid resuscitation or lactate > 4, the patient meets criteria for SEPTIC SHOCK          Are any of the following organ dysfunction criteria present within 6 hours of suspected infection and SIRS criteria that are NOT considered to be chronic conditions? (!) Yes  -FG        Organ dysfunction  Lactate >/equal 4 0 mmol/L  -FG        Date of presentation of severe sepsis  01/29/21  -FG        Time of presentation of severe sepsis  1730  -FG        Tissue hypoperfusion persists in the hour after crystalloid fluid administration, evidenced, by either:  * OR * Lactate level is greater to or equal 4 mmol/dL ( ___ mmol/dL in comment field)  -FG        Was hypotension present within one hour of the conclusion of crystalloid fluid administration?   No  -FG        Date of presentation of septic shock          Time of presentation of septic shock            User Key  (r) = Recorded By, (t) = Taken By, (c) = Cosigned By    [address] Name Provider Type    FG Barbara Harris MD Resident

## 2021-01-30 NOTE — DEATH NOTE
INPATIENT DEATH NOTE  Eliza Lazaro 65 y o  female MRN: [id number]  Unit/Bed#: MICU 14 Encounter: [id number]    Date, Time and Cause of Death    Date of Death: 21  Time of Death:  6:22 AM  Preliminary Cause of Death: Cardiogenic shock  Entered by: Oscar Salazar[FM1 1]     Attribution     FM1 1 Oscar Salazar MD 21 06:49           Patient's Information  Pronounced by: Oscar Salazar  Did the patient's death occur in the ED?: No  Did the patient's death occur in the OR?: No  Did the patient's death occur less than 10 days post-op?: No  Did the patient's death occur within 24 hours of admission?: Yes  Was code status DNR at the time of death?: Yes    PHYSICAL EXAM:  Unresponsive to noxious stimuli, Carotid pulse absent, Heart sounds absent, Pupillary light reflex absent and Corneal blink reflex absent    Medical Examiner notification criteria:  Patient  within 24 hours of arrival to hospital   Medical Examiner's office notified?:  Yes   Medical Examiner accepted case?:  No  Name of Medical Examiner: Gabriella Simpson    Family Notification  Was the family notified?: Yes  Date Notified: 21  Notified by: Dr Lorri Burnett  Name of Family Notified of Death:  Yajaira Robe   Relationship to Patient: Spouse  Family Notification Route: Telephone  Was the family told to contact a  home?: Yes  Name of  Home[de-identified] [facility] in [place]    Autopsy Options:  Post-mortem examination declined by next of kin    Primary Service Attending Physician notified?:  yes - Attending:  Felicia Garvey DO    Physician/Resident responsible for completing Discharge Summary:  Husam Fitzpatrick PGY-1

## 2021-01-30 NOTE — PROCEDURES
Central Line Insertion    Date/Time: 1/29/2021 11:00 PM  Performed by: Jeremy Rios MD  Authorized by: Jeremy Rios MD     Patient location:  ICU  Other Assisting Provider: Yes (comment) (Dr Leslie Madison)    Consent:     Consent obtained:  Emergent situation  Pre-procedure details:     Hand hygiene: Hand hygiene performed prior to insertion      Sterile barrier technique: All elements of maximal sterile technique followed      Skin preparation:  2% chlorhexidine    Skin preparation agent: Skin preparation agent completely dried prior to procedure    Indications:     Central line indications: medications requiring central line and dialysis    Anesthesia (see MAR for exact dosages): Anesthesia method:  Local infiltration    Local anesthetic:  Lidocaine 1% w/o epi  Procedure details:     Location:  Right internal jugular    Vessel type: vein      Laterality:  Right    Approach: percutaneous technique used      Patient position:  Flat    Catheter type:  Triple lumen    Landmarks identified: yes      Ultrasound guidance: yes      Sterile ultrasound techniques: Sterile gel and sterile probe covers were used      Manometry confirmation: yes      Number of attempts:  1    Successful placement: yes    Post-procedure details:     Post-procedure:  Dressing applied and line sutured    Assessment:  Blood return through all ports, no pneumothorax on x-ray, free fluid flow and placement verified by x-ray    Post-procedure complications: none      Patient tolerance of procedure: Tolerated well, no immediate complications    Observer:  Yes

## 2021-01-30 NOTE — H&P
H&P Exam - [facility] 65 y o  female MRN: [id number]  Unit/Bed#: MICU 14 Encounter: [id number]      -------------------------------------------------------------------------------------------------------------  Chief Complaint: Cardiac Arrest    History of Present Illness   HX and PE limited by: Patient mental status    Emmanuel Faye is a 65 y o  female, with a history significant for A flutter, malignant pericardial effusion (status post VATS/Pericardial window drainage), breast carcinoma, Metastatic GIST,  who presented to the ED on 1/29 with gradually-progressive shortness of breath since discharge two days ago  Patient was tachypneic on arrival and, over the course of her time in the ED, she had episodes of unresponsiveness  CT head was unremarkable  Her lactic was 16 and repeat was 17  While in the ED, the patient became bradycardic and went into cardiac arrest two times  ROSC was achieved both times after ACLS with compressions, sodium bicarb, calcium, glucose, epinephrine  Notably, in the ED, patient was also found to be hypoglycemic  She also exhibited staring episodes  She was intubated for airway protection and vecuronium was used as the paralytic  Patient has been tachycardic, hypothermic, and hypotensive despite vasopressors  She has acidosis, with a VBG pH of 6 967, hyperkalemia (6 3), and renal failure  She was evaluated by cardiology/thoracic surgery  Tamponade physiology was not felt to be the cause of these issues  Per the ED team, who had an extensive conversation with family, patient's family wants everything done  This was further confirmed by nephrology  Patient is full code       History obtained from chart review, ED team  -------------------------------------------------------------------------------------------------------------  Assessment and Plan:    Neuro:    Diagnosis: Altered mental status, Seizure-like activity  o Plan:   o CAM-ICU  o Wean sedation  o Sleep-Wake Cycle Regulation  o EEG if patient remains non-responsive  - Cerebral hypoperfusion is felt to be more  likely at this time than seizure     Diagnosis: Pain and Sedation  o Plan:   - Fentanyl gtt       CV:    Diagnosis: Shock (Likely cardiogenic), Cardiac Arrest x2, Malignant Pericardial Effusion, A flutter, Pericardial Mass  o Plan:   o Stress dose steroids, Norepinephrine gtt, Vasopressin gtt, Epinephrine gtt  o Continue to closely monitor hemodynamics  o Closely monitor on tele  o Sodium bicarbonate infusion  o Cardiology consult, IR consult      Pulm:   Diagnosis: Intubated for airway protection  o Plan:   o Good pulm hygene  o Frequent suctioning  o Wean sedation to monitor neuro status/intubation need   Diagnosis: Recent PE  o Plan:   o On Xarelto    GI:    Diagnosis: No acute issues  o Plan:   o Prn miralax bowel regimen      :    Diagnosis: Acute Kidney Injury  o Plan:   o Nephrology consult  o Plan for dialysis  o Trend BMP  o Strict I&Os      F/E/N:    Plan:    Fluids:   Electrolytes: Trend and replete as needed   Nutrition: NPO      Heme/Onc:    Diagnosis: Metastatic GIST   o Plan:  o Oncology consult       Endo:    Diagnosis: Hypoglycemia  o Plan:   - D10 Bolus  - D5 Bicarb Infusion  - Trend glucose  - Maintain BS between 140-180   Diagnosis: Lactic acidosis  o Plan:   - Dialysis  - Fluid resuscitation   - Nephrology consult  - Coma panel    ID:    Diagnosis: Lactic Acidosis, Hypothermia, Shock, Hypothermia  o Plan:   o Cefepime, Vancomycin, Flagyl, Stress dose steroids  o Blood cultures  o Bear hugger warming  o Continue to closely monitor for signs of infection  o Trend daily fever curve and WBC    MSK/Skin:    Diagnosis: No acute issues  o Plan:   o Turn and reposition frequently  o Continue pressure off-loading  o SCDs      Disposition: Admit to Critical Care   Code Status: Level 1 - Full Code  --------------------------------------------------------------------------------------------------------------  Review of Systems   Unable to perform ROS: Mental status change       Review of systems was unable to be performed secondary to AMS, intubation    Physical Exam  Vitals signs and nursing note reviewed  Constitutional:       General: She is in acute distress  Appearance: She is obese  She is ill-appearing and toxic-appearing  HENT:      Head: Normocephalic  Right Ear: External ear normal       Left Ear: External ear normal       Nose: Nose normal       Mouth/Throat:      Mouth: Mucous membranes are dry  Comments: Patient is intubated with her mouth wide open  Eyes:      Comments: Non-reactive to light, pupils equal   Neck:      Musculoskeletal: Neck supple  Cardiovascular:      Rate and Rhythm: Tachycardia present  Comments: Non-palpable distal pulses, quiet heart sounds  Pulmonary:      Breath sounds: Rhonchi and rales present  Comments: Patient is intubated  Abdominal:      General: There is distension  Palpations: Abdomen is soft  Musculoskeletal:      Right lower leg: Edema (3-4+) present  Left lower leg: Edema (3-4+) present  Skin:     General: Skin is dry  Capillary Refill: Capillary refill takes more than 3 seconds  Comments: Cold distal extremities   Neurological:      Mental Status: She is alert        Comments: Due to AMS, intubation, sedation, this component of the exam is unable to be performed   Psychiatric:      Comments: Due to AMS, intubation, sedation, this component of the exam is unable to be performed       --------------------------------------------------------------------------------------------------------------  Vitals:   Vitals:    01/30/21 0100 01/30/21 0109 01/30/21 0115 01/30/21 0148   BP:       BP Location:       Pulse: (!) 128  (!) 120    Resp:       Temp: (!) 91 °F (32 8 °C) (!) 91 °F (32 8 °C) (!) 91 °F (32 8 °C) (!) 91 °F (32 8 °C)   TempSrc:       SpO2:       Weight:       Height:         Temp  Min: 91 °F (32 8 °C)  Max: 97 7 °F (36 5 °C)  IBW: 54 7 kg  Height: 5' 4" (162 6 cm)  Body mass index is 31 79 kg/m²  N/A    Laboratory and Diagnostics:  Results from last 7 days   Lab Units 01/29/21 2142 01/29/21 1926 01/29/21 1818 01/29/21  1813 01/29/21  1400 01/27/21  0451 01/26/21  0436 01/25/21  1713  01/25/21  0519   WBC Thousand/uL 29 51*  --   --  25 03* 22 66* 14 24* 15 70* 15 06*  --  12 70*   HEMOGLOBIN g/dL 9 5*  --   --  7 0* 10 6* 8 8* 8 5* 8 7*   < > 6 2*   I STAT HEMOGLOBIN g/dl  --  7 8* 8 8*  --   --   --   --   --   --   --    HEMATOCRIT % 33 2*  --   --  26 2* 38 2 29 3* 27 9* 28 6*   < > 20 8*   HEMATOCRIT, ISTAT %  --  23* 26*  --   --   --   --   --   --   --    PLATELETS Thousands/uL 186  --   --  267 479* 457* 399* 424*  --  309   NEUTROS PCT %  --   --   --   --   --   --   --   --   --  76*   BANDS PCT %  --   --   --   --  5  --   --  3  --   --    MONOS PCT %  --   --   --   --   --   --   --   --   --  9   MONO PCT %  --   --   --   --  6  --   --  2*  --   --     < > = values in this interval not displayed       Results from last 7 days   Lab Units 01/30/21  0031 01/29/21 2142 01/29/21 1926 01/29/21 1818 01/29/21  1556 01/29/21  1400 01/27/21  0451 01/26/21  0522 01/25/21  0519   SODIUM mmol/L 147* 148*  --   --  142 139 142 145 144   POTASSIUM mmol/L 5 0 5 1  --   --  5 8* 6 3* 4 1 4 0 3 3*   CHLORIDE mmol/L 108 111*  --   --  108 110* 111* 113* 115*   CO2 mmol/L 7* 8*  --   --  10* 10* 24 27 22   CO2, I-STAT mmol/L  --   --  18* 11*  --   --   --   --   --    ANION GAP mmol/L 32* 29*  --   --  24* 19* 7 5 7   BUN mg/dL 35* 34*  --   --  32* 31* 16 21 25   CREATININE mg/dL 2 88* 2 75*  --   --  2 81* 2 71* 0 94 0 95 0 88   CALCIUM mg/dL 8 9 9 6  --   --  9 0 9 9 8 6 8 3 7 6*   GLUCOSE RANDOM mg/dL 217* 232*  --   --  29* 51* 138 108 99   ALT U/L  --   --   --   --   --  71  --   --  18   AST U/L  --   --   --   --   --  163*  --   --  25   ALK PHOS U/L  --   --   --   --   --  91  --   --  75   ALBUMIN g/dL  --   --   --   --   --  3 4*  --   --  2 4*   TOTAL BILIRUBIN mg/dL  --   --   --   --   --  3 01*  --   --  1 09*     Results from last 7 days   Lab Units 01/29/21  2142   MAGNESIUM mg/dL 3 5*   PHOSPHORUS mg/dL 8 6*      Results from last 7 days   Lab Units 01/27/21  0451 01/26/21 2014 01/26/21  1317 01/26/21  0425 01/25/21  2144 01/25/21  0328 01/24/21 2004 01/24/21  1136   INR   --   --   --   --  1 55*  --   --  1 62*   PTT seconds 121* 75* 138* 39* 24 74* 96* 25      Results from last 7 days   Lab Units 01/29/21  1400   TROPONIN I ng/mL <0 02     Results from last 7 days   Lab Units 01/29/21  2142 01/29/21  1835 01/29/21  1642   LACTIC ACID mmol/L 22 1* 17 8* 16 2*     ABG:  Results from last 7 days   Lab Units 01/30/21  0101   PH ART  6 993*   PCO2 ART mm Hg 19 2*   PO2 ART mm Hg 227 2*   HCO3 ART mmol/L 4 6*   BASE EXC ART mmol/L -25 2   ABG SOURCE  Line, Arterial     VBG:  Results from last 7 days   Lab Units 01/30/21  0101 01/29/21  2300   PH MOO   --  7 035*   PCO2 MOO mm Hg  --  26 8*   PO2 MOO mm Hg  --  48 5*   HCO3 MOO mmol/L  --  7 0*   BASE EXC MOO mmol/L  --  -22 3   ABG SOURCE  Line, Arterial  --            Micro:  Results from last 7 days   Lab Units 01/29/21  1752   BLOOD CULTURE  Received in Microbiology Lab  Culture in Progress  Received in Microbiology Lab  Culture in Progress  EKG:   Imaging: CT head, CT C/A/P I have personally reviewed pertinent reports        Historical Information   Past Medical History:   Diagnosis Date    Arthritis     Hypertension      Past Surgical History:   Procedure Laterality Date    IR BIOPSY LIVER MASS  1/13/2021    MASTECTOMY      left     PERICARDIAL WINDOW N/A 1/4/2021    Procedure: WINDOW PERICARDIAL, subxiphoid ;  Surgeon: Jone Galdamez MD;  [facility]: [facility];  Service: Thoracic    PERICARDIAL WINDOW Right 1/21/2021 Procedure: WINDOW PERICARDIAL;  Surgeon: Aminta Rm MD;  [facility];  Service: Thoracic    DC [address] N/A 1/21/2021    Procedure: Aidee Blade;  Surgeon: Aminta Rm MD;  [facility];  Service: Thoracic    THORACOSCOPY VIDEO ASSISTED SURGERY (VATS) Right 1/21/2021    Procedure: THORACOSCOPY VIDEO ASSISTED SURGERY (VATS)  PERICARDIAL WINDOW, BIOPSY ANTERIOR MEDIASTINAL MASS;  Surgeon: Aminta Rm MD;  Location[facility];  Service: Thoracic     Social History   Social History     Substance and Sexual Activity   Alcohol Use Yes    Frequency: Monthly or less    Drinks per session: 1 or 2    Binge frequency: Never     Social History     Substance and Sexual Activity   Drug Use Never     Social History     Tobacco Use   Smoking Status Never Smoker   Smokeless Tobacco Never Used       Family History:   History reviewed  No pertinent family history    I have reviewed this patient's family history and commented on sigificant items within the HPI      Medications:  Current Facility-Administered Medications   Medication Dose Route Frequency    cefepime (MAXIPIME) 1,000 mg in dextrose 5 % 50 mL IVPB  1,000 mg Intravenous Q12H    chlorhexidine (PERIDEX) 0 12 % oral rinse 15 mL  15 mL Swish & Spit Q12H [address]    dextrose 50 % IV solution **ADS Override Pull**        EPINEPHrine 3000 mcg (STANDARD CONCENTRATION) IV in sodium chloride 0 9% 250 mL  1-10 mcg/min Intravenous Titrated    fentaNYL 1000 mcg in sodium chloride 0 9% 100mL infusion  25 mcg/hr Intravenous Continuous    hydrocortisone sodium succinate (PF) (Solu-CORTEF) injection 50 mg  50 mg Intravenous Q6H [address]    metroNIDAZOLE (FLAGYL) IVPB (premix) 500 mg 100 mL  500 mg Intravenous Q8H    norepinephrine (LEVOPHED) 1 mg/mL injection **ADS Override Pull**        norepinephrine (LEVOPHED) 4 mg (STANDARD CONCENTRATION) IV in sodium chloride 0 9% 250 mL  1-30 mcg/min Intravenous Titrated    NxStage K 2/Ca 3 dialysis solution (RFP-400) 20,000 mL  20,000 mL Dialysis Continuous    polyethylene glycol (MIRALAX) packet 17 g  17 g Oral Daily PRN    sodium bicarbonate 150 mEq in dextrose 5 % 1,000 mL infusion  125 mL/hr Intravenous Continuous    sodium bicarbonate 8 4 % injection **ADS Override Pull**        vancomycin (VANCOCIN) IVPB (premix in dextrose) 750 mg 150 mL  10 mg/kg Intravenous Q24H    vasopressin (PITRESSIN) 20 Units in sodium chloride 0 9 % 100 mL infusion  0 04 Units/min Intravenous Continuous     Home medications:  Prior to Admission Medications   Prescriptions Last Dose Informant Patient Reported? Taking?   diltiazem (CARDIZEM CD) 180 mg 24 hr capsule   No Yes   Sig: Take 1 capsule (180 mg total) by mouth daily   metoprolol succinate (TOPROL-XL) 100 mg 24 hr tablet   No Yes   Sig: Take 1 tablet (100 mg total) by mouth every 12 (twelve) hours   rivaroxaban (XARELTO) 15 mg tablet   No Yes   Sig: Take 1 tablet (15 mg total) by mouth 2 (two) times a day with meals for 41 doses   rivaroxaban (XARELTO) 20 mg tablet   No Yes   Sig: Take 1 tablet (20 mg total) by mouth daily with breakfast      Facility-Administered Medications: None     Allergies:  No Known Allergies  ------------------------------------------------------------------------------------------------------------  Advance Directive and Living Will:      Power of :    POLST:    ------------------------------------------------------------------------------------------------------------  Anticipated Length of Stay is > 2 midnights    Care Time Delivered:   No Critical Care time spent       Adam Jara MD        Portions of the record may have been created with voice recognition software  Occasional wrong word or "sound a like" substitutions may have occurred due to the inherent limitations of voice recognition software    Read the chart carefully and recognize, using context, where substitutions have occurred

## 2021-01-30 NOTE — RESPIRATORY THERAPY NOTE
RT Ventilator Management Note  Dimitry Figueroa 65 y o  female MRN: [id number]  Unit/Bed#: [facility] 14 Encounter: [id number]      Daily Screen     No data found in the last 10 encounters  Physical Exam:   Assessment Type: (P) Assess only  General Appearance: (P) Sedated  Respiratory Pattern: (P) Assisted  Chest Assessment: (P) Chest expansion symmetrical  Bilateral Breath Sounds: (P) Clear  O2 Device: Ventilator      Resp Comments: (P) Pt remains on S-CMV with no changes at this time, will continue to monitor per respriatory protocol

## 2021-01-30 NOTE — RESPIRATORY THERAPY NOTE
RT Protocol Note  [facility] Held 65 y o  female MRN: [id number]  Unit/Bed#: [facility] Encounter: [id number]    Assessment    Active Problems:    * No active hospital problems  *      Home Pulmonary Medications:  none       Past Medical History:   Diagnosis Date    Arthritis     Hypertension      Social History     Socioeconomic History    Marital status: /Civil Union     Spouse name: None    Number of children: None    Years of education: None    Highest education level: None   Occupational History    None   Social Needs    Financial resource strain: None    Food insecurity     Worry: None     Inability: None    Transportation needs     Medical: None     Non-medical: None   Tobacco Use    Smoking status: Never Smoker    Smokeless tobacco: Never Used   Substance and Sexual Activity    Alcohol use: Yes     Frequency: Monthly or less     Drinks per session: 1 or 2     Binge frequency: Never    Drug use: Never    Sexual activity: None   Lifestyle    Physical activity     Days per week: None     Minutes per session: None    Stress: None   Relationships    Social connections     Talks on phone: None     Gets together: None     Attends Taoism service: None     Active member of club or organization: None     Attends meetings of clubs or organizations: None     Relationship status: None    Intimate partner violence     Fear of current or ex partner: None     Emotionally abused: None     Physically abused: None     Forced sexual activity: None   Other Topics Concern    None   Social History Narrative    None       Subjective         Objective    Physical Exam:   Assessment Type: (P) Assess only  General Appearance: (P) Sedated  Respiratory Pattern: (P) Assisted  Chest Assessment: (P) Chest expansion symmetrical  Bilateral Breath Sounds: (P) Clear  O2 Device: vent    Vitals:  Blood pressure 141/80, pulse (!) 130, temperature (!) 92 5 °F (33 6 °C), resp   rate 16, height 5' 4" (1 626 m), weight 84 kg (185 lb 3 oz), SpO2 100 %  Results from last 7 days   Lab Units 01/29/21  1823   PH ART  7 053*   PCO2 ART mm Hg 27 5*   PO2 ART mm Hg 138 5*   HCO3 ART mmol/L 7 5*   BASE EXC ART mmol/L -21 3   O2 CONTENT ART mL/dL 11 0*   O2 HGB, ARTERIAL % 96 3   ABG SOURCE  Radial, Right   MILAGROS TEST  Yes       Imaging and other studies: I have personally reviewed pertinent reports  O2 Device: vent     Plan    Respiratory Plan: (P) Vent/NIV/HFNC        Resp Comments: (P) Bayron Peon old female with no known pulm hx admitted due to resp failure currnetly resting comfortably on S-CMV, laila bs clear, x-ray shows Patchy opacity right lung base laterally may represent atelectasis , no udn's indicated at this time, will continue to monitor per respiratory protocol

## 2021-01-30 NOTE — PROCEDURES
Arterial Line Insertion    Date/Time: 1/29/2021 10:30 PM  Performed by: Evelina Smith MD  Authorized by: Evelina Smith MD     Patient location:  ICU  Other Assisting Provider: Yes (comment) (Dr Ana Maria Quan)    Consent:     Consent obtained:  Emergent situation  Indications:     Indications: hemodynamic monitoring, multiple ABGs, continuous blood pressure monitoring and frequent labs / infusion    Pre-procedure details:     Skin preparation:  Chlorhexidine    Preparation: Patient was prepped and draped in sterile fashion    Anesthesia (see MAR for exact dosages): Anesthesia method:  None  Procedure details:     Location / Tip of Catheter:  Femoral    Laterality:  Right    Placement technique:  Ultrasound guided    Number of attempts:  2    Successful placement: yes      Transducer: waveform confirmed    Post-procedure details:     Post-procedure:  Sterile dressing applied, sutured and secured with tape    Patient tolerance of procedure:   Tolerated well, no immediate complications

## 2021-01-30 NOTE — QUICK NOTE
100 ml midazolam wasted with Alonso Briggs RN  Med d/c on pt  arrival from ED  Unable to waste in [address]

## 2021-01-30 NOTE — DISCHARGE SUMMARY
Discharge Summary - Erlin Claros 65 y o  female MRN: [id number]    [facility]: [id number] PCP: Eileen Castillo MD    Admission Date: 1/29/2021    Admission Orders (From admission, onward)     Ordered        01/29/21 1912  INPATIENT ADMISSION  Once                     Admitting Diagnosis: SOB (shortness of breath) [R06 02]  Acute renal failure (ARF) (HCC) [N17 9]  Carcinoma of central portion of female breast, left (HCC) [C50 112]  Pericardial effusion without cardiac tamponade [I31 3]    HPI:  Pt is a 66 y/o F w/ PMHx of A Flutter,  large anterior mediastinal mass / mediastinal adenopathy and innumerable liver metastases, with recent malignant pericardial effusion, s/p subxiphoid pericardial window 1/4/21 and subsequent R VATS pericardial window 1/21/21, who presents with worsening SOB  On presentation, she was tachycardic 140, normotensive 125/74, tachypneic 30 but saturating 98% on room air  ECG revealed narrow complex supraventricular tachycardia likely atrial flutter with rapid ventricular rate  Presenting lab revealed acute renal failure accompanying metabolic acidosis, hyperkalemia 6 3, creatinine 2 7, hyperbilirubinemia, significant leukocytosis with neutrophil predominance 22 7, microcytosis and mild transaminitis  A transthoracic echocardiogram was obtained and revealed preserved LV and RV function but reaccumulation of a moderate-sized pericardial effusion with no hemodynamic compromise  CXR revealed no acute changes from previous study but did show an enlarged cardiac silhouette which is consistent with pericardial effusion  Initially full Code  She coded twice and ROSC was acheieved with compressions, sodium bicarb, calcium, glucose, epinephrine  At this time, the  decided to changed full code to Level 2  She required pressure support for which central line and Arterial line was placed for monitoring  She was transferred to MICU   Around 6 am, pt went into PEA and unfortunately passed away  Procedures Performed:   Orders Placed This Encounter   Procedures   [address] ED ECG Documentation Only    Intubation    POC Cardiac US    POC Cardiac US       Summary of Hospital Course:   Pt is a 64 y/o F w/ PMHx of A Flutter,  large anterior mediastinal mass / mediastinal adenopathy and innumerable liver metastases, with recent malignant pericardial effusion, s/p subxiphoid pericardial window 1/4/21 and subsequent R VATS pericardial window 1/21/21, who presents with worsening SOB  On presentation, she was tachycardic 140, normotensive 125/74, tachypneic 30 but saturating 98% on room air  ECG revealed narrow complex supraventricular tachycardia likely atrial flutter with rapid ventricular rate  Presenting lab revealed acute renal failure accompanying metabolic acidosis, hyperkalemia 6 3, creatinine 2 7, hyperbilirubinemia, significant leukocytosis with neutrophil predominance 22 7, microcytosis and mild transaminitis  A transthoracic echocardiogram was obtained and revealed preserved LV and RV function but reaccumulation of a moderate-sized pericardial effusion with no hemodynamic compromise  CXR revealed no acute changes from previous study but did show an enlarged cardiac silhouette which is consistent with pericardial effusion  Initially full Code  She coded twice and ROSC was acheieved with compressions, sodium bicarb, calcium, glucose, epinephrine  At this time, the  decided to changed full code to Level 2  She required pressure support for which central line and Arterial line was placed for monitoring  She was transferred to MICU  Around 6 am, pt went into PEA and unfortunately passed away       Significant Findings, Care, Treatment and Services Provided: Cardiac U/S- Large Pericardial Effusion w/out HD instability, Lactic Acid 18, ABG pH 6 9, WBC 25, CT chest: Recurrent large loculated malignant pericardial effusion with the largest component inferior to the heart  Emergently Intubated to secure airway  Initially full Code  around 1800hrs , pt went into asystole and CPR was performed w/ achieving ROSC  She coded again and ROSC was achieved again  At this time, the family decided to changed full code to Level 2  Disposition:      Final Diagnosis: Cardiac Arrest     Resolved Problems  Date Reviewed: 2021    None          Condition at Time of Death:Poor    Date, Time and Cause of Death    Date of Death: 21  Time of Death:  6:22 AM  Preliminary Cause of Death: Cardiogenic shock  Entered by: Tamera Salazar[FM1 1]     Attribution     FM1 1 Corrine Ortiz MD 21 06:49        Death Note:    INPATIENT DEATH NOTE  Jennifer Mon 65 y o  female MRN: [id number]  Unit/Bed#: MICU 14 Encounter: [id number]    Date, Time and Cause of Death    Date of Death: 21  Time of Death:  6:22 AM  Preliminary Cause of Death: Cardiogenic shock  Entered by: Tamera Salazar[FM1 1]     Attribution     FM1 1 Tamera Salazar MD 21 06:49           Patient's Information  Pronounced by: Tamera Salazar  Did the patient's death occur in the ED?: No  Did the patient's death occur in the OR?: No  Did the patient's death occur less than 10 days post-op?: No  Did the patient's death occur within 24 hours of admission?: Yes  Was code status DNR at the time of death?: Yes    PHYSICAL EXAM:  Unresponsive to noxious stimuli, Carotid pulse absent, Heart sounds absent, Pupillary light reflex absent and Corneal blink reflex absent    Medical Examiner notification criteria:  Patient  within 24 hours of arrival to hospital   Medical Examiner's office notified?:  Yes   Medical Examiner accepted case?:  No  Name of Medical Examiner: Dionne Strickland    Family Notification  Was the family notified?: Yes  Date Notified: 21  Notified by: Dr Sergio Sylvester  Name of Family Notified of Death:  [address]  Person Relationship to Patient: Spouse  Family Notification Route: Telephone  Was the family told to contact a  home?: Yes  Name of  Home[de-identified] [facility] in [place]    Autopsy Options:  Post-mortem examination declined by next of kin    Primary Service Attending Physician notified?:  yes - Attending:  Ekta Schmidt, DO    Physician/Resident responsible for completing Discharge Summary:  Otis Del Valle PGY-1

## 2021-02-01 LAB
ANISOCYTOSIS BLD QL SMEAR: PRESENT
BASOPHILS # BLD AUTO: 0.25 THOUSAND/UL (ref 0–0.1)
BASOPHILS NFR MAR MANUAL: 1 % (ref 0–1)
EOSINOPHIL # BLD AUTO: 0.25 THOUSAND/UL (ref 0–0.61)
EOSINOPHIL NFR BLD MANUAL: 1 % (ref 0–6)
ERYTHROCYTE [DISTWIDTH] IN BLOOD BY AUTOMATED COUNT: 23.9 % (ref 11.6–15.1)
HCT VFR BLD AUTO: 26.2 % (ref 34.8–46.1)
HGB BLD-MCNC: 7 G/DL (ref 11.5–15.4)
LYMPHOCYTES # BLD AUTO: 1.5 THOUSAND/UL (ref 0.6–4.47)
LYMPHOCYTES # BLD AUTO: 6 %
MCH RBC QN AUTO: 21.7 PG (ref 26.8–34.3)
MCHC RBC AUTO-ENTMCNC: 26.7 G/DL (ref 31.4–37.4)
MCV RBC AUTO: 81 FL (ref 82–98)
METAMYELOCYTES NFR BLD MANUAL: 3 % (ref 0–1)
MICROCYTES BLD QL AUTO: PRESENT
MONOCYTES # BLD AUTO: 1.75 THOUSAND/UL (ref 0–1.22)
MONOCYTES NFR BLD AUTO: 7 % (ref 4–12)
MYELOCYTES NFR BLD MANUAL: 4 % (ref 0–1)
NEUTS BAND NFR BLD MANUAL: 4 % (ref 0–8)
NEUTS SEG # BLD: 17.77 THOUSAND/UL (ref 1.81–6.82)
NEUTS SEG NFR BLD AUTO: 67 %
NRBC BLD AUTO-RTO: 11 /100 WBCS
NRBC BLD AUTO-RTO: 21 /100 WBC (ref 0–2)
OVALOCYTES BLD QL SMEAR: PRESENT
PATHOLOGIST INTERPRETATION: NORMAL
PATHOLOGY REVIEW: YES
PLATELET # BLD AUTO: 267 THOUSANDS/UL (ref 149–390)
PLATELET BLD QL SMEAR: ADEQUATE
PMV BLD AUTO: 11.9 FL (ref 8.9–12.7)
POIKILOCYTOSIS BLD QL SMEAR: PRESENT
POLYCHROMASIA BLD QL SMEAR: PRESENT
PROMYELOCYTES NFR BLD MANUAL: 1 % (ref 0–0)
RBC # BLD AUTO: 3.23 MILLION/UL (ref 3.81–5.12)
SCHISTOCYTES BLD QL SMEAR: PRESENT
TOTAL CELLS COUNTED SPEC: 100
VARIANT LYMPHS # BLD AUTO: 6 % (ref 0–0)
WBC # BLD AUTO: 25.03 THOUSAND/UL (ref 4.31–10.16)
WBC NRBC COR # BLD: 25.03 THOUSAND/UL (ref 4.31–10.16)

## 2021-02-01 NOTE — UTILIZATION REVIEW
Initial Clinical Review    Admission: Date/Time/Statement:   Admission Orders (From admission, onward)     Ordered        01/29/21 1912  INPATIENT ADMISSION  Once                   Orders Placed This Encounter   Procedures    INPATIENT ADMISSION     Standing Status:   Standing     Number of Occurrences:   1     Order Specific Question:   Level of Care     Answer:   Critical Care [15]     Order Specific Question:   Estimated length of stay     Answer:   More than 2 Midnights     Order Specific Question:   Certification     Answer:   I certify that inpatient services are medically necessary for this patient for a duration of greater than two midnights  See H&P and MD Progress Notes for additional information about the patient's course of treatment  ED Arrival Information     Expected Arrival Acuity Means of Arrival Escorted By Service Admission Type    - 1/29/2021 13:44 Emergent Wheelchair Family Member Critical Care/ICU Emergency    Arrival Complaint    sob        Chief Complaint   Patient presents with    Shortness of Breath     Patient reports increasing SOB starting 2 days ago  Reports recent heart surgery     Assessment/Plan:  65 y o  female, with a PMHx for A flutter, malignant pericardial effusion (s/p VATS/Pericardial window drainage), breast carcinoma, Metastatic GIST,  who presented to the ED on 1/29 with gradually-progressive sob since d/c 2 days ago  Pt was tachypneic on arrival and, over the course of her time in the ED, she had episodes of unresponsiveness  CT head was unremarkable  Lactic was 16 with repeat 17  While in the ED, the patient became bradycardic and went into cardiac arrest 2 x  ROSC was achieved both times after ACLS with compressions, sodium bicarb, calcium, glucose, epinephrine  Notably, in the ED, patient was also found to be hypoglycemic  She also exhibited staring episodes  She was intubated for airway protection  Tachycardic, hypothermic, and hypotensive despite vasopressors  Aacidosis, with a VBG pH of 6 967, hyperkalemia (6 3), and renal failure  Admit inpatient to MICU with shock concern for RV dysfunction  S/p recent pericardial window- no tamponade on repeated cardiac imaging  Continue fentanyl gtt, sodium bicarb gtt, Norepinephrine gtt, Vasopressin gtt, Epinephrine gtt  Cardiology and IR consulted  Remains on vent  Npo  Blood cxs  Bear huggar arming  IV vanco and flagyl  Central line and A-line placed  1/30  -- pt went into PEA and unfortunately passed away      Date, Time and Cause of Death    Date of Death: 1/30/21  Time of Death:  6:22 AM       ED Triage Vitals   Temperature Pulse Respirations Blood Pressure SpO2   01/29/21 1447 01/29/21 1355 01/29/21 1355 01/29/21 1402 01/29/21 1355   97 7 °F (36 5 °C) (!) 140 (!) 30 125/74 98 %      Temp Source Heart Rate Source Patient Position - Orthostatic VS BP Location FiO2 (%)   01/29/21 1447 01/29/21 1355 01/29/21 1355 01/29/21 1355 01/29/21 1750   Rectal Monitor Lying Right arm 70      Pain Score       01/29/21 1730       No Pain          Wt Readings from Last 1 Encounters:   01/29/21 84 kg (185 lb 3 oz)     Additional Vital Signs:   Date/Time  Temp  Pulse  Resp  BP  MAP (mmHg)  Arterial Line BP  MAP  SpO2  FiO2 (%)  O2 Device   01/30/21 0622  92 1 °F (33 4 °C)Abnormal   0Abnormal         22/12  16 mmHg         01/30/21 0600  92 1 °F (33 4 °C)Abnormal   102        56/30  36 mmHg         01/30/21 0326  91 2 °F (32 9 °C)Abnormal   100  28Abnormal   79/44Abnormal                01/30/21 0300  91 °F (32 8 °C)Abnormal   116Abnormal         90/54  64 mmHg  81 %Abnormal        01/30/21 0245  91 °F (32 8 °C)Abnormal   118Abnormal         128/70  84 mmHg  91 %       01/30/21 0000  91 °F (32 8 °C)Abnormal   128Abnormal         116/62  74 mmHg      Ventilator   01/29/21 2125    128Abnormal         114/60  74 mmHg  87 %Abnormal        01/29/21 1827  94 3 °F (34 6 °C)Abnormal   132Abnormal   14 91/40Abnormal         100 %       01/29/21 18:24:14  94 3 °F (34 6 °C)Abnormal   157Abnormal   14  106/54        100 %       01/29/21 1818  93 9 °F (34 4 °C)Abnormal   140Abnormal   14  147/63        100 %       01/29/21 1600    128Abnormal   24Abnormal   110/57  73      95 %    None (Room air)   01/29/21 1447  97 7 °F (36 5 °C)                     01/29/21 1355    140Abnormal   30Abnormal           98 %    None (Room air)       Pertinent Labs/Diagnostic Test Results:   ECG 1/29 --narrow complex supraventricular tachycardia likely atrial flutter with rapid ventricular rate  CXR 1/29 -- No acute intracranial abnormality  CT chest 1/29 -- Recurrent large loculated malignant pericardial effusion with the largest component inferior to the heart  Redemonstration of intrapericardial mass abutting the right atrium with adjacent gas from recent biopsy  Resolving right middle lobe pulmonary infarct  Slight increase in small loculated right pleural effusion  CT c/a/p 1/29 -- 1   No airspace opacities at the bilateral lung bases may represent mild aspiration pneumonitis   Previously demonstrated chest findings are stable  2   Mild ascites  CTH 1/30 -- No acute intracranial abnormality is seen  CXR 1/30 -- No other significant interval change           Results from last 7 days   Lab Units 01/30/21  0445 01/30/21  0250 01/30/21  0245 01/29/21  2142 01/29/21  1926  01/29/21  1813 01/29/21  1400  01/25/21  1713   WBC Thousand/uL 29 28* 17 30*  --  29 51*  --   --  25 03* 22 66*   < > 15 06*   HEMOGLOBIN g/dL 9 6* 6 7*  --  9 5*  --   --  7 0* 10 6*   < > 8 7*   I STAT HEMOGLOBIN g/dl  --   --  5 4*  --  7 8*   < >  --   --   --   --    HEMATOCRIT % 33 1* 23 2*  --  33 2*  --   --  26 2* 38 2   < > 28 6*   HEMATOCRIT, ISTAT %  --   --  16*  --  23*   < >  --   --   --   --    PLATELETS Thousands/uL 67* 71*  --  186  --   --  267 479*   < > 424*   TOTAL NEUT ABS Thousand/uL  --   --   -- --   --   --  17 77*  --   --   --    BANDS PCT %  --   --   --   --   --   --  4 5  --  3    < > = values in this interval not displayed  Results from last 7 days   Lab Units 01/30/21 0445 01/30/21 0245 01/30/21 0031 01/29/21 2142 01/29/21  1926  01/29/21  1556 01/29/21  1400   SODIUM mmol/L 152*  --  147* 148*  --   --  142 139   POTASSIUM mmol/L 5 3  --  5 0 5 1  --   --  5 8* 6 3*   CHLORIDE mmol/L 113*  --  108 111*  --   --  108 110*   CO2 mmol/L 6*  --  7* 8*  --   --  10* 10*   CO2, I-STAT mmol/L  --  21  --   --  18*   < >  --   --    ANION GAP mmol/L 33*  --  32* 29*  --   --  24* 19*   BUN mg/dL 32*  --  35* 34*  --   --  32* 31*   CREATININE mg/dL 2 78*  --  2 88* 2 75*  --   --  2 81* 2 71*   EGFR ml/min/1 73sq m 19  --  18 19  --   --  19 19   CALCIUM mg/dL 8 8  --  8 9 9 6  --   --  9 0 9 9   CALCIUM, IONIZED mmol/L 1 01*  --  1 10*  --   --   --   --   --    MAGNESIUM mg/dL 2 9*  --   --  3 5*  --   --   --   --    PHOSPHORUS mg/dL 8 8*  --   --  8 6*  --   --   --   --     < > = values in this interval not displayed       Results from last 7 days   Lab Units 01/29/21  1400   AST U/L 163*   ALT U/L 71   ALK PHOS U/L 91   TOTAL PROTEIN g/dL 7 3   ALBUMIN g/dL 3 4*   TOTAL BILIRUBIN mg/dL 3 01*     Results from last 7 days   Lab Units 01/29/21  1812 01/29/21  1721 01/29/21  1648 01/25/21  1741   POC GLUCOSE mg/dl 85 85 98 112     Results from last 7 days   Lab Units 01/30/21 0445 01/30/21 0031 01/29/21 2142 01/29/21  1556 01/29/21  1400 01/27/21  0451 01/26/21  0522   GLUCOSE RANDOM mg/dL 230* 217* 232* 29* 51* 138 108     Results from last 7 days   Lab Units 01/30/21  0445 01/30/21  0101 01/29/21  2142   PH ART  7 013* 6 993* 7 137*   PCO2 ART mm Hg 19 5* 19 2* 21 1*   PO2 ART mm Hg 182 3* 227 2* 204 9*   HCO3 ART mmol/L 4 8* 4 6* 7 0*   BASE EXC ART mmol/L -24 5 -25 2 -20 3   O2 CONTENT ART mL/dL 14 8* 15 1* 14 9*   O2 HGB, ARTERIAL % 98 1* 98 5* 98 4*   ABG SOURCE  Line, Arterial Line, Arterial Line, Arterial     Results from last 7 days   Lab Units 01/29/21  2300 01/29/21  1642   PH MOO  7 035* 6 967*   PCO2 MOO mm Hg 26 8* 32 1*   PO2 MOO mm Hg 48 5* 36 2   HCO3 MOO mmol/L 7 0* 7 2*   BASE EXC MOO mmol/L -22 3 -23 3   O2 CONTENT MOO ml/dL 10 1 5 4   O2 HGB, VENOUS % 67 4 39 5*     Results from last 7 days   Lab Units 01/30/21  0245 01/29/21  1926 01/29/21  1818   PH, MOO I-STAT   --  7 074* 6 971*   PCO2, MOO ISTAT mm HG  --  56 7* 41 6*   PO2, MOO ISTAT mm HG  --  29 0* 40 0   HCO3, MOO ISTAT mmol/L  --  16 6* 9 6*   I STAT BASE EXC mmol/L -5* -13* -21*   I STAT O2 SAT % 100* 34* 49*   ISTAT PH ART  7 406  --   --    I STAT ART PCO2 mm HG 31 3*  --   --    I STAT ART PO2 mm  0*  --   --    I STAT ART HCO3 mmol/L 19 6*  --   --          Results from last 7 days   Lab Units 01/29/21  1400   TROPONIN I ng/mL <0 02     Results from last 7 days   Lab Units 01/27/21  0451 01/26/21  2014 01/26/21  1317  01/25/21  2144   PROTIME seconds  --   --   --   --  18 5*   INR   --   --   --   --  1 55*   PTT seconds 121* 75* 138*   < > 24    < > = values in this interval not displayed  Results from last 7 days   Lab Units 01/29/21  1556   TSH 3RD GENERATON uIU/mL 2 150     Results from last 7 days   Lab Units 01/29/21  2142 01/29/21  1835 01/29/21  1642   LACTIC ACID mmol/L 22 1* 17 8* 16 2*     Results from last 7 days   Lab Units 01/29/21  1400   NT-PRO BNP pg/mL 14,676*       Results from last 7 days   Lab Units 01/30/21  0031   ETHANOL LVL mg/dL <3   ACETAMINOPHEN LVL ug/mL 3*   SALICYLATE LVL mg/dL <3*     Results from last 7 days   Lab Units 01/29/21  1752   BLOOD CULTURE  No Growth at 48 hrs  No Growth at 48 hrs       Results from last 7 days   Lab Units 01/29/21  1813 01/29/21  1400   TOTAL COUNTED  100 100       ED Treatment:   Medication Administration from 01/29/2021 1344 to 01/29/2021 2054       Date/Time Order Dose Route Action     01/29/2021 1456 sodium chloride 0 9 % bolus 500 mL 500 mL Intravenous New Bag     01/29/2021 1600 dextrose infusion 10 % bolus 250 mL Intravenous New Bag     01/29/2021 1757 ceftriaxone (ROCEPHIN) 1 g/50 mL in dextrose IVPB 1,000 mg Intravenous New Bag     01/29/2021 1827 multi-electrolyte (ISOLYTE-S PH 7 4) bolus 1,000 mL 1,000 mL Intravenous New Bag     01/29/2021 1747 etomidate (AMIDATE) 2 mg/mL injection 20 mg 20 mg Intravenous Given     01/29/2021 1747 vecuronium (NORCURON) injection 10 mg 10 mg Intravenous Given     01/29/2021 1838 midazolam (VERSED) 0 5 mg/mL (STANDARD CONCENTRATION) 100 mL infusion 1 mg/hr Intravenous New Bag     01/29/2021 1822 fentaNYL 1000 mcg in sodium chloride 0 9% 100mL infusion 25 mcg/hr Intravenous New Bag     01/29/2021 1808 EPINEPHrine (ADRENALIN) injection SOSY 1 mg Intravenous Given     01/29/2021 1808 calcium chloride 1 g/10 mL injection 1 g Intravenous Given     01/29/2021 1809 sodium bicarbonate 50 mEq/50 mL injection 50 mEq Intravenous Given     01/29/2021 1815 magnesium sulfate 2 g/50 mL IVPB (premix) 2 g 2 g Intravenous New Bag     01/29/2021 1815 magnesium sulfate 1 g/2 mL injection 2 g Intravenous Given     01/29/2021 1823 multi-electrolyte (ISOLYTE-S PH 7 4) bolus 1,000 mL Intravenous New Bag     01/29/2021 1820 norepinephrine (LEVOPHED) 4 mg in sodium chloride 0 9 % 250 mL infusion 10 mcg/min Intravenous New Bag     01/29/2021 1937 sodium bicarbonate 150 mEq in dextrose 5 % 1,000 mL infusion 125 mL/hr Intravenous New Bag     01/29/2021 1915 EPINEPHrine (ADRENALIN) injection SOSY 1 mg Intravenous Given     01/29/2021 1916 calcium chloride 1 g/10 mL injection 1 g Intravenous Given     01/29/2021 1919 sodium bicarbonate 50 mEq/50 mL injection 50 mEq Intravenous Given     01/29/2021 1916 sodium bicarbonate 50 mEq/50 mL injection 50 mEq Intravenous Given     01/29/2021 1925 dextrose 25 g/50 mL IV solution 25 g Intravenous Given     01/29/2021 2000 norepinephrine (LEVOPHED) 4 mg (STANDARD CONCENTRATION) IV in sodium chloride 0 9% 250 mL 20 mcg/min Intravenous New Bag     01/29/2021 2030 EPINEPHrine 3000 mcg (STANDARD CONCENTRATION) IV in sodium chloride 0 9% 250 mL 2 mcg/min Intravenous Continue to Inpatient Floor     Past Medical History:   Diagnosis Date    Arthritis     Hypertension      Admitting Diagnosis: SOB (shortness of breath) [R06 02]  Acute renal failure (ARF) (HCC) [N17 9]  Carcinoma of central portion of female breast, left (HCC) [C50 112]  Pericardial effusion without cardiac tamponade [I31 3]  Age/Sex: 65 y o  female       Frequency   pantoprazole (PROTONIX) injection 40 mg Every 24 hours scheduled   polyethylene glycol (MIRALAX) packet 17 g Daily PRN   milrinone (PRIMACOR) 20 mg in 100 mL infusion (premix) Continuous   cefepime (MAXIPIME) 1,000 mg in dextrose 5 % 50 mL IVPB Every 12 hours   vancomycin (VANCOCIN) IVPB (premix in dextrose) 750 mg 150 mL Every 24 hours   metroNIDAZOLE (FLAGYL) IVPB (premix) 500 mg 100 mL Every 8 hours   hydrocortisone sodium succinate (PF) (Solu-CORTEF) injection 50 mg Every 6 hours scheduled   vasopressin (PITRESSIN) 20 Units in sodium chloride 0 9 % 100 mL infusion Continuous   NxStage K 2/Ca 3 dialysis solution (RFP-400) 20,000 mL Continuous   EPINEPHrine 3000 mcg (STANDARD CONCENTRATION) IV in sodium chloride 0 9% 250 mL Titrated   norepinephrine (LEVOPHED) 4 mg (STANDARD CONCENTRATION) IV in sodium chloride 0 9% 250 mL Titrated   midazolam (VERSED) injection 4 mg Once   fentanyl citrate (PF) 100 MCG/2ML 100 mcg Once   midazolam (VERSED) 0 5 mg/mL (STANDARD CONCENTRATION) 100 mL infusion Continuous   fentaNYL 1000 mcg in sodium chloride 0 9% 100mL infusion Continuous   etomidate (AMIDATE) 2 mg/mL injection 20 mg Once   vecuronium (NORCURON) injection 10 mg Once   norepinephrine (LEVOPHED) 1 mg/mL injection **ADS Override Pull** -   multi-electrolyte (ISOLYTE-S PH 7 4) bolus 1,000 mL Once   ceftriaxone (ROCEPHIN) 1 g/50 mL in dextrose IVPB Once   dextrose infusion 10 % bolus Once   sodium chloride 0 9 % bolus 500 mL Once       Network Utilization Review Department  ATTENTION: Please call with any questions or concerns to [phone] and carefully listen to the prompts so that you are directed to the right person  All voicemails are confidential   Ju Diaz all requests for admission clinical reviews, approved or denied determinations and any other requests to dedicated fax number below belonging to the campus where the patient is receiving treatment   List of dedicated fax numbers for the Facilities:  [address] DENIALS (Administrative/Medical Necessity) [phone]   [address] (Maternity/NICU/Pediatrics) [phone]   [address] [place] [address] Dr Huang Ramos 1854 (  Bimal Mauro "Marah" 103) [address] [address] [address] Ayo Maxi Arshad 1481 [address] [place] [address] [phone]

## 2021-02-03 LAB
BACTERIA BLD CULT: NORMAL
BACTERIA BLD CULT: NORMAL

## 2021-02-11 NOTE — ASSESSMENT & PLAN NOTE
· CT: Anterior mediastinal mass, extending into the right atrial lumen  · S/p right VATS, pericardial window with biopsy of the anterior mediastinal mass by thoracic surgery 1/21   · Friable mass seen under the pericardium with thickened pericardium

## 2021-02-11 NOTE — DISCHARGE SUMMARY
Discharge- Arturo Bustamante 1950, 65 y o  female MRN: [id number]    Unit/Bed#: [facility] 426-01 Encounter: [id number]    Primary Care Provider: Antoni Acosta MD   Date and time admitted to hospital: 1/3/2021  8:26 AM        * Atrial flutter ([address] )  Assessment & Plan  · Patient was brought to ED  due to coughing, lower extremity edema and dyspnea  · EKG showed atrial flutter, QTc prolongation  Her son passed away on 12/21/2020  · Patient was evaluated by Cardiology  · Cardiac echo with large pericardial effusion without tamponade, EF 65%  · Initially was treated with Cardizem drip, Currently off Cardizem drip,   · on p o  Metoprolol and diltiazem  · Has a PICC line due to difficult IV access  · Continue metoprolol 100 mg b i d  And Cardizem 180 mg daily  · Anticipate discharge on Xarelto      Pulmonary embolus [facility])  Assessment & Plan  Case was Discussed with radiology on 1/19  CT scan from 01/03/2021 and 01/16/2021 showed pulmonary infarct, as per note on chest x-ray of 1/16- "posterior segment right upper lobe pulmonary embolus is visible on CT scan  True emboli versus tumor emboli given tumor invading the right atrium "  Given these findings, PE was likely present on admission  Lower extremity venous Doppler negative for DVT  Discussed the risks versus benefits of anticoagulation with patient and her   They agreed to start anticoagulation and see how she does  Discussed with cardiology and thoracic surgery    Continue heparin drip  1/26-hemoglobin remains stable on heparin drip; monitor overnight and consider discharge on NOAC if remains stable    Anemia  Assessment & Plan  Patient noted to have hemoglobin as low as 6 2 since admission; yesterday hemoglobin was 8 7; repeat hemoglobin this morning was 8 5-stable  Will continue anticoagulation and monitor again tomorrow; anticipate discharge if hemoglobin remains stable with a c     Leukocytosis  Assessment & Plan  Suspect reactive leukocytosis, improving  Continue to monitor      Metastatic GIST  Assessment & Plan  CT on admission shows: Innumerable vague hypodensities throughout the liver suspicious for metastases  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  No primary seen on CT scan  Gastroenterology on board  Oncology on board  Recommended Oral tyrosine kinase inhibitor that can be started outpatient   Patient had biopsy-proven GIST liver lesion, positive atypical cells from the pericardial fluid  And noted mediastinal mass  Patient seems to have high burden of GIST  Unknown primary at this point  EGD/colonoscopy yesterday negative; pill endoscopy also negative  Patient to follow with Oncology in the outpatient setting; plan for FDG-PET as per GI in the outpatient setting    Pain and swelling of right upper extremity  Assessment & Plan  Right upper extremity swelling noted   US obtained that shows possible hematoma vs  Proximal biceps tendon repair  General surgery consult appreciated- recommends conservative managment and holding AC  Patient does not have motor strength loss  neurovascular intact  Pain is significantly improved since last night  Patient is able to extend her shoulder, slightly limited due to pain  At this point, patient will follow up outpatient   If persistent in 3 months, may obtain MRI  Swelling has decreased significantly     Mediastinal mass  Assessment & Plan  · CT: Anterior mediastinal mass, extending into the right atrial lumen  · S/p right VATS, pericardial window with biopsy of the anterior mediastinal mass by thoracic surgery 1/21   · Friable mass seen under the pericardium with thickened pericardium         Pericardial effusion without cardiac tamponade  Assessment & Plan  Evaluated by thoracic surgery  Underwent pericardial drain placement 01/04/2021, continued to have significant drainage  Status post right VATS, pericardial window with biopsy of the anterior mediastinal mass on 01/21/2021  According to op note there was a friable mass under the pericardium  Echo from 01/22 showed trace pericardial effusion, no evidence of hemodynamic compromise  CT out 1/24/21  Repeat echo 1/25 showed: "There was mild to moderate multi-loculated pockets of pericardial effusions around the heart  There was no evidence of hemodynamic compromise "   cleared by thoracic surgery for discharge status post chest tube removal        Essential hypertension  Assessment & Plan  Blood pressure well controlled; continue present therapy      Discharging Physician / Practitioner: Julia Figueroa MD  PCP: Hayley Acosta MD  Admission Date:   Admission Orders (From admission, onward)     Ordered        01/03/21 0847  Inpatient Admission  Once                   Discharge Date: 01/27/21    Resolved Problems  Date Reviewed: 1/26/2021          Resolved    Shortness of breath 1/8/2021     Resolved by  Stephanie Tate MD    Acute kidney injury ([facility] [address] ) 1/25/2021     Resolved by  Claribel Tirado MD          Consultations During Hospital Stay:  ·  Cardiology, Gastroenterology, thoracic surgery    Procedures Performed:   ·  EGD, colonoscopy, capsule colonoscopy, pericardial window, VATs    Significant Findings / Test Results:   ·  CTA on admission:  "Large pericardial effusion measuring approximately 3 3 cm area of bulging of the right pericardium or a potential adjacent right mediastinal mass measuring approximately 6 6 cm #2/100  The pericardial effusion may be on a malignant basis      Prominent diffuse mediastinal adenopathy     Interlobular septal thickening and small pleural effusions in keeping with pulmonary edema      Posterior right upper lobe consolidation #3/55 in a wedge-shaped appearance with a apex extending towards the right hilum may represent pneumonia or a postobstructive pneumonitis from a central mass  A discrete mass is not identified but could be   obscured     Innumerable vague hypodensities throughout the liver are suspicious for metastases "      Liver biopsy:  "Liver, Mass, Biopsy:  - Spindle cell neoplasm, 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 "      Pericardial effusion:   "Final Diagnosis  A -B  Pericardial Fluid (Thin-prep and cell block preparations):  Positive for malignancy  Few groups of atypical, CD34 positive, spindle cells consistent with angiosarcoma "    Incidental Findings:   ·  see above    Test Results Pending at Discharge (will require follow up):   ·  none     Outpatient Tests Requested:   PET scan to characterize GIST tumor; CBC/CMP    Complications:      Reason for Admission:  Cough, atrial flutter    Hospital Course:      as per H&P; HPI:    "Dawna Lam is a 65 y o  female who presents with history of htn, lower ext edema presented with cough  She says her  noted it and brought her to ED, where they found her to be in a flutter, lower edema and transferred here  She was having no symptoms  She say she has been stressed since 2020 as her son passed away  She is making  arrangements "    Very sad case were patient was initially diagnosed with metastatic GIST cancer on this admission; imaging showed pericardial effusion versus mediastinal mass  Patient underwent pericardial window with initial resolution of pericardial fluid  GI performed EGD, colonoscopy, and pill endoscopy on 2021 with no findings suggestive of GIST tumor in the GI tract  Patient treated for PE as well as acute kidney injury which resolved during this admission  Cleared by thoracic surgery Cardiology for discharge; continue anticoagulation for PE and atrial fibrillation  Please see above list of diagnoses and related plan for additional information       Condition at Discharge: poor     Discharge Day Visit / Exam:     * Please refer to separate progress note for these details *    Discussion with Family: Discussed treatment plan with family and patient who agree with current plan; encouraged to ask questions and participate  Discharge instructions/Information to patient and family:   See after visit summary for information provided to patient and family  Provisions for Follow-Up Care:  See after visit summary for information related to follow-up care and any pertinent home health orders  Disposition:     Home with VNA Services (Reminder: Complete face to face encounter)    For Discharges to [facility]:   · Not Applicable to this Patient - Not Applicable to this Patient    Planned Readmission:  None     Discharge Statement:  I spent 45 minutes discharging the patient  This time was spent on the day of discharge  I had direct contact with the patient on the day of discharge  Greater than 50% of the total time was spent examining patient, answering all patient questions, arranging and discussing plan of care with patient as well as directly providing post-discharge instructions  Additional time then spent on discharge activities  Discharge Medications:  See after visit summary for reconciled discharge medications provided to patient and family        ** Please Note: This note has been constructed using a voice recognition system **

## 2021-02-22 LAB
MYCOBACTERIUM SPEC CULT: NORMAL
RHODAMINE-AURAMINE STN SPEC: NORMAL

## (undated) DEVICE — SINGLE USE SUCTION VALVE MAJ-209: Brand: SINGLE USE SUCTION VALVE (STERILE)

## (undated) DEVICE — ANTI-FOG SOLUTION WITH FOAM PAD: Brand: DEVON

## (undated) DEVICE — TELFA NON-ADHERENT ABSORBENT DRESSING: Brand: TELFA

## (undated) DEVICE — TUBING SUCTION 5MM X 12 FT

## (undated) DEVICE — 3M™ STERI-STRIP™ REINFORCED ADHESIVE SKIN CLOSURES, R1547, 1/2 IN X 4 IN (12 MM X 100 MM), 6 STRIPS/ENVELOPE: Brand: 3M™ STERI-STRIP™

## (undated) DEVICE — INTENDED FOR TISSUE SEPARATION, AND OTHER PROCEDURES THAT REQUIRE A SHARP SURGICAL BLADE TO PUNCTURE OR CUT.: Brand: BARD-PARKER SAFETY BLADES SIZE 15, STERILE

## (undated) DEVICE — SUT ETHILON 3-0 FS-1 18 IN 663G

## (undated) DEVICE — HEAVY DUTY TABLE COVER: Brand: CONVERTORS

## (undated) DEVICE — FIRST STEP BEDSIDE KIT - STAND-UP POUCH, ENDOSCOPIC CLEANING PAD - 1 POUCH: Brand: FIRST STEP BEDSIDE KIT - STAND-UP POUCH, ENDOSCOPIC CLEANING PAD

## (undated) DEVICE — 28 FR STRAIGHT – SOFT PVC CATHETER: Brand: PVC THORACIC CATHETERS

## (undated) DEVICE — INTENDED FOR TISSUE SEPARATION, AND OTHER PROCEDURES THAT REQUIRE A SHARP SURGICAL BLADE TO PUNCTURE OR CUT.: Brand: BARD-PARKER SAFETY BLADES SIZE 10, STERILE

## (undated) DEVICE — ELECTRODE BLADE MOD E-Z CLEAN 2.5IN 6.4CM -0012M

## (undated) DEVICE — DRAPE FLUID WARMER (BIRD BATH)

## (undated) DEVICE — 2000CC GUARDIAN II: Brand: GUARDIAN

## (undated) DEVICE — CHLORAPREP HI-LITE 26ML ORANGE

## (undated) DEVICE — 3M™ TEGADERM™ TRANSPARENT FILM DRESSING FRAME STYLE, 1626W, 4 IN X 4-3/4 IN (10 CM X 12 CM), 50/CT 4CT/CASE: Brand: 3M™ TEGADERM™

## (undated) DEVICE — GLOVE INDICATOR PI UNDERGLOVE SZ 8 BLUE

## (undated) DEVICE — ADHESIVE SKIN HIGH VISCOSITY EXOFIN 1ML

## (undated) DEVICE — MEDI-VAC YANK SUCT HNDL W/TPRD BULBOUS TIP: Brand: CARDINAL HEALTH

## (undated) DEVICE — NEEDLE SPINAL 20G X 3.5 LF

## (undated) DEVICE — ADAPTOR TRACH SWIVEL

## (undated) DEVICE — NEEDLE 25G X 1 1/2

## (undated) DEVICE — UTILITY MARKER,BLACK WITH LABELS: Brand: DEVON

## (undated) DEVICE — SINGLE USE BIOPSY VALVE MAJ-210: Brand: SINGLE USE BIOPSY VALVE (STERILE)

## (undated) DEVICE — 3M™ IOBAN™ 2 ANTIMICROBIAL INCISE DRAPE 6650EZ: Brand: IOBAN™ 2

## (undated) DEVICE — SUT VICRYL 2-0 CT-1 27 IN J259H

## (undated) DEVICE — PAD GROUNDING ADULT

## (undated) DEVICE — GLOVE SRG BIOGEL ECLIPSE 7.5

## (undated) DEVICE — SUT MONOCRYL 4-0 PS-2 18 IN Y496G

## (undated) DEVICE — STERILE THORACIC PACK: Brand: CARDINAL HEALTH

## (undated) DEVICE — SPONGE TONSIL STRUNG 7/8 IN X-RAY DETECT

## (undated) DEVICE — 3000CC GUARDIAN II: Brand: GUARDIAN

## (undated) DEVICE — WOUND RETRACTOR AND PROTECTOR: Brand: ALEXIS WOUND PROTECTOR-RETRACTOR

## (undated) DEVICE — ADHESIVE SKN CLSR HISTOACRYL FLEX 0.5ML LF

## (undated) DEVICE — ROSEBUD DISSECTORS: Brand: DEROYAL

## (undated) DEVICE — PLUMEPEN PRO 10FT

## (undated) DEVICE — PENCIL ELECTROSURG E-Z CLEAN -0035H

## (undated) DEVICE — SUT VICRYL 3-0 SH 27 IN J416H

## (undated) DEVICE — BETHLEHEM MAJOR GENERAL PACK: Brand: CARDINAL HEALTH

## (undated) DEVICE — GAUZE SPONGES,16 PLY: Brand: CURITY

## (undated) DEVICE — SUT VICRYL 2-0 SH 27 IN UNDYED J417H

## (undated) DEVICE — SUT VICRYL 0 CT-1 27 IN J260H

## (undated) DEVICE — SURGICEL 4 X 8

## (undated) DEVICE — HEMOSTATIC MATRIX SURGIFLO 8ML W/THROMBIN

## (undated) DEVICE — SUT PROLENE 0 CT-1 30 IN 8424H

## (undated) DEVICE — SYRINGE 10ML SLIP TIP LF